# Patient Record
Sex: MALE | Race: WHITE | NOT HISPANIC OR LATINO | URBAN - METROPOLITAN AREA
[De-identification: names, ages, dates, MRNs, and addresses within clinical notes are randomized per-mention and may not be internally consistent; named-entity substitution may affect disease eponyms.]

---

## 2017-01-13 ENCOUNTER — AMBULATORY - HEALTHEAST (OUTPATIENT)
Dept: INFUSION THERAPY | Facility: HOSPITAL | Age: 82
End: 2017-01-13

## 2017-01-13 ENCOUNTER — OFFICE VISIT - HEALTHEAST (OUTPATIENT)
Dept: ONCOLOGY | Facility: HOSPITAL | Age: 82
End: 2017-01-13

## 2017-01-13 DIAGNOSIS — C91.10 CLL (CHRONIC LYMPHOCYTIC LEUKEMIA) (H): ICD-10-CM

## 2017-01-16 ENCOUNTER — COMMUNICATION - HEALTHEAST (OUTPATIENT)
Dept: ONCOLOGY | Facility: HOSPITAL | Age: 82
End: 2017-01-16

## 2017-01-23 ENCOUNTER — AMBULATORY - HEALTHEAST (OUTPATIENT)
Dept: ONCOLOGY | Facility: CLINIC | Age: 82
End: 2017-01-23

## 2017-01-23 DIAGNOSIS — C91.10 CLL (CHRONIC LYMPHOCYTIC LEUKEMIA) (H): ICD-10-CM

## 2017-01-24 ENCOUNTER — COMMUNICATION - HEALTHEAST (OUTPATIENT)
Dept: ONCOLOGY | Facility: HOSPITAL | Age: 82
End: 2017-01-24

## 2017-01-27 ENCOUNTER — AMBULATORY - HEALTHEAST (OUTPATIENT)
Dept: INFUSION THERAPY | Facility: HOSPITAL | Age: 82
End: 2017-01-27

## 2017-01-27 DIAGNOSIS — C91.10 CLL (CHRONIC LYMPHOCYTIC LEUKEMIA) (H): ICD-10-CM

## 2017-01-30 ENCOUNTER — COMMUNICATION - HEALTHEAST (OUTPATIENT)
Dept: ONCOLOGY | Facility: HOSPITAL | Age: 82
End: 2017-01-30

## 2017-01-30 LAB
LAB AP CHARGES (HE HISTORICAL CONVERSION): ABNORMAL
PATH REPORT.COMMENTS IMP SPEC: ABNORMAL
PATH REPORT.COMMENTS IMP SPEC: ABNORMAL
PATH REPORT.FINAL DX SPEC: ABNORMAL
PATH REPORT.MICROSCOPIC SPEC OTHER STN: ABNORMAL
RESULT FLAG (HE HISTORICAL CONVERSION): ABNORMAL

## 2017-02-03 ENCOUNTER — COMMUNICATION - HEALTHEAST (OUTPATIENT)
Dept: FAMILY MEDICINE | Facility: CLINIC | Age: 82
End: 2017-02-03

## 2017-02-03 DIAGNOSIS — E78.5 HYPERLIPIDEMIA: ICD-10-CM

## 2017-02-07 ENCOUNTER — COMMUNICATION - HEALTHEAST (OUTPATIENT)
Dept: FAMILY MEDICINE | Facility: CLINIC | Age: 82
End: 2017-02-07

## 2017-02-14 ENCOUNTER — AMBULATORY - HEALTHEAST (OUTPATIENT)
Dept: ONCOLOGY | Facility: HOSPITAL | Age: 82
End: 2017-02-14

## 2017-02-20 ENCOUNTER — COMMUNICATION - HEALTHEAST (OUTPATIENT)
Dept: FAMILY MEDICINE | Facility: CLINIC | Age: 82
End: 2017-02-20

## 2017-02-20 ENCOUNTER — OFFICE VISIT - HEALTHEAST (OUTPATIENT)
Dept: FAMILY MEDICINE | Facility: CLINIC | Age: 82
End: 2017-02-20

## 2017-02-20 DIAGNOSIS — C91.10 CLL (CHRONIC LYMPHOCYTIC LEUKEMIA) (H): ICD-10-CM

## 2017-02-20 DIAGNOSIS — R13.12 OROPHARYNGEAL DYSPHAGIA: ICD-10-CM

## 2017-02-20 DIAGNOSIS — J02.9 SORE THROAT: ICD-10-CM

## 2017-02-20 DIAGNOSIS — B34.9 VIRAL SYNDROME: ICD-10-CM

## 2017-02-21 ENCOUNTER — RECORDS - HEALTHEAST (OUTPATIENT)
Dept: ADMINISTRATIVE | Facility: OTHER | Age: 82
End: 2017-02-21

## 2017-02-22 ENCOUNTER — RECORDS - HEALTHEAST (OUTPATIENT)
Dept: ADMINISTRATIVE | Facility: OTHER | Age: 82
End: 2017-02-22

## 2017-02-24 ENCOUNTER — HOSPITAL ENCOUNTER (OUTPATIENT)
Dept: RADIOLOGY | Facility: HOSPITAL | Age: 82
Discharge: HOME | End: 2017-02-24
Attending: INTERNAL MEDICINE

## 2017-02-24 DIAGNOSIS — K44.9 HIATAL HERNIA: ICD-10-CM

## 2017-02-24 DIAGNOSIS — R13.10 DYSPHAGIA, UNSPECIFIED TYPE: ICD-10-CM

## 2017-02-25 ENCOUNTER — OFFICE VISIT - HEALTHEAST (OUTPATIENT)
Dept: FAMILY MEDICINE | Facility: CLINIC | Age: 82
End: 2017-02-25

## 2017-02-25 DIAGNOSIS — C91.10 CLL (CHRONIC LYMPHOCYTIC LEUKEMIA) (H): ICD-10-CM

## 2017-02-25 DIAGNOSIS — L03.012 PARONYCHIA OF FINGER OF LEFT HAND: ICD-10-CM

## 2017-02-27 ENCOUNTER — RECORDS - HEALTHEAST (OUTPATIENT)
Dept: ADMINISTRATIVE | Facility: OTHER | Age: 82
End: 2017-02-27

## 2017-02-28 ENCOUNTER — HOSPITAL ENCOUNTER (OUTPATIENT)
Dept: CT IMAGING | Facility: HOSPITAL | Age: 82
Discharge: HOME | End: 2017-02-28
Attending: INTERNAL MEDICINE

## 2017-02-28 DIAGNOSIS — K44.9 PARAESOPHAGEAL HIATAL HERNIA: ICD-10-CM

## 2017-03-01 ENCOUNTER — RECORDS - HEALTHEAST (OUTPATIENT)
Dept: ADMINISTRATIVE | Facility: OTHER | Age: 82
End: 2017-03-01

## 2017-03-02 ENCOUNTER — COMMUNICATION - HEALTHEAST (OUTPATIENT)
Dept: ONCOLOGY | Facility: HOSPITAL | Age: 82
End: 2017-03-02

## 2017-03-03 ENCOUNTER — RECORDS - HEALTHEAST (OUTPATIENT)
Dept: ADMINISTRATIVE | Facility: OTHER | Age: 82
End: 2017-03-03

## 2017-03-03 ENCOUNTER — HOSPITAL ENCOUNTER (OUTPATIENT)
Dept: CT IMAGING | Facility: HOSPITAL | Age: 82
Discharge: HOME | End: 2017-03-03
Attending: INTERNAL MEDICINE

## 2017-03-03 ENCOUNTER — OFFICE VISIT - HEALTHEAST (OUTPATIENT)
Dept: FAMILY MEDICINE | Facility: CLINIC | Age: 82
End: 2017-03-03

## 2017-03-03 DIAGNOSIS — F43.20 ADJUSTMENT DISORDER: ICD-10-CM

## 2017-03-03 DIAGNOSIS — C91.10 CLL (CHRONIC LYMPHOCYTIC LEUKEMIA) (H): ICD-10-CM

## 2017-03-03 DIAGNOSIS — R63.4 WEIGHT LOSS: ICD-10-CM

## 2017-03-03 DIAGNOSIS — R93.5 ABNORMAL ABDOMINAL CT SCAN: ICD-10-CM

## 2017-03-03 DIAGNOSIS — R13.10 SWALLOWING DYSFUNCTION: ICD-10-CM

## 2017-03-03 DIAGNOSIS — K44.9 PARAESOPHAGEAL HIATAL HERNIA: ICD-10-CM

## 2017-03-06 ENCOUNTER — RECORDS - HEALTHEAST (OUTPATIENT)
Dept: ADMINISTRATIVE | Facility: OTHER | Age: 82
End: 2017-03-06

## 2017-03-08 ENCOUNTER — COMMUNICATION - HEALTHEAST (OUTPATIENT)
Dept: FAMILY MEDICINE | Facility: CLINIC | Age: 82
End: 2017-03-08

## 2017-03-09 ENCOUNTER — RECORDS - HEALTHEAST (OUTPATIENT)
Dept: ADMINISTRATIVE | Facility: OTHER | Age: 82
End: 2017-03-09

## 2017-03-10 ENCOUNTER — RECORDS - HEALTHEAST (OUTPATIENT)
Dept: ADMINISTRATIVE | Facility: OTHER | Age: 82
End: 2017-03-10

## 2017-03-13 ENCOUNTER — HOSPITAL ENCOUNTER (OUTPATIENT)
Dept: RADIOLOGY | Facility: HOSPITAL | Age: 82
Discharge: HOME | End: 2017-03-13
Attending: FAMILY MEDICINE

## 2017-03-13 DIAGNOSIS — R13.10 SWALLOWING DYSFUNCTION: ICD-10-CM

## 2017-03-14 ENCOUNTER — COMMUNICATION - HEALTHEAST (OUTPATIENT)
Dept: FAMILY MEDICINE | Facility: CLINIC | Age: 82
End: 2017-03-14

## 2017-03-15 ENCOUNTER — COMMUNICATION - HEALTHEAST (OUTPATIENT)
Dept: ONCOLOGY | Facility: HOSPITAL | Age: 82
End: 2017-03-15

## 2017-03-15 ENCOUNTER — AMBULATORY - HEALTHEAST (OUTPATIENT)
Dept: FAMILY MEDICINE | Facility: CLINIC | Age: 82
End: 2017-03-15

## 2017-03-15 DIAGNOSIS — R13.10 SWALLOWING DYSFUNCTION: ICD-10-CM

## 2017-03-22 ENCOUNTER — OFFICE VISIT - HEALTHEAST (OUTPATIENT)
Dept: SPEECH THERAPY | Facility: REHABILITATION | Age: 82
End: 2017-03-22

## 2017-03-22 DIAGNOSIS — R13.10 SWALLOWING DYSFUNCTION: ICD-10-CM

## 2017-03-22 DIAGNOSIS — R13.13 DYSPHAGIA, PHARYNGEAL PHASE: ICD-10-CM

## 2017-03-27 ENCOUNTER — OFFICE VISIT - HEALTHEAST (OUTPATIENT)
Dept: SPEECH THERAPY | Facility: REHABILITATION | Age: 82
End: 2017-03-27

## 2017-03-27 DIAGNOSIS — R13.10 SWALLOWING DYSFUNCTION: ICD-10-CM

## 2017-03-27 DIAGNOSIS — R13.13 DYSPHAGIA, PHARYNGEAL PHASE: ICD-10-CM

## 2017-03-31 ENCOUNTER — COMMUNICATION - HEALTHEAST (OUTPATIENT)
Dept: FAMILY MEDICINE | Facility: CLINIC | Age: 82
End: 2017-03-31

## 2017-03-31 DIAGNOSIS — I10 HYPERTENSION: ICD-10-CM

## 2017-04-14 ENCOUNTER — AMBULATORY - HEALTHEAST (OUTPATIENT)
Dept: INFUSION THERAPY | Facility: HOSPITAL | Age: 82
End: 2017-04-14

## 2017-04-14 ENCOUNTER — OFFICE VISIT - HEALTHEAST (OUTPATIENT)
Dept: ONCOLOGY | Facility: HOSPITAL | Age: 82
End: 2017-04-14

## 2017-04-14 DIAGNOSIS — C91.10 CLL (CHRONIC LYMPHOCYTIC LEUKEMIA) (H): ICD-10-CM

## 2017-04-14 DIAGNOSIS — D72.829 LEUKOCYTOSIS: ICD-10-CM

## 2017-04-14 DIAGNOSIS — D63.8 ANEMIA, CHRONIC DISEASE: ICD-10-CM

## 2017-04-14 DIAGNOSIS — D69.6 THROMBOCYTOPENIA (H): ICD-10-CM

## 2017-05-15 ENCOUNTER — COMMUNICATION - HEALTHEAST (OUTPATIENT)
Dept: SPEECH THERAPY | Facility: REHABILITATION | Age: 82
End: 2017-05-15

## 2017-05-25 ENCOUNTER — COMMUNICATION - HEALTHEAST (OUTPATIENT)
Dept: FAMILY MEDICINE | Facility: CLINIC | Age: 82
End: 2017-05-25

## 2017-05-25 DIAGNOSIS — I25.10 CORONARY ARTERY DISEASE: ICD-10-CM

## 2017-06-05 ENCOUNTER — RECORDS - HEALTHEAST (OUTPATIENT)
Dept: ADMINISTRATIVE | Facility: OTHER | Age: 82
End: 2017-06-05

## 2017-07-14 ENCOUNTER — OFFICE VISIT - HEALTHEAST (OUTPATIENT)
Dept: ONCOLOGY | Facility: HOSPITAL | Age: 82
End: 2017-07-14

## 2017-07-14 ENCOUNTER — AMBULATORY - HEALTHEAST (OUTPATIENT)
Dept: INFUSION THERAPY | Facility: HOSPITAL | Age: 82
End: 2017-07-14

## 2017-07-14 DIAGNOSIS — C73 MALIGNANT NEOPLASM OF THYROID GLAND (H): ICD-10-CM

## 2017-07-14 DIAGNOSIS — C91.10 CLL (CHRONIC LYMPHOCYTIC LEUKEMIA) (H): ICD-10-CM

## 2017-07-18 ENCOUNTER — AMBULATORY - HEALTHEAST (OUTPATIENT)
Dept: INFUSION THERAPY | Facility: HOSPITAL | Age: 82
End: 2017-07-18

## 2017-07-18 ENCOUNTER — INFUSION - HEALTHEAST (OUTPATIENT)
Dept: INFUSION THERAPY | Facility: HOSPITAL | Age: 82
End: 2017-07-18

## 2017-07-18 DIAGNOSIS — C91.10 CLL (CHRONIC LYMPHOCYTIC LEUKEMIA) (H): ICD-10-CM

## 2017-07-18 LAB — HBV SURFACE AG SERPL QL IA: NEGATIVE

## 2017-07-18 ASSESSMENT — MIFFLIN-ST. JEOR: SCORE: 1551.2

## 2017-07-19 LAB — HBV CORE AB SERPL QL IA: NEGATIVE

## 2017-07-25 ENCOUNTER — AMBULATORY - HEALTHEAST (OUTPATIENT)
Dept: INFUSION THERAPY | Facility: HOSPITAL | Age: 82
End: 2017-07-25

## 2017-07-25 ENCOUNTER — INFUSION - HEALTHEAST (OUTPATIENT)
Dept: INFUSION THERAPY | Facility: HOSPITAL | Age: 82
End: 2017-07-25

## 2017-07-25 ENCOUNTER — OFFICE VISIT - HEALTHEAST (OUTPATIENT)
Dept: ONCOLOGY | Facility: HOSPITAL | Age: 82
End: 2017-07-25

## 2017-07-25 DIAGNOSIS — C91.10 CLL (CHRONIC LYMPHOCYTIC LEUKEMIA) (H): ICD-10-CM

## 2017-08-01 ENCOUNTER — INFUSION - HEALTHEAST (OUTPATIENT)
Dept: INFUSION THERAPY | Facility: HOSPITAL | Age: 82
End: 2017-08-01

## 2017-08-01 DIAGNOSIS — C91.10 CLL (CHRONIC LYMPHOCYTIC LEUKEMIA) (H): ICD-10-CM

## 2017-08-02 ENCOUNTER — COMMUNICATION - HEALTHEAST (OUTPATIENT)
Dept: ONCOLOGY | Facility: HOSPITAL | Age: 82
End: 2017-08-02

## 2017-08-02 DIAGNOSIS — C91.10 CLL (CHRONIC LYMPHOCYTIC LEUKEMIA) (H): ICD-10-CM

## 2017-08-02 DIAGNOSIS — R11.0 NAUSEA: ICD-10-CM

## 2017-08-03 ENCOUNTER — COMMUNICATION - HEALTHEAST (OUTPATIENT)
Dept: FAMILY MEDICINE | Facility: CLINIC | Age: 82
End: 2017-08-03

## 2017-08-03 DIAGNOSIS — E78.5 HYPERLIPIDEMIA: ICD-10-CM

## 2017-08-07 ENCOUNTER — INFUSION - HEALTHEAST (OUTPATIENT)
Dept: INFUSION THERAPY | Facility: CLINIC | Age: 82
End: 2017-08-07

## 2017-08-07 ENCOUNTER — AMBULATORY - HEALTHEAST (OUTPATIENT)
Dept: INFUSION THERAPY | Facility: CLINIC | Age: 82
End: 2017-08-07

## 2017-08-07 ENCOUNTER — OFFICE VISIT - HEALTHEAST (OUTPATIENT)
Dept: ONCOLOGY | Facility: CLINIC | Age: 82
End: 2017-08-07

## 2017-08-07 DIAGNOSIS — Z51.11 CHEMOTHERAPY MANAGEMENT, ENCOUNTER FOR: ICD-10-CM

## 2017-08-07 DIAGNOSIS — C91.10 CLL (CHRONIC LYMPHOCYTIC LEUKEMIA) (H): ICD-10-CM

## 2017-08-07 ASSESSMENT — MIFFLIN-ST. JEOR: SCORE: 1540.76

## 2017-08-10 ENCOUNTER — COMMUNICATION - HEALTHEAST (OUTPATIENT)
Dept: ONCOLOGY | Facility: CLINIC | Age: 82
End: 2017-08-10

## 2017-08-14 ENCOUNTER — OFFICE VISIT - HEALTHEAST (OUTPATIENT)
Dept: ONCOLOGY | Facility: HOSPITAL | Age: 82
End: 2017-08-14

## 2017-08-14 ENCOUNTER — AMBULATORY - HEALTHEAST (OUTPATIENT)
Dept: INFUSION THERAPY | Facility: HOSPITAL | Age: 82
End: 2017-08-14

## 2017-08-14 DIAGNOSIS — C91.10 CLL (CHRONIC LYMPHOCYTIC LEUKEMIA) (H): ICD-10-CM

## 2017-09-08 ENCOUNTER — RECORDS - HEALTHEAST (OUTPATIENT)
Dept: ADMINISTRATIVE | Facility: OTHER | Age: 82
End: 2017-09-08

## 2017-09-13 ENCOUNTER — COMMUNICATION - HEALTHEAST (OUTPATIENT)
Dept: FAMILY MEDICINE | Facility: CLINIC | Age: 82
End: 2017-09-13

## 2017-09-13 ENCOUNTER — AMBULATORY - HEALTHEAST (OUTPATIENT)
Dept: INFUSION THERAPY | Facility: HOSPITAL | Age: 82
End: 2017-09-13

## 2017-09-13 ENCOUNTER — OFFICE VISIT - HEALTHEAST (OUTPATIENT)
Dept: ONCOLOGY | Facility: HOSPITAL | Age: 82
End: 2017-09-13

## 2017-09-13 DIAGNOSIS — C91.10 CLL (CHRONIC LYMPHOCYTIC LEUKEMIA) (H): ICD-10-CM

## 2017-09-15 ENCOUNTER — COMMUNICATION - HEALTHEAST (OUTPATIENT)
Dept: FAMILY MEDICINE | Facility: CLINIC | Age: 82
End: 2017-09-15

## 2017-10-03 ENCOUNTER — COMMUNICATION - HEALTHEAST (OUTPATIENT)
Dept: FAMILY MEDICINE | Facility: CLINIC | Age: 82
End: 2017-10-03

## 2017-10-03 DIAGNOSIS — I10 HYPERTENSION: ICD-10-CM

## 2017-10-06 ENCOUNTER — AMBULATORY - HEALTHEAST (OUTPATIENT)
Dept: INFUSION THERAPY | Facility: HOSPITAL | Age: 82
End: 2017-10-06

## 2017-10-06 ENCOUNTER — OFFICE VISIT - HEALTHEAST (OUTPATIENT)
Dept: ONCOLOGY | Facility: HOSPITAL | Age: 82
End: 2017-10-06

## 2017-10-06 DIAGNOSIS — C91.10 CLL (CHRONIC LYMPHOCYTIC LEUKEMIA) (H): ICD-10-CM

## 2017-10-29 ENCOUNTER — COMMUNICATION - HEALTHEAST (OUTPATIENT)
Dept: FAMILY MEDICINE | Facility: CLINIC | Age: 82
End: 2017-10-29

## 2017-10-29 DIAGNOSIS — E03.9 HYPOTHYROIDISM: ICD-10-CM

## 2017-10-29 DIAGNOSIS — E78.5 HYPERLIPIDEMIA: ICD-10-CM

## 2017-11-02 ENCOUNTER — COMMUNICATION - HEALTHEAST (OUTPATIENT)
Dept: FAMILY MEDICINE | Facility: CLINIC | Age: 82
End: 2017-11-02

## 2017-11-02 DIAGNOSIS — E03.9 HYPOTHYROIDISM: ICD-10-CM

## 2017-11-02 DIAGNOSIS — E78.5 HYPERLIPIDEMIA: ICD-10-CM

## 2017-11-06 ENCOUNTER — AMBULATORY - HEALTHEAST (OUTPATIENT)
Dept: LAB | Facility: CLINIC | Age: 82
End: 2017-11-06

## 2017-11-06 DIAGNOSIS — E03.9 HYPOTHYROIDISM: ICD-10-CM

## 2017-11-17 ENCOUNTER — RECORDS - HEALTHEAST (OUTPATIENT)
Dept: ADMINISTRATIVE | Facility: OTHER | Age: 82
End: 2017-11-17

## 2017-11-27 ENCOUNTER — RECORDS - HEALTHEAST (OUTPATIENT)
Dept: ADMINISTRATIVE | Facility: OTHER | Age: 82
End: 2017-11-27

## 2017-11-28 ENCOUNTER — COMMUNICATION - HEALTHEAST (OUTPATIENT)
Dept: FAMILY MEDICINE | Facility: CLINIC | Age: 82
End: 2017-11-28

## 2017-11-28 DIAGNOSIS — I25.10 CORONARY ARTERY DISEASE: ICD-10-CM

## 2017-11-30 ENCOUNTER — COMMUNICATION - HEALTHEAST (OUTPATIENT)
Dept: SCHEDULING | Facility: CLINIC | Age: 82
End: 2017-11-30

## 2017-11-30 ENCOUNTER — AMBULATORY - HEALTHEAST (OUTPATIENT)
Dept: FAMILY MEDICINE | Facility: CLINIC | Age: 82
End: 2017-11-30

## 2017-12-01 ENCOUNTER — HOME CARE/HOSPICE - HEALTHEAST (OUTPATIENT)
Dept: HOME HEALTH SERVICES | Facility: HOME HEALTH | Age: 82
End: 2017-12-01

## 2017-12-02 ENCOUNTER — HOME CARE/HOSPICE - HEALTHEAST (OUTPATIENT)
Dept: HOME HEALTH SERVICES | Facility: HOME HEALTH | Age: 82
End: 2017-12-02

## 2017-12-03 ENCOUNTER — COMMUNICATION - HEALTHEAST (OUTPATIENT)
Dept: HOME HEALTH SERVICES | Facility: HOME HEALTH | Age: 82
End: 2017-12-03

## 2017-12-05 ENCOUNTER — COMMUNICATION - HEALTHEAST (OUTPATIENT)
Dept: FAMILY MEDICINE | Facility: CLINIC | Age: 82
End: 2017-12-05

## 2017-12-07 ENCOUNTER — OFFICE VISIT - HEALTHEAST (OUTPATIENT)
Dept: FAMILY MEDICINE | Facility: CLINIC | Age: 82
End: 2017-12-07

## 2017-12-07 DIAGNOSIS — Z09 HOSPITAL DISCHARGE FOLLOW-UP: ICD-10-CM

## 2017-12-07 DIAGNOSIS — I10 BENIGN ESSENTIAL HTN: ICD-10-CM

## 2017-12-07 DIAGNOSIS — L03.818 CELLULITIS OF OTHER SPECIFIED SITE: ICD-10-CM

## 2017-12-07 DIAGNOSIS — H60.391 INFECTION OF EAR LOBE, RIGHT: ICD-10-CM

## 2017-12-07 DIAGNOSIS — F43.21 ADJUSTMENT DISORDER WITH DEPRESSED MOOD: ICD-10-CM

## 2017-12-07 ASSESSMENT — MIFFLIN-ST. JEOR: SCORE: 1556.64

## 2017-12-13 ENCOUNTER — RECORDS - HEALTHEAST (OUTPATIENT)
Dept: ADMINISTRATIVE | Facility: OTHER | Age: 82
End: 2017-12-13

## 2018-01-17 ENCOUNTER — COMMUNICATION - HEALTHEAST (OUTPATIENT)
Dept: ONCOLOGY | Facility: HOSPITAL | Age: 83
End: 2018-01-17

## 2018-01-17 ENCOUNTER — COMMUNICATION - HEALTHEAST (OUTPATIENT)
Dept: SCHEDULING | Facility: CLINIC | Age: 83
End: 2018-01-17

## 2018-01-17 DIAGNOSIS — R12 HEARTBURN: ICD-10-CM

## 2018-01-17 DIAGNOSIS — K21.9 ACID REFLUX: ICD-10-CM

## 2018-01-18 ENCOUNTER — COMMUNICATION - HEALTHEAST (OUTPATIENT)
Dept: ONCOLOGY | Facility: HOSPITAL | Age: 83
End: 2018-01-18

## 2018-01-18 ENCOUNTER — AMBULATORY - HEALTHEAST (OUTPATIENT)
Dept: ONCOLOGY | Facility: HOSPITAL | Age: 83
End: 2018-01-18

## 2018-01-18 ENCOUNTER — INFUSION - HEALTHEAST (OUTPATIENT)
Dept: INFUSION THERAPY | Facility: HOSPITAL | Age: 83
End: 2018-01-18

## 2018-01-18 ENCOUNTER — AMBULATORY - HEALTHEAST (OUTPATIENT)
Dept: INFUSION THERAPY | Facility: HOSPITAL | Age: 83
End: 2018-01-18

## 2018-01-18 DIAGNOSIS — D64.9 ANEMIA: ICD-10-CM

## 2018-01-18 DIAGNOSIS — C91.10 CLL (CHRONIC LYMPHOCYTIC LEUKEMIA) (H): ICD-10-CM

## 2018-01-18 DIAGNOSIS — R53.83 FATIGUE: ICD-10-CM

## 2018-01-18 DIAGNOSIS — R53.1 WEAKNESS: ICD-10-CM

## 2018-01-18 LAB
ABO/RH(D): ABNORMAL
ANTIBODY SCREEN: POSITIVE
ANTIBODY UNIDENTIFIED: NORMAL
BASOPHILS # BLD AUTO: 0 THOU/UL (ref 0–0.2)
BASOPHILS NFR BLD AUTO: 0 % (ref 0–2)
EOSINOPHIL COUNT (ABSOLUTE): 0 THOU/UL (ref 0–0.4)
EOSINOPHIL NFR BLD AUTO: 0 % (ref 0–6)
ERYTHROCYTE [DISTWIDTH] IN BLOOD BY AUTOMATED COUNT: 15.2 % (ref 11–14.5)
HCT VFR BLD AUTO: 16.9 % (ref 40–54)
HGB BLD-MCNC: 5.1 G/DL (ref 14–18)
LYMPHOCYTES # BLD AUTO: 111.5 THOU/UL (ref 0.8–4.4)
LYMPHOCYTES NFR BLD AUTO: 99 % (ref 20–40)
MCH RBC QN AUTO: 32.1 PG (ref 27–34)
MCHC RBC AUTO-ENTMCNC: 30.2 G/DL (ref 32–36)
MCV RBC AUTO: 106 FL (ref 80–100)
MONOCYTES # BLD AUTO: 0 THOU/UL (ref 0–0.9)
MONOCYTES NFR BLD AUTO: 0 % (ref 2–10)
OVALOCYTES: ABNORMAL
PLAT MORPH BLD: ABNORMAL
PLATELET # BLD AUTO: 67 THOU/UL (ref 140–440)
PMV BLD AUTO: 13 FL (ref 8.5–12.5)
RBC # BLD AUTO: 1.59 MILL/UL (ref 4.4–6.2)
SMUDGE CELLS BLD QL SMEAR: PRESENT
TOTAL NEUTROPHILS-ABS(DIFF): 1.7 THOU/UL (ref 2–7.7)
TOTAL NEUTROPHILS-REL(DIFF): 1 % (ref 50–70)
WBC: 113.2 THOU/UL (ref 4–11)

## 2018-01-19 ENCOUNTER — OFFICE VISIT - HEALTHEAST (OUTPATIENT)
Dept: ONCOLOGY | Facility: HOSPITAL | Age: 83
End: 2018-01-19

## 2018-01-19 DIAGNOSIS — C91.10 CLL (CHRONIC LYMPHOCYTIC LEUKEMIA) (H): ICD-10-CM

## 2018-01-19 DIAGNOSIS — D72.829 LEUKOCYTOSIS: ICD-10-CM

## 2018-01-19 DIAGNOSIS — D64.9 ANEMIA: ICD-10-CM

## 2018-01-19 LAB
BLD PROD TYP BPU: NORMAL
BLOOD EXPIRATION DATE: NORMAL
BLOOD TYPE: 6200
CODING SYSTEM: NORMAL
COMPONENT (HISTORICAL CONVERSION): NORMAL
CROSSMATCH: NORMAL
LAB AP CHARGES (HE HISTORICAL CONVERSION): ABNORMAL
PATH REPORT.COMMENTS IMP SPEC: ABNORMAL
PATH REPORT.COMMENTS IMP SPEC: ABNORMAL
PATH REPORT.FINAL DX SPEC: ABNORMAL
PATH REPORT.MICROSCOPIC SPEC OTHER STN: ABNORMAL
RESULT FLAG (HE HISTORICAL CONVERSION): ABNORMAL
STATUS (HISTORICAL CONVERSION): NORMAL
UNIT ABO/RH (HISTORICAL CONVERSION): NORMAL
UNIT NUMBER: NORMAL

## 2018-01-21 LAB
BLD PROD TYP BPU: NORMAL
BLD PROD TYP BPU: NORMAL
BLOOD EXPIRATION DATE: NORMAL
BLOOD EXPIRATION DATE: NORMAL
BLOOD TYPE: 600
BLOOD TYPE: 600
CODING SYSTEM: NORMAL
CODING SYSTEM: NORMAL
COMPONENT (HISTORICAL CONVERSION): NORMAL
COMPONENT (HISTORICAL CONVERSION): NORMAL
CROSSMATCH: NORMAL
CROSSMATCH: NORMAL
ISSUE DATE AND TIME: NORMAL
ISSUE DATE AND TIME: NORMAL
STATUS (HISTORICAL CONVERSION): NORMAL
STATUS (HISTORICAL CONVERSION): NORMAL
UNIT ABO/RH (HISTORICAL CONVERSION): NORMAL
UNIT ABO/RH (HISTORICAL CONVERSION): NORMAL
UNIT NUMBER: NORMAL
UNIT NUMBER: NORMAL

## 2018-01-25 LAB
MISCELLANEOUS TEST DEPT. - HE HISTORICAL: NORMAL
PERFORMING LAB: NORMAL
SPECIMEN STATUS: NORMAL
TEST NAME: NORMAL

## 2018-01-26 ENCOUNTER — AMBULATORY - HEALTHEAST (OUTPATIENT)
Dept: INFUSION THERAPY | Facility: HOSPITAL | Age: 83
End: 2018-01-26

## 2018-01-26 ENCOUNTER — OFFICE VISIT - HEALTHEAST (OUTPATIENT)
Dept: ONCOLOGY | Facility: HOSPITAL | Age: 83
End: 2018-01-26

## 2018-01-26 DIAGNOSIS — C91.12 CLL (CHRONIC LYMPHOID LEUKEMIA) IN RELAPSE (H): ICD-10-CM

## 2018-01-26 DIAGNOSIS — D59.10 AUTOIMMUNE HEMOLYTIC ANEMIA (H): ICD-10-CM

## 2018-01-26 LAB
ALBUMIN SERPL-MCNC: 3.6 G/DL (ref 3.5–5)
ALP SERPL-CCNC: 52 U/L (ref 45–120)
ALT SERPL W P-5'-P-CCNC: 10 U/L (ref 0–45)
ANION GAP SERPL CALCULATED.3IONS-SCNC: 9 MMOL/L (ref 5–18)
AST SERPL W P-5'-P-CCNC: 10 U/L (ref 0–40)
BASOPHILS # BLD AUTO: 0 THOU/UL (ref 0–0.2)
BASOPHILS NFR BLD AUTO: 0 % (ref 0–2)
BILIRUB SERPL-MCNC: 0.6 MG/DL (ref 0–1)
BUN SERPL-MCNC: 27 MG/DL (ref 8–28)
CALCIUM SERPL-MCNC: 8.3 MG/DL (ref 8.5–10.5)
CHLORIDE BLD-SCNC: 108 MMOL/L (ref 98–107)
CO2 SERPL-SCNC: 26 MMOL/L (ref 22–31)
CREAT SERPL-MCNC: 0.96 MG/DL (ref 0.7–1.3)
EOSINOPHIL COUNT (ABSOLUTE): 0 THOU/UL (ref 0–0.4)
EOSINOPHIL NFR BLD AUTO: 0 % (ref 0–6)
ERYTHROCYTE [DISTWIDTH] IN BLOOD BY AUTOMATED COUNT: 15.8 % (ref 11–14.5)
GFR SERPL CREATININE-BSD FRML MDRD: >60 ML/MIN/1.73M2
GLUCOSE BLD-MCNC: 124 MG/DL (ref 70–125)
HAPTOGLOB SERPL-MCNC: <8 MG/DL (ref 33–171)
HCT VFR BLD AUTO: 29.4 % (ref 40–54)
HGB BLD-MCNC: 8.8 G/DL (ref 14–18)
LDH SERPL L TO P-CCNC: 240 U/L (ref 125–220)
LYMPHOCYTES # BLD AUTO: 109.5 THOU/UL (ref 0.8–4.4)
LYMPHOCYTES NFR BLD AUTO: 95 % (ref 20–40)
MCH RBC QN AUTO: 29.2 PG (ref 27–34)
MCHC RBC AUTO-ENTMCNC: 29.9 G/DL (ref 32–36)
MCV RBC AUTO: 98 FL (ref 80–100)
MONOCYTES # BLD AUTO: 0 THOU/UL (ref 0–0.9)
MONOCYTES NFR BLD AUTO: 0 % (ref 2–10)
OVALOCYTES: ABNORMAL
PLAT MORPH BLD: ABNORMAL
PLATELET # BLD AUTO: 60 THOU/UL (ref 140–440)
PMV BLD AUTO: 13.8 FL (ref 8.5–12.5)
POTASSIUM BLD-SCNC: 3.5 MMOL/L (ref 3.5–5)
PROT SERPL-MCNC: 5.3 G/DL (ref 6–8)
RBC # BLD AUTO: 3.01 MILL/UL (ref 4.4–6.2)
REACTIVE LYMPHS: ABNORMAL
RETICS # AUTO: 0.01 MILL/UL (ref 0.01–0.11)
SODIUM SERPL-SCNC: 143 MMOL/L (ref 136–145)
TOTAL NEUTROPHILS-ABS(DIFF): 5.8 THOU/UL (ref 2–7.7)
TOTAL NEUTROPHILS-REL(DIFF): 5 % (ref 50–70)
WBC: 115.3 THOU/UL (ref 4–11)

## 2018-01-29 ENCOUNTER — OFFICE VISIT - HEALTHEAST (OUTPATIENT)
Dept: FAMILY MEDICINE | Facility: CLINIC | Age: 83
End: 2018-01-29

## 2018-01-29 DIAGNOSIS — C91.12 CLL (CHRONIC LYMPHOID LEUKEMIA) IN RELAPSE (H): ICD-10-CM

## 2018-01-29 DIAGNOSIS — Z09 HOSPITAL DISCHARGE FOLLOW-UP: ICD-10-CM

## 2018-01-29 DIAGNOSIS — C44.92 SQUAMOUS CELL CARCINOMA OF SKIN: ICD-10-CM

## 2018-01-29 DIAGNOSIS — D59.19 OTHER AUTOIMMUNE HEMOLYTIC ANEMIAS: ICD-10-CM

## 2018-01-29 DIAGNOSIS — I10 BENIGN ESSENTIAL HTN: ICD-10-CM

## 2018-01-29 ASSESSMENT — MIFFLIN-ST. JEOR: SCORE: 1606.54

## 2018-02-01 ENCOUNTER — OFFICE VISIT - HEALTHEAST (OUTPATIENT)
Dept: ONCOLOGY | Facility: HOSPITAL | Age: 83
End: 2018-02-01

## 2018-02-01 ENCOUNTER — AMBULATORY - HEALTHEAST (OUTPATIENT)
Dept: INFUSION THERAPY | Facility: HOSPITAL | Age: 83
End: 2018-02-01

## 2018-02-01 DIAGNOSIS — D59.10 AUTOIMMUNE HEMOLYTIC ANEMIA (H): ICD-10-CM

## 2018-02-01 DIAGNOSIS — D59.19 OTHER AUTOIMMUNE HEMOLYTIC ANEMIAS: ICD-10-CM

## 2018-02-01 DIAGNOSIS — C91.12 CLL (CHRONIC LYMPHOID LEUKEMIA) IN RELAPSE (H): ICD-10-CM

## 2018-02-01 LAB
BASOPHILS # BLD AUTO: 0 THOU/UL (ref 0–0.2)
BASOPHILS NFR BLD AUTO: 0 % (ref 0–2)
EOSINOPHIL COUNT (ABSOLUTE): 0 THOU/UL (ref 0–0.4)
EOSINOPHIL NFR BLD AUTO: 0 % (ref 0–6)
ERYTHROCYTE [DISTWIDTH] IN BLOOD BY AUTOMATED COUNT: 17.3 % (ref 11–14.5)
HAPTOGLOB SERPL-MCNC: <8 MG/DL (ref 33–171)
HCT VFR BLD AUTO: 29.4 % (ref 40–54)
HGB BLD-MCNC: 8.4 G/DL (ref 14–18)
LDH SERPL L TO P-CCNC: 195 U/L (ref 125–220)
LYMPHOCYTES # BLD AUTO: 162.4 THOU/UL (ref 0.8–4.4)
LYMPHOCYTES NFR BLD AUTO: 97 % (ref 20–40)
MCH RBC QN AUTO: 29.1 PG (ref 27–34)
MCHC RBC AUTO-ENTMCNC: 28.6 G/DL (ref 32–36)
MCV RBC AUTO: 102 FL (ref 80–100)
MONOCYTES # BLD AUTO: 0 THOU/UL (ref 0–0.9)
MONOCYTES NFR BLD AUTO: 0 % (ref 2–10)
OVALOCYTES: ABNORMAL
PLAT MORPH BLD: ABNORMAL
PLATELET # BLD AUTO: 92 THOU/UL (ref 140–440)
PMV BLD AUTO: 12.9 FL (ref 8.5–12.5)
RBC # BLD AUTO: 2.89 MILL/UL (ref 4.4–6.2)
RETICS # AUTO: 0.03 MILL/UL (ref 0.01–0.11)
SMUDGE CELLS BLD QL SMEAR: PRESENT
TOTAL NEUTROPHILS-ABS(DIFF): 5 THOU/UL (ref 2–7.7)
TOTAL NEUTROPHILS-REL(DIFF): 3 % (ref 50–70)
WBC: 167.4 THOU/UL (ref 4–11)

## 2018-02-08 ENCOUNTER — OFFICE VISIT - HEALTHEAST (OUTPATIENT)
Dept: ONCOLOGY | Facility: HOSPITAL | Age: 83
End: 2018-02-08

## 2018-02-08 ENCOUNTER — AMBULATORY - HEALTHEAST (OUTPATIENT)
Dept: ONCOLOGY | Facility: HOSPITAL | Age: 83
End: 2018-02-08

## 2018-02-08 ENCOUNTER — AMBULATORY - HEALTHEAST (OUTPATIENT)
Dept: INFUSION THERAPY | Facility: HOSPITAL | Age: 83
End: 2018-02-08

## 2018-02-08 DIAGNOSIS — C91.10 CLL (CHRONIC LYMPHOCYTIC LEUKEMIA) (H): ICD-10-CM

## 2018-02-08 DIAGNOSIS — C44.92 SQUAMOUS CELL CARCINOMA OF SKIN: ICD-10-CM

## 2018-02-08 DIAGNOSIS — R06.02 SHORTNESS OF BREATH: ICD-10-CM

## 2018-02-08 DIAGNOSIS — C91.12 CLL (CHRONIC LYMPHOID LEUKEMIA) IN RELAPSE (H): ICD-10-CM

## 2018-02-08 DIAGNOSIS — R53.83 FATIGUE: ICD-10-CM

## 2018-02-08 DIAGNOSIS — D59.19 OTHER AUTOIMMUNE HEMOLYTIC ANEMIAS: ICD-10-CM

## 2018-02-08 DIAGNOSIS — R10.84 GENERALIZED ABDOMINAL PAIN: ICD-10-CM

## 2018-02-08 DIAGNOSIS — D59.10 ACQUIRED AUTOIMMUNE HEMOLYTIC ANEMIA (H): ICD-10-CM

## 2018-02-08 LAB
ABO/RH(D): ABNORMAL
ALBUMIN SERPL-MCNC: 3.9 G/DL (ref 3.5–5)
ALP SERPL-CCNC: 48 U/L (ref 45–120)
ALT SERPL W P-5'-P-CCNC: 12 U/L (ref 0–45)
ANION GAP SERPL CALCULATED.3IONS-SCNC: 9 MMOL/L (ref 5–18)
ANTIBODY SCREEN: POSITIVE
AST SERPL W P-5'-P-CCNC: 12 U/L (ref 0–40)
BASOPHILS # BLD AUTO: 0 THOU/UL (ref 0–0.2)
BASOPHILS NFR BLD AUTO: 0 % (ref 0–2)
BILIRUB SERPL-MCNC: 0.5 MG/DL (ref 0–1)
BUN SERPL-MCNC: 18 MG/DL (ref 8–28)
CALCIUM SERPL-MCNC: 8.5 MG/DL (ref 8.5–10.5)
CHLORIDE BLD-SCNC: 109 MMOL/L (ref 98–107)
CO2 SERPL-SCNC: 24 MMOL/L (ref 22–31)
CREAT SERPL-MCNC: 0.93 MG/DL (ref 0.7–1.3)
EOSINOPHIL COUNT (ABSOLUTE): 0 THOU/UL (ref 0–0.4)
EOSINOPHIL NFR BLD AUTO: 0 % (ref 0–6)
ERYTHROCYTE [DISTWIDTH] IN BLOOD BY AUTOMATED COUNT: 21.5 % (ref 11–14.5)
GFR SERPL CREATININE-BSD FRML MDRD: >60 ML/MIN/1.73M2
GLUCOSE BLD-MCNC: 231 MG/DL (ref 70–125)
HAPTOGLOB SERPL-MCNC: <8 MG/DL (ref 33–171)
HCT VFR BLD AUTO: 28.4 % (ref 40–54)
HGB BLD-MCNC: 8.1 G/DL (ref 14–18)
LDH SERPL L TO P-CCNC: 213 U/L (ref 125–220)
LYMPHOCYTES # BLD AUTO: 122.2 THOU/UL (ref 0.8–4.4)
LYMPHOCYTES NFR BLD AUTO: 99 % (ref 20–40)
MCH RBC QN AUTO: 30 PG (ref 27–34)
MCHC RBC AUTO-ENTMCNC: 28.5 G/DL (ref 32–36)
MCV RBC AUTO: 105 FL (ref 80–100)
MONOCYTES # BLD AUTO: 0 THOU/UL (ref 0–0.9)
MONOCYTES NFR BLD AUTO: 0 % (ref 2–10)
PLAT MORPH BLD: ABNORMAL
PLATELET # BLD AUTO: 86 THOU/UL (ref 140–440)
PMV BLD AUTO: 12.5 FL (ref 8.5–12.5)
POTASSIUM BLD-SCNC: 4.4 MMOL/L (ref 3.5–5)
PROT SERPL-MCNC: 5.9 G/DL (ref 6–8)
RBC # BLD AUTO: 2.7 MILL/UL (ref 4.4–6.2)
SODIUM SERPL-SCNC: 142 MMOL/L (ref 136–145)
TOTAL NEUTROPHILS-ABS(DIFF): 1.2 THOU/UL (ref 2–7.7)
TOTAL NEUTROPHILS-REL(DIFF): 1 % (ref 50–70)
WBC: 123.4 THOU/UL (ref 4–11)

## 2018-02-09 ENCOUNTER — INFUSION - HEALTHEAST (OUTPATIENT)
Dept: INFUSION THERAPY | Facility: HOSPITAL | Age: 83
End: 2018-02-09

## 2018-02-09 ENCOUNTER — COMMUNICATION - HEALTHEAST (OUTPATIENT)
Dept: ONCOLOGY | Facility: HOSPITAL | Age: 83
End: 2018-02-09

## 2018-02-09 DIAGNOSIS — R53.83 FATIGUE: ICD-10-CM

## 2018-02-09 DIAGNOSIS — C91.10 CLL (CHRONIC LYMPHOCYTIC LEUKEMIA) (H): ICD-10-CM

## 2018-02-09 DIAGNOSIS — R06.02 SHORTNESS OF BREATH: ICD-10-CM

## 2018-02-10 LAB
BLD PROD TYP BPU: NORMAL
BLOOD EXPIRATION DATE: NORMAL
BLOOD TYPE: 600
CODING SYSTEM: NORMAL
COMPONENT (HISTORICAL CONVERSION): NORMAL
CROSSMATCH: NORMAL
ISSUE DATE AND TIME: NORMAL
STATUS (HISTORICAL CONVERSION): NORMAL
UNIT ABO/RH (HISTORICAL CONVERSION): NORMAL
UNIT NUMBER: NORMAL

## 2018-02-11 LAB — REF LAB ANTIBODY IDENTIFICATION: NORMAL

## 2018-02-12 ENCOUNTER — COMMUNICATION - HEALTHEAST (OUTPATIENT)
Dept: ONCOLOGY | Facility: HOSPITAL | Age: 83
End: 2018-02-12

## 2018-02-13 ENCOUNTER — HOSPITAL ENCOUNTER (OUTPATIENT)
Dept: CT IMAGING | Facility: HOSPITAL | Age: 83
Setting detail: RADIATION/ONCOLOGY SERIES
Discharge: STILL ADMITTED OR ADMINISTRATIVE | End: 2018-02-13
Attending: INTERNAL MEDICINE

## 2018-02-13 DIAGNOSIS — C91.10 CLL (CHRONIC LYMPHOCYTIC LEUKEMIA) (H): ICD-10-CM

## 2018-02-14 ENCOUNTER — AMBULATORY - HEALTHEAST (OUTPATIENT)
Dept: INFUSION THERAPY | Facility: HOSPITAL | Age: 83
End: 2018-02-14

## 2018-02-14 ENCOUNTER — OFFICE VISIT - HEALTHEAST (OUTPATIENT)
Dept: ONCOLOGY | Facility: HOSPITAL | Age: 83
End: 2018-02-14

## 2018-02-14 DIAGNOSIS — C91.12 CLL (CHRONIC LYMPHOID LEUKEMIA) IN RELAPSE (H): ICD-10-CM

## 2018-02-14 DIAGNOSIS — R10.30 LOWER ABDOMINAL PAIN: ICD-10-CM

## 2018-02-14 DIAGNOSIS — D59.10 AUTOIMMUNE HEMOLYTIC ANEMIA (H): ICD-10-CM

## 2018-02-14 LAB
BASOPHILS # BLD AUTO: 0 THOU/UL (ref 0–0.2)
BASOPHILS NFR BLD AUTO: 0 % (ref 0–2)
EOSINOPHIL COUNT (ABSOLUTE): 0 THOU/UL (ref 0–0.4)
EOSINOPHIL NFR BLD AUTO: 0 % (ref 0–6)
ERYTHROCYTE [DISTWIDTH] IN BLOOD BY AUTOMATED COUNT: 23 % (ref 11–14.5)
HCT VFR BLD AUTO: 29.5 % (ref 40–54)
HGB BLD-MCNC: 8.8 G/DL (ref 14–18)
LDH SERPL L TO P-CCNC: 201 U/L (ref 125–220)
LYMPHOCYTES # BLD AUTO: 83.2 THOU/UL (ref 0.8–4.4)
LYMPHOCYTES NFR BLD AUTO: 93 % (ref 20–40)
MCH RBC QN AUTO: 31 PG (ref 27–34)
MCHC RBC AUTO-ENTMCNC: 29.8 G/DL (ref 32–36)
MCV RBC AUTO: 104 FL (ref 80–100)
MONOCYTES # BLD AUTO: 0.9 THOU/UL (ref 0–0.9)
MONOCYTES NFR BLD AUTO: 1 % (ref 2–10)
OVALOCYTES: ABNORMAL
PLAT MORPH BLD: ABNORMAL
PLATELET # BLD AUTO: 76 THOU/UL (ref 140–440)
PMV BLD AUTO: 11.7 FL (ref 8.5–12.5)
RBC # BLD AUTO: 2.84 MILL/UL (ref 4.4–6.2)
RETICS # AUTO: 0.14 MILL/UL (ref 0.01–0.11)
SMUDGE CELLS BLD QL SMEAR: PRESENT
TOTAL NEUTROPHILS-ABS(DIFF): 5.4 THOU/UL (ref 2–7.7)
TOTAL NEUTROPHILS-REL(DIFF): 6 % (ref 50–70)
WBC: 89.5 THOU/UL (ref 4–11)

## 2018-02-18 ENCOUNTER — COMMUNICATION - HEALTHEAST (OUTPATIENT)
Dept: FAMILY MEDICINE | Facility: CLINIC | Age: 83
End: 2018-02-18

## 2018-02-18 DIAGNOSIS — D59.19 OTHER AUTOIMMUNE HEMOLYTIC ANEMIAS: ICD-10-CM

## 2018-02-19 ENCOUNTER — AMBULATORY - HEALTHEAST (OUTPATIENT)
Dept: INFUSION THERAPY | Facility: HOSPITAL | Age: 83
End: 2018-02-19

## 2018-02-19 DIAGNOSIS — D59.10 AUTOIMMUNE HEMOLYTIC ANEMIA (H): ICD-10-CM

## 2018-02-19 DIAGNOSIS — C91.12 CLL (CHRONIC LYMPHOID LEUKEMIA) IN RELAPSE (H): ICD-10-CM

## 2018-02-19 LAB
ERYTHROCYTE [DISTWIDTH] IN BLOOD BY AUTOMATED COUNT: 24.8 % (ref 11–14.5)
HAPTOGLOB SERPL-MCNC: <8 MG/DL (ref 33–171)
HCT VFR BLD AUTO: 31.7 % (ref 40–54)
HGB BLD-MCNC: 9.3 G/DL (ref 14–18)
MCH RBC QN AUTO: 31.7 PG (ref 27–34)
MCHC RBC AUTO-ENTMCNC: 29.3 G/DL (ref 32–36)
MCV RBC AUTO: 108 FL (ref 80–100)
PLATELET # BLD AUTO: 79 THOU/UL (ref 140–440)
PMV BLD AUTO: 12.1 FL (ref 8.5–12.5)
RBC # BLD AUTO: 2.93 MILL/UL (ref 4.4–6.2)
WBC: 83.6 THOU/UL (ref 4–11)

## 2018-02-26 ENCOUNTER — COMMUNICATION - HEALTHEAST (OUTPATIENT)
Dept: ONCOLOGY | Facility: HOSPITAL | Age: 83
End: 2018-02-26

## 2018-03-03 ENCOUNTER — COMMUNICATION - HEALTHEAST (OUTPATIENT)
Dept: FAMILY MEDICINE | Facility: CLINIC | Age: 83
End: 2018-03-03

## 2018-03-07 ENCOUNTER — AMBULATORY - HEALTHEAST (OUTPATIENT)
Dept: INFUSION THERAPY | Facility: HOSPITAL | Age: 83
End: 2018-03-07

## 2018-03-07 ENCOUNTER — OFFICE VISIT - HEALTHEAST (OUTPATIENT)
Dept: ONCOLOGY | Facility: HOSPITAL | Age: 83
End: 2018-03-07

## 2018-03-07 ENCOUNTER — COMMUNICATION - HEALTHEAST (OUTPATIENT)
Dept: ONCOLOGY | Facility: HOSPITAL | Age: 83
End: 2018-03-07

## 2018-03-07 DIAGNOSIS — T14.8XXA BRUISING: ICD-10-CM

## 2018-03-07 DIAGNOSIS — C91.12 CLL (CHRONIC LYMPHOID LEUKEMIA) IN RELAPSE (H): ICD-10-CM

## 2018-03-07 DIAGNOSIS — D59.19 OTHER AUTOIMMUNE HEMOLYTIC ANEMIAS: ICD-10-CM

## 2018-03-07 DIAGNOSIS — C91.10 CLL (CHRONIC LYMPHOCYTIC LEUKEMIA) (H): ICD-10-CM

## 2018-03-07 LAB
ALBUMIN SERPL-MCNC: 3.7 G/DL (ref 3.5–5)
ALP SERPL-CCNC: 35 U/L (ref 45–120)
ALT SERPL W P-5'-P-CCNC: <9 U/L (ref 0–45)
ANION GAP SERPL CALCULATED.3IONS-SCNC: 7 MMOL/L (ref 5–18)
AST SERPL W P-5'-P-CCNC: 8 U/L (ref 0–40)
BASOPHILS # BLD AUTO: 0 THOU/UL (ref 0–0.2)
BASOPHILS NFR BLD AUTO: 0 % (ref 0–2)
BILIRUB SERPL-MCNC: 0.9 MG/DL (ref 0–1)
BUN SERPL-MCNC: 20 MG/DL (ref 8–28)
CALCIUM SERPL-MCNC: 8.5 MG/DL (ref 8.5–10.5)
CHLORIDE BLD-SCNC: 106 MMOL/L (ref 98–107)
CO2 SERPL-SCNC: 28 MMOL/L (ref 22–31)
CREAT SERPL-MCNC: 0.88 MG/DL (ref 0.7–1.3)
EOSINOPHIL COUNT (ABSOLUTE): 0 THOU/UL (ref 0–0.4)
EOSINOPHIL NFR BLD AUTO: 0 % (ref 0–6)
ERYTHROCYTE [DISTWIDTH] IN BLOOD BY AUTOMATED COUNT: 24.6 % (ref 11–14.5)
GFR SERPL CREATININE-BSD FRML MDRD: >60 ML/MIN/1.73M2
GLUCOSE BLD-MCNC: 158 MG/DL (ref 70–125)
HCT VFR BLD AUTO: 31.8 % (ref 40–54)
HGB BLD-MCNC: 9.7 G/DL (ref 14–18)
LYMPHOCYTES # BLD AUTO: 67.1 THOU/UL (ref 0.8–4.4)
LYMPHOCYTES NFR BLD AUTO: 96 % (ref 20–40)
MCH RBC QN AUTO: 33.7 PG (ref 27–34)
MCHC RBC AUTO-ENTMCNC: 30.5 G/DL (ref 32–36)
MCV RBC AUTO: 110 FL (ref 80–100)
MONOCYTES # BLD AUTO: 0 THOU/UL (ref 0–0.9)
MONOCYTES NFR BLD AUTO: 0 % (ref 2–10)
OVALOCYTES: ABNORMAL
PLAT MORPH BLD: ABNORMAL
PLATELET # BLD AUTO: 82 THOU/UL (ref 140–440)
PMV BLD AUTO: 12 FL (ref 8.5–12.5)
POLYCHROMASIA BLD QL SMEAR: ABNORMAL
POTASSIUM BLD-SCNC: 3.4 MMOL/L (ref 3.5–5)
PROT SERPL-MCNC: 5.5 G/DL (ref 6–8)
RBC # BLD AUTO: 2.88 MILL/UL (ref 4.4–6.2)
SODIUM SERPL-SCNC: 141 MMOL/L (ref 136–145)
TOTAL NEUTROPHILS-ABS(DIFF): 3.2 THOU/UL (ref 2–7.7)
TOTAL NEUTROPHILS-REL(DIFF): 5 % (ref 50–70)
WBC: 70.3 THOU/UL (ref 4–11)

## 2018-03-08 ENCOUNTER — COMMUNICATION - HEALTHEAST (OUTPATIENT)
Dept: ONCOLOGY | Facility: HOSPITAL | Age: 83
End: 2018-03-08

## 2018-03-09 ENCOUNTER — COMMUNICATION - HEALTHEAST (OUTPATIENT)
Dept: SCHEDULING | Facility: CLINIC | Age: 83
End: 2018-03-09

## 2018-03-09 ENCOUNTER — TRANSFERRED RECORDS (OUTPATIENT)
Dept: HEALTH INFORMATION MANAGEMENT | Facility: CLINIC | Age: 83
End: 2018-03-09

## 2018-03-09 ENCOUNTER — HOSPITAL ENCOUNTER (INPATIENT)
Dept: INTENSIVE CARE | Facility: CLINIC | Age: 83
Discharge: NURSING, REHAB OR LONG-TERM CARE | End: 2018-03-22
Attending: INTERNAL MEDICINE | Admitting: INTERNAL MEDICINE
Payer: COMMERCIAL

## 2018-03-09 DIAGNOSIS — A41.9 SEPSIS (H): ICD-10-CM

## 2018-03-09 DIAGNOSIS — C91.12 CLL (CHRONIC LYMPHOID LEUKEMIA) IN RELAPSE (H): ICD-10-CM

## 2018-03-09 DIAGNOSIS — E87.20 LACTIC ACIDOSIS: ICD-10-CM

## 2018-03-09 DIAGNOSIS — K81.9 CHOLECYSTITIS: ICD-10-CM

## 2018-03-09 DIAGNOSIS — R10.9 ABDOMINAL PAIN, UNSPECIFIED ABDOMINAL LOCATION: ICD-10-CM

## 2018-03-09 DIAGNOSIS — D59.19 OTHER AUTOIMMUNE HEMOLYTIC ANEMIAS: ICD-10-CM

## 2018-03-09 DIAGNOSIS — D69.6 THROMBOCYTOPENIA (H): ICD-10-CM

## 2018-03-09 DIAGNOSIS — D64.9 CHRONIC ANEMIA: ICD-10-CM

## 2018-03-09 DIAGNOSIS — E27.40 ADRENAL INSUFFICIENCY (H): ICD-10-CM

## 2018-03-09 DIAGNOSIS — N17.9 AKI (ACUTE KIDNEY INJURY) (H): ICD-10-CM

## 2018-03-09 DIAGNOSIS — I48.91 ATRIAL FIBRILLATION WITH RAPID VENTRICULAR RESPONSE (H): ICD-10-CM

## 2018-03-09 LAB
ABO/RH(D): ABNORMAL
ALBUMIN SERPL-MCNC: 3.2 G/DL (ref 3.5–5)
ALP SERPL-CCNC: 39 U/L (ref 45–120)
ALT SERPL W P-5'-P-CCNC: 11 U/L (ref 0–45)
ANION GAP SERPL CALCULATED.3IONS-SCNC: 12 MMOL/L (ref 5–18)
ANTIBODY SCREEN: POSITIVE
AORTIC ROOT: 3.5 CM
AORTIC VALVE MEAN VELOCITY: 206 CM/S
APTT PPP: 28 SECONDS (ref 24–37)
AR DECEL SLOPE: 2680 MM/S2
AR PEAK VELOCITY: 258 CM/S
ASCENDING AORTA: 4.4 CM
AST SERPL W P-5'-P-CCNC: 15 U/L (ref 0–40)
ATRIAL RATE - MUSE: 106 BPM
AV DIMENSIONLESS INDEX VTI: 0.4
AV MEAN GRADIENT: 19 MMHG
AV PEAK GRADIENT: 38.2 MMHG
AV REGURGITANT PEAK GRADIENT: 26.6 MMHG
AV REGURGITATION PRESSURE HALF TIME: 283 MS
AV VALVE AREA: 1.5 CM2
AV VELOCITY RATIO: 0.4
BASE EXCESS BLDV CALC-SCNC: -6.8 MMOL/L
BASOPHILS # BLD AUTO: 0 THOU/UL (ref 0–0.2)
BASOPHILS NFR BLD AUTO: 0 % (ref 0–2)
BILIRUB SERPL-MCNC: 1.6 MG/DL (ref 0–1)
BSA FOR ECHO PROCEDURE: 2.17 M2
BUN SERPL-MCNC: 28 MG/DL (ref 8–28)
CALCIUM SERPL-MCNC: 7.7 MG/DL (ref 8.5–10.5)
CHLORIDE BLD-SCNC: 110 MMOL/L (ref 98–107)
CO2 SERPL-SCNC: 20 MMOL/L (ref 22–31)
CREAT SERPL-MCNC: 1.4 MG/DL (ref 0.7–1.3)
CV BLOOD PRESSURE: NORMAL MMHG
CV ECHO HEIGHT: 75 IN
CV ECHO WEIGHT: 196 LBS
DIASTOLIC BLOOD PRESSURE - MUSE: NORMAL MMHG
DOP CALC AO PEAK VEL: 309 CM/S
DOP CALC AO VTI: 64.2 CM
DOP CALC LVOT AREA: 3.8 CM2
DOP CALC LVOT DIAMETER: 2.2 CM
DOP CALC LVOT PEAK VEL: 119 CM/S
DOP CALC LVOT STROKE VOLUME: 96.9 CM3
DOP CALCLVOT PEAK VEL VTI: 25.5 CM
ECHO EJECTION FRACTION ESTIMATED: 60 %
EJECTION FRACTION: 51 % (ref 55–75)
EOSINOPHIL COUNT (ABSOLUTE): 0 THOU/UL (ref 0–0.4)
EOSINOPHIL NFR BLD AUTO: 0 % (ref 0–6)
ERYTHROCYTE [DISTWIDTH] IN BLOOD BY AUTOMATED COUNT: 23.9 % (ref 11–14.5)
FLUAV AG SPEC QL IA: NORMAL
FLUBV AG SPEC QL IA: NORMAL
FOLATE SERPL-MCNC: 15.1 NG/ML
FRACTIONAL SHORTENING: 38.6 % (ref 28–44)
GFR SERPL CREATININE-BSD FRML MDRD: 48 ML/MIN/1.73M2
GLUCOSE BLD-MCNC: 179 MG/DL (ref 70–125)
GLUCOSE BLDC GLUCOMTR-MCNC: 134 MG/DL
GLUCOSE BLDC GLUCOMTR-MCNC: 140 MG/DL
GLUCOSE BLDC GLUCOMTR-MCNC: 142 MG/DL
GLUCOSE BLDC GLUCOMTR-MCNC: 144 MG/DL
GLUCOSE BLDC GLUCOMTR-MCNC: 152 MG/DL
GLUCOSE BLDC GLUCOMTR-MCNC: 165 MG/DL
HCO3, VENOUS, CALC - HISTORICAL: 19.3 MMOL/L (ref 24–30)
HCT VFR BLD AUTO: 29 % (ref 40–54)
HGB BLD-MCNC: 8.7 G/DL (ref 14–18)
INR PPP: 1.26 (ref 0.9–1.1)
INTERPRETATION ECG - MUSE: NORMAL
INTERVENTRICULAR SEPTUM IN END DIASTOLE: 1.55 CM (ref 0.6–1)
IVS/PW RATIO: 1.2
LA AREA 1: 27.7 CM2
LA AREA 2: 26 CM2
LACTATE SERPL-SCNC: 2.7 MMOL/L (ref 0.5–2.2)
LACTATE SERPL-SCNC: 2.7 MMOL/L (ref 0.5–2.2)
LACTATE SERPL-SCNC: 7 MMOL/L (ref 0.5–2.2)
LEFT ATRIUM LENGTH: 6.4 CM
LEFT ATRIUM SIZE: 4 CM
LEFT ATRIUM VOLUME INDEX: 44.1 ML/M2
LEFT ATRIUM VOLUME: 95.7 ML
LEFT VENTRICLE CARDIAC INDEX: 3.3 L/MIN/M2
LEFT VENTRICLE CARDIAC OUTPUT: 7.3 L/MIN
LEFT VENTRICLE DIASTOLIC VOLUME INDEX: 54.4 CM3/M2 (ref 34–74)
LEFT VENTRICLE DIASTOLIC VOLUME: 118 CM3 (ref 62–150)
LEFT VENTRICLE HEART RATE: 75 BPM
LEFT VENTRICLE MASS INDEX: 133.9 G/M2
LEFT VENTRICLE SYSTOLIC VOLUME INDEX: 26.9 CM3/M2 (ref 11–31)
LEFT VENTRICLE SYSTOLIC VOLUME: 58.4 CM3 (ref 21–61)
LEFT VENTRICULAR INTERNAL DIMENSION IN DIASTOLE: 4.84 CM (ref 4.2–5.8)
LEFT VENTRICULAR INTERNAL DIMENSION IN SYSTOLE: 2.97 CM (ref 2.5–4)
LEFT VENTRICULAR MASS: 290.6 G
LEFT VENTRICULAR OUTFLOW TRACT MEAN GRADIENT: 3 MMHG
LEFT VENTRICULAR OUTFLOW TRACT MEAN VELOCITY: 80.8 CM/S
LEFT VENTRICULAR OUTFLOW TRACT PEAK GRADIENT: 5 MMHG
LEFT VENTRICULAR POSTERIOR WALL IN END DIASTOLE: 1.34 CM (ref 0.6–1)
LV STROKE VOLUME INDEX: 44.6 ML/M2
LYMPHOCYTES # BLD AUTO: 32.1 THOU/UL (ref 0.8–4.4)
LYMPHOCYTES NFR BLD AUTO: 99 % (ref 20–40)
MAGNESIUM SERPL-MCNC: 1.4 MG/DL (ref 1.8–2.6)
MCH RBC QN AUTO: 34.4 PG (ref 27–34)
MCHC RBC AUTO-ENTMCNC: 30 G/DL (ref 32–36)
MCV RBC AUTO: 115 FL (ref 80–100)
MITRAL REGURGITANT VELOCITY TIME INTEGRAL: 109 CM
MITRAL VALVE E/A RATIO: 2.7
MONOCYTES # BLD AUTO: 0 THOU/UL (ref 0–0.9)
MONOCYTES NFR BLD AUTO: 0 % (ref 2–10)
MR FLOW: 26 CM3
MR MEAN GRADIENT: 42 MMHG
MR MEAN VELOCITY: 298 CM/S
MR PEAK GRADIENT: 65 MMHG
MR PISA EROA: 0.2 CM2
MR PISA RADIUS: 0.7 CM
MR PISA VN NYQUIST: 30.8 CM/S
MV AVERAGE E/E' RATIO: 16 CM/S
MV DECELERATION TIME: 222 MS
MV E'TISSUE VEL-LAT: 10.1 CM/S
MV E'TISSUE VEL-MED: 5.77 CM/S
MV LATERAL E/E' RATIO: 12.6
MV MEDIAL E/E' RATIO: 22
MV PEAK A VELOCITY: 46.7 CM/S
MV PEAK E VELOCITY: 127 CM/S
MV REGURGITANT VOLUME: 25.6 CC
NUC REST DIASTOLIC VOLUME INDEX: 3134.4 LBS
NUC REST SYSTOLIC VOLUME INDEX: 75 IN
OVALOCYTES: ABNORMAL
OXYHEMOGLOBIN (VBG) - HISTORICAL: 75.5 % (ref 70–75)
OXYHGB MFR BLDV: 80.1 % (ref 70–75)
P AXIS - MUSE: 80 DEGREES
PCO2 BLDV: 43 MM HG (ref 35–50)
PH BLDV: 7.27 [PH] (ref 7.35–7.45)
PHOSPHATE SERPL-MCNC: 2.9 MG/DL (ref 2.5–4.5)
PISA MR PEAK VEL: 403 CM/S
PLAT MORPH BLD: ABNORMAL
PLATELET # BLD AUTO: 48 THOU/UL (ref 140–440)
PMV BLD AUTO: 12.5 FL (ref 8.5–12.5)
PO2 BLDV: 45 MM HG (ref 25–47)
POLYCHROMASIA BLD QL SMEAR: ABNORMAL
POTASSIUM BLD-SCNC: 3.7 MMOL/L (ref 3.5–5)
POTASSIUM BLD-SCNC: 3.8 MMOL/L (ref 3.5–5)
PR INTERVAL - MUSE: 130 MS
PROCALCITONIN SERPL-MCNC: 30.52 NG/ML (ref 0–0.49)
PROT SERPL-MCNC: 5.2 G/DL (ref 6–8)
QRS DURATION - MUSE: 72 MS
QT - MUSE: 344 MS
QTC - MUSE: 456 MS
R AXIS - MUSE: 36 DEGREES
RBC # BLD AUTO: 2.53 MILL/UL (ref 4.4–6.2)
RETICS # AUTO: 0.12 MILL/UL (ref 0.01–0.11)
SMUDGE CELLS BLD QL SMEAR: PRESENT
SODIUM SERPL-SCNC: 142 MMOL/L (ref 136–145)
SYSTOLIC BLOOD PRESSURE - MUSE: NORMAL MMHG
T AXIS - MUSE: 132 DEGREES
TOTAL NEUTROPHILS-ABS(DIFF): 0.3 THOU/UL (ref 2–7.7)
TOTAL NEUTROPHILS-REL(DIFF): 1 % (ref 50–70)
TRICUSPID REGURGITATION PEAK PRESSURE GRADIENT: 30.3 MMHG
TRICUSPID VALVE ANULAR PLANE SYSTOLIC EXCURSION: 2 CM
TRICUSPID VALVE PEAK REGURGITANT VELOCITY: 275 CM/S
TROPONIN I SERPL-MCNC: 0.04 NG/ML (ref 0–0.29)
VENTRICULAR RATE- MUSE: 106 BPM
VIT B12 SERPL-MCNC: 265 PG/ML (ref 213–816)
WBC: 32.4 THOU/UL (ref 4–11)

## 2018-03-09 ASSESSMENT — MIFFLIN-ST. JEOR
SCORE: 1667.77
SCORE: 1624.23

## 2018-03-10 LAB
AEROBIC BLOOD CULTURE BOTTLE: ABNORMAL
AEROBIC BLOOD CULTURE BOTTLE: ABNORMAL
ALBUMIN SERPL-MCNC: 2.8 G/DL (ref 3.5–5)
ALBUMIN UR-MCNC: ABNORMAL MG/DL
AMORPH CRY #/AREA URNS HPF: ABNORMAL /[HPF]
ANAEROBIC BLOOD CULTURE BOTTLE: ABNORMAL
ANAEROBIC BLOOD CULTURE BOTTLE: ABNORMAL
ANION GAP SERPL CALCULATED.3IONS-SCNC: 10 MMOL/L (ref 5–18)
ANION GAP SERPL CALCULATED.3IONS-SCNC: 9 MMOL/L (ref 5–18)
APPEARANCE UR: ABNORMAL
BACTERIA #/AREA URNS HPF: ABNORMAL HPF
BACTERIA SPEC CULT: NO GROWTH
BASE EXCESS BLDA CALC-SCNC: -7.8 MMOL/L
BASE EXCESS BLDV CALC-SCNC: -7.8 MMOL/L
BASE EXCESS BLDV CALC-SCNC: -8.8 MMOL/L
BASE EXCESS BLDV CALC-SCNC: -9 MMOL/L
BASOPHILS # BLD AUTO: 0 THOU/UL (ref 0–0.2)
BASOPHILS # BLD AUTO: 0 THOU/UL (ref 0–0.2)
BASOPHILS NFR BLD AUTO: 0 % (ref 0–2)
BASOPHILS NFR BLD AUTO: 0 % (ref 0–2)
BILIRUB UR QL STRIP: NEGATIVE
BUN SERPL-MCNC: 45 MG/DL (ref 8–28)
BUN SERPL-MCNC: 48 MG/DL (ref 8–28)
CALCIUM SERPL-MCNC: 6.8 MG/DL (ref 8.5–10.5)
CALCIUM SERPL-MCNC: 6.9 MG/DL (ref 8.5–10.5)
CALCIUM, IONIZED MEASURED: 0.9 MMOL/L (ref 1.11–1.3)
CALCIUM, IONIZED MEASURED: 0.97 MMOL/L (ref 1.11–1.3)
CHLORIDE BLD-SCNC: 114 MMOL/L (ref 98–107)
CHLORIDE BLD-SCNC: 115 MMOL/L (ref 98–107)
CO2 SERPL-SCNC: 17 MMOL/L (ref 22–31)
CO2 SERPL-SCNC: 18 MMOL/L (ref 22–31)
COHGB MFR BLD: 100 % (ref 95–96)
COLOR UR AUTO: YELLOW
CREAT SERPL-MCNC: 1.55 MG/DL (ref 0.7–1.3)
CREAT SERPL-MCNC: 1.63 MG/DL (ref 0.7–1.3)
DOHLE BODIES: ABNORMAL
EOSINOPHIL COUNT (ABSOLUTE): 0 THOU/UL (ref 0–0.4)
EOSINOPHIL COUNT (ABSOLUTE): 0 THOU/UL (ref 0–0.4)
EOSINOPHIL NFR BLD AUTO: 0 % (ref 0–6)
EOSINOPHIL NFR BLD AUTO: 0 % (ref 0–6)
ERYTHROCYTE [DISTWIDTH] IN BLOOD BY AUTOMATED COUNT: 23.5 % (ref 11–14.5)
ERYTHROCYTE [DISTWIDTH] IN BLOOD BY AUTOMATED COUNT: 23.6 % (ref 11–14.5)
GFR SERPL CREATININE-BSD FRML MDRD: 40 ML/MIN/1.73M2
GFR SERPL CREATININE-BSD FRML MDRD: 43 ML/MIN/1.73M2
GLUCOSE BLD-MCNC: 131 MG/DL (ref 70–125)
GLUCOSE BLD-MCNC: 213 MG/DL (ref 70–125)
GLUCOSE BLDC GLUCOMTR-MCNC: 127 MG/DL
GLUCOSE BLDC GLUCOMTR-MCNC: 132 MG/DL
GLUCOSE BLDC GLUCOMTR-MCNC: 134 MG/DL
GLUCOSE BLDC GLUCOMTR-MCNC: 154 MG/DL
GLUCOSE BLDC GLUCOMTR-MCNC: 157 MG/DL
GLUCOSE BLDC GLUCOMTR-MCNC: 162 MG/DL
GLUCOSE BLDC GLUCOMTR-MCNC: 297 MG/DL
GLUCOSE UR STRIP-MCNC: NEGATIVE MG/DL
HCO3, ARTERIAL CALC - HISTORICAL: 18.8 MMOL/L (ref 23–29)
HCO3, VENOUS, CALC - HISTORICAL: 17.9 MMOL/L (ref 24–30)
HCO3, VENOUS, CALC - HISTORICAL: 18.1 MMOL/L (ref 24–30)
HCO3, VENOUS, CALC - HISTORICAL: 18.5 MMOL/L (ref 24–30)
HCT VFR BLD AUTO: 24.1 % (ref 40–54)
HCT VFR BLD AUTO: 28.2 % (ref 40–54)
HGB BLD-MCNC: 7.2 G/DL (ref 14–18)
HGB BLD-MCNC: 8.6 G/DL (ref 14–18)
HGB UR QL STRIP: ABNORMAL
INR PPP: 2.03 (ref 0.9–1.1)
ION CA PH 7.4: 0.83 MMOL/L (ref 1.11–1.3)
ION CA PH 7.4: 0.9 MMOL/L (ref 1.11–1.3)
KETONES UR STRIP-MCNC: NEGATIVE MG/DL
LACTATE SERPL-SCNC: 1.7 MMOL/L (ref 0.5–2.2)
LACTATE SERPL-SCNC: 2.9 MMOL/L (ref 0.5–2.2)
LACTATE SERPL-SCNC: 4 MMOL/L (ref 0.5–2.2)
LEUKOCYTE ESTERASE UR QL STRIP: ABNORMAL
LYMPHOCYTES # BLD AUTO: 25.6 THOU/UL (ref 0.8–4.4)
LYMPHOCYTES # BLD AUTO: 25.8 THOU/UL (ref 0.8–4.4)
LYMPHOCYTES NFR BLD AUTO: 64 % (ref 20–40)
LYMPHOCYTES NFR BLD AUTO: 70 % (ref 20–40)
MAGNESIUM SERPL-MCNC: 2.2 MG/DL (ref 1.8–2.6)
MAGNESIUM SERPL-MCNC: 2.5 MG/DL (ref 1.8–2.6)
MCH RBC QN AUTO: 33.8 PG (ref 27–34)
MCH RBC QN AUTO: 34.7 PG (ref 27–34)
MCHC RBC AUTO-ENTMCNC: 29.9 G/DL (ref 32–36)
MCHC RBC AUTO-ENTMCNC: 30.5 G/DL (ref 32–36)
MCV RBC AUTO: 113 FL (ref 80–100)
MCV RBC AUTO: 114 FL (ref 80–100)
METAMYELOCYTES (ABSOLUTE): 0.2 THOU/UL
METAMYELOCYTES (ABSOLUTE): 0.4 THOU/UL
METAMYELOCYTES NFR BLD MANUAL: 1 %
METAMYELOCYTES NFR BLD MANUAL: 1 %
MONOCYTES # BLD AUTO: 0.6 THOU/UL (ref 0–0.9)
MONOCYTES # BLD AUTO: 0.6 THOU/UL (ref 0–0.9)
MONOCYTES NFR BLD AUTO: 2 % (ref 2–10)
MONOCYTES NFR BLD AUTO: 2 % (ref 2–10)
MUCOUS THREADS #/AREA URNS LPF: ABNORMAL LPF
NITRATE UR QL: NEGATIVE
O2/TOTAL GAS SETTING VFR VENT: 40 %
OVALOCYTES: ABNORMAL
OVALOCYTES: ABNORMAL
OXYHEMOGLOBIN (VBG) - HISTORICAL: 76 % (ref 70–75)
OXYHEMOGLOBIN (VBG) - HISTORICAL: 95.5 % (ref 70–75)
OXYHEMOGLOBIN (VBG) - HISTORICAL: 96.7 % (ref 70–75)
OXYHEMOGLOBIN - HISTORICAL: 95.9 % (ref 95–96)
OXYHGB MFR BLDV: 100 % (ref 70–75)
OXYHGB MFR BLDV: 100 % (ref 70–75)
OXYHGB MFR BLDV: 79.7 % (ref 70–75)
PATH REPORT.MICROSCOPIC SPEC OTHER STN: ABNORMAL
PCO2 BLD: 42 MM HG (ref 35–45)
PCO2 BLDV: 43 MM HG (ref 35–50)
PCO2 BLDV: 43 MM HG (ref 35–50)
PCO2 BLDV: 49 MM HG (ref 35–50)
PEEP: 5 CM H2O
PH BLD: 7.26 [PH] (ref 7.37–7.44)
PH BLDV: 7.19 [PH] (ref 7.35–7.45)
PH BLDV: 7.23 [PH] (ref 7.35–7.45)
PH BLDV: 7.25 [PH] (ref 7.35–7.45)
PH UR STRIP: 5 [PH] (ref 4.5–8)
PH: 7.2 (ref 7.35–7.45)
PH: 7.21 (ref 7.35–7.45)
PHOSPHATE SERPL-MCNC: 5.8 MG/DL (ref 2.5–4.5)
PLAT MORPH BLD: ABNORMAL
PLAT MORPH BLD: ABNORMAL
PLATELET # BLD AUTO: 36 THOU/UL (ref 140–440)
PLATELET # BLD AUTO: 59 THOU/UL (ref 140–440)
PMV BLD AUTO: 11.9 FL (ref 8.5–12.5)
PMV BLD AUTO: 13.2 FL (ref 8.5–12.5)
PO2 BLD: 144 MM HG (ref 75–85)
PO2 BLDV: 113 MM HG (ref 25–47)
PO2 BLDV: 149 MM HG (ref 25–47)
PO2 BLDV: 41 MM HG (ref 25–47)
POTASSIUM BLD-SCNC: 4.7 MMOL/L (ref 3.5–5)
POTASSIUM BLD-SCNC: 5 MMOL/L (ref 3.5–5)
RATE: 14 RR/MIN
RBC # BLD AUTO: 2.13 MILL/UL (ref 4.4–6.2)
RBC # BLD AUTO: 2.48 MILL/UL (ref 4.4–6.2)
RBC #/AREA URNS AUTO: ABNORMAL HPF
SMUDGE CELLS BLD QL SMEAR: PRESENT
SODIUM SERPL-SCNC: 141 MMOL/L (ref 136–145)
SODIUM SERPL-SCNC: 142 MMOL/L (ref 136–145)
SP GR UR STRIP: 1.01 (ref 1–1.03)
SQUAMOUS #/AREA URNS AUTO: ABNORMAL LPF
TEMPERATURE: 37 DEGREES C
TOTAL NEUTROPHILS-ABS(DIFF): 10.3 THOU/UL (ref 2–7.7)
TOTAL NEUTROPHILS-ABS(DIFF): 14 THOU/UL (ref 2–7.7)
TOTAL NEUTROPHILS-REL(DIFF): 28 % (ref 50–70)
TOTAL NEUTROPHILS-REL(DIFF): 35 % (ref 50–70)
TOXIC GRANULATION: ABNORMAL
UROBILINOGEN UR STRIP-ACNC: ABNORMAL
VENTILATION MODE: ABNORMAL
WBC #/AREA URNS AUTO: ABNORMAL HPF
WBC: 36.9 THOU/UL (ref 4–11)
WBC: 40.6 THOU/UL (ref 4–11)

## 2018-03-10 ASSESSMENT — MIFFLIN-ST. JEOR
SCORE: 1646.45
SCORE: 1646.45

## 2018-03-11 LAB
ABO/RH(D): ABNORMAL
ALBUMIN SERPL-MCNC: 2.4 G/DL (ref 3.5–5)
ALP SERPL-CCNC: 39 U/L (ref 45–120)
ALT SERPL W P-5'-P-CCNC: 24 U/L (ref 0–45)
ANION GAP SERPL CALCULATED.3IONS-SCNC: 8 MMOL/L (ref 5–18)
ANTIBODY SCREEN: POSITIVE
AST SERPL W P-5'-P-CCNC: 15 U/L (ref 0–40)
BACTERIA SPEC CULT: NO GROWTH
BILIRUB DIRECT SERPL-MCNC: 0.6 MG/DL
BILIRUB SERPL-MCNC: 1.2 MG/DL (ref 0–1)
BUN SERPL-MCNC: 48 MG/DL (ref 8–28)
CALCIUM SERPL-MCNC: 7.5 MG/DL (ref 8.5–10.5)
CALCIUM, IONIZED MEASURED: 0.99 MMOL/L (ref 1.11–1.3)
CALCIUM, IONIZED MEASURED: 0.99 MMOL/L (ref 1.11–1.3)
CHLORIDE BLD-SCNC: 115 MMOL/L (ref 98–107)
CO2 SERPL-SCNC: 23 MMOL/L (ref 22–31)
CREAT SERPL-MCNC: 1.31 MG/DL (ref 0.7–1.3)
ERYTHROCYTE [DISTWIDTH] IN BLOOD BY AUTOMATED COUNT: 22.9 % (ref 11–14.5)
ERYTHROCYTE [DISTWIDTH] IN BLOOD BY AUTOMATED COUNT: 22.9 % (ref 11–14.5)
ERYTHROCYTE [DISTWIDTH] IN BLOOD BY AUTOMATED COUNT: 23 % (ref 11–14.5)
GFR SERPL CREATININE-BSD FRML MDRD: 52 ML/MIN/1.73M2
GLUCOSE BLD-MCNC: 122 MG/DL (ref 70–125)
GLUCOSE BLDC GLUCOMTR-MCNC: 102 MG/DL
GLUCOSE BLDC GLUCOMTR-MCNC: 117 MG/DL
GLUCOSE BLDC GLUCOMTR-MCNC: 134 MG/DL
GLUCOSE BLDC GLUCOMTR-MCNC: 137 MG/DL
GLUCOSE BLDC GLUCOMTR-MCNC: 152 MG/DL
GLUCOSE BLDC GLUCOMTR-MCNC: 184 MG/DL
GLUCOSE BLDC GLUCOMTR-MCNC: 200 MG/DL
GLUCOSE BLDC GLUCOMTR-MCNC: 235 MG/DL
GLUCOSE BLDC GLUCOMTR-MCNC: 256 MG/DL
GLUCOSE BLDC GLUCOMTR-MCNC: 56 MG/DL
GLUCOSE BLDC GLUCOMTR-MCNC: 75 MG/DL
GLUCOSE BLDC GLUCOMTR-MCNC: 80 MG/DL
GLUCOSE BLDC GLUCOMTR-MCNC: 80 MG/DL
GLUCOSE BLDC GLUCOMTR-MCNC: 84 MG/DL
HCT VFR BLD AUTO: 20.7 % (ref 40–54)
HCT VFR BLD AUTO: 22.1 % (ref 40–54)
HCT VFR BLD AUTO: 24.3 % (ref 40–54)
HGB BLD-MCNC: 6.8 G/DL (ref 14–18)
HGB BLD-MCNC: 7 G/DL (ref 14–18)
HGB BLD-MCNC: 7.4 G/DL (ref 14–18)
ION CA PH 7.4: 0.91 MMOL/L (ref 1.11–1.3)
ION CA PH 7.4: 0.94 MMOL/L (ref 1.11–1.3)
LACTATE SERPL-SCNC: 0.9 MMOL/L (ref 0.5–2.2)
LACTATE SERPL-SCNC: 2.4 MMOL/L (ref 0.5–2.2)
MAGNESIUM SERPL-MCNC: 2.4 MG/DL (ref 1.8–2.6)
MCH RBC QN AUTO: 33 PG (ref 27–34)
MCH RBC QN AUTO: 33.3 PG (ref 27–34)
MCH RBC QN AUTO: 33.8 PG (ref 27–34)
MCHC RBC AUTO-ENTMCNC: 30.5 G/DL (ref 32–36)
MCHC RBC AUTO-ENTMCNC: 31.7 G/DL (ref 32–36)
MCHC RBC AUTO-ENTMCNC: 32.9 G/DL (ref 32–36)
MCV RBC AUTO: 101 FL (ref 80–100)
MCV RBC AUTO: 107 FL (ref 80–100)
MCV RBC AUTO: 110 FL (ref 80–100)
PH: 7.2 (ref 7.35–7.45)
PH: 7.27 (ref 7.35–7.45)
PHOSPHATE SERPL-MCNC: 5 MG/DL (ref 2.5–4.5)
PLATELET # BLD AUTO: 43 THOU/UL (ref 140–440)
PLATELET # BLD AUTO: 51 THOU/UL (ref 140–440)
PLATELET # BLD AUTO: 88 THOU/UL (ref 140–440)
PMV BLD AUTO: 11.4 FL (ref 8.5–12.5)
PMV BLD AUTO: 11.8 FL (ref 8.5–12.5)
PMV BLD AUTO: 12.2 FL (ref 8.5–12.5)
POTASSIUM BLD-SCNC: 4 MMOL/L (ref 3.5–5)
PROT SERPL-MCNC: 4.7 G/DL (ref 6–8)
RBC # BLD AUTO: 2.06 MILL/UL (ref 4.4–6.2)
RBC # BLD AUTO: 2.07 MILL/UL (ref 4.4–6.2)
RBC # BLD AUTO: 2.22 MILL/UL (ref 4.4–6.2)
SODIUM SERPL-SCNC: 146 MMOL/L (ref 136–145)
WBC: 15.3 THOU/UL (ref 4–11)
WBC: 29.9 THOU/UL (ref 4–11)
WBC: 53.4 THOU/UL (ref 4–11)

## 2018-03-11 ASSESSMENT — MIFFLIN-ST. JEOR: SCORE: 1651.9

## 2018-03-12 LAB
ALBUMIN SERPL-MCNC: 2.5 G/DL (ref 3.5–5)
ALP SERPL-CCNC: 39 U/L (ref 45–120)
ALT SERPL W P-5'-P-CCNC: 18 U/L (ref 0–45)
ANION GAP SERPL CALCULATED.3IONS-SCNC: 7 MMOL/L (ref 5–18)
AST SERPL W P-5'-P-CCNC: 9 U/L (ref 0–40)
BASE EXCESS BLDV CALC-SCNC: 3.6 MMOL/L
BILIRUB DIRECT SERPL-MCNC: 0.6 MG/DL
BILIRUB SERPL-MCNC: 1.3 MG/DL (ref 0–1)
BLD PROD TYP BPU: NORMAL
BLOOD EXPIRATION DATE: NORMAL
BLOOD TYPE: 600
BLOOD TYPE: 6200
BUN SERPL-MCNC: 53 MG/DL (ref 8–28)
CALCIUM SERPL-MCNC: 7.7 MG/DL (ref 8.5–10.5)
CHLORIDE BLD-SCNC: 115 MMOL/L (ref 98–107)
CO2 SERPL-SCNC: 27 MMOL/L (ref 22–31)
CODING SYSTEM: NORMAL
COMPONENT (HISTORICAL CONVERSION): NORMAL
CREAT SERPL-MCNC: 1.25 MG/DL (ref 0.7–1.3)
CROSSMATCH: NORMAL
CROSSMATCH: NORMAL
ERYTHROCYTE [DISTWIDTH] IN BLOOD BY AUTOMATED COUNT: 23.5 % (ref 11–14.5)
GFR SERPL CREATININE-BSD FRML MDRD: 55 ML/MIN/1.73M2
GLUCOSE BLD-MCNC: 139 MG/DL (ref 70–125)
GLUCOSE BLDC GLUCOMTR-MCNC: 142 MG/DL
GLUCOSE BLDC GLUCOMTR-MCNC: 142 MG/DL
GLUCOSE BLDC GLUCOMTR-MCNC: 144 MG/DL
GLUCOSE BLDC GLUCOMTR-MCNC: 147 MG/DL
GLUCOSE BLDC GLUCOMTR-MCNC: 150 MG/DL
GLUCOSE BLDC GLUCOMTR-MCNC: 191 MG/DL
GLUCOSE BLDC GLUCOMTR-MCNC: 199 MG/DL
HCO3, VENOUS, CALC - HISTORICAL: 27.4 MMOL/L (ref 24–30)
HCT VFR BLD AUTO: 21.6 % (ref 40–54)
HGB BLD-MCNC: 6.7 G/DL (ref 14–18)
HGB BLD-MCNC: 7 G/DL (ref 14–18)
ISSUE DATE AND TIME: NORMAL
LAB AP CHARGES (HE HISTORICAL CONVERSION): ABNORMAL
MAGNESIUM SERPL-MCNC: 2.4 MG/DL (ref 1.8–2.6)
MCH RBC QN AUTO: 33 PG (ref 27–34)
MCHC RBC AUTO-ENTMCNC: 32.4 G/DL (ref 32–36)
MCV RBC AUTO: 102 FL (ref 80–100)
OXYHEMOGLOBIN (VBG) - HISTORICAL: 73.4 % (ref 70–75)
OXYHGB MFR BLDV: 76.7 % (ref 70–75)
PATH REPORT.COMMENTS IMP SPEC: ABNORMAL
PATH REPORT.COMMENTS IMP SPEC: ABNORMAL
PATH REPORT.FINAL DX SPEC: ABNORMAL
PATH REPORT.MICROSCOPIC SPEC OTHER STN: ABNORMAL
PATH REPORT.RELEVANT HX SPEC: ABNORMAL
PCO2 BLDV: 57 MM HG (ref 35–50)
PH BLDV: 7.33 [PH] (ref 7.35–7.45)
PHOSPHATE SERPL-MCNC: 3.8 MG/DL (ref 2.5–4.5)
PLATELET # BLD AUTO: 34 THOU/UL (ref 140–440)
PMV BLD AUTO: 12.7 FL (ref 8.5–12.5)
PO2 BLDV: 41 MM HG (ref 25–47)
POTASSIUM BLD-SCNC: 4 MMOL/L (ref 3.5–5)
PROT SERPL-MCNC: 4.6 G/DL (ref 6–8)
RBC # BLD AUTO: 2.12 MILL/UL (ref 4.4–6.2)
RESULT FLAG (HE HISTORICAL CONVERSION): ABNORMAL
SODIUM SERPL-SCNC: 149 MMOL/L (ref 136–145)
STATUS (HISTORICAL CONVERSION): NORMAL
UNIT ABO/RH (HISTORICAL CONVERSION): NORMAL
UNIT NUMBER: NORMAL
WBC: 21.6 THOU/UL (ref 4–11)

## 2018-03-12 ASSESSMENT — MIFFLIN-ST. JEOR: SCORE: 1659.61

## 2018-03-13 LAB
ANION GAP SERPL CALCULATED.3IONS-SCNC: 8 MMOL/L (ref 5–18)
BACTERIA SPEC CULT: ABNORMAL
BACTERIA SPEC CULT: ABNORMAL
BACTERIA SPEC CULT: NO GROWTH
BASOPHILS # BLD AUTO: 0 THOU/UL (ref 0–0.2)
BASOPHILS NFR BLD AUTO: 0 % (ref 0–2)
BUN SERPL-MCNC: 46 MG/DL (ref 8–28)
CALCIUM SERPL-MCNC: 7.5 MG/DL (ref 8.5–10.5)
CHLORIDE BLD-SCNC: 118 MMOL/L (ref 98–107)
CO2 SERPL-SCNC: 27 MMOL/L (ref 22–31)
CREAT SERPL-MCNC: 0.96 MG/DL (ref 0.7–1.3)
EOSINOPHIL # BLD AUTO: 0 THOU/UL (ref 0–0.4)
EOSINOPHIL NFR BLD AUTO: 0 % (ref 0–6)
ERYTHROCYTE [DISTWIDTH] IN BLOOD BY AUTOMATED COUNT: 22.9 % (ref 11–14.5)
GFR SERPL CREATININE-BSD FRML MDRD: >60 ML/MIN/1.73M2
GLUCOSE BLD-MCNC: 136 MG/DL (ref 70–125)
GLUCOSE BLDC GLUCOMTR-MCNC: 143 MG/DL
GLUCOSE BLDC GLUCOMTR-MCNC: 146 MG/DL
GLUCOSE BLDC GLUCOMTR-MCNC: 148 MG/DL
GLUCOSE BLDC GLUCOMTR-MCNC: 152 MG/DL
GLUCOSE BLDC GLUCOMTR-MCNC: 160 MG/DL
GLUCOSE BLDC GLUCOMTR-MCNC: 172 MG/DL
GLUCOSE BLDC GLUCOMTR-MCNC: 173 MG/DL
GRAM STAIN RESULT: ABNORMAL
GRAM STAIN RESULT: ABNORMAL
GRAM STAIN RESULT: NORMAL
GRAM STAIN RESULT: NORMAL
HCT VFR BLD AUTO: 23.4 % (ref 40–54)
HGB BLD-MCNC: 7.4 G/DL (ref 14–18)
LYMPHOCYTES # BLD AUTO: 6.4 THOU/UL (ref 0.8–4.4)
LYMPHOCYTES NFR BLD AUTO: 43 % (ref 20–40)
MAGNESIUM SERPL-MCNC: 2.2 MG/DL (ref 1.8–2.6)
MCH RBC QN AUTO: 31.6 PG (ref 27–34)
MCHC RBC AUTO-ENTMCNC: 31.6 G/DL (ref 32–36)
MCV RBC AUTO: 100 FL (ref 80–100)
MONOCYTES # BLD AUTO: 0.5 THOU/UL (ref 0–0.9)
MONOCYTES NFR BLD AUTO: 3 % (ref 2–10)
NEUTROPHILS # BLD AUTO: 7.8 THOU/UL (ref 2–7.7)
NEUTROPHILS NFR BLD AUTO: 54 % (ref 50–70)
PHOSPHATE SERPL-MCNC: 2.2 MG/DL (ref 2.5–4.5)
PLATELET # BLD AUTO: 23 THOU/UL (ref 140–440)
PMV BLD AUTO: 13.1 FL (ref 8.5–12.5)
POTASSIUM BLD-SCNC: 3 MMOL/L (ref 3.5–5)
RBC # BLD AUTO: 2.34 MILL/UL (ref 4.4–6.2)
SODIUM SERPL-SCNC: 153 MMOL/L (ref 136–145)
WBC: 14.8 THOU/UL (ref 4–11)

## 2018-03-13 ASSESSMENT — MIFFLIN-ST. JEOR: SCORE: 1667.49

## 2018-03-14 LAB
ALBUMIN SERPL-MCNC: 2.5 G/DL (ref 3.5–5)
ANION GAP SERPL CALCULATED.3IONS-SCNC: 8 MMOL/L (ref 5–18)
BASOPHILS # BLD AUTO: 0 THOU/UL (ref 0–0.2)
BASOPHILS NFR BLD AUTO: 0 % (ref 0–2)
BLD PROD TYP BPU: NORMAL
BLOOD EXPIRATION DATE: NORMAL
BLOOD TYPE: 600
BUN SERPL-MCNC: 38 MG/DL (ref 8–28)
CALCIUM SERPL-MCNC: 7.6 MG/DL (ref 8.5–10.5)
CHLORIDE BLD-SCNC: 120 MMOL/L (ref 98–107)
CO2 SERPL-SCNC: 27 MMOL/L (ref 22–31)
CODING SYSTEM: NORMAL
COMPONENT (HISTORICAL CONVERSION): NORMAL
CREAT SERPL-MCNC: 0.94 MG/DL (ref 0.7–1.3)
CROSSMATCH: NORMAL
EOSINOPHIL # BLD AUTO: 0 THOU/UL (ref 0–0.4)
EOSINOPHIL NFR BLD AUTO: 0 % (ref 0–6)
ERYTHROCYTE [DISTWIDTH] IN BLOOD BY AUTOMATED COUNT: 22.8 % (ref 11–14.5)
GFR SERPL CREATININE-BSD FRML MDRD: >60 ML/MIN/1.73M2
GLUCOSE BLD-MCNC: 166 MG/DL (ref 70–125)
GLUCOSE BLDC GLUCOMTR-MCNC: 166 MG/DL
GLUCOSE BLDC GLUCOMTR-MCNC: 170 MG/DL
GLUCOSE BLDC GLUCOMTR-MCNC: 182 MG/DL
GLUCOSE BLDC GLUCOMTR-MCNC: 201 MG/DL
HCT VFR BLD AUTO: 26.3 % (ref 40–54)
HGB BLD-MCNC: 8 G/DL (ref 14–18)
HGB BLD-MCNC: 8 G/DL (ref 14–18)
ISSUE DATE AND TIME: NORMAL
LYMPHOCYTES # BLD AUTO: 6.8 THOU/UL (ref 0.8–4.4)
LYMPHOCYTES NFR BLD AUTO: 48 % (ref 20–40)
MAGNESIUM SERPL-MCNC: 2.2 MG/DL (ref 1.8–2.6)
MCH RBC QN AUTO: 31.7 PG (ref 27–34)
MCHC RBC AUTO-ENTMCNC: 30.4 G/DL (ref 32–36)
MCV RBC AUTO: 104 FL (ref 80–100)
MONOCYTES # BLD AUTO: 0.5 THOU/UL (ref 0–0.9)
MONOCYTES NFR BLD AUTO: 4 % (ref 2–10)
NEUTROPHILS # BLD AUTO: 6.8 THOU/UL (ref 2–7.7)
NEUTROPHILS NFR BLD AUTO: 49 % (ref 50–70)
PHOSPHATE SERPL-MCNC: 3.3 MG/DL (ref 2.5–4.5)
PLATELET # BLD AUTO: 30 THOU/UL (ref 140–440)
PMV BLD AUTO: 13.2 FL (ref 8.5–12.5)
POTASSIUM BLD-SCNC: 3.1 MMOL/L (ref 3.5–5)
POTASSIUM BLD-SCNC: 3.8 MMOL/L (ref 3.5–5)
RBC # BLD AUTO: 2.52 MILL/UL (ref 4.4–6.2)
SODIUM SERPL-SCNC: 155 MMOL/L (ref 136–145)
STATUS (HISTORICAL CONVERSION): NORMAL
UNIT ABO/RH (HISTORICAL CONVERSION): NORMAL
UNIT NUMBER: NORMAL
WBC: 14.3 THOU/UL (ref 4–11)

## 2018-03-15 LAB
AEROBIC BLOOD CULTURE BOTTLE: ABNORMAL
ALBUMIN SERPL-MCNC: 2.5 G/DL (ref 3.5–5)
ALP SERPL-CCNC: 40 U/L (ref 45–120)
ALT SERPL W P-5'-P-CCNC: <9 U/L (ref 0–45)
ANAEROBIC BLOOD CULTURE BOTTLE: ABNORMAL
ANION GAP SERPL CALCULATED.3IONS-SCNC: 7 MMOL/L (ref 5–18)
AST SERPL W P-5'-P-CCNC: 7 U/L (ref 0–40)
BACTERIA SPEC CULT: ABNORMAL
BASOPHILS # BLD AUTO: 0 THOU/UL (ref 0–0.2)
BASOPHILS NFR BLD AUTO: 0 % (ref 0–2)
BILIRUB SERPL-MCNC: 1 MG/DL (ref 0–1)
BLD PROD TYP BPU: NORMAL
BLOOD EXPIRATION DATE: NORMAL
BLOOD TYPE: 600
BUN SERPL-MCNC: 33 MG/DL (ref 8–28)
CALCIUM SERPL-MCNC: 7.6 MG/DL (ref 8.5–10.5)
CHLORIDE BLD-SCNC: 116 MMOL/L (ref 98–107)
CO2 SERPL-SCNC: 30 MMOL/L (ref 22–31)
CODING SYSTEM: NORMAL
COMPONENT (HISTORICAL CONVERSION): NORMAL
COMPONENT (HISTORICAL CONVERSION): NORMAL
CREAT SERPL-MCNC: 0.85 MG/DL (ref 0.7–1.3)
CROSSMATCH: NORMAL
EOSINOPHIL # BLD AUTO: 0 THOU/UL (ref 0–0.4)
EOSINOPHIL NFR BLD AUTO: 0 % (ref 0–6)
ERYTHROCYTE [DISTWIDTH] IN BLOOD BY AUTOMATED COUNT: 22.5 % (ref 11–14.5)
GFR SERPL CREATININE-BSD FRML MDRD: >60 ML/MIN/1.73M2
GLUCOSE BLD-MCNC: 218 MG/DL (ref 70–125)
GLUCOSE BLDC GLUCOMTR-MCNC: 173 MG/DL
GLUCOSE BLDC GLUCOMTR-MCNC: 230 MG/DL
GLUCOSE BLDC GLUCOMTR-MCNC: 233 MG/DL
GLUCOSE BLDC GLUCOMTR-MCNC: 252 MG/DL
GRAM STAIN RESULT: ABNORMAL
HCT VFR BLD AUTO: 25.9 % (ref 40–54)
HGB BLD-MCNC: 7.9 G/DL (ref 14–18)
LYMPHOCYTES # BLD AUTO: 5.5 THOU/UL (ref 0.8–4.4)
LYMPHOCYTES NFR BLD AUTO: 48 % (ref 20–40)
MAGNESIUM SERPL-MCNC: 2.2 MG/DL (ref 1.8–2.6)
MCH RBC QN AUTO: 32.1 PG (ref 27–34)
MCHC RBC AUTO-ENTMCNC: 30.5 G/DL (ref 32–36)
MCV RBC AUTO: 105 FL (ref 80–100)
MONOCYTES # BLD AUTO: 0.4 THOU/UL (ref 0–0.9)
MONOCYTES NFR BLD AUTO: 4 % (ref 2–10)
NEUTROPHILS # BLD AUTO: 5.6 THOU/UL (ref 2–7.7)
NEUTROPHILS NFR BLD AUTO: 49 % (ref 50–70)
PHOSPHATE SERPL-MCNC: 2 MG/DL (ref 2.5–4.5)
PLATELET # BLD AUTO: 37 THOU/UL (ref 140–440)
PMV BLD AUTO: 13.7 FL (ref 8.5–12.5)
POTASSIUM BLD-SCNC: 3.1 MMOL/L (ref 3.5–5)
POTASSIUM BLD-SCNC: 3.5 MMOL/L (ref 3.5–5)
PROT SERPL-MCNC: 4.5 G/DL (ref 6–8)
RBC # BLD AUTO: 2.46 MILL/UL (ref 4.4–6.2)
SODIUM SERPL-SCNC: 153 MMOL/L (ref 136–145)
STATUS (HISTORICAL CONVERSION): NORMAL
STATUS (HISTORICAL CONVERSION): NORMAL
UNIT ABO/RH (HISTORICAL CONVERSION): NORMAL
UNIT NUMBER: NORMAL
WBC: 11.7 THOU/UL (ref 4–11)

## 2018-03-15 ASSESSMENT — MIFFLIN-ST. JEOR: SCORE: 1660.97

## 2018-03-16 LAB
ABO/RH(D): ABNORMAL
ANION GAP SERPL CALCULATED.3IONS-SCNC: 5 MMOL/L (ref 5–18)
ANTIBODY SCREEN: POSITIVE
BUN SERPL-MCNC: 27 MG/DL (ref 8–28)
CALCIUM SERPL-MCNC: 7.1 MG/DL (ref 8.5–10.5)
CHLORIDE BLD-SCNC: 111 MMOL/L (ref 98–107)
CO2 SERPL-SCNC: 29 MMOL/L (ref 22–31)
CREAT SERPL-MCNC: 0.82 MG/DL (ref 0.7–1.3)
ERYTHROCYTE [DISTWIDTH] IN BLOOD BY AUTOMATED COUNT: 22.3 % (ref 11–14.5)
GFR SERPL CREATININE-BSD FRML MDRD: >60 ML/MIN/1.73M2
GLUCOSE BLD-MCNC: 215 MG/DL (ref 70–125)
GLUCOSE BLDC GLUCOMTR-MCNC: 171 MG/DL
GLUCOSE BLDC GLUCOMTR-MCNC: 196 MG/DL
GLUCOSE BLDC GLUCOMTR-MCNC: 206 MG/DL
GLUCOSE BLDC GLUCOMTR-MCNC: 226 MG/DL
HCT VFR BLD AUTO: 25.1 % (ref 40–54)
HGB BLD-MCNC: 7.6 G/DL (ref 14–18)
INR PPP: 1.21 (ref 0.9–1.1)
INR PPP: 1.21 (ref 0.9–1.1)
MAGNESIUM SERPL-MCNC: 1.9 MG/DL (ref 1.8–2.6)
MCH RBC QN AUTO: 32.2 PG (ref 27–34)
MCHC RBC AUTO-ENTMCNC: 30.3 G/DL (ref 32–36)
MCV RBC AUTO: 106 FL (ref 80–100)
PHOSPHATE SERPL-MCNC: 2 MG/DL (ref 2.5–4.5)
PLATELET # BLD AUTO: 41 THOU/UL (ref 140–440)
PMV BLD AUTO: 14.4 FL (ref 8.5–12.5)
POTASSIUM BLD-SCNC: 3.9 MMOL/L (ref 3.5–5)
POTASSIUM BLD-SCNC: 4.1 MMOL/L (ref 3.5–5)
RBC # BLD AUTO: 2.36 MILL/UL (ref 4.4–6.2)
SODIUM SERPL-SCNC: 145 MMOL/L (ref 136–145)
WBC: 13.2 THOU/UL (ref 4–11)

## 2018-03-16 ASSESSMENT — MIFFLIN-ST. JEOR: SCORE: 1616.97

## 2018-03-17 LAB
ANION GAP SERPL CALCULATED.3IONS-SCNC: 8 MMOL/L (ref 5–18)
BUN SERPL-MCNC: 23 MG/DL (ref 8–28)
CALCIUM SERPL-MCNC: 7.1 MG/DL (ref 8.5–10.5)
CHLORIDE BLD-SCNC: 107 MMOL/L (ref 98–107)
CO2 SERPL-SCNC: 28 MMOL/L (ref 22–31)
CREAT SERPL-MCNC: 0.81 MG/DL (ref 0.7–1.3)
ERYTHROCYTE [DISTWIDTH] IN BLOOD BY AUTOMATED COUNT: 22.3 % (ref 11–14.5)
GFR SERPL CREATININE-BSD FRML MDRD: >60 ML/MIN/1.73M2
GLUCOSE BLD-MCNC: 189 MG/DL (ref 70–125)
GLUCOSE BLDC GLUCOMTR-MCNC: 177 MG/DL
GLUCOSE BLDC GLUCOMTR-MCNC: 182 MG/DL
GLUCOSE BLDC GLUCOMTR-MCNC: 200 MG/DL
GLUCOSE BLDC GLUCOMTR-MCNC: 247 MG/DL
HCT VFR BLD AUTO: 25.6 % (ref 40–54)
HGB BLD-MCNC: 7.8 G/DL (ref 14–18)
HGB BLD-MCNC: 8.6 G/DL (ref 14–18)
MAGNESIUM SERPL-MCNC: 1.7 MG/DL (ref 1.8–2.6)
MCH RBC QN AUTO: 32.2 PG (ref 27–34)
MCHC RBC AUTO-ENTMCNC: 30.5 G/DL (ref 32–36)
MCV RBC AUTO: 106 FL (ref 80–100)
PHOSPHATE SERPL-MCNC: 2.6 MG/DL (ref 2.5–4.5)
PLATELET # BLD AUTO: 59 THOU/UL (ref 140–440)
PMV BLD AUTO: 13.4 FL (ref 8.5–12.5)
POTASSIUM BLD-SCNC: 3.6 MMOL/L (ref 3.5–5)
POTASSIUM BLD-SCNC: 3.6 MMOL/L (ref 3.5–5)
RBC # BLD AUTO: 2.42 MILL/UL (ref 4.4–6.2)
SODIUM SERPL-SCNC: 143 MMOL/L (ref 136–145)
T4 FREE SERPL-MCNC: 0.5 NG/DL (ref 0.7–1.8)
WBC: 19.2 THOU/UL (ref 4–11)

## 2018-03-18 LAB
ALBUMIN SERPL-MCNC: 2.5 G/DL (ref 3.5–5)
ALP SERPL-CCNC: 37 U/L (ref 45–120)
ALT SERPL W P-5'-P-CCNC: <9 U/L (ref 0–45)
ALT SERPL-CCNC: <9 U/L (ref 0–45)
ANION GAP SERPL CALCULATED.3IONS-SCNC: 5 MMOL/L (ref 5–18)
AST SERPL W P-5'-P-CCNC: 9 U/L (ref 0–40)
AST SERPL-CCNC: 9 U/L (ref 0–40)
BILIRUB SERPL-MCNC: 1.2 MG/DL (ref 0–1)
BLD PROD TYP BPU: NORMAL
BLOOD EXPIRATION DATE: NORMAL
BLOOD TYPE: 600
BUN SERPL-MCNC: 20 MG/DL (ref 8–28)
CALCIUM SERPL-MCNC: 7 MG/DL (ref 8.5–10.5)
CHLORIDE BLD-SCNC: 107 MMOL/L (ref 98–107)
CO2 SERPL-SCNC: 30 MMOL/L (ref 22–31)
CODING SYSTEM: NORMAL
COMPONENT (HISTORICAL CONVERSION): NORMAL
CREAT SERPL-MCNC: 0.77 MG/DL (ref 0.7–1.3)
CROSSMATCH: NORMAL
ERYTHROCYTE [DISTWIDTH] IN BLOOD BY AUTOMATED COUNT: 22.7 % (ref 11–14.5)
GFR SERPL CREATININE-BSD FRML MDRD: >60 ML/MIN/1.73M2
GLUCOSE BLD-MCNC: 172 MG/DL (ref 70–125)
GLUCOSE BLDC GLUCOMTR-MCNC: 162 MG/DL
GLUCOSE BLDC GLUCOMTR-MCNC: 192 MG/DL
GLUCOSE BLDC GLUCOMTR-MCNC: 201 MG/DL
GLUCOSE BLDC GLUCOMTR-MCNC: 216 MG/DL
GLUCOSE BLDC GLUCOMTR-MCNC: 254 MG/DL
HCT VFR BLD AUTO: 27.9 % (ref 40–54)
HGB BLD-MCNC: 8.7 G/DL (ref 14–18)
ISSUE DATE AND TIME: NORMAL
MCH RBC QN AUTO: 32.1 PG (ref 27–34)
MCHC RBC AUTO-ENTMCNC: 31.2 G/DL (ref 32–36)
MCV RBC AUTO: 103 FL (ref 80–100)
PHOSPHATE SERPL-MCNC: 2.6 MG/DL (ref 2.5–4.5)
PLATELET # BLD AUTO: 52 THOU/UL (ref 140–440)
PMV BLD AUTO: 13.9 FL (ref 8.5–12.5)
POTASSIUM BLD-SCNC: 3.8 MMOL/L (ref 3.5–5)
PROT SERPL-MCNC: 4.4 G/DL (ref 6–8)
RBC # BLD AUTO: 2.71 MILL/UL (ref 4.4–6.2)
SODIUM SERPL-SCNC: 142 MMOL/L (ref 136–145)
STATUS (HISTORICAL CONVERSION): NORMAL
TSH SERPL DL<=0.005 MIU/L-ACNC: 4.21 UIU/ML (ref 0.3–5)
TSH SERPL-ACNC: 4.21 UIU/ML (ref 0.3–5)
UNIT ABO/RH (HISTORICAL CONVERSION): NORMAL
UNIT NUMBER: NORMAL
WBC: 12.5 THOU/UL (ref 4–11)

## 2018-03-18 ASSESSMENT — MIFFLIN-ST. JEOR: SCORE: 1701.79

## 2018-03-19 LAB
ANION GAP SERPL CALCULATED.3IONS-SCNC: 6 MMOL/L (ref 5–18)
BUN SERPL-MCNC: 23 MG/DL (ref 8–28)
CALCIUM SERPL-MCNC: 7.1 MG/DL (ref 8.5–10.5)
CHLORIDE BLD-SCNC: 105 MMOL/L (ref 98–107)
CO2 SERPL-SCNC: 31 MMOL/L (ref 22–31)
CREAT SERPL-MCNC: 0.76 MG/DL (ref 0.7–1.3)
ERYTHROCYTE [DISTWIDTH] IN BLOOD BY AUTOMATED COUNT: 22.4 % (ref 11–14.5)
GFR SERPL CREATININE-BSD FRML MDRD: >60 ML/MIN/1.73M2
GLUCOSE BLD-MCNC: 139 MG/DL (ref 70–125)
GLUCOSE BLDC GLUCOMTR-MCNC: 131 MG/DL
GLUCOSE BLDC GLUCOMTR-MCNC: 139 MG/DL
GLUCOSE BLDC GLUCOMTR-MCNC: 142 MG/DL
GLUCOSE BLDC GLUCOMTR-MCNC: 177 MG/DL
HCT VFR BLD AUTO: 28.8 % (ref 40–54)
HGB BLD-MCNC: 8.8 G/DL (ref 14–18)
MAGNESIUM SERPL-MCNC: 1.8 MG/DL (ref 1.8–2.6)
MCH RBC QN AUTO: 31.5 PG (ref 27–34)
MCHC RBC AUTO-ENTMCNC: 30.6 G/DL (ref 32–36)
MCV RBC AUTO: 103 FL (ref 80–100)
PHOSPHATE SERPL-MCNC: 2.4 MG/DL (ref 2.5–4.5)
PLATELET # BLD AUTO: 65 THOU/UL (ref 140–440)
PMV BLD AUTO: 13.4 FL (ref 8.5–12.5)
POTASSIUM BLD-SCNC: 3.6 MMOL/L (ref 3.5–5)
RBC # BLD AUTO: 2.79 MILL/UL (ref 4.4–6.2)
SODIUM SERPL-SCNC: 142 MMOL/L (ref 136–145)
WBC: 11.4 THOU/UL (ref 4–11)

## 2018-03-19 ASSESSMENT — MIFFLIN-ST. JEOR: SCORE: 1692.27

## 2018-03-20 ENCOUNTER — COMMUNICATION - HEALTHEAST (OUTPATIENT)
Dept: ONCOLOGY | Facility: HOSPITAL | Age: 83
End: 2018-03-20

## 2018-03-20 LAB
AEROBIC BLOOD CULTURE BOTTLE: NO GROWTH
AEROBIC BLOOD CULTURE BOTTLE: NO GROWTH
ANAEROBIC BLOOD CULTURE BOTTLE: NO GROWTH
ANAEROBIC BLOOD CULTURE BOTTLE: NO GROWTH
ANION GAP SERPL CALCULATED.3IONS-SCNC: 7 MMOL/L (ref 5–18)
BLD PROD TYP BPU: NORMAL
BLOOD EXPIRATION DATE: NORMAL
BLOOD TYPE: 600
BUN SERPL-MCNC: 21 MG/DL (ref 8–28)
CALCIUM SERPL-MCNC: 7.4 MG/DL (ref 8.5–10.5)
CHLORIDE BLD-SCNC: 106 MMOL/L (ref 98–107)
CO2 SERPL-SCNC: 30 MMOL/L (ref 22–31)
CODING SYSTEM: NORMAL
COMPONENT (HISTORICAL CONVERSION): NORMAL
CREAT SERPL-MCNC: 0.77 MG/DL (ref 0.7–1.3)
CROSSMATCH: NORMAL
ERYTHROCYTE [DISTWIDTH] IN BLOOD BY AUTOMATED COUNT: 22.5 % (ref 11–14.5)
GFR SERPL CREATININE-BSD FRML MDRD: >60 ML/MIN/1.73M2
GLUCOSE BLD-MCNC: 125 MG/DL (ref 70–125)
GLUCOSE BLDC GLUCOMTR-MCNC: 145 MG/DL
GLUCOSE BLDC GLUCOMTR-MCNC: 145 MG/DL
GLUCOSE BLDC GLUCOMTR-MCNC: 166 MG/DL
GLUCOSE BLDC GLUCOMTR-MCNC: 251 MG/DL
HCT VFR BLD AUTO: 32 % (ref 40–54)
HGB BLD-MCNC: 9.8 G/DL (ref 14–18)
MCH RBC QN AUTO: 31.9 PG (ref 27–34)
MCHC RBC AUTO-ENTMCNC: 30.6 G/DL (ref 32–36)
MCV RBC AUTO: 104 FL (ref 80–100)
PHOSPHATE SERPL-MCNC: 2.4 MG/DL (ref 2.5–4.5)
PLATELET # BLD AUTO: 80 THOU/UL (ref 140–440)
PMV BLD AUTO: 12.9 FL (ref 8.5–12.5)
POTASSIUM BLD-SCNC: 3.8 MMOL/L (ref 3.5–5)
RBC # BLD AUTO: 3.07 MILL/UL (ref 4.4–6.2)
SODIUM SERPL-SCNC: 143 MMOL/L (ref 136–145)
STATUS (HISTORICAL CONVERSION): NORMAL
UNIT ABO/RH (HISTORICAL CONVERSION): NORMAL
UNIT NUMBER: NORMAL
WBC: 11.5 THOU/UL (ref 4–11)

## 2018-03-20 ASSESSMENT — MIFFLIN-ST. JEOR: SCORE: 1765.3

## 2018-03-21 ENCOUNTER — COMMUNICATION - HEALTHEAST (OUTPATIENT)
Dept: ONCOLOGY | Facility: HOSPITAL | Age: 83
End: 2018-03-21

## 2018-03-21 LAB
ANION GAP SERPL CALCULATED.3IONS-SCNC: 7 MMOL/L (ref 5–18)
ATRIAL RATE - MUSE: 55 BPM
BUN SERPL-MCNC: 19 MG/DL (ref 8–28)
CALCIUM SERPL-MCNC: 7.5 MG/DL (ref 8.5–10.5)
CHLORIDE BLD-SCNC: 104 MMOL/L (ref 98–107)
CO2 SERPL-SCNC: 29 MMOL/L (ref 22–31)
CREAT SERPL-MCNC: 0.71 MG/DL (ref 0.7–1.3)
DIASTOLIC BLOOD PRESSURE - MUSE: NORMAL MMHG
ERYTHROCYTE [DISTWIDTH] IN BLOOD BY AUTOMATED COUNT: 21.8 % (ref 11–14.5)
GFR SERPL CREATININE-BSD FRML MDRD: >60 ML/MIN/1.73M2
GLUCOSE BLD-MCNC: 100 MG/DL (ref 70–125)
GLUCOSE BLDC GLUCOMTR-MCNC: 206 MG/DL
GLUCOSE BLDC GLUCOMTR-MCNC: 206 MG/DL
GLUCOSE BLDC GLUCOMTR-MCNC: 216 MG/DL
GLUCOSE BLDC GLUCOMTR-MCNC: 220 MG/DL
GLUCOSE BLDC GLUCOMTR-MCNC: 96 MG/DL
HCT VFR BLD AUTO: 29.6 % (ref 40–54)
HGB BLD-MCNC: 9.2 G/DL (ref 14–18)
INTERPRETATION ECG - MUSE: NORMAL
MCH RBC QN AUTO: 32.4 PG (ref 27–34)
MCHC RBC AUTO-ENTMCNC: 31.1 G/DL (ref 32–36)
MCV RBC AUTO: 104 FL (ref 80–100)
P AXIS - MUSE: NORMAL DEGREES
PHOSPHATE SERPL-MCNC: 2 MG/DL (ref 2.5–4.5)
PLATELET # BLD AUTO: 88 THOU/UL (ref 140–440)
PMV BLD AUTO: 12.9 FL (ref 8.5–12.5)
POTASSIUM BLD-SCNC: 3.6 MMOL/L (ref 3.5–5)
PR INTERVAL - MUSE: NORMAL MS
QRS DURATION - MUSE: 84 MS
QT - MUSE: 504 MS
QTC - MUSE: 504 MS
R AXIS - MUSE: 31 DEGREES
RBC # BLD AUTO: 2.84 MILL/UL (ref 4.4–6.2)
SODIUM SERPL-SCNC: 140 MMOL/L (ref 136–145)
SYSTOLIC BLOOD PRESSURE - MUSE: NORMAL MMHG
T AXIS - MUSE: 110 DEGREES
VENTRICULAR RATE- MUSE: 60 BPM
WBC: 9.1 THOU/UL (ref 4–11)

## 2018-03-21 ASSESSMENT — MIFFLIN-ST. JEOR: SCORE: 1774.82

## 2018-03-22 ENCOUNTER — RECORDS - HEALTHEAST (OUTPATIENT)
Dept: ADMINISTRATIVE | Facility: OTHER | Age: 83
End: 2018-03-22

## 2018-03-22 ENCOUNTER — HOSPITAL ENCOUNTER (INPATIENT)
Facility: CLINIC | Age: 83
LOS: 6 days | Discharge: HOME HEALTH | DRG: 949 | End: 2018-03-28
Attending: PHYSICAL MEDICINE & REHABILITATION | Admitting: PHYSICAL MEDICINE & REHABILITATION
Payer: COMMERCIAL

## 2018-03-22 DIAGNOSIS — B37.2 CANDIDAL INTERTRIGO: ICD-10-CM

## 2018-03-22 DIAGNOSIS — I48.0 PAROXYSMAL ATRIAL FIBRILLATION (H): Primary | ICD-10-CM

## 2018-03-22 DIAGNOSIS — I82.621 ACUTE DEEP VEIN THROMBOSIS (DVT) OF OTHER VEIN OF RIGHT UPPER EXTREMITY (H): ICD-10-CM

## 2018-03-22 DIAGNOSIS — G25.81 RESTLESS LEGS SYNDROME (RLS): ICD-10-CM

## 2018-03-22 DIAGNOSIS — I82.621 ACUTE DEEP VEIN THROMBOSIS (DVT) OF BRACHIAL VEIN OF RIGHT UPPER EXTREMITY (H): ICD-10-CM

## 2018-03-22 DIAGNOSIS — R53.81 DEBILITY: ICD-10-CM

## 2018-03-22 DIAGNOSIS — K81.0 ACUTE CHOLECYSTITIS: ICD-10-CM

## 2018-03-22 DIAGNOSIS — F43.21 ADJUSTMENT DISORDER WITH DEPRESSED MOOD: ICD-10-CM

## 2018-03-22 PROBLEM — A41.9 SEPSIS (H): Status: ACTIVE | Noted: 2018-03-22

## 2018-03-22 LAB
ANION GAP SERPL CALCULATED.3IONS-SCNC: 4 MMOL/L (ref 5–18)
BUN SERPL-MCNC: 20 MG/DL (ref 8–28)
CALCIUM SERPL-MCNC: 8.1 MG/DL (ref 8.5–10.5)
CHLORIDE BLD-SCNC: 106 MMOL/L (ref 98–107)
CO2 SERPL-SCNC: 31 MMOL/L (ref 22–31)
CREAT SERPL-MCNC: 0.75 MG/DL (ref 0.7–1.3)
CREAT SERPL-MCNC: 0.75 MG/DL (ref 0.7–1.3)
ERYTHROCYTE [DISTWIDTH] IN BLOOD BY AUTOMATED COUNT: 22.2 % (ref 11–14.5)
GFR SERPL CREATININE-BSD FRML MDRD: >60 ML/MIN/1.73M2
GFR SERPL CREATININE-BSD FRML MDRD: >60 ML/MIN/1.73M2
GLUCOSE BLD-MCNC: 113 MG/DL (ref 70–125)
GLUCOSE BLDC GLUCOMTR-MCNC: 119 MG/DL
GLUCOSE BLDC GLUCOMTR-MCNC: 130 MG/DL (ref 70–99)
GLUCOSE BLDC GLUCOMTR-MCNC: 147 MG/DL (ref 70–99)
GLUCOSE BLDC GLUCOMTR-MCNC: 154 MG/DL
GLUCOSE SERPL-MCNC: 113 MG/DL (ref 70–125)
HCT VFR BLD AUTO: 32.9 % (ref 40–54)
HGB BLD-MCNC: 9.9 G/DL (ref 14–18)
MAGNESIUM SERPL-MCNC: 2 MG/DL (ref 1.8–2.6)
MCH RBC QN AUTO: 31.6 PG (ref 27–34)
MCHC RBC AUTO-ENTMCNC: 30.1 G/DL (ref 32–36)
MCV RBC AUTO: 105 FL (ref 80–100)
PHOSPHATE SERPL-MCNC: 1.9 MG/DL (ref 2.5–4.5)
PLATELET # BLD AUTO: 113 THOU/UL (ref 140–440)
PMV BLD AUTO: 12.2 FL (ref 8.5–12.5)
POTASSIUM BLD-SCNC: 4.8 MMOL/L (ref 3.5–5)
POTASSIUM SERPL-SCNC: 4.8 MMOL/L (ref 3.5–5)
RBC # BLD AUTO: 3.13 MILL/UL (ref 4.4–6.2)
SODIUM SERPL-SCNC: 141 MMOL/L (ref 136–145)
WBC: 9.3 THOU/UL (ref 4–11)

## 2018-03-22 PROCEDURE — 12800006 ZZH R&B REHAB

## 2018-03-22 PROCEDURE — 25000132 ZZH RX MED GY IP 250 OP 250 PS 637: Performed by: PHYSICIAN ASSISTANT

## 2018-03-22 PROCEDURE — 00000146 ZZHCL STATISTIC GLUCOSE BY METER IP

## 2018-03-22 RX ORDER — ALLOPURINOL 100 MG/1
300 TABLET ORAL DAILY
Status: DISCONTINUED | OUTPATIENT
Start: 2018-03-23 | End: 2018-03-28 | Stop reason: HOSPADM

## 2018-03-22 RX ORDER — POLYETHYLENE GLYCOL 3350 17 G/17G
17 POWDER, FOR SOLUTION ORAL DAILY PRN
Status: DISCONTINUED | OUTPATIENT
Start: 2018-03-22 | End: 2018-03-28 | Stop reason: HOSPADM

## 2018-03-22 RX ORDER — TIZANIDINE 2 MG/1
2 TABLET ORAL EVERY 8 HOURS PRN
Status: DISCONTINUED | OUTPATIENT
Start: 2018-03-22 | End: 2018-03-28 | Stop reason: HOSPADM

## 2018-03-22 RX ORDER — FOLIC ACID 1 MG/1
1 TABLET ORAL AT BEDTIME
Status: DISCONTINUED | OUTPATIENT
Start: 2018-03-22 | End: 2018-03-28 | Stop reason: HOSPADM

## 2018-03-22 RX ORDER — ROPINIROLE 0.25 MG/1
0.25 TABLET, FILM COATED ORAL
Status: DISCONTINUED | OUTPATIENT
Start: 2018-03-22 | End: 2018-03-27

## 2018-03-22 RX ORDER — CEFDINIR 300 MG/1
300 CAPSULE ORAL EVERY 12 HOURS SCHEDULED
Status: DISCONTINUED | OUTPATIENT
Start: 2018-03-22 | End: 2018-03-28 | Stop reason: HOSPADM

## 2018-03-22 RX ORDER — METRONIDAZOLE 500 MG/1
500 TABLET ORAL EVERY 8 HOURS SCHEDULED
Status: DISCONTINUED | OUTPATIENT
Start: 2018-03-22 | End: 2018-03-28 | Stop reason: HOSPADM

## 2018-03-22 RX ORDER — OXYCODONE HYDROCHLORIDE 5 MG/1
5 TABLET ORAL EVERY 4 HOURS PRN
Status: DISCONTINUED | OUTPATIENT
Start: 2018-03-22 | End: 2018-03-28 | Stop reason: HOSPADM

## 2018-03-22 RX ORDER — SIMVASTATIN 20 MG
40 TABLET ORAL AT BEDTIME
Status: DISCONTINUED | OUTPATIENT
Start: 2018-03-22 | End: 2018-03-28 | Stop reason: HOSPADM

## 2018-03-22 RX ORDER — ACETAMINOPHEN 325 MG/1
325-975 TABLET ORAL EVERY 6 HOURS PRN
Status: DISCONTINUED | OUTPATIENT
Start: 2018-03-22 | End: 2018-03-27

## 2018-03-22 RX ORDER — CLOPIDOGREL BISULFATE 75 MG/1
75 TABLET ORAL DAILY
Status: DISCONTINUED | OUTPATIENT
Start: 2018-03-23 | End: 2018-03-28 | Stop reason: HOSPADM

## 2018-03-22 RX ORDER — NALOXONE HYDROCHLORIDE 0.4 MG/ML
.1-.4 INJECTION, SOLUTION INTRAMUSCULAR; INTRAVENOUS; SUBCUTANEOUS
Status: DISCONTINUED | OUTPATIENT
Start: 2018-03-22 | End: 2018-03-28 | Stop reason: HOSPADM

## 2018-03-22 RX ORDER — AMIODARONE HYDROCHLORIDE 200 MG/1
200 TABLET ORAL 2 TIMES DAILY
Status: DISCONTINUED | OUTPATIENT
Start: 2018-03-22 | End: 2018-03-28 | Stop reason: HOSPADM

## 2018-03-22 RX ORDER — AMIODARONE HYDROCHLORIDE 200 MG/1
200 TABLET ORAL DAILY
Status: DISCONTINUED | OUTPATIENT
Start: 2018-03-23 | End: 2018-03-22

## 2018-03-22 RX ADMIN — OMEPRAZOLE 40 MG: 20 CAPSULE, DELAYED RELEASE ORAL at 18:04

## 2018-03-22 RX ADMIN — CEFDINIR 300 MG: 300 CAPSULE ORAL at 20:20

## 2018-03-22 RX ADMIN — AMIODARONE HYDROCHLORIDE 200 MG: 200 TABLET ORAL at 20:17

## 2018-03-22 RX ADMIN — METRONIDAZOLE 500 MG: 500 TABLET ORAL at 18:04

## 2018-03-22 RX ADMIN — METRONIDAZOLE 500 MG: 500 TABLET ORAL at 20:18

## 2018-03-22 RX ADMIN — OXYCODONE HYDROCHLORIDE 5 MG: 5 TABLET ORAL at 20:16

## 2018-03-22 RX ADMIN — SIMVASTATIN 40 MG: 20 TABLET, FILM COATED ORAL at 20:18

## 2018-03-22 RX ADMIN — FOLIC ACID 1 MG: 1 TABLET ORAL at 20:20

## 2018-03-22 ASSESSMENT — MIFFLIN-ST. JEOR: SCORE: 1673.67

## 2018-03-22 NOTE — H&P
"     [facility]                HISTORY AND PHYSICAL NOTE         Patient Name: Nixon Velasquez Jr.   YOB: 1929  MRN: [id number]     Age / Sex: 88 year old male    Admit Date: 03/22/18    Reason for Admission: intense rehabilitation for debility in the setting of septicemia, and acute acalculous cholecystitis s/p percutaneous cholecystostomy.     Physical Examination  /51 (BP Location: Left arm)  Pulse 52  Temp 95.4  F (35.2  C) (Oral)  Resp 20  Ht 1.93 m (6' 4\")  Wt 93 kg (205 lb)  SpO2 95%  BMI 24.95 kg/m2  General Awake, alert, not in distress  HEENT EOMs intact  Cardiac Regular  Respiratory Clear breath sounds  Abdomen Soft, has a drain in the right lower quadrant with a bag with dark green/brown drainage  LE edema 2+  echymosis throughout the extremities   Dentures present   Strength is 4/5 and symmetrical   Legs are much weaker and edematous     Post Admission Physician Evaluation:     I have compared Nixon Velasquez Jr.'s condition on admission to acute rehabilitation to that outlined in the preadmission screen. History and physical exam performed by me.    No significant differences are identified and the patient remains appropriate for an inpatient rehabilitation facility level of care to manage medical issues and address functional impairments due to Debility     Comorbid medical conditions being managed:   Presented with septic and acute acalculous cholecystitis, on antibiotics for another 10 days. Plan for elective cholecystectomy in 4-6 weeks. Needs to have his drainage monitored closely. 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      Present function:   Ambulated slow 10 feet with with min to mod assist of 2.      Anticipated " rehabilitation course: Anticipate he will require 2-3 weeks. Anticipate he will be discharged home with his family for support. He has 10 children. His wife is in hospice and family has moved into the house to provide the with support. There are 4 BIRGIT and main level on one level. There are grab bars in the house.      Will benefit from intensive rehabilitation includin minutes each of PT, OT and SLP - seven days a week. Anticipate he will be able to tolerate the intensity of the therapies.        Rehabilitation nursing; has rehab nursing needs in the reenforcement of the strategies, taught by the therapists, and bowel and bladder management   Close management by physiatry.     Counseled patient and family regarding expectation in the ARU, urged to use call light, counseled on initial evaluation, and care conference closer to discharge. They report a large family will be attending the care conference      Prognosis:  fair    Estimated length of stay: 2-3 weeks         Yuliya Mayorga MD, MHA   Department of Physical Medicine and Rehabilitation  [facility]  Total time spent: 70 minutes with more than half the time was spent counseling and / or coordination of care   Includes counseling / education / discussion     Yuliya Mayorga MD, MHA

## 2018-03-22 NOTE — PLAN OF CARE
Problem: Patient Care Overview  Goal: Plan of Care/Patient Progress Review  FOCUS/GOAL  Mobility and Reinforcement of self-care/ADL    ASSESSMENT, INTERVENTIONS AND CONTINUING PLAN FOR GOAL:  Patient arrived to unit at approx 1440, was able to transfer from w/c to bed with Ao2 and gait belt pivot assist.  Patient denies any pain at this time, is somewhat [place] but was able to understand and follow commands.  Patient was sleepy when arrived here, room orientation done with patient and son (Mason) and per son patient has 9 other children who are also involved and live close.  Patient will still have all admission questions and skin assessment to be done, continue with admission process.

## 2018-03-22 NOTE — IP AVS SNAPSHOT
MRN:[id number]                      After Visit Summary   3/22/2018    Nixon Velasquez Jr.    MRN: [id number]           Thank you!     Thank you for choosing [facility] for your care. Our goal is always to provide you with excellent care. Hearing back from our patients is one way we can continue to improve our services. Please take a few minutes to complete the written survey that you may receive in the mail after you visit with us. Thank you!        Patient Information     Date Of Birth          5/2/1929        About your hospital stay     You were admitted on:  March 22, 2018 You last received care in the:   [facility]    You were discharged on:  March 28, 2018        Reason for your hospital stay       Admitted for rehabilitation following hospitalization for acute cholecystitis (gallbladder inflammation/infection)                  Who to Call     For medical emergencies, please call 911.  For non-urgent questions about your medical care, please call your primary care provider or clinic, [phone]          Attending Provider     Provider Specialty    Radha Christine MD Physical Medicine and Rehab    Standal, Bri Robbins MD Physical Medicine and Rehab       Primary Care Provider Office Phone # Fax #    Riley Cuevas [phone]      After Care Instructions     Activity       Your activity upon discharge: up with walker with assist from family as advised.            Diet       Follow this diet upon discharge: Orders Placed This Encounter      Room Service      Combination Diet Regular Diet Adult; Mechanical/Dental Soft Diet            Tubes and drains       You are going home with the following tubes or drains: cholecystostomy tube Flushing Your Drainage Tube: Twice daily.   1. Use 10cc of sterile normal saline  2. Turn the 3 way stopcock off to the drainage bag/bulb. (Point the arrow/lever at the bag/bulb).  3. Clean the flushing port with alcohol and attach the  "flush syringe by twisting it into place.  4. Gently inject/flush the drain. (Saline should flow toward your body not toward the bag/bulb.)  5. Turn the stopcock back into its original position (to open the drain again). [The lever should be \"off\" to the flushing port].  6. Untwist the syringe and remove it.    Keep tube site clean and dry.  Drain tube three times daily or as needed.                  Follow-up Appointments     Adult [facility] Follow-up and recommended labs and tests       Follow up with Oncology as scheduled- discuss recent hospitalization and acute right ue dvt  Follow up with primary care- within one week follow up hospitalization, a-fib, acute dvt, acute cholecystitis.  Repeat CBC, BMP within-  Follow up with surgery within 2-4 weeks    Appointments on [place] and/or [facility] (with [facility] or [facility] provider or service). Call [phone] if you haven't heard regarding these appointments within 7 days of discharge.                  Additional Services     Home Care OT Referral for Hospital Discharge       OT to eval and treat    Your provider has ordered home care - occupational therapy. If you have not been contacted within 2 days of your discharge please call the department phone number listed on the top of this document.            Home Care PT Referral for Hospital Discharge       PT to eval and treat    Your provider has ordered home care - physical therapy. If you have not been contacted within 2 days of your discharge please call the department phone number listed on the top of this document.            Home care nursing referral       RN skilled nursing visit. RN to assess drain site for signs/symptoms of infection, hydration, nutrition and bowel status and home safety.  Home health aide to assist with bathing    Your provider has ordered home care nursing services. If you have not been contacted within 2 days of your discharge please call the inpatient department phone number at " [phone] .                  Further instructions from your care team       Follow up appointments:    -- Follow up with Oncology as scheduled.    -- Follow up with primary care within one week follow up hospitalization, acute cholecystitis, a-fib, RUE DVT  You are scheduled to see Dr. Riley Cuevas on Friday, March 30th at 8:20 am. Please arrive 15 minutes early to check in    Address  [facility]                          [address] [place] [address]  Phone   [phone]    -- Follow up with General Surgery- 2-4 weeks follow up acute cholecystis- follow up cholecystostomy tube check is recommended in 2-4 weeks. Please call [facility] to schedule your exchange [phone]    -- Call [facility] at [phone] for the following:  - Drainage tube falls out, is pulled back or felt to be out of position.  - Unable to flush drainage tube.  - Leakage around drainage tube site.  - Extreme pain at drainage tube site.  - Outputs suddenly stop or significantly reduce.  - Warmth, redness, swelling or tenderness around the drainage tube.  - Nausea and/or vomiting  - Jaundice (yellow or orange/yellow color of your skin, especially in your eyes)    -- Follow up with A-fib clinic with cardiology-     HOME CARE  [facility] [phone] will provide RN, PT, OT, and Home Health Aide. They will call you after you discharge to schedule the first visit.       Pending Results     Date and Time Order Name Status Description    3/28/2018 0836 EKG 12-lead, complete Preliminary             Statement of Approval     Ordered          03/28/18 1553  I have reviewed and agree with all the recommendations and orders detailed in this document.  EFFECTIVE NOW     Approved and electronically signed by:  Bri Heard MD             Admission Information     Date & Time Provider Department Dept. Phone    3/22/2018 Bri Heard MD  [facility] [phone]     "  Your Vitals Were     Blood Pressure Pulse Temperature Respirations Height Weight    123/59 (BP Location: Left arm) 52 96.8  F (36  C) (Oral) 18 1.93 m (6' 4\") 93 kg (205 lb)    Pulse Oximetry BMI (Body Mass Index)                90% 24.95 kg/m2          [employer] Information     [employer] lets you send messages to your doctor, view your test results, renew your prescriptions, schedule appointments and more. To sign up, go to [email or web address] . Click on \"Log in\" on the left side of the screen, which will take you to the Welcome page. Then click on \"Sign up Now\" on the right side of the page.     You will be asked to enter the access code listed below, as well as some personal information. Please follow the directions to create your username and password.     Your access code is: [email or web address]  Expires: 2018  1:12 PM     Your access code will  in 90 days. If you need help or a new code, please call your [facility] clinic or [phone].        Care EveryWhere ID     This is your Care EveryWhere ID. This could be used by other organizations to access your [facility] medical records  [id number]        Equal Access to Services     MORIAH LORD AH: Lois chicaso Sobarry, waaxda luqadaha, qaybta kaalmada adeegyada, anthony correa. So [facility] [phone].    ATENCIÓN: Si habla español, tiene a del real disposición servicios gratuitos de asistencia lingüística. Llame al [phone].    We comply with applicable federal civil rights laws and [place] laws. We do not discriminate on the basis of race, color, national origin, age, disability, sex, sexual orientation, or gender identity.               Review of your medicines      START taking        Dose / Directions    allopurinol 300 MG tablet   Commonly known as:  ZYLOPRIM        Dose:  300 mg   Start taking on:  3/29/2018   Take 1 tablet (300 mg) by mouth daily   Quantity:  30 tablet   Refills:  0       amiodarone 200 MG tablet "   Commonly known as:  PACERONE/CODARONE   Indication:  Atrial Fibrillation   Used for:  Paroxysmal atrial fibrillation (H)        200 mg twice daily through 3/31.  200 mg daily starting 4/1   Quantity:  38 tablet   Refills:  0       cefdinir 300 MG capsule   Commonly known as:  OMNICEF   Indication:  cholecystitis   Used for:  Acute cholecystitis        Dose:  300 mg   Take 1 capsule (300 mg) by mouth every 12 hours   Quantity:  8 capsule   Refills:  0       clopidogrel 75 MG tablet   Commonly known as:  PLAVIX        Dose:  75 mg   Start taking on:  3/29/2018   Take 1 tablet (75 mg) by mouth daily   Quantity:  30 tablet   Refills:  0       enoxaparin 100 MG/ML injection   Commonly known as:  LOVENOX   Used for:  Acute deep vein thrombosis (DVT) of other vein of right upper extremity (H)        Dose:  1 mg/kg   Inject 0.93 mLs (93 mg) Subcutaneous every 12 hours   Quantity:  60 Syringe   Refills:  0       escitalopram 5 MG tablet   Commonly known as:  LEXAPRO        Dose:  5 mg   Take 1 tablet (5 mg) by mouth daily   Refills:  0       folic acid 1 MG tablet   Commonly known as:  FOLVITE        Dose:  1 mg   Take 1 tablet (1 mg) by mouth At Bedtime   Quantity:  30 tablet   Refills:  0       ibrutinib 140 MG tablet   Commonly known as:  IMBRUVICA   Indication:  Chronic Lymphocytic Leukemia        Dose:  420 mg   Start taking on:  3/29/2018   Take 3 tablets (420 mg) by mouth daily   Refills:  0       levothyroxine 150 MCG tablet   Commonly known as:  SYNTHROID/LEVOTHROID        Dose:  150 mcg   Start taking on:  3/29/2018   Take 1 tablet (150 mcg) by mouth every morning (before breakfast)   Quantity:  30 tablet   Refills:  0       metroNIDAZOLE 500 MG tablet   Commonly known as:  FLAGYL   Indication:  cholecystitis   Used for:  Acute cholecystitis        Dose:  500 mg   Take 1 tablet (500 mg) by mouth every 8 hours   Quantity:  13 tablet   Refills:  0       miconazole 2 % cream   Commonly known as:  MICATIN   Used for:   Candidal intertrigo        Apply topically 2 times daily   Quantity:  198 g   Refills:  0       mirtazapine 15 MG tablet   Commonly known as:  REMERON   Used for:  Adjustment disorder with depressed mood        Dose:  15 mg   Take 1 tablet (15 mg) by mouth every evening   Quantity:  30 tablet   Refills:  0       omeprazole 40 MG capsule   Commonly known as:  priLOSEC        Dose:  40 mg   Take 1 capsule (40 mg) by mouth 2 times daily (before meals)   Quantity:  30 capsule   Refills:  0       oxyCODONE 5 MG capsule   Commonly known as:  OXY-IR   Used for:  Acute cholecystitis        Dose:  5 mg   Take 1 capsule (5 mg) by mouth every 8 hours as needed for moderate to severe pain   Quantity:  15 capsule   Refills:  0       rOPINIRole 0.25 MG tablet   Commonly known as:  REQUIP   Used for:  Restless legs syndrome (RLS)        Dose:  0.25 mg   Take 1 tablet (0.25 mg) by mouth every evening   Quantity:  30 tablet   Refills:  0       Sharps Container Misc   Used for:  Acute deep vein thrombosis (DVT) of other vein of right upper extremity (H)        Dose:  1 Box   1 Box continuous   Quantity:  1 each   Refills:  0       simvastatin 40 MG tablet   Commonly known as:  ZOCOR        Dose:  40 mg   Take 1 tablet (40 mg) by mouth At Bedtime   Quantity:  30 tablet   Refills:  0       tiZANidine 2 MG tablet   Commonly known as:  ZANAFLEX        Dose:  2 mg   Take 1 tablet (2 mg) by mouth every 8 hours as needed for muscle spasms   Quantity:  90 tablet   Refills:  0         CONTINUE these medicines which have NOT CHANGED        Dose / Directions    TYLENOL PO        Refills:  0            Where to get your medicines      These medications were sent to [facility] [place], [place] - [address]  [address] [address]     Phone:  [phone]     amiodarone 200 MG tablet    cefdinir 300 MG capsule    enoxaparin 100 MG/ML injection    metroNIDAZOLE 500 MG tablet    miconazole 2 % cream     mirtazapine 15 MG tablet    rOPINIRole 0.25 MG tablet    Sharps Container Misc         Some of these will need a paper prescription and others can be bought over the counter. Ask your nurse if you have questions.     Bring a paper prescription for each of these medications     oxyCODONE 5 MG capsule                Protect others around you: Learn how to safely use, store and throw away your medicines at www.disposemymeds.org.        ANTIBIOTIC INSTRUCTION     You've Been Prescribed an Antibiotic - Now What?  Your healthcare team thinks that you or your loved one might have an infection. Some infections can be treated with antibiotics, which are powerful, life-saving drugs. Like all medications, antibiotics have side effects and should only be used when necessary. There are some important things you should know about your antibiotic treatment.      Your healthcare team may run tests before you start taking an antibiotic.    Your team may take samples (e.g., from your blood, urine or other areas) to run tests to look for bacteria. These test can be important to determine if you need an antibiotic at all and, if you do, which antibiotic will work best.      Within a few days, your healthcare team might change or even stop your antibiotic.    Your team may start you on an antibiotic while they are working to find out what is making you sick.    Your team might change your antibiotic because test results show that a different antibiotic would be better to treat your infection.    In some cases, once your team has more information, they learn that you do not need an antibiotic at all. They may find out that you don't have an infection, or that the antibiotic you're taking won't work against your infection. For example, an infection caused by a virus can't be treated with antibiotics. Staying on an antibiotic when you don't need it is more likely to be harmful than helpful.      You may experience side effects from your  antibiotic.    Like all medications, antibiotics have side effects. Some of these can be serious.    Let you healthcare team know if you have any known allergies when you are admitted to the hospital.    One significant side effect of nearly all antibiotics is the risk of severe and sometimes deadly diarrhea caused by Clostridium difficile (C. Difficile). This occurs when a person takes antibiotics because some good germs are destroyed. Antibiotic use allows C. diificile to take over, putting patients at high risk for this serious infection.    As a patient or caregiver, it is important to understand your or your loved one's antibiotic treatment. It is especially important for caregivers to speak up when patients can't speak for themselves. Here are some important questions to ask your healthcare team.    What infection is this antibiotic treating and how do you know I have that infection?    What side effects might occur from this antibiotic?    How long will I need to take this antibiotic?    Is it safe to take this antibiotic with other medications or supplements (e.g., vitamins) that I am taking?     Are there any special directions I need to know about taking this antibiotic? For example, should I take it with food?    How will I be monitored to know whether my infection is responding to the antibiotic?    What tests may help to make sure the right antibiotic is prescribed for me?      Information provided by:  www.cdc.gov/getsmart  U.S. Department of Health and Human Services  Centers for disease Control and Prevention  National Center for Emerging and Zoonotic Infectious Diseases  Division of Healthcare Quality Promotion        Information about OPIOIDS     PRESCRIPTION OPIOIDS: WHAT YOU NEED TO KNOW    Prescription opioids can be used to help relieve moderate to severe pain and are often prescribed following a surgery or injury, or for certain health conditions. These medications can be an important part of  treatment but also come with serious risks. It is important to work with your health care provider to make sure you are getting the safest, most effective care.    WHAT ARE THE RISKS AND SIDE EFFECTS OF OPIOID USE?  Prescription opioids carry serious risks of addiction and overdose, especially with prolonged use. An opioid overdose, often marked by slowed breathing can cause sudden death. The use of prescription opioids can have a number of side effects as well, even when taken as directed:      Tolerance - meaning you might need to take more of a medication for the same pain relief    Physical dependence - meaning you have symptoms of withdrawal when a medication is stopped    Increased sensitivity to pain    Constipation    Nausea, vomiting, and dry mouth    Sleepiness and dizziness    Confusion    Depression    Low levels of testosterone that can result in lower sex drive, energy, and strength    Itching and sweating    RISKS ARE GREATER WITH:    History of drug misuse, substance use disorder, or overdose    Mental health conditions (such as depression or anxiety)    Sleep apnea    Older age (65 years or older)    Pregnancy    Avoid alcohol while taking prescription opioids.   Also, unless specifically advised by your health care provider, medications to avoid include:    Benzodiazepines (such as Xanax or Valium)    Muscle relaxants (such as Soma or Flexeril)    Hypnotics (such as Ambien or Lunesta)    Other prescription opioids    KNOW YOUR OPTIONS:  Talk to your health care provider about ways to manage your pain that do not involve prescription opioids. Some of these options may actually work better and have fewer risks and side effects:    Pain relievers such as acetaminophen, ibuprofen, and naproxen    Some medications that are also used for depression or seizures    Physical therapy and exercise    Cognitive behavioral therapy, a psychological, goal-directed approach, in which patients learn how to modify  physical, behavioral, and emotional triggers of pain and stress    IF YOU ARE PRESCRIBED OPIOIDS FOR PAIN:    Never take opioids in greater amounts or more often than prescribed    Follow up with your primary health care provider and work together to create a plan on how to manage your pain.    Talk about ways to help manage your pain that do not involve prescription opioids    Talk about all concerns and side effects    Help prevent misuse and abuse    Never sell or share prescription opioids    Never use another person's prescription opioids    Store prescription opioids in a secure place and out of reach of others (this may include visitors, children, friends, and family)    Visit www.cdc.gov/drugoverdose to learn about risks of opioid abuse and overdose    If you believe you may be struggling with addiction, tell your health care provider and ask for guidance or call [facility]'s National Helpline at 1-482-176-HELP    LEARN MORE / www.cdc.gov/drugoverdose/prescribing/guideline.html    Safely dispose of unused prescription opioids: Find your local drug take-back programs and more information about the importance of safe disposal at www.doseofreality.[place].gov             Medication List: This is a list of all your medications and when to take them. Check marks below indicate your daily home schedule. Keep this list as a reference.      Medications           Morning Afternoon Evening Bedtime As Needed    allopurinol 300 MG tablet   Commonly known as:  ZYLOPRIM   Take 1 tablet (300 mg) by mouth daily   Start taking on:  3/29/2018   Last time this was given:  300 mg on 3/28/2018  9:11 AM                                amiodarone 200 MG tablet   Commonly known as:  PACERONE/CODARONE   200 mg twice daily through 3/31.  200 mg daily starting 4/1   Last time this was given:  200 mg on 3/28/2018  9:11 AM                                cefdinir 300 MG capsule   Commonly known as:  OMNICEF   Take 1 capsule (300 mg) by mouth every  12 hours   Last time this was given:  300 mg on 3/28/2018  9:11 AM                                clopidogrel 75 MG tablet   Commonly known as:  PLAVIX   Take 1 tablet (75 mg) by mouth daily   Start taking on:  3/29/2018   Last time this was given:  75 mg on 3/28/2018  9:13 AM                                enoxaparin 100 MG/ML injection   Commonly known as:  LOVENOX   Inject 0.93 mLs (93 mg) Subcutaneous every 12 hours   Last time this was given:  90 mg on 3/28/2018  9:11 AM                                escitalopram 5 MG tablet   Commonly known as:  LEXAPRO   Take 1 tablet (5 mg) by mouth daily   Last time this was given:  5 mg on 3/28/2018  1:32 PM                                folic acid 1 MG tablet   Commonly known as:  FOLVITE   Take 1 tablet (1 mg) by mouth At Bedtime   Last time this was given:  1 mg on 3/27/2018  7:35 PM                                ibrutinib 140 MG tablet   Commonly known as:  IMBRUVICA   Take 3 tablets (420 mg) by mouth daily   Start taking on:  3/29/2018   Last time this was given:  420 mg on 3/28/2018  9:12 AM                                levothyroxine 150 MCG tablet   Commonly known as:  SYNTHROID/LEVOTHROID   Take 1 tablet (150 mcg) by mouth every morning (before breakfast)   Start taking on:  3/29/2018   Last time this was given:  150 mcg on 3/28/2018  6:16 AM                                metroNIDAZOLE 500 MG tablet   Commonly known as:  FLAGYL   Take 1 tablet (500 mg) by mouth every 8 hours   Last time this was given:  500 mg on 3/28/2018  1:32 PM                                miconazole 2 % cream   Commonly known as:  MICATIN   Apply topically 2 times daily   Last time this was given:  3/28/2018 10:10 AM                                mirtazapine 15 MG tablet   Commonly known as:  REMERON   Take 1 tablet (15 mg) by mouth every evening                                omeprazole 40 MG capsule   Commonly known as:  priLOSEC   Take 1 capsule (40 mg) by mouth 2 times daily  (before meals)   Last time this was given:  40 mg on 3/28/2018  6:16 AM                                oxyCODONE 5 MG capsule   Commonly known as:  OXY-IR   Take 1 capsule (5 mg) by mouth every 8 hours as needed for moderate to severe pain                                rOPINIRole 0.25 MG tablet   Commonly known as:  REQUIP   Take 1 tablet (0.25 mg) by mouth every evening   Last time this was given:  0.25 mg on 3/27/2018  7:34 PM                                Sharps Container Misc   1 Box continuous                                simvastatin 40 MG tablet   Commonly known as:  ZOCOR   Take 1 tablet (40 mg) by mouth At Bedtime   Last time this was given:  40 mg on 3/27/2018  7:34 PM                                tiZANidine 2 MG tablet   Commonly known as:  ZANAFLEX   Take 1 tablet (2 mg) by mouth every 8 hours as needed for muscle spasms   Last time this was given:  2 mg on 3/28/2018  6:16 AM                                TYLENOL PO   Last time this was given:  1,000 mg on 3/28/2018  3:15 AM

## 2018-03-22 NOTE — IP AVS SNAPSHOT
[facility]    [address] [place] [id number]    Phone:  [phone]                                       After Visit Summary   3/22/2018    Nixon Velasquez Jr.    MRN: [id number]           After Visit Summary Signature Page     I have received my discharge instructions, and my questions have been answered. I have discussed any challenges I see with this plan with the nurse or doctor.    ..........................................................................................................................................  Patient/Patient Representative Signature      ..........................................................................................................................................  Patient Representative Print Name and Relationship to Patient    ..................................................               ................................................  Date                                            Time    ..........................................................................................................................................  Reviewed by Signature/Title    ...................................................              ..............................................  Date                                                            Time

## 2018-03-22 NOTE — H&P
"    [facility]   Acute Rehabilitation Unit  Admission History and Physical    chief complaint   \"I'm weak\"    History of Present illness  Nixon Velasquez Jr. is a 88 year old man with a past medical history significant for CLL, hemolytic anemia, CAD s/p CABG, CVA, Papillary thyroid carcinoma, prostate cancer, who initially presente t to [facility] 3/9/2018 with septic shock found to be secondary to acute cholecystitis with E. Coli Bacteremia.  Mr. Velasquez underwent percutaneous cholecystostomy placement and was treated with IV zosyn x 11 days with transition to po for 10 additional days.  He was seen and evaluated by surgery with plan for cholecystectomy in 4-6 weeks.  Hospital course was complicated by A-Fib with RVR,  Pancytopenia, and qt prolongation.     On admission to ARU patient seen with 2 sons at bedside.  Reports he is fatigued and is noted to be somewhat dysarthric, son does answer some of questions.  Nixon denies abdominal pain, n/v/, having \"liquid farts\" when asked about stool, denies fevers/chills, and sob.  Reports he has chronic urinary incontinence dating back to \"prostate cancer\" and wears depends daily.  He reports he is hard of hearing, had an infection in his right ear and due to shape of left ear is unable to wear hearing aides.  Denies vision changes.  Son clarifies that Nixon has dysarthria and dysphagia and some facial numbness dating back to a stroke and these worsen with fatigue.  Also report Nixon has a \"Milk allergy\", reportedly if he eats any products containing milk he has restless legs for 10 days.        Functionally, the patient was independent with adls prior to admission, even doing stairs on a regular basis.  Had some assistance from sons with finances though was still driving.  Currently CGA/SBA with seated grooming, min assist with dressing, min-mod assist fo 2 for transfers, min assist with fww ambulating 10 feet with FWW.  " "      PAST Medical History   Reviewed and updated in Epic.  Past Medical History:   Diagnosis Date     CAD (coronary artery disease)      CLL (chronic lymphocytic leukemia) (H)      CVA (cerebral vascular accident) (H)      Prostate cancer (H)      Thyroid cancer (H)        Surgical History  Reviewed and updated in Epic.  Past Surgical History:   Procedure Laterality Date     CORONARY ARTERY BYPASS       PROSTATECTOMY PERINEAL       THYROIDECTOMY         SOCIAL HISTORY  Reviewed and updated in Epic.  Marital Status:   Living situation: lives with son, wife (on hospice) with daughters providing 24/7 care  Family support: supportive 5 sons 3 daughters  Vocational History: retired  Tobacco use: denies  Alcohol use: denies  Illicit drug use: denies  Social History     Social History     Marital status:      Spouse name: N/A     Number of children: N/A     Years of education: N/A     Occupational History     Not on file.     Social History Main Topics     Smoking status: Not on file     Smokeless tobacco: Not on file     Alcohol use Not on file     Drug use: Not on file     Sexual activity: Not on file     Other Topics Concern     Not on file     Social History Narrative       FAMILY HISTORY  Reviewed and updated in Epic.  No family history on file.      Prior Functional history   Pt was independent with all ADLs, transfers, mobility and gait.      Medications  Scheduled meds  Prescriptions Prior to Admission   Medication Sig Dispense Refill Last Dose     Acetaminophen (TYLENOL PO)    3/22 at 0220       ALLERGIES     Allergies   Allergen Reactions     Milk [Lac Bovis] Muscle Pain (Myalgia)         Review of Systems  A 10 point ROS was performed and negative unless otherwise noted in HPI.       Physical Exam  VITAL SIGNS:  /51 (BP Location: Left arm)  Pulse 52  Temp 95.4  F (35.2  C) (Oral)  Resp 20  Ht 1.93 m (6' 4\")  Wt 93 kg (205 lb)  SpO2 95%  BMI 24.95 kg/m2  BMI:  Estimated body mass index " "is 24.95 kg/(m^2) as calculated from the following:    Height as of this encounter: 1.93 m (6' 4\").    Weight as of this encounter: 93 kg (205 lb).     General: alert awake nad  HEENT: mmm, mild right facial droop  Pulmonary: non labored clear on room air  Cardiovascular: irreg, +murmur  Abdominal: soft distended non tender drain brown clear fluid in RUQ  Extremities: warm dry +ble edema  MSK/neuro:   Mental Status:  alert and oriented   Cranial Nerves: [place], right facial numbness/weakness   Sensory: Normal to light touch in bilateral upper and lower extremities    Strength:   SF  EF  EE  WE  G  HF  KE  DF   PF   R  4           4          4            4         4         2+         3          3          3  L  4           4          4            4         4         2            3          3          3     Abnormal movements: None    Speech: dysarthric overall intelligible    Cognition: oriented to conversation   Gait: not tested  Skin: examined skin without rashes, redness, swelling.       Labs  Obtain am labs.      IMPRESSION/PLAN:  Mr. Nixon Velasquez is a 88 year old man with a history of: CLL, hemolytic anemia, CAD s/p CABG, CVA, Papillary thyroid carcinoma, prostate cancer, who initially presente t to [facility] 3/9/2018 with septic shock found to be secondary to acute cholecystitis with E. Coli Bacteremia.      Admission to acute inpatient rehab sepsis 2/2 e.coli bacteremia  Impairment group code: 16      1. PT, OT 90 minutes of each on a daily basis, in addition to rehab nursing and close management of physiatrist.      2. Impairment of ADL's: Noted to have impaired activity tolerance, impaired trunk strength currently sba with set up for seated grooming and hygiene min-mod assist of 2 for transfers to commode.  Patient does wear depends for baseline incontinence.  Goal for home with mod I for basic adls.     3. Impairment of mobility:  Noted to have impaired strength, balance, and activity tolerance.  " Currently mod assist for bed mobility, min-mod assist of 2 for sit to stand with elevated surface and min assist with 10 feet of ambulation.  Goals for mod I with transfers and short distance ambulation.     4. Medical Conditions  Cholecystitis- with septic shock.  Underwent cholecystostomy and received 11 day course of iv Zosyn.  -follow up with general surgery with plan for cholecystectomy 4-6 weeks- may need drain removed prior  -drain cares  -empty drain q shift  -continue oral antibiotics, flagyl, cefdinir for 10 days  -oxycodone/tylenol prn pain      CLL- reported recent restart of oral chemotherapy.  Received 3 units PRBC, 3 units PLTS, and 1 unit FFP durinag hospitalization in setting of pancytopenia, felt to be in part related to ongoing chemotherapy.   -continue ibrutinib 420 mg daily (patient to take own supply)  -to be held 3 days prior to cholecystectomy (when scheduled)  -monitor counts  -follow up with oncology    A-Fib with RVR- reportedly post op seen and evaluated by cardiology, started on iv amiodarone   -continue amiodarone 200 mg bid through 3/31 then transition to 200 mg daily  -follow up with cardiology- consider anticoagulation as outpatient    CAD- history of CABG  -continue plavix  -continue zocor    CVA- with residual dysarthria, dysphagia, and right facial sensory changes.   -monitor swallow  -continue mech soft diet     History of prostate cancer- s/p prostatectomy- with chronic urinary incontinence with use of depends    Thyroid cancer s/p thyroidectomy- continue synthroid    5. Adjustment to disability:  Monitor mood  6. FEN: mech soft  7. Bowel: incontinent- monitor   8. Bladder: incontinent   9. DVT Prophylaxis: mechanical  10. GI Prophylaxis: ppi  11. Code: DNR/DNI  12. Disposition: goal for home with family support  13. ELOS:  2 weeks.  14. Rehab prognosis:  Expected ongoing improvement in strength and activity tolerance though guarded given many medical comorbidities  15. Follow  up Appointments on Discharge: General Surgery, Cardiology, Oncology, PCP        Seen and discussed with Dr. Mayorga , PM&R staff physician     Lisa Trejo PA-C  Rehab Service  Pager [phone]

## 2018-03-23 LAB
ALBUMIN SERPL-MCNC: 2.9 G/DL (ref 3.4–5)
ALP SERPL-CCNC: 46 U/L (ref 40–150)
ALT SERPL W P-5'-P-CCNC: 15 U/L (ref 0–70)
ANION GAP SERPL CALCULATED.3IONS-SCNC: 5 MMOL/L (ref 3–14)
ANISOCYTOSIS BLD QL SMEAR: SLIGHT
AST SERPL W P-5'-P-CCNC: 15 U/L (ref 0–45)
BASOPHILS # BLD AUTO: 0 10E9/L (ref 0–0.2)
BASOPHILS NFR BLD AUTO: 0.1 %
BILIRUB SERPL-MCNC: 0.8 MG/DL (ref 0.2–1.3)
BUN SERPL-MCNC: 16 MG/DL (ref 7–30)
CALCIUM SERPL-MCNC: 7.8 MG/DL (ref 8.5–10.1)
CHLORIDE SERPL-SCNC: 109 MMOL/L (ref 94–109)
CO2 SERPL-SCNC: 30 MMOL/L (ref 20–32)
CREAT SERPL-MCNC: 0.8 MG/DL (ref 0.66–1.25)
DIFFERENTIAL METHOD BLD: ABNORMAL
EOSINOPHIL # BLD AUTO: 0 10E9/L (ref 0–0.7)
EOSINOPHIL NFR BLD AUTO: 0.4 %
ERYTHROCYTE [DISTWIDTH] IN BLOOD BY AUTOMATED COUNT: 21.4 % (ref 10–15)
GFR SERPL CREATININE-BSD FRML MDRD: >90 ML/MIN/1.7M2
GLUCOSE SERPL-MCNC: 109 MG/DL (ref 70–99)
HCT VFR BLD AUTO: 33.2 % (ref 40–53)
HGB BLD-MCNC: 10.1 G/DL (ref 13.3–17.7)
IMM GRANULOCYTES # BLD: 0 10E9/L (ref 0–0.4)
IMM GRANULOCYTES NFR BLD: 0.2 %
LYMPHOCYTES # BLD AUTO: 5.5 10E9/L (ref 0.8–5.3)
LYMPHOCYTES NFR BLD AUTO: 55.2 %
MCH RBC QN AUTO: 31.2 PG (ref 26.5–33)
MCHC RBC AUTO-ENTMCNC: 30.4 G/DL (ref 31.5–36.5)
MCV RBC AUTO: 103 FL (ref 78–100)
MICROCYTES BLD QL SMEAR: PRESENT
MONOCYTES # BLD AUTO: 0.5 10E9/L (ref 0–1.3)
MONOCYTES NFR BLD AUTO: 5 %
NEUTROPHILS # BLD AUTO: 3.9 10E9/L (ref 1.6–8.3)
NEUTROPHILS NFR BLD AUTO: 39.1 %
NRBC # BLD AUTO: 0 10*3/UL
NRBC BLD AUTO-RTO: 0 /100
OVALOCYTES BLD QL SMEAR: SLIGHT
PLATELET # BLD AUTO: 130 10E9/L (ref 150–450)
PLATELET # BLD EST: ABNORMAL 10*3/UL
POIKILOCYTOSIS BLD QL SMEAR: SLIGHT
POTASSIUM SERPL-SCNC: 4.2 MMOL/L (ref 3.4–5.3)
PROT SERPL-MCNC: 5.4 G/DL (ref 6.8–8.8)
RBC # BLD AUTO: 3.24 10E12/L (ref 4.4–5.9)
SODIUM SERPL-SCNC: 144 MMOL/L (ref 133–144)
WBC # BLD AUTO: 9.9 10E9/L (ref 4–11)

## 2018-03-23 PROCEDURE — 97167 OT EVAL HIGH COMPLEX 60 MIN: CPT | Mod: GO | Performed by: OCCUPATIONAL THERAPIST

## 2018-03-23 PROCEDURE — 85025 COMPLETE CBC W/AUTO DIFF WBC: CPT | Performed by: PHYSICIAN ASSISTANT

## 2018-03-23 PROCEDURE — 40000193 ZZH STATISTIC PT WARD VISIT: Performed by: STUDENT IN AN ORGANIZED HEALTH CARE EDUCATION/TRAINING PROGRAM

## 2018-03-23 PROCEDURE — 40000133 ZZH STATISTIC OT WARD VISIT: Performed by: OCCUPATIONAL THERAPIST

## 2018-03-23 PROCEDURE — 97530 THERAPEUTIC ACTIVITIES: CPT | Mod: GO | Performed by: OCCUPATIONAL THERAPIST

## 2018-03-23 PROCEDURE — 97110 THERAPEUTIC EXERCISES: CPT | Mod: GP | Performed by: STUDENT IN AN ORGANIZED HEALTH CARE EDUCATION/TRAINING PROGRAM

## 2018-03-23 PROCEDURE — 97535 SELF CARE MNGMENT TRAINING: CPT | Mod: GO | Performed by: OCCUPATIONAL THERAPIST

## 2018-03-23 PROCEDURE — 25000132 ZZH RX MED GY IP 250 OP 250 PS 637: Performed by: PHYSICIAN ASSISTANT

## 2018-03-23 PROCEDURE — 80053 COMPREHEN METABOLIC PANEL: CPT | Performed by: PHYSICIAN ASSISTANT

## 2018-03-23 PROCEDURE — 97163 PT EVAL HIGH COMPLEX 45 MIN: CPT | Mod: GP | Performed by: STUDENT IN AN ORGANIZED HEALTH CARE EDUCATION/TRAINING PROGRAM

## 2018-03-23 PROCEDURE — 12800006 ZZH R&B REHAB

## 2018-03-23 PROCEDURE — 97530 THERAPEUTIC ACTIVITIES: CPT | Mod: GP | Performed by: STUDENT IN AN ORGANIZED HEALTH CARE EDUCATION/TRAINING PROGRAM

## 2018-03-23 PROCEDURE — 36415 COLL VENOUS BLD VENIPUNCTURE: CPT | Performed by: PHYSICIAN ASSISTANT

## 2018-03-23 RX ORDER — MICONAZOLE NITRATE 20 MG/G
CREAM TOPICAL 2 TIMES DAILY
Status: DISCONTINUED | OUTPATIENT
Start: 2018-03-23 | End: 2018-03-28 | Stop reason: HOSPADM

## 2018-03-23 RX ADMIN — MICONAZOLE NITRATE: 20 CREAM TOPICAL at 21:45

## 2018-03-23 RX ADMIN — FOLIC ACID 1 MG: 1 TABLET ORAL at 21:45

## 2018-03-23 RX ADMIN — OMEPRAZOLE 40 MG: 20 CAPSULE, DELAYED RELEASE ORAL at 06:54

## 2018-03-23 RX ADMIN — CEFDINIR 300 MG: 300 CAPSULE ORAL at 19:30

## 2018-03-23 RX ADMIN — AMIODARONE HYDROCHLORIDE 200 MG: 200 TABLET ORAL at 21:45

## 2018-03-23 RX ADMIN — METRONIDAZOLE 500 MG: 500 TABLET ORAL at 06:54

## 2018-03-23 RX ADMIN — SIMVASTATIN 40 MG: 20 TABLET, FILM COATED ORAL at 21:45

## 2018-03-23 RX ADMIN — OMEPRAZOLE 40 MG: 20 CAPSULE, DELAYED RELEASE ORAL at 16:30

## 2018-03-23 RX ADMIN — METRONIDAZOLE 500 MG: 500 TABLET ORAL at 16:30

## 2018-03-23 RX ADMIN — AMIODARONE HYDROCHLORIDE 200 MG: 200 TABLET ORAL at 08:20

## 2018-03-23 RX ADMIN — ALLOPURINOL 300 MG: 100 TABLET ORAL at 08:20

## 2018-03-23 RX ADMIN — LEVOTHYROXINE SODIUM 150 MCG: 100 TABLET ORAL at 06:54

## 2018-03-23 RX ADMIN — METRONIDAZOLE 500 MG: 500 TABLET ORAL at 21:45

## 2018-03-23 RX ADMIN — CLOPIDOGREL BISULFATE 75 MG: 75 TABLET, FILM COATED ORAL at 08:20

## 2018-03-23 RX ADMIN — CEFDINIR 300 MG: 300 CAPSULE ORAL at 08:20

## 2018-03-23 RX ADMIN — OXYCODONE HYDROCHLORIDE 5 MG: 5 TABLET ORAL at 23:23

## 2018-03-23 ASSESSMENT — ACTIVITIES OF DAILY LIVING (ADL): PREVIOUS_RESPONSIBILITIES: DRIVING

## 2018-03-23 NOTE — PROGRESS NOTES
03/23/18 1500   Values Beliefs and Spiritual Care   C: Community: In support of your spiritual health, is there someone we may contact for you? (identify all that apply) ([facility]/ARU)   Visit Information   Visit Made By Staff    Type of Visit Initial;Staff consultation/triage   Visited Patient;Patient/family not available   Interventions   Plan of Care Review   With patient/family/proxy   Basic Spiritual Interventions    introduction/orientation to Spiritual Health Services   Advanced Assessments/Interventions   Presenting Concerns/Issues Spiritual/Uatsdin/emotional support   SPIRITUAL HEALTH SERVICES   Spiritual Assessment Progress Note   [facility] ([facility] 5R   REFERRAL SOURCE: Hospital  request.   On this visit, Nixon was just beginning his OT apt.   PLAN: We agreed to meet on Monday on ARU.    Rev. Siobhan Barakat  Staff   Spiritual Health Services  Pager: [phone]  Office: [phone]  * [facility] remains available 24/7 for emergent requests/referrals, either by having the switchboard page the on-call  or by entering an ASAP/STAT consult in Epic (this will also page the on-call ).*

## 2018-03-23 NOTE — PLAN OF CARE
Problem: Patient Care Overview  Goal: Plan of Care/Patient Progress Review  OT eval completed and tx initiated. Pt previously was IND with ADLs and driving. Pt was managing basement stairs to work in his workshop. Pt's family provides assistance with finances, cooking, cleaning. Pt has a service for medication set-up. Pt currently requires set-up and supervision with G/H tasks seated at EOB. Pt requires max A with toilet hygiene and set-up and SBA with use of urinal. Pt requires mod A with supine to EOB with HOB increased and min A with EOB to supine.   Anticipate LOS: 1.5-2 weeks with pt D/C'ing Home with HC OT and home health aide.  Family looking into purchasing stair lift for basement stairs. Pt may need W/C, hospital bed, AE for LBD and bathing, and bedside commode.

## 2018-03-23 NOTE — PLAN OF CARE
Problem: Patient Care Overview  Goal: Plan of Care/Patient Progress Review  Orders received, chart reviewed, eval completed and treatment initiated.  Currently requires MOD A for sup>sit with use of hospital bed features, demo MIN A stand pivot transfer with FWW, though is variable- recommend Ax2 stand pivot transfer with nsg, wc based for mobility at this time. Pt is anxious to get home to his wife who has advanced dementia and is on home hospice. Anticipate pt with extensive equipment needs including ramp, wc, hospital bed, need for family training with goal to get him home with 24/7 assist by 4/5/18.    Attempted to see pt for scheduled pm PT appt, however pt exhausted per RN, sleeping and she felt it was more important for him to rest 2/2 fatigue. Missed 30 minutes

## 2018-03-23 NOTE — PROGRESS NOTES
03/23/18 0815   Quick Adds   Type of Visit Initial Occupational Therapy Evaluation   Living Environment   Lives With child(maile), adult;spouse   Living Arrangements house   Home Accessibility bed and bath on same level;stairs to enter home;stairs within home   Number of Stairs to Enter Home 5   Number of Stairs Within Home 12  (2 flights in house, only uses stairs for the basement)   Stair Railings at Home inside, present at both sides;outside, present on right side   Transportation Available car;family or friend will provide   Living Environment Comment OT: Pt lives with son and wife in a 2 story house. Pt's bedroom and bathroom are on the main floor. Pt has shower bench and uses walk in shower, but does have a bathtub as well. Pt has a queen sized bed.    Self-Care   Dominant Hand right   Usual Activity Tolerance fair   Current Activity Tolerance poor   Regular Exercise no   Equipment Currently Used at Home commode;grab bar;other (see comments)  (shower bench)   Activity/Exercise/Self-Care Comment OT: Pt reports limited activity since November d/t being sick/weak. Prior pt took care of wife, and enjoyed woodworking,   Functional Level Prior   Ambulation 0-->independent   Transferring 0-->independent   Toileting 0-->independent   Bathing 0-->independent   Dressing 0-->independent   Eating 0-->independent   Communication 0-->understands/communicates without difficulty   Swallowing 0-->swallows foods/liquids without difficulty   Cognition 0 - no cognition issues reported   Fall history within last six months yes   Number of times patient has fallen within last six months 1  (tripped in the basement workshop)   Which of the above functional risks had a recent onset or change? ambulation;transferring;toileting;bathing;dressing;eating;fall history   Prior Functional Level Comment OT: Pt was previously IND with ADLs. Pt's kids assist with meal prep, home management, and money management. Pt reports having a service that  "assists with setting up medications.    General Information   Onset of Illness/Injury or Date of Surgery - Date 03/09/18   Referring Physician Dr. Mayorga    Patient/Family Goals Statement OT: \"To get out of here as soon as possible to be with my sweatheart.\"   Additional Occupational Profile Info/Pertinent History of Current Problem OT: Per chart review, pt is a 88 year old man with a past medical history significant for CLL, hemolytic anemia, CAD s/p CABG, CVA, Papillary thyroid carcinoma, prostate cancer, who initially presente t to [facility] 3/9/2018 with septic shock found to be secondary to acute cholecystitis with E. Coli Bacteremia.  Mr. Velasquez underwent percutaneous cholecystostomy placement and was treated with IV zosyn x 11 days with transition to po for 10 additional days.  He was seen and evaluated by surgery with plan for cholecystectomy in 4-6 weeks.  Hospital course was complicated by A-Fib with RVR, Pancytopenia, and qt prolongation.   Precautions/Limitations fall precautions;other (see comments)  ([place]), hx of Right side weakness.   Heart Disease Risk Factors Lack of physical activity;Family history;Medical history;Gender;Age   Cognitive Status Examination   Orientation orientation to person, place and time   Level of Consciousness alert   Visual Perception   Visual Perception Comments OT: Wears glasses   Sensory Examination   Sensory Comments OT: numbness and tingling in BLE's   Integumentary/Edema   Integumentary/Edema Comments OT: 1+ edema BLE's.   Range of Motion (ROM)   ROM Comment OT: BUE's WFL   Strength   Strength Comments OT: RUE: 4- all planes, LUE: 3+ all planes   Hand Strength   Left hand  (pounds) 25 pounds   Right hand  (pounds) 29 pounds   Coordination   Left hand, nine hole peg test (seconds) 35   Right hand, nine hole peg test (seconds) 46   Functional Limitations Decreased speed;Object transport impaired   Coordination Comments OT: Right hand slower d/t hx " of stroke affecting Right side.    ARC Assessment Only   Acute Rehab FIM See FIM scores for Mobility/ADL Assessment   Instrumental Activities of Daily Living (IADL)   Previous Responsibilities driving   Activities of Daily Living Analysis   Impairments Contributing to Impaired Activities of Daily Living balance impaired;coordination impaired;motor control impaired;postural control impaired;ROM decreased;strength decreased   General Therapy Interventions   Planned Therapy Interventions ADL retraining;IADL retraining;balance training;bed mobility training;fine motor coordination training;motor coordination training;ROM;strengthening;transfer training;home program guidelines;progressive activity/exercise;risk factor education   Clinical Impression   Criteria for Skilled Therapeutic Interventions Met yes, treatment indicated   OT Diagnosis OT: Decreased safety and IND with ADLs and IADLs.    Influenced by the following impairments OT: Impaired strength, impaired cognition, impaired balance, weakness, impaired standing tolerance, impaired [facility]   Assessment of Occupational Performance 5 or more Performance Deficits   Identified Performance Deficits OT: Performance deficits include: dressing, toileting, G/H tasks, bathing, functional transfers and mobility, and driving.    Clinical Decision Making (Complexity) High complexity   Therapy Frequency daily   Predicted Duration of Therapy Intervention (days/wks) 1.5 -2 weeks   Anticipated Equipment Needs at Discharge bathing equipment;bedside commode;dressing equipment;hospital bed;toileting equipment;wheelchair;wheelchair cushion   Anticipated Discharge Disposition Home;Home with Assist;Home with Home Therapy   Risks and Benefits of Treatment have been explained. Yes   Patient, Family & other staff in agreement with plan of care Yes   Clinical Impression Comments OT: Pt pleasant and cooperative throughout evaluation. Educated pt on role of OT and goals for POC. Spoke to pt's dtr  on the phone with the pt present to discuss D/C planning in regards to how much support can be given and options for home modifications. Pt would benefit from skilled OT services due to recent decline in safety and IND with ADLs and IADLs.    Total Evaluation Time   Total Evaluation Time (Minutes) 30

## 2018-03-23 NOTE — PROGRESS NOTES
"  [facility]  Daily progress note    interval history  Nixon Velasquez Jr. was seen and examined at bedside. Daughters present.  Reports had issues with sleep and feeling tired looking forward to use of bed extender.  Appetite is low but did eat this am, no n/v/d, no sob, no fevers, no urinary concerns is incontinent at baseline.  With some redness and rash in groin area, Nixon feels this would be better if he got a shower.  Reportedly felt right groin \"lump\" was able to \"push it in\" and on exam \"can't find it\" suspect hernia, encouraged to monitor and report if painful or unable to reduce.     Functionally, undergoing therapy evals today        medications    miconazole   Topical BID     cefdinir  300 mg Oral Q12H KRISTIN     metroNIDAZOLE  500 mg Oral Q8H KRISTIN     ibrutinib  420 mg Oral Daily     allopurinol  300 mg Oral Daily     clopidogrel  75 mg Oral Daily     folic acid  1 mg Oral At Bedtime     levothyroxine  150 mcg Oral QAM AC     omeprazole  40 mg Oral BID AC     simvastatin  40 mg Oral At Bedtime     amiodarone  200 mg Oral BID        tiZANidine, acetaminophen, polyethylene glycol, oxyCODONE IR, naloxone, rOPINIRole     physical exam  /67  Pulse 60  Temp 97.1  F (36.2  C) (Oral)  Resp 16  Ht 1.93 m (6' 4\")  Wt 93 kg (205 lb)  SpO2 94%  BMI 24.95 kg/m2  Gen: alert awake nad  Cardio: rrr  Pulm: non labored clear on room air  Abd: soft non tender.  RUQ drain with brown clear drainage.  Right groin suspected hernia (reducible)  Ext: significant edema- +pedal pulses   Neuro/MSK: alert oriented, mild dysarthria though speech overall inteligible.  Strength upper 4/5 throughout lower 2+/5 at hip flexors 3/5 ke/kf.      Labs  Cbc  cmp reviewed.    Rehabilitation - continue comprehensive acute inpatient rehabilitation program with multidisciplinary approach including therapies, rehab nursing, and physiatry following. See interval history for " updates.      assessment and plan    IMPRESSION/PLAN:  Mr. Nixon Velasquez is a 88 year old man with a history of: CLL, hemolytic anemia, CAD s/p CABG, CVA, Papillary thyroid carcinoma, prostate cancer, who initially presente t to [facility] 3/9/2018 with septic shock found to be secondary to acute cholecystitis with E. Coli Bacteremia.        Cholecystitis- with septic shock.  Underwent cholecystostomy and received 11 day course of iv Zosyn.  -follow up with general surgery with plan for cholecystectomy 4-6 weeks- may need drain removed prior  -drain cares  -empty drain q shift  -continue oral antibiotics, flagyl, cefdinir for 10 days  -oxycodone/tylenol prn pain        CLL- reported recent restart of oral chemotherapy.  Received 3 units PRBC, 3 units PLTS, and 1 unit FFP durinag hospitalization in setting of pancytopenia, felt to be in part related to ongoing chemotherapy.   -continue ibrutinib 420 mg daily (patient to take own supply)  -to be held 3 days prior to cholecystectomy (when scheduled)  -monitor counts  -follow up with oncology     A-Fib with RVR- reportedly post op seen and evaluated by cardiology, started on iv amiodarone   -continue amiodarone 200 mg bid through 3/31 then transition to 200 mg daily  -follow up with cardiology- consider anticoagulation as outpatient     CAD- history of CABG  -continue plavix  -continue zocor     CVA- with residual dysarthria, dysphagia, and right facial sensory changes.   -monitor swallow  -continue mech soft diet      History of prostate cancer- s/p prostatectomy- with chronic urinary incontinence with use of depends  -family to bring in home brief     Candida intertrigo-   Apply micantin  -keep skin folds clean and dry as able     Thyroid cancer s/p thyroidectomy- continue synthroid    Discussed with Dr. Heard.     Lisa Trejo PATenaC  Rehab Service  Pager: [phone]

## 2018-03-23 NOTE — PLAN OF CARE
Problem: Goal/Outcome  Goal: Goal Outcome Summary  Pt a&ox4. Used call light appropriately. Dysarthria/garbled speech at times, difficult to understand at times. Pt is [place] and doesn't wear hearing aids due to deformity of ear. Pt wears partial dentures that he removed at hs. Complained of generalized pain -Oxycodone 5mg given x1 with relief. Pt was ao2 to change incontinent brief. Per pt incontinent at baseline since his prostate cancer years back. Pt has red moist fungal rash in groin, applied corn starch and sticky note left for PMR team. Pt states he is allergic to all dairy products, assisted pt with ordering dinner. Took medications whole with water, per report pt has dysphagia but is declining to follow diet-no s/s of aspiration or coughing with regular diet/thin liquids. Drain site in right side of abdomen draining to gravity, site has minimal bleeding, dressing changed. Bi-lat LE edema, rested legs on a pillow, pt declined TEDs this shift. Skin tear on R hand. Pt has thin brittle skin, uses pillows to prevent hitting extremities on side rails. Bottom has pink blanchable area, encouraged pt to reposition off of bottom.     Ordered long bed for pt due to being 6ft 4in, bed arrived pt states he will switch beds when he gets out of bed. Alarms on for safety.  Son staying in room with pt, brought recliner chair and linen for him to use.

## 2018-03-23 NOTE — PLAN OF CARE
"Problem: Patient Care Overview  Goal: Plan of Care/Patient Progress Review  FOCUS/GOAL  Bladder management, Medical management and Mobility    ASSESSMENT, INTERVENTIONS AND CONTINUING PLAN FOR GOAL:  Pt denied pain during shift. Denied SOB, denied difficulty breathing. No neuro changes noted. VSS. Check-ins provided by writer Q 2 hours for incontinence management and repositioning provided Q 2 hours. Incontinent of bladder x1, requiring bed change approx 0200. Sticky note left for MD to assess: \"3/23 0450 Pt noticed increase in swelling R lower abdomen, \"bulge\" per pt. Pt stating noticed increase in size yesterday 3/22. No pain.\" Pt Son stayed overnight, at bedside. Alarms on for safety purposes. Call light within reach. Continue with POC.         "

## 2018-03-23 NOTE — PROGRESS NOTES
03/23/18 1128   Quick Adds   Type of Visit Initial PT Evaluation   Living Environment   Lives With child(maile), adult;spouse   Living Arrangements house   Home Accessibility bed and bath on same level;stairs to enter home;stairs within home   Number of Stairs to Enter Home 4   Number of Stairs Within Home 12  (2 flights in house, only uses stairs for the basement)   Stair Railings at Home inside, present at both sides;outside, present on right side   Transportation Available car;family or friend will provide   Living Environment Comment Pt lives with son and wife in a 2 story house. Pt's bedroom and bathroom are on the main floor. Pt has shower bench and uses walk in shower, but does have a bathtub as well. Pt has a double bed. Dtr states they are looking doreen added stair lift to basement woodshop   Self-Care   Dominant Hand right   Usual Activity Tolerance fair   Current Activity Tolerance poor   Regular Exercise no   Equipment Currently Used at Home commode;grab bar;other (see comments)  (shower bench, lift chair)   Activity/Exercise/Self-Care Comment Pt reports limited activity since November d/t being sick/weak. Prior pt took care of wife, and enjoyed woodworking,   Functional Level Prior   Ambulation 0-->independent   Transferring 0-->independent   Toileting 0-->independent   Bathing 0-->independent   Dressing 0-->independent   Eating 0-->independent   Communication 0-->understands/communicates without difficulty   Swallowing 0-->swallows foods/liquids without difficulty   Cognition 0 - no cognition issues reported   Fall history within last six months yes   Number of times patient has fallen within last six months 1  (tripped in the basement workshop)   Which of the above functional risks had a recent onset or change? ambulation;transferring;toileting;bathing;dressing;eating;fall history   Prior Functional Level Comment Pt was previously IND with ADLs. Pt's kids assist with meal prep, home management, and  money management. Pt reports having a service that assists with setting up medications.    General Information   Onset of Illness/Injury or Date of Surgery - Date 03/09/18   Referring Physician Dr. Vargas   Patient/Family Goals Statement to get home my Monday   Pertinent History of Current Problem (include personal factors and/or comorbidities that impact the POC) PMH and [facility] CLL, hemolytic anemia, CAD s/p CABG, CVA, Papillary thyroid carcinoma, prostate cancer, septic shock found to be secondary to acute cholecystitis with E. Coli Bacteremia.  s/p percutaneous cholecystostomy placement;  plan for cholecystectomy in 4-6 weeks.  Hospital course was complicated by A-Fib with RVR,  Pancytopenia, and qt prolongation. h/o CVA with residual deficits   Precautions/Limitations fall precautions;other (see comments)  (chemo precautions)   Heart Disease Risk Factors Age;Gender;Medical history;Stress;Lack of physical activity   General Info Comments per chart review, pt was still driving   Cognitive Status Examination   Level of Consciousness alert   Follows Commands and Answers Questions 75% of the time   Personal Safety and Judgment impaired   Pain Assessment   Patient Currently in Pain No   Integumentary/Edema   Integumentary/Edema Comments BLE +2   Posture    Posture Forward head position;Protracted shoulders;Kyphosis   Posture Comments demo trunk wekaness in sitting EOB   Range of Motion (ROM)   ROM Comment tight HS, limiting knee EXT ~10*   Strength   Strength Comments B grt toe 3-/5, foot intrinsics 4/5. functionally pt with generalized weakness   MMT: Hip, Rehab Eval   Hip Flexion - Left Side (4-/5) good minus, left   Hip Flexion - Right Side (4-/5) good minus, right   Hip Extension - Right Side (2/5) poor, right   Hip Extension - Left Side (2/5) poor, left   Hip ABduction - Right Side (3-/5) fair minus, right   Hip ABduction - Left Side (3-/5) fair minus, left   MMT: Knee, Rehab Eval   Knee Flexion - Left Side (5/5)  normal,left   Knee Extension - Left Side (4/5) good, left   Knee Flexion - Right Side (4-/5) good minus, right   Knee Extension - Right Side (4/5) good, right   MMT: Ankle, Rehab Eval   Ankle Dorsiflexion - Left Side (3/5) fair, left   Ankle Plantarflexion - Left Side (4/5) good, left   Ankle Dorsiflexion - Right Side (3/5) fair, left   Ankle Plantarflexion - Right Side (4/5) good, left   ARC Assessment Only   Acute Rehab FIM See FIM scores for Mobility/ADL Assessment   Balance   Balance Comments needs heavy UE support in standing   General Therapy Interventions   Planned Therapy Interventions ADL retraining;balance training;bed mobility training;gait training;strengthening;groups;transfer training;stretching;wheelchair management/propulsion training;risk factor education;home program guidelines   Clinical Impression   Criteria for Skilled Therapeutic Intervention yes, treatment indicated   PT Diagnosis impaired functional mobility   Influenced by the following impairments strength, balance, activity tolerance, complex medical,   Functional limitations due to impairments indepdence with functional mobility, transfers, bed mobility, gait, stairs, household and community mobility, ability to drive   Clinical Presentation Unstable/Unpredictable   Clinical Presentation Rationale pt with > 4 personal factors, complex medical   Clinical Decision Making (Complexity) High complexity   Therapy Frequency` other (see comments)  ( minutes daily)   Predicted Duration of Therapy Intervention (days/wks) 14 days   Anticipated Equipment Needs at Discharge hospital bed;front wheeled walker;wheelchair;gait belt   Anticipated Discharge Disposition Home with Assist;Home with Home Therapy   Risk & Benefits of therapy have been explained Yes   Patient, Family & other staff in agreement with plan of care Yes   Clinical Impression Comments please see care plan note   Total Evaluation Time   Total Evaluation Time (Minutes) 43

## 2018-03-23 NOTE — PLAN OF CARE
Problem: Goal/Outcome  Goal: Goal Outcome Summary  Outcome: No Change  FOCUS/GOAL  Medication management    ASSESSMENT, INTERVENTIONS AND CONTINUING PLAN FOR GOAL:  Pt A&O. VSS. Fluids promoted. Denies SOB, numbness or tingling in all extremities and denies pain. Pt very tired today after morning therapy, needed A2 to transfer to bed because pt stated he felt too weak and tired. Incontinent today. Pt uses call light appropriately and is able to make needs known. Will continue POC.

## 2018-03-24 PROCEDURE — 25000132 ZZH RX MED GY IP 250 OP 250 PS 637: Performed by: PHYSICAL MEDICINE & REHABILITATION

## 2018-03-24 PROCEDURE — 97116 GAIT TRAINING THERAPY: CPT | Mod: GP | Performed by: PHYSICAL THERAPIST

## 2018-03-24 PROCEDURE — 25000132 ZZH RX MED GY IP 250 OP 250 PS 637: Performed by: PHYSICIAN ASSISTANT

## 2018-03-24 PROCEDURE — 97530 THERAPEUTIC ACTIVITIES: CPT | Mod: GP | Performed by: PHYSICAL THERAPIST

## 2018-03-24 PROCEDURE — 97110 THERAPEUTIC EXERCISES: CPT | Mod: GP | Performed by: PHYSICAL THERAPIST

## 2018-03-24 PROCEDURE — 40000193 ZZH STATISTIC PT WARD VISIT: Performed by: PHYSICAL THERAPIST

## 2018-03-24 PROCEDURE — 97535 SELF CARE MNGMENT TRAINING: CPT | Mod: GO

## 2018-03-24 PROCEDURE — 12800006 ZZH R&B REHAB

## 2018-03-24 PROCEDURE — 40000133 ZZH STATISTIC OT WARD VISIT

## 2018-03-24 RX ORDER — PROCHLORPERAZINE MALEATE 5 MG
5 TABLET ORAL EVERY 6 HOURS PRN
Status: DISCONTINUED | OUTPATIENT
Start: 2018-03-24 | End: 2018-03-28 | Stop reason: HOSPADM

## 2018-03-24 RX ADMIN — OXYCODONE HYDROCHLORIDE 5 MG: 5 TABLET ORAL at 21:53

## 2018-03-24 RX ADMIN — MICONAZOLE NITRATE: 20 CREAM TOPICAL at 21:04

## 2018-03-24 RX ADMIN — ACETAMINOPHEN 325 MG: 325 TABLET ORAL at 17:07

## 2018-03-24 RX ADMIN — ACETAMINOPHEN 650 MG: 325 TABLET ORAL at 19:19

## 2018-03-24 RX ADMIN — METRONIDAZOLE 500 MG: 500 TABLET ORAL at 06:04

## 2018-03-24 RX ADMIN — OMEPRAZOLE 40 MG: 20 CAPSULE, DELAYED RELEASE ORAL at 06:04

## 2018-03-24 RX ADMIN — CEFDINIR 300 MG: 300 CAPSULE ORAL at 07:57

## 2018-03-24 RX ADMIN — LEVOTHYROXINE SODIUM 150 MCG: 100 TABLET ORAL at 06:04

## 2018-03-24 RX ADMIN — AMIODARONE HYDROCHLORIDE 200 MG: 200 TABLET ORAL at 21:04

## 2018-03-24 RX ADMIN — FOLIC ACID 1 MG: 1 TABLET ORAL at 21:04

## 2018-03-24 RX ADMIN — SIMVASTATIN 40 MG: 20 TABLET, FILM COATED ORAL at 21:04

## 2018-03-24 RX ADMIN — AMIODARONE HYDROCHLORIDE 200 MG: 200 TABLET ORAL at 08:02

## 2018-03-24 RX ADMIN — MICONAZOLE NITRATE: 20 CREAM TOPICAL at 07:59

## 2018-03-24 RX ADMIN — METRONIDAZOLE 500 MG: 500 TABLET ORAL at 13:25

## 2018-03-24 RX ADMIN — OMEPRAZOLE 40 MG: 20 CAPSULE, DELAYED RELEASE ORAL at 17:06

## 2018-03-24 RX ADMIN — ALLOPURINOL 300 MG: 100 TABLET ORAL at 07:58

## 2018-03-24 RX ADMIN — CEFDINIR 300 MG: 300 CAPSULE ORAL at 19:19

## 2018-03-24 RX ADMIN — CLOPIDOGREL BISULFATE 75 MG: 75 TABLET, FILM COATED ORAL at 07:58

## 2018-03-24 RX ADMIN — METRONIDAZOLE 500 MG: 500 TABLET ORAL at 21:04

## 2018-03-24 NOTE — PLAN OF CARE
FOCUS/GOAL  Bowel management, Bladder management, Pain management, Mobility, Skin integrity and Cognition/Memory/Judgment/Problem solving    ASSESSMENT, INTERVENTIONS AND CONTINUING PLAN FOR GOAL:  Pt is alert and oriented but forgetful at times, pt couldn't find call light and reported that it had been hours since the last time that he had seen staff, but after being reminded that the nurse had brought him a hot blanket and adjusted the temperature in the room shortly prior he no longer was upset, incontinent at baseline and was changed x 1 during the night, loose BM on 3/23/17, heavy assist of 2 to bedside commode, currently on chemo precautions for oral chemo, gravity drain in RUQ with CDI dressing, 50 ml output during the night, no further care concerns at this time continue with POC.

## 2018-03-24 NOTE — PROGRESS NOTES
18 1100   Living Arrangements   Lives With child(maile), adult;spouse   Living Arrangements house   Able to Return to Prior Living Arrangements yes   Home Safety   Patient Feels Safe Living in Home? yes   Discharge Needs Assessment   Equipment Currently Used at Home commode;grab bar;shower chair   Transportation Available car;family or friend will provide   Values Beliefs and Spiritual Care   F: Jolene: Do you have a connection with a jolene community, Alevism, or Advent group? yes   The name of the jolene community, Alevism, or Advent group is: (Faith)     Social Work: Initial Assessment with Discharge Plan    Patient Name: Nixon Velasquez Jr.  : 1929  Age: 88 year old  MRN: [id number]  Completed assessment with: patient  Admitted to ARU: 3/22/18    Presenting Information   Date of SW assessment: 3/24/18  Health Care Directive: not in EPIC  Primary Health Care Agent: next-of-kin would be surrogate decision-maker if necessary  Secondary Health Care Agent: n/a  Living Situation: lives with wife (has dementia, on hospice, being cared for by their daughter) in house with 5 step entry and 12 steps inside  Previous Functional Status: independent mostly, but needed more assistance from family recently  DME available: see above  Patient and family understanding of hospitalization: weakness  Cultural/Language/Spiritual Considerations: Faith    Physical Health  Reason for admission: debility 2/2 E coli bacteremia; sepsis    Provider Information   Primary Care Physician: Dr. Riley Cuevas [phone]    Mental Health/Chemical Dependency:   Diagnosis: depression (lexapro)-not currently taking  Alcohol/Tobacco/Narcotics: none  Support/Services in Place: none  Services Needed/Recommended: health psychologist is available  Sexuality/Intimacy: heterosexual    Support System  Marital Status: ; wife-Balbina (dementia, hospice care, daughter is caregiver)  Family support: 10 children; main  contacts: Srinivasa, Jean-Claude, Cha, Nixon Ott  Other children: Kurt, Pat, Miguel, Rodney, Ronnell  Other support available: extended family    Community Resources  Current in home services: none  Previous services: none    Financial/Employment/Education  Employment Status: retired  Income Source: Social Security  Education: high school  Financial Concerns: none  Insurance: [facility]    Discharge Plan   Patient and family discharge goal: home with wife/family and recommended home care services  Provided education on discharge plan: home care/therapy services  Patient agreeable to discharge plan: to be determined  Provided education and attained signature for Medicare IM and IRF Patient Rights and Privacy Information provided to patient : yes  Provided patient with [facility] Resources: n/a  Barriers to discharge: none identified    Discharge Recommendations   Disposition: return home with wife/family and home care services  Transportation Needs: family can provide  Name of Transportation Company and Phone: n/a    Additional comments   D:  See H&P for full medical background.  I:  Met with patient to complete assessment and social work consult.  A:  Patient is doing okay.  He expressed feeling bad about interaction with 2 staff last night, thinks he was hallucinating and might have offended them.  He was very apologetic and became teary when talking about it and asked that writer let the staff members know that he is sorry.  Writer updated staff nurse and charge nurse to pass along his apology.  Supportive family who is very involved.  Short stay so that he can return home soon.  P:  SW will follow and assist as needed.    Please invite to Care Conference:  Srinivasa (son) [phone]      VAISHNAVI Pierce   Phone: [phone]  Pager: [phone]

## 2018-03-24 NOTE — PLAN OF CARE
Problem: Goal/Outcome  Goal: Goal Outcome Summary  Outcome: Therapy, progress toward functional goals as expected  OT: pt was min A for functional transfers with FWW to wheelchair, no Liko lift needed. Pt shower completed. Pt declined PM OT session due to fatigue, though pt was pleasant and energetic during AM session.

## 2018-03-24 NOTE — PLAN OF CARE
Problem: Goal/Outcome  Goal: Goal Outcome Summary  RN: Denies pain. Right upper quad drain intact. Lisseth area reddened, no open area noted, cream applied per order.

## 2018-03-24 NOTE — PLAN OF CARE
"Problem: Goal/Outcome  Goal: Goal Outcome Summary  FOCUS/GOAL  Bowel management, Bladder management and Wound care management    ASSESSMENT, INTERVENTIONS AND CONTINUING PLAN FOR GOAL:  Pt alert and oriented, repositioned q2hr while in bed. Pt incont of urine this shift. Pt continent of BM, calling to get assistance up to BSC. Moderate assist x2 stand pivot using walker to BSC. Pt had small formed BM. 25 mL of green drainage from drain. Dressing changes with small amount drainage. Pt moved to different room this shift. Pt ate 100% dinner. Pt calling at end of night for pain med. Pt told nurse he had pain all over keeping hi from sleeping. Asked for \"whichever one is stronger, I need it\" when asked what medication he wanted for pain. Pt given 5mg oxycodone.         "

## 2018-03-24 NOTE — PLAN OF CARE
Problem: Goal/Outcome  Goal: Goal Outcome Summary  Pt had great participation during am session, but fatigued at end requesting to be done.  Pt able to complete SPT with fww and min-mod A demonstrating good ability to turn.  Pt cancelled pm session due to fatigue.  Pt was agitated and called family reporting fatigue and wanting to go home.  PT checked in with family and reported letting pt rest and will reset tomorrow.  Family was gracious and agreeable.  PT to continue with POC toward d/c goals tomorrow.

## 2018-03-24 NOTE — PROGRESS NOTES
[facility], [place]    Physical Medicine and Rehabilitation Daily Note     Assessment and Plan:    Nixon Velasquez Jr. is a 88 year old man with a past medical history significant for CLL, hemolytic anemia, CAD s/p CABG, CVA, Papillary thyroid carcinoma, prostate cancer, who initially presente t to [facility] 3/9/2018 with septic shock found to be secondary to acute cholecystitis with E. Coli Bacteremia.  Mr. Velasquez underwent percutaneous cholecystostomy placement and was treated with IV zosyn x 11 days with transition to po for 10 additional days.  He was seen and evaluated by surgery with plan for cholecystectomy in 4-6 weeks.  Hospital course was complicated by A-Fib with RVR,  Pancytopenia, and qt prolongation.     Interval History:    Patient seen on rounds, making gains with therapies. Some difficulties with comprehension and expression. Needs some asst with dressing.  Assist of 2 for pivot transfers. Continue therapies and discharge planning.           Review of Systems:    8 systems reviewed, see interval history for details.     Medications:    See chart     Physical Exam:      All vitals stable  Constitutional: Alert, NAD    Lungs:  Clear    Cardiovascular:  RRR   Abdomen: Soft    Genitounirinary:  Cont urine    Musculoskeletal: No joint swelling or redness    Neurologic:  No new focal changes      Total time spent >25 min with chart review, patient exam, rehab plan and dictation. >50% time spent face to face and with patient care coordination.     LUÍS MYLES

## 2018-03-25 PROCEDURE — 25000132 ZZH RX MED GY IP 250 OP 250 PS 637: Performed by: PHYSICIAN ASSISTANT

## 2018-03-25 PROCEDURE — 97530 THERAPEUTIC ACTIVITIES: CPT | Mod: GO

## 2018-03-25 PROCEDURE — 97116 GAIT TRAINING THERAPY: CPT | Mod: GP | Performed by: PHYSICAL THERAPIST

## 2018-03-25 PROCEDURE — 97110 THERAPEUTIC EXERCISES: CPT | Mod: GP | Performed by: PHYSICAL THERAPIST

## 2018-03-25 PROCEDURE — 97110 THERAPEUTIC EXERCISES: CPT | Mod: GO

## 2018-03-25 PROCEDURE — 40000133 ZZH STATISTIC OT WARD VISIT

## 2018-03-25 PROCEDURE — 40000193 ZZH STATISTIC PT WARD VISIT: Performed by: PHYSICAL THERAPIST

## 2018-03-25 PROCEDURE — 97530 THERAPEUTIC ACTIVITIES: CPT | Mod: GO | Performed by: STUDENT IN AN ORGANIZED HEALTH CARE EDUCATION/TRAINING PROGRAM

## 2018-03-25 PROCEDURE — 40000133 ZZH STATISTIC OT WARD VISIT: Performed by: STUDENT IN AN ORGANIZED HEALTH CARE EDUCATION/TRAINING PROGRAM

## 2018-03-25 PROCEDURE — 97530 THERAPEUTIC ACTIVITIES: CPT | Mod: GP | Performed by: PHYSICAL THERAPIST

## 2018-03-25 PROCEDURE — 97112 NEUROMUSCULAR REEDUCATION: CPT | Mod: GP | Performed by: PHYSICAL THERAPIST

## 2018-03-25 PROCEDURE — 12800006 ZZH R&B REHAB

## 2018-03-25 PROCEDURE — 25000132 ZZH RX MED GY IP 250 OP 250 PS 637: Performed by: PHYSICAL MEDICINE & REHABILITATION

## 2018-03-25 RX ADMIN — METRONIDAZOLE 500 MG: 500 TABLET ORAL at 20:37

## 2018-03-25 RX ADMIN — OMEPRAZOLE 40 MG: 20 CAPSULE, DELAYED RELEASE ORAL at 16:26

## 2018-03-25 RX ADMIN — AMIODARONE HYDROCHLORIDE 200 MG: 200 TABLET ORAL at 20:37

## 2018-03-25 RX ADMIN — CEFDINIR 300 MG: 300 CAPSULE ORAL at 08:13

## 2018-03-25 RX ADMIN — OMEPRAZOLE 40 MG: 20 CAPSULE, DELAYED RELEASE ORAL at 06:02

## 2018-03-25 RX ADMIN — FOLIC ACID 1 MG: 1 TABLET ORAL at 20:37

## 2018-03-25 RX ADMIN — CEFDINIR 300 MG: 300 CAPSULE ORAL at 20:37

## 2018-03-25 RX ADMIN — ROPINIROLE HYDROCHLORIDE 0.25 MG: 0.25 TABLET, FILM COATED ORAL at 21:53

## 2018-03-25 RX ADMIN — LEVOTHYROXINE SODIUM 150 MCG: 100 TABLET ORAL at 06:02

## 2018-03-25 RX ADMIN — OXYCODONE HYDROCHLORIDE 5 MG: 5 TABLET ORAL at 20:20

## 2018-03-25 RX ADMIN — METRONIDAZOLE 500 MG: 500 TABLET ORAL at 13:40

## 2018-03-25 RX ADMIN — ALLOPURINOL 300 MG: 100 TABLET ORAL at 08:12

## 2018-03-25 RX ADMIN — AMIODARONE HYDROCHLORIDE 200 MG: 200 TABLET ORAL at 08:13

## 2018-03-25 RX ADMIN — OXYCODONE HYDROCHLORIDE 5 MG: 5 TABLET ORAL at 13:40

## 2018-03-25 RX ADMIN — SIMVASTATIN 40 MG: 20 TABLET, FILM COATED ORAL at 20:37

## 2018-03-25 RX ADMIN — MICONAZOLE NITRATE: 20 CREAM TOPICAL at 20:38

## 2018-03-25 RX ADMIN — METRONIDAZOLE 500 MG: 500 TABLET ORAL at 06:02

## 2018-03-25 RX ADMIN — CLOPIDOGREL BISULFATE 75 MG: 75 TABLET, FILM COATED ORAL at 08:13

## 2018-03-25 NOTE — PLAN OF CARE
Problem: Patient Care Overview  Goal: Plan of Care/Patient Progress Review  OT: Session focused on functional reach while standing at FWW level and seated shoulder isometric strengthening. Pt quick to fatigue with strengthening exercises this date.

## 2018-03-25 NOTE — PLAN OF CARE
Problem: Goal/Outcome  Goal: Goal Outcome Summary  Outcome: Improving  FOCUS/GOAL  Bladder management, Nutrition/Feeding/Swallowing precautions and Pain management    ASSESSMENT, INTERVENTIONS AND CONTINUING PLAN FOR GOAL:  Pt was inct of bladder and voided as well, dependent with perinea cares and changing the brief but managed to pull down his pants with Min A but Mod A with pulling his pants up, Mod A with transfers from sitting to standing position, and CGA x1 with ambulation to the bathroom/room. Pt was encouraged with fluid intakes and toileting shc, c/o generalized pain,denied spasms, and pain med given x1 with some relief. Bed alarm on for safety. Pt's family in room, and helpful.   Nursing will cont POC

## 2018-03-25 NOTE — PLAN OF CARE
Problem: Goal/Outcome  Goal: Goal Outcome Summary  Pt demonstrated very depressed emotion during am session, but was much more upbeat during am session with son and daughter present.  Pt continues to demonstrate progress with transfers and gait toward d/c.

## 2018-03-25 NOTE — PROGRESS NOTES
[facility], [place]    Physical Medicine and Rehabilitation Daily Note     Assessment and Plan:    Nixon Velasquez Jr. is a 88 year old man with a past medical history significant for CLL, hemolytic anemia, CAD s/p CABG, CVA, Papillary thyroid carcinoma, prostate cancer, who initially presente t to [facility] 3/9/2018 with septic shock found to be secondary to acute cholecystitis with E. Coli Bacteremia.  Mr. Velasquez underwent percutaneous cholecystostomy placement and was treated with IV zosyn x 11 days with transition to po for 10 additional days.  He was seen and evaluated by surgery with plan for cholecystectomy in 4-6 weeks.  Hospital course was complicated by A-Fib with RVR,  Pancytopenia, and qt prolongation.    Interval History:    Patient seen on rounds, making gains with therapies. Needs some asst with dressing. Some difficulties with memory and comprehension. Mod asst with transfers and walker. Continue therapies and discharge planning.          Review of Systems:    8 systems reviewed, see interval history for details.     Medications:    See chart     Physical Exam:      All vitals stable  Constitutional: Alert, NAD    Lungs:  Clear    Cardiovascular:  RRR   Abdomen: Soft    Genitounirinary:  Cont urine    Musculoskeletal: No joint swelling or redness    Neurologic:  No new focal changes    Total time spent >25 min with chart review, patient exam, rehab plan and dictation. >50% time spent face to face and with patient care coordination.       LUÍS MYLES

## 2018-03-25 NOTE — PLAN OF CARE
Problem: Goal/Outcome  Goal: Goal Outcome Summary  FOCUS/GOAL  Bowel management, Bladder management, Medication management, Pain management and Wound care management    ASSESSMENT, INTERVENTIONS AND CONTINUING PLAN FOR GOAL:  Pt alert and oriented. At start of shift pt or family had called because pt needed to void and the pt had begun to walk quickly all the way to the bathroom with son at side before staff could assist him. Pt educated on importance of waiting for staff assistance. Pt had med size soft BM. Pt had to use wheelchair to get back to bed because of fatigue. Pt had a low grade temp of 99.3 and was shivering. Nurse obtained order of PRN compazine for nausea. Pt stated he was no longer nauseous so he did not receive. Pt given PRN tylenol 325 mg and some apple juice. After pt drank fluids and ate some dinner he said he felt much better. Recheck of temp was normal. Pt requested additional 650 of 975 mg dose of tylenol for generalized pain with good effect. Pt up to [facility] to urinate x1 this shift. 10mL green drainage from RUQ drain. Pt later called stating he had more generalized pain and was very uncomfortable. Pt unable to specify a more precise location of pain. Pt given PRN oxycodone and more apple juice, orange juice and water encouraging fluid intake. Pt also given ice pack to try and positioned on side with pillows. Pt asking if there is a way to get his surgery sooner to have his gallbladder removed because it is making him very uncomfortable. Sticky note left for doctor to assess with pt.

## 2018-03-25 NOTE — PLAN OF CARE
Problem: Goal/Outcome  Goal: Goal Outcome Summary  Outcome: Improving  FOCUS/GOAL  Medical management    ASSESSMENT, INTERVENTIONS AND CONTINUING PLAN FOR GOAL:  Patient slept well with his son at bedside. He called for assistance twice, able to verbalize his needs to use bathroom. The first time he transferred and ambulated to the bathroom sat on the toilet with moderate assistance and a walker. It was difficult for him to get off the toilet, he needed maximal assistance to stand up from the toilet, once he was up, he ambulated back to bed. The second time he transferred out of bed with more difficulties, maximal assistance, but it was easier for his to get off the toilet this time, needing assistance of 1. The toilet is low, put on a raises toilet seat. He had no c/o pain. He has a Cholecystomy tube. Draining green thick fluids. He is on chemotherapy precaution.

## 2018-03-26 ENCOUNTER — AMBULATORY - HEALTHEAST (OUTPATIENT)
Dept: ONCOLOGY | Facility: HOSPITAL | Age: 83
End: 2018-03-26

## 2018-03-26 ENCOUNTER — APPOINTMENT (OUTPATIENT)
Dept: ULTRASOUND IMAGING | Facility: CLINIC | Age: 83
DRG: 949 | End: 2018-03-26
Attending: PHYSICAL MEDICINE & REHABILITATION
Payer: COMMERCIAL

## 2018-03-26 DIAGNOSIS — C91.12 CLL (CHRONIC LYMPHOID LEUKEMIA) IN RELAPSE (H): ICD-10-CM

## 2018-03-26 LAB
ANION GAP SERPL CALCULATED.3IONS-SCNC: 5 MMOL/L (ref 3–14)
ANISOCYTOSIS BLD QL SMEAR: ABNORMAL
BASOPHILS # BLD AUTO: 0 10E9/L (ref 0–0.2)
BASOPHILS NFR BLD AUTO: 0.1 %
BUN SERPL-MCNC: 8 MG/DL (ref 7–30)
BURR CELLS BLD QL SMEAR: SLIGHT
CALCIUM SERPL-MCNC: 7.4 MG/DL (ref 8.5–10.1)
CHLORIDE SERPL-SCNC: 109 MMOL/L (ref 94–109)
CO2 SERPL-SCNC: 27 MMOL/L (ref 20–32)
CREAT SERPL-MCNC: 0.79 MG/DL (ref 0.66–1.25)
DIFFERENTIAL METHOD BLD: ABNORMAL
EOSINOPHIL # BLD AUTO: 0.1 10E9/L (ref 0–0.7)
EOSINOPHIL NFR BLD AUTO: 0.7 %
ERYTHROCYTE [DISTWIDTH] IN BLOOD BY AUTOMATED COUNT: 21.3 % (ref 10–15)
ERYTHROCYTE [DISTWIDTH] IN BLOOD BY AUTOMATED COUNT: 21.4 % (ref 10–15)
GFR SERPL CREATININE-BSD FRML MDRD: >90 ML/MIN/1.7M2
GLUCOSE SERPL-MCNC: 131 MG/DL (ref 70–99)
HCT VFR BLD AUTO: 31.4 % (ref 40–53)
HCT VFR BLD AUTO: 33.1 % (ref 40–53)
HGB BLD-MCNC: 10.1 G/DL (ref 13.3–17.7)
HGB BLD-MCNC: 9.6 G/DL (ref 13.3–17.7)
IMM GRANULOCYTES # BLD: 0 10E9/L (ref 0–0.4)
IMM GRANULOCYTES NFR BLD: 0.1 %
LYMPHOCYTES # BLD AUTO: 5.1 10E9/L (ref 0.8–5.3)
LYMPHOCYTES NFR BLD AUTO: 70.9 %
MACROCYTES BLD QL SMEAR: PRESENT
MCH RBC QN AUTO: 31.2 PG (ref 26.5–33)
MCH RBC QN AUTO: 31.8 PG (ref 26.5–33)
MCHC RBC AUTO-ENTMCNC: 30.5 G/DL (ref 31.5–36.5)
MCHC RBC AUTO-ENTMCNC: 30.6 G/DL (ref 31.5–36.5)
MCV RBC AUTO: 102 FL (ref 78–100)
MCV RBC AUTO: 104 FL (ref 78–100)
MICROCYTES BLD QL SMEAR: PRESENT
MONOCYTES # BLD AUTO: 0.4 10E9/L (ref 0–1.3)
MONOCYTES NFR BLD AUTO: 5 %
NEUTROPHILS # BLD AUTO: 1.7 10E9/L (ref 1.6–8.3)
NEUTROPHILS NFR BLD AUTO: 23.2 %
NRBC # BLD AUTO: 0 10*3/UL
NRBC BLD AUTO-RTO: 0 /100
PLATELET # BLD AUTO: 109 10E9/L (ref 150–450)
PLATELET # BLD AUTO: 92 10E9/L (ref 150–450)
PLATELET # BLD EST: ABNORMAL 10*3/UL
POIKILOCYTOSIS BLD QL SMEAR: SLIGHT
POTASSIUM SERPL-SCNC: 4 MMOL/L (ref 3.4–5.3)
RADIOLOGIST FLAGS: ABNORMAL
RBC # BLD AUTO: 3.08 10E12/L (ref 4.4–5.9)
RBC # BLD AUTO: 3.18 10E12/L (ref 4.4–5.9)
RBC INCLUSIONS BLD: SLIGHT
SODIUM SERPL-SCNC: 141 MMOL/L (ref 133–144)
WBC # BLD AUTO: 7.1 10E9/L (ref 4–11)
WBC # BLD AUTO: 9.4 10E9/L (ref 4–11)

## 2018-03-26 PROCEDURE — 99222 1ST HOSP IP/OBS MODERATE 55: CPT | Performed by: PHYSICIAN ASSISTANT

## 2018-03-26 PROCEDURE — 97110 THERAPEUTIC EXERCISES: CPT | Mod: GP | Performed by: STUDENT IN AN ORGANIZED HEALTH CARE EDUCATION/TRAINING PROGRAM

## 2018-03-26 PROCEDURE — 93971 EXTREMITY STUDY: CPT | Mod: RT

## 2018-03-26 PROCEDURE — 97116 GAIT TRAINING THERAPY: CPT | Mod: GP | Performed by: STUDENT IN AN ORGANIZED HEALTH CARE EDUCATION/TRAINING PROGRAM

## 2018-03-26 PROCEDURE — 25000132 ZZH RX MED GY IP 250 OP 250 PS 637: Performed by: PHYSICIAN ASSISTANT

## 2018-03-26 PROCEDURE — 40000133 ZZH STATISTIC OT WARD VISIT

## 2018-03-26 PROCEDURE — 97535 SELF CARE MNGMENT TRAINING: CPT | Mod: GO

## 2018-03-26 PROCEDURE — 25000128 H RX IP 250 OP 636: Performed by: INTERNAL MEDICINE

## 2018-03-26 PROCEDURE — 25000132 ZZH RX MED GY IP 250 OP 250 PS 637: Performed by: PHYSICAL MEDICINE & REHABILITATION

## 2018-03-26 PROCEDURE — 36415 COLL VENOUS BLD VENIPUNCTURE: CPT | Performed by: PHYSICIAN ASSISTANT

## 2018-03-26 PROCEDURE — 40000133 ZZH STATISTIC OT WARD VISIT: Performed by: OCCUPATIONAL THERAPIST

## 2018-03-26 PROCEDURE — 97530 THERAPEUTIC ACTIVITIES: CPT | Mod: GP | Performed by: STUDENT IN AN ORGANIZED HEALTH CARE EDUCATION/TRAINING PROGRAM

## 2018-03-26 PROCEDURE — 97535 SELF CARE MNGMENT TRAINING: CPT | Mod: GO | Performed by: OCCUPATIONAL THERAPIST

## 2018-03-26 PROCEDURE — 12800006 ZZH R&B REHAB

## 2018-03-26 PROCEDURE — 40000193 ZZH STATISTIC PT WARD VISIT: Performed by: STUDENT IN AN ORGANIZED HEALTH CARE EDUCATION/TRAINING PROGRAM

## 2018-03-26 PROCEDURE — 85027 COMPLETE CBC AUTOMATED: CPT | Performed by: INTERNAL MEDICINE

## 2018-03-26 PROCEDURE — 85025 COMPLETE CBC W/AUTO DIFF WBC: CPT | Performed by: PHYSICIAN ASSISTANT

## 2018-03-26 PROCEDURE — 99207 ZZC CONSULT E&M CHANGED TO INITIAL LEVEL: CPT | Performed by: PHYSICIAN ASSISTANT

## 2018-03-26 PROCEDURE — 80048 BASIC METABOLIC PNL TOTAL CA: CPT | Performed by: PHYSICIAN ASSISTANT

## 2018-03-26 RX ORDER — HEPARIN SODIUM 10000 [USP'U]/100ML
0-3500 INJECTION, SOLUTION INTRAVENOUS CONTINUOUS
Status: DISCONTINUED | OUTPATIENT
Start: 2018-03-26 | End: 2018-03-27

## 2018-03-26 RX ADMIN — CLOPIDOGREL BISULFATE 75 MG: 75 TABLET, FILM COATED ORAL at 08:00

## 2018-03-26 RX ADMIN — METRONIDAZOLE 500 MG: 500 TABLET ORAL at 13:47

## 2018-03-26 RX ADMIN — CEFDINIR 300 MG: 300 CAPSULE ORAL at 21:13

## 2018-03-26 RX ADMIN — OMEPRAZOLE 40 MG: 20 CAPSULE, DELAYED RELEASE ORAL at 06:51

## 2018-03-26 RX ADMIN — AMIODARONE HYDROCHLORIDE 200 MG: 200 TABLET ORAL at 21:13

## 2018-03-26 RX ADMIN — OMEPRAZOLE 40 MG: 20 CAPSULE, DELAYED RELEASE ORAL at 16:00

## 2018-03-26 RX ADMIN — AMIODARONE HYDROCHLORIDE 200 MG: 200 TABLET ORAL at 08:00

## 2018-03-26 RX ADMIN — MICONAZOLE NITRATE: 20 CREAM TOPICAL at 21:15

## 2018-03-26 RX ADMIN — ALLOPURINOL 300 MG: 100 TABLET ORAL at 08:00

## 2018-03-26 RX ADMIN — ACETAMINOPHEN 650 MG: 325 TABLET ORAL at 16:00

## 2018-03-26 RX ADMIN — MICONAZOLE NITRATE: 20 CREAM TOPICAL at 08:04

## 2018-03-26 RX ADMIN — CEFDINIR 300 MG: 300 CAPSULE ORAL at 08:00

## 2018-03-26 RX ADMIN — LEVOTHYROXINE SODIUM 150 MCG: 100 TABLET ORAL at 06:51

## 2018-03-26 RX ADMIN — HEPARIN SODIUM 1650 UNITS/HR: 10000 INJECTION, SOLUTION INTRAVENOUS at 18:03

## 2018-03-26 RX ADMIN — OXYCODONE HYDROCHLORIDE 5 MG: 5 TABLET ORAL at 21:36

## 2018-03-26 RX ADMIN — OXYCODONE HYDROCHLORIDE 5 MG: 5 TABLET ORAL at 05:13

## 2018-03-26 RX ADMIN — METRONIDAZOLE 500 MG: 500 TABLET ORAL at 21:13

## 2018-03-26 RX ADMIN — METRONIDAZOLE 500 MG: 500 TABLET ORAL at 05:13

## 2018-03-26 RX ADMIN — SIMVASTATIN 40 MG: 20 TABLET, FILM COATED ORAL at 21:13

## 2018-03-26 RX ADMIN — FOLIC ACID 1 MG: 1 TABLET ORAL at 21:13

## 2018-03-26 NOTE — PLAN OF CARE
"Problem: Goal/Outcome  Goal: Goal Outcome Summary  FOCUS/GOAL  Bladder management, Nutrition/Feeding/Swallowing precautions, Medication management and Pain management    ASSESSMENT, INTERVENTIONS AND CONTINUING PLAN FOR GOAL:  Pt alert and oriented, VSS. Pt sleeping much of shift. Pt complaining of generalized pain with some additional pain in left shoulder/rib area with movement. Pt stating he thinks he \"cracked a rib, there was a pop during therapy.\" Pt given oxycodone. Pt then explaining he has had a pill pocketed on the left side of his mouth that he couldn't swallow, stated he sometimes has trouble swallowing from prev stroke . Pt sat upright in bed and alternated between sips of water and applesauce to clear throat/mouth. Rest of scheduled meds given with applesauce, sticky note placed to clarify diet and suggest swallow eval. Son later called nurse stating pt was unable to sleep because of his restless legs. Son stated the pt hasn't been able to sleep at night for many days because of his restless legs. Pt noted to have visible legs spasms occurring. Nurse explained to pt that he needs to tell us if he is uncomfortable and what is going on with him otherwise we cannot help. Pt given PRN requip for restless legs. Drain on right side draining green output, dressing changed. Pt needs encouragement to drink, does not like the taste of water here.         "

## 2018-03-26 NOTE — PLAN OF CARE
Problem: Goal/Outcome  Goal: Goal Outcome Summary  FOCUS/GOAL  Medical management    ASSESSMENT, INTERVENTIONS AND CONTINUING PLAN FOR GOAL:  Emptied 25 ml of dark colored drainage from gall bladder site drain without issue. Rt arm is edematous today, skin is very fragile in general with multiple bruises and skin tears noted. Currently Mr. Velasquez  Is getting a U/S of his right arm. Pt does require heavy assist of 1 to get both legs back into bed. Will continue to monitor and anticipate his needs.

## 2018-03-26 NOTE — CONSULTS
[facility], [place] - ARU  Internal Medicine Consult       Date of Admission:  3/22/2018  Consult Requested by: PM&R  Reason for Consult: R upper extremity DVT.    Assessment & Plan   Nixon Velasquez Jr. is a 88 year old male who has a history of HTN, HLD, CAD s/p CABG x2, CVA, DM II, RLS, squamous cell skin cancer, adenocarcinoma of prostate s/p prostatectomy, papillary carcinoma of thyroid s/p thyroidectomy, acquired hypothyroidism, CLL, and autoimmune hemolytic anemia (follows with Dr. Pennington, [facility]) who was admitted to [facility] from 3/9 to 3/22 with septic shock d/t E.coli bacteremia and acute cholecystitis s/p cholecystostomy tube drainage. Hospital stay c/b A-fib with RVR (started on amiodarone by cardiology) and FRANC (resolved). He was discharged to ARU for aggressive therapy d/t physical deconditioning.     # Acute catheter associated DVT of RUE:  Acute worsening of RUE swelling with evidence of occlusive thrombus of R innominate vein, subclavian vein, and axillary vein on US 3/26. Noted to have R PICC on CXR 3/18 per [facility] chart, which was discontinued prior to admission to ARU. Likely PICC associated DVT. No concerning signs or symptoms to suggest PE.   - Cont IV heparin (started by primary team)  - Pharmacy consulted to start coumadin 3/27  - Will try to contact primary hematologist, Dr. Pennington, to discuss optimal AC regimen (given CLL, thrombocytopenia, and bleeding risk w surgery/chemotherapy).   - Repeat CBC in AM.    # E.coli bacteremia d/t acute cholecystitis s/p cholecystostomy:  Patient admitted w septic shock 3/9 d/t E.coli bacteremia. Noted to have acute cholecystitis, which is probable source. Initially treated w pressors, IV abx and cholecystostomy w clinical improvement. General surgery considering cholecystectomy in 3-4 weeks following completion of abx (10 day course recommended on discharge).   - Cont cefdinir 300mg BID +  metronidazole 500mg TID  - Follow up with Hematology and Cardiology for pre-op recommendations re: anticoagulation, chemotherapy, antiplatelet therapy.  - Follow up with General Surgery in 3-4 weeks for possible choleycstectomy    # CLL; Hx Autoimmune hemolytic anemia; Thrombocytopenia:  Follows with Dr. Pennington of Hem/Onc. Recently started on Ibrutinib 3/1/18, which has been continued here. Thrombocytopenia questionably d/t consumption for DVT vs marrow suppression from infection vs medication side effect. Counts stable.   - Will attempt to update Dr. Pennington, however clinic currently closed  - Cont Ibrutinib 420mg QD  - Monitor for bleeding  - Repeat CBC in AM    # DM II:  Diet controlled. Sugars well controlled.     # PAF:  Noted during acute admit. Likely precipitated by sepsis. Evaluated by cardiology and treated w IV amiodarone. Anticoagulation recommended but deferred initiation to outpatient d/t possible surgery and bleeding risk.   - Cardiology follow up scheduled 4/10  - Cont amiodarone 200mg BID (plan to taper to 200mg QD at follow up appointment)  - Cont IV heparin as above    # Hx HTN:  Hypotensive on admission to [facility] d/t sepsis. Not on any antihypertensive meds PTA. Currently hypertensive.   - Monitor BP  - Consider beta blocker if consistently hypertensive given cormorbidities (a-fib and CAD).    # CAD:  Hx CABG x2 in 1996. No acute concerns. Cont plavix and statin.     # Hx CVA:  Residual R hemiparesthesia. No new complaints. Cont plavix and statin.     # Hypothyroidism:  Hx papillary carcinoma s/p thyroidectomy. Cont PTA levothyroxine 150mcg QD.     # RLS:  Stable. Cont PTA requip.      DVT Prophylaxis: Heparin    The patient's care was discussed with the Bedside Nurse, Patient and PM&R Team.    Bryce Arteaga PA-C  Internal Medicine Staff Hospitalist Service  [facility]  Pager: [phone]  ______________________________________________________________________    Chief Complaint    RUE swelling d/t DVT    History is obtained from the patient    History of Present Illness   Nixon Velasquez Jr. is a 88 year old male who has a history of HTN, HLD, CAD s/p CABG x2, CVA, DM II, RLS, squamous cell skin cancer, adenocarcinoma of prostate s/p prostatectomy, papillary carcinoma of thyroid s/p thyroidectomy, acquired hypothyroidism, CLL, and autoimmune hemolytic anemia (follows with Dr. Pennington, [facility]) who was admitted to [facility] in [place] from 3/9 to 3/22 with septic shock d/t E.coli bacteremia and acute cholangitis/cholecystitis. He was treated with pressors, IV antibiotics and cholecystostomy tube with plans for possible cholecystectomy 3-4 weeks post-discharge. His hospital stay was complicated by A-fib with RVR (started on amiodarone by cardiology) and FRANC. He was discharged to ARU for aggressive therapy d/t physical deconditioning.     The patient notes RUE swelling and discomfort for the past 1-2 weeks, however significantly worse since admission to ARU. He denies any actual pain, just discomfort from swelling. Venous doppler US of the RUE was positive for occlusive thrombus of the right innominate vein, subclavian vein, and axillary vein. The patient denies any fevers, chills, chest pain or dyspnea. No hypoxia noted on vitals. He does not recall any central venous catheters at OSH, however review of [facility] chart reveals that he had a R PICC line. He does not recall any history of DVT/PE in the past, however review of chart notes PE on past medical history.     He currently denies any complaints other than generalized weakness and fatigue.     Review of Systems   The 10 point Review of Systems is negative other than noted in the HPI or here.     Past Medical History    I have reviewed this patient's medical history and updated it with pertinent information if needed.   Past Medical History:   Diagnosis Date     Adenocarcinoma of prostate (H)      Autoimmune hemolytic anemia  (H)      CAD (coronary artery disease)      CLL (chronic lymphocytic leukemia) (H)      CVA (cerebral vascular accident) (H)      Erosive esophagitis     w/ GI bleed     Hiatal hernia      HLD (hyperlipidemia)      HTN (hypertension)      Hypothyroidism      Papillary carcinoma of thyroid (H)      RLS (restless legs syndrome)      Squamous cell carcinoma      Type 2 diabetes mellitus (H)        Past Surgical History   I have reviewed this patient's surgical history and updated it with pertinent information if needed.  Past Surgical History:   Procedure Laterality Date     CORONARY ARTERY BYPASS       PROSTATECTOMY PERINEAL       THYROIDECTOMY         Social History   Social History   Substance Use Topics     Smoking status: Not on file     Smokeless tobacco: Not on file     Alcohol use Not on file       Family History   Family history reviewed with patient and is noncontributory.    Medications   I have reviewed this patient's current medications  Prescriptions Prior to Admission   Medication Sig Dispense Refill Last Dose     Acetaminophen (TYLENOL PO)    3/22 at 0220       Allergies   Allergies   Allergen Reactions     Milk Protein Extract Other (See Comments), Muscle Pain (Myalgia) and Cramps     Restless legs     Milk [Lac Bovis] Muscle Pain (Myalgia)       Physical Exam   Vital Signs: Temp: 97.7  F (36.5  C) Temp src: Oral BP: 156/67 Pulse: 63   Resp: 18 SpO2: 93 % O2 Device: None (Room air)    Weight: 205 lbs 0 oz    GENERAL:  Awake. Alert. Oriented x 3. NAD. On room air.  HEENT: No scleral icterus. Mucous membranes moist.   CV: RRR. S1, S2. No murmurs appreciated.   RESPIRATORY: Lungs CTAB with no wheezing, rales, rhonchi.   GI: Abdomen soft and non distended. Active bowel sounds. No tenderness. Cholecystostomy RUQ w bilious drainage.     NEUROLOGICAL: No focal deficits. Moves all extremities.    EXTREMITIES: No peripheral edema. Intact bilateral pedal pulses.   SKIN: Scattered purpura/ecchymotic areas. No  jaundice. No rashes.    Data   Data reviewed today: I reviewed all medications, new labs and imaging results over the last 24 hours.    ROUTINE IP LABS (Last four results)  CMP   Recent Labs  Lab 03/26/18  0620 03/23/18  0650    144   POTASSIUM 4.0 4.2   CHLORIDE 109 109   CO2 27 30   ANIONGAP 5 5   * 109*   BUN 8 16   CR 0.79 0.80   ELIU 7.4* 7.8*   PROTTOTAL  --  5.4*   ALBUMIN  --  2.9*   BILITOTAL  --  0.8   ALKPHOS  --  46   AST  --  15   ALT  --  15     CBC   Recent Labs  Lab 03/26/18  0620 03/23/18  0650   WBC 7.1 9.9   RBC 3.18* 3.24*   HGB 10.1* 10.1*   HCT 33.1* 33.2*   * 103*   MCH 31.8 31.2   MCHC 30.5* 30.4*   RDW 21.4* 21.4*   PLT 92* 130*     INR No lab results found in last 7 days.      All labs personally reviewed in [facility].  See A&P for additional results.     Unresulted Labs Ordered in the Past 30 Days of this Admission     Date and Time Order Name Status Description    3/26/2018 1546 CBC with platelets In process

## 2018-03-26 NOTE — PROGRESS NOTES
"  [facility]   Acute Rehabilitation Unit  Daily progress note    interval history  Nixon Velasquez Jr. was seen and examined at bedside. He reports new swelling in his right upper extremity. Denies any pain or erythema in affected extremity. Also complains of pain in his left posterior ribs. No shortness of breath, chest pain, nausea, vomiting, constipation or diarrhea. Patient sent to ultrasound for RUE doppler which revealed extensive DVT. This was communicated to patient. Medicine consulted. He will be started on anticoagulation - holding off on therapies today until clot stabilized.       medications    heparin Loading Dose  80 Units/kg Intravenous Once     miconazole   Topical BID     cefdinir  300 mg Oral Q12H KRISTIN     metroNIDAZOLE  500 mg Oral Q8H KRISTIN     ibrutinib  420 mg Oral Daily     allopurinol  300 mg Oral Daily     clopidogrel  75 mg Oral Daily     folic acid  1 mg Oral At Bedtime     levothyroxine  150 mcg Oral QAM AC     omeprazole  40 mg Oral BID AC     simvastatin  40 mg Oral At Bedtime     amiodarone  200 mg Oral BID        heparin, Warfarin Therapy Reminder, prochlorperazine, tiZANidine, acetaminophen, polyethylene glycol, oxyCODONE IR, naloxone, rOPINIRole     physical exam  /67 (BP Location: Left arm)  Pulse 63  Temp 97.7  F (36.5  C) (Oral)  Resp 18  Ht 1.93 m (6' 4\")  Wt 93 kg (205 lb)  SpO2 93%  BMI 24.95 kg/m2  Gen: alert awake nad  Cardio: Regular rate and rhythm  Pulm: non labored clear on room air  Abd: soft non tender.  RUQ drain with brown clear drainage.  Ext: RUE edematous with no erythema. Multiple ecchymoses on upper extremities. Compression stockings BLE.    Neuro/MSK: alert oriented, mild dysarthria though speech overall inteligible.        Labs  CBC, INR, Heparin 10A, bmp     Rehabilitation - continue comprehensive acute inpatient rehabilitation program with multidisciplinary approach including therapies, rehab nursing, and " physiatry following. See interval history for updates.      assessment and plan    IMPRESSION/PLAN:  Mr. Nixon Velasquez is an 88 year old man with a history of: CLL, hemolytic anemia, CAD s/p CABG, CVA, Papillary thyroid carcinoma, prostate cancer, who initially presente t to [facility] 3/9/2018 with septic shock found to be secondary to acute cholecystitis with E. Coli Bacteremia.     Acute DVT - Discovered on ultrasound 3/26/18, likely central-line associated given ICU stay and need for pressors with central line prior to rehab stay. Medicine has been consulted. Patient being placed on heparin. Will need to discuss plan for upcoming cholecystectomy now that he is going to be anticoagulated - question whether they should pursue surgery prior to becoming therapeutic or wait.     Cholecystitis- with septic shock.  Underwent cholecystostomy and received 11 day course of iv Zosyn.  -follow up with general surgery with plan for cholecystectomy 4-6 weeks- may need drain removed prior  -drain cares  -empty drain q shift  -continue oral antibiotics, flagyl, cefdinir for 10 days  -oxycodone/tylenol prn pain     CLL- reported recent restart of oral chemotherapy.  Received 3 units PRBC, 3 units PLTS, and 1 unit FFP durinag hospitalization in setting of pancytopenia, felt to be in part related to ongoing chemotherapy.   -continue ibrutinib 420 mg daily (patient to take own supply)  -to be held 3 days prior to cholecystectomy (when scheduled)  -monitor counts  -follow up with oncology     A-Fib with RVR- reportedly post op seen and evaluated by cardiology, started on iv amiodarone   -continue amiodarone 200 mg bid through 3/31 then transition to 200 mg daily  -follow up with cardiology- there has been consideration for anticoagulation, although this was not pursued given upcoming surgery.      CAD- history of CABG  -continue plavix  -continue zocor     CVA- with residual dysarthria, dysphagia, and right facial sensory  changes.   -monitor swallow  -continue [facility] soft diet      History of prostate cancer- s/p prostatectomy- with chronic urinary incontinence with use of depends  -family to bring in home brief     Candida intertrigo-   Apply micantin  -keep skin folds clean and dry as able     Thyroid cancer s/p thyroidectomy- continue synthroid    Bri Heard MD  [phone]    I, Bri Heard, spent a total of 60 minutes bedside and on the inpatient unit today managing the care of Nixon Velasquez Jr..  Over 50% of my time on the unit was spent counseling the patient and /or coordinating care. See note for details.

## 2018-03-26 NOTE — PLAN OF CARE
Problem: Goal/Outcome  Goal: Goal Outcome Summary  Outcome: Improving  FOCUS/GOAL  Medical management    ASSESSMENT, INTERVENTIONS AND CONTINUING PLAN FOR GOAL:  Patient did not sleep well tonight, his son is at bedside. Around 0100, he called for assistance to the bathroom. He c/o pain in left lateral mid rib cage, he said he broke a bone, there is no skin discoloration or bruising at that area. Offered him pain medication, he declined.  He was able to transfer out of bed, ambulate to the bathroom, transfer  to toilet and back to bed  with a walker and minimal assistance. He had been incontinent of urine and voided more in the toilet.   Around 0500, he called asked for assistance to the bathroom with same amount of assistance. He was incontinent of urine, voided in the toilet and had a small soft BM. He needed maximal assistance with perineal cares and clothing management. He c/o again of the same pain, left rib cage, offered him Oxycodone 5 mg, he verbalize he had pain relief. It was difficult for him to describe his pain, his son had to speak for him at times.   No respiratory distress. He wants to talk to the doctor about this pain.

## 2018-03-27 ENCOUNTER — ANESTHESIA EVENT (OUTPATIENT)
Dept: REHABILITATION | Facility: CLINIC | Age: 83
DRG: 949 | End: 2018-03-27
Payer: COMMERCIAL

## 2018-03-27 ENCOUNTER — COMMUNICATION - HEALTHEAST (OUTPATIENT)
Dept: ONCOLOGY | Facility: HOSPITAL | Age: 83
End: 2018-03-27

## 2018-03-27 ENCOUNTER — ANESTHESIA (OUTPATIENT)
Dept: REHABILITATION | Facility: CLINIC | Age: 83
DRG: 949 | End: 2018-03-27
Payer: COMMERCIAL

## 2018-03-27 LAB
ERYTHROCYTE [DISTWIDTH] IN BLOOD BY AUTOMATED COUNT: 21.3 % (ref 10–15)
HCT VFR BLD AUTO: 31.6 % (ref 40–53)
HGB BLD-MCNC: 9.8 G/DL (ref 13.3–17.7)
INR PPP: 1.28 (ref 0.86–1.14)
LMWH PPP CHRO-ACNC: 0.47 IU/ML
LMWH PPP CHRO-ACNC: 0.82 IU/ML
MCH RBC QN AUTO: 31.9 PG (ref 26.5–33)
MCHC RBC AUTO-ENTMCNC: 31 G/DL (ref 31.5–36.5)
MCV RBC AUTO: 103 FL (ref 78–100)
PLATELET # BLD AUTO: 89 10E9/L (ref 150–450)
RBC # BLD AUTO: 3.07 10E12/L (ref 4.4–5.9)
WBC # BLD AUTO: 7.8 10E9/L (ref 4–11)

## 2018-03-27 PROCEDURE — 85520 HEPARIN ASSAY: CPT | Performed by: INTERNAL MEDICINE

## 2018-03-27 PROCEDURE — 40000671 ZZH STATISTIC ANESTHESIA CASE

## 2018-03-27 PROCEDURE — 36415 COLL VENOUS BLD VENIPUNCTURE: CPT | Performed by: INTERNAL MEDICINE

## 2018-03-27 PROCEDURE — 25000128 H RX IP 250 OP 636: Performed by: INTERNAL MEDICINE

## 2018-03-27 PROCEDURE — 85520 HEPARIN ASSAY: CPT | Performed by: PHYSICAL MEDICINE & REHABILITATION

## 2018-03-27 PROCEDURE — 25000132 ZZH RX MED GY IP 250 OP 250 PS 637: Performed by: PHYSICAL MEDICINE & REHABILITATION

## 2018-03-27 PROCEDURE — 12800006 ZZH R&B REHAB

## 2018-03-27 PROCEDURE — 25000128 H RX IP 250 OP 636: Performed by: PHYSICIAN ASSISTANT

## 2018-03-27 PROCEDURE — 36415 COLL VENOUS BLD VENIPUNCTURE: CPT | Performed by: PHYSICAL MEDICINE & REHABILITATION

## 2018-03-27 PROCEDURE — 85027 COMPLETE CBC AUTOMATED: CPT | Performed by: INTERNAL MEDICINE

## 2018-03-27 PROCEDURE — 25000132 ZZH RX MED GY IP 250 OP 250 PS 637: Performed by: PHYSICIAN ASSISTANT

## 2018-03-27 PROCEDURE — 85610 PROTHROMBIN TIME: CPT | Performed by: INTERNAL MEDICINE

## 2018-03-27 RX ORDER — FONDAPARINUX SODIUM 7.5 MG/.6ML
7.5 INJECTION SUBCUTANEOUS EVERY 24 HOURS
Status: DISCONTINUED | OUTPATIENT
Start: 2018-03-27 | End: 2018-03-27

## 2018-03-27 RX ORDER — ROPINIROLE 0.25 MG/1
0.25 TABLET, FILM COATED ORAL EVERY EVENING
Status: DISCONTINUED | OUTPATIENT
Start: 2018-03-27 | End: 2018-03-28 | Stop reason: HOSPADM

## 2018-03-27 RX ORDER — ACETAMINOPHEN 500 MG
1000 TABLET ORAL EVERY 6 HOURS PRN
Status: DISCONTINUED | OUTPATIENT
Start: 2018-03-27 | End: 2018-03-28 | Stop reason: HOSPADM

## 2018-03-27 RX ADMIN — ROPINIROLE HYDROCHLORIDE 0.25 MG: 0.25 TABLET, FILM COATED ORAL at 19:34

## 2018-03-27 RX ADMIN — AMIODARONE HYDROCHLORIDE 200 MG: 200 TABLET ORAL at 09:02

## 2018-03-27 RX ADMIN — CLOPIDOGREL BISULFATE 75 MG: 75 TABLET, FILM COATED ORAL at 09:02

## 2018-03-27 RX ADMIN — HEPARIN SODIUM 1550 UNITS/HR: 10000 INJECTION, SOLUTION INTRAVENOUS at 03:40

## 2018-03-27 RX ADMIN — OMEPRAZOLE 40 MG: 20 CAPSULE, DELAYED RELEASE ORAL at 06:48

## 2018-03-27 RX ADMIN — OMEPRAZOLE 40 MG: 20 CAPSULE, DELAYED RELEASE ORAL at 17:27

## 2018-03-27 RX ADMIN — METRONIDAZOLE 500 MG: 500 TABLET ORAL at 13:54

## 2018-03-27 RX ADMIN — FOLIC ACID 1 MG: 1 TABLET ORAL at 19:35

## 2018-03-27 RX ADMIN — HEPARIN SODIUM 1550 UNITS/HR: 10000 INJECTION, SOLUTION INTRAVENOUS at 08:52

## 2018-03-27 RX ADMIN — LEVOTHYROXINE SODIUM 150 MCG: 100 TABLET ORAL at 06:48

## 2018-03-27 RX ADMIN — ACETAMINOPHEN 1000 MG: 500 TABLET ORAL at 20:24

## 2018-03-27 RX ADMIN — SIMVASTATIN 40 MG: 20 TABLET, FILM COATED ORAL at 19:34

## 2018-03-27 RX ADMIN — METRONIDAZOLE 500 MG: 500 TABLET ORAL at 06:48

## 2018-03-27 RX ADMIN — CEFDINIR 300 MG: 300 CAPSULE ORAL at 19:34

## 2018-03-27 RX ADMIN — ALLOPURINOL 300 MG: 100 TABLET ORAL at 09:02

## 2018-03-27 RX ADMIN — AMIODARONE HYDROCHLORIDE 200 MG: 200 TABLET ORAL at 19:35

## 2018-03-27 RX ADMIN — FONDAPARINUX SODIUM 7.5 MG: 7.5 INJECTION, SOLUTION SUBCUTANEOUS at 12:03

## 2018-03-27 RX ADMIN — CEFDINIR 300 MG: 300 CAPSULE ORAL at 09:02

## 2018-03-27 RX ADMIN — OXYCODONE HYDROCHLORIDE 5 MG: 5 TABLET ORAL at 20:24

## 2018-03-27 RX ADMIN — TIZANIDINE 2 MG: 2 TABLET ORAL at 22:09

## 2018-03-27 RX ADMIN — METRONIDAZOLE 500 MG: 500 TABLET ORAL at 19:34

## 2018-03-27 NOTE — PLAN OF CARE
Problem: Patient Care Overview  Goal: Plan of Care/Patient Progress Review  PT: Cancel of 30 minute AM session, per nursing and pt that pt had been up til 3 or 4 in the morning for blood draws, also has R UE DVT; pt left supine in bed to rest.

## 2018-03-27 NOTE — PLAN OF CARE
Problem: Goal/Outcome  Goal: Goal Outcome Summary  FOCUS/GOAL  Medical management    ASSESSMENT, INTERVENTIONS AND CONTINUING PLAN FOR GOAL:  ANA drain flushed without discomfort to patient, drainage in bag continues to be greenish brown 15 ml emptied this morning.   Heparin drip d/c'd per verbal order from PA, Atrixtra injection given. Pt very tired today due to being awake until 0400 with medical needs. Will continue to monitor and anticipate needs.

## 2018-03-27 NOTE — PLAN OF CARE
Problem: Goal/Outcome  Goal: Goal Outcome Summary  FOCUS/GOAL  Medication management, Pain management and Wound care management    ASSESSMENT, INTERVENTIONS AND CONTINUING PLAN FOR GOAL:  Pt alert and oriented but forgetful. Offered pills crushed or whole with applesauce and patient wasn't sure, so pills crushed. No difficulty swallowing noted. RUE US positive for clot. PIV started to left hand and heparin started at 1800. 10a lab scheduled for midnight, 6 hours after. Received loading dose of 7450, then 1650 units/hour. PIV site bleeding with no pain, area cleansed and new dressing applied with gauze and coban as skin fragile. Daughter present and states he is allergic to warfarin. Updated allergy list and left note for doctor. Pt verbalizes he is sad if this affects his discharge plan as he was hoping to be home around [facility]. Nephew plans to sleep in room. Received PRN tylenol and oxycodone received with relief for pain in RUE. Up with assist of 1 and walker. Drain output 50cc this shift and dressing changed. No rash noted to groin. PVR 61, was incontinent of urine. Last BM 3/26.

## 2018-03-27 NOTE — PROGRESS NOTES
"   03/27/18 1700   Visit Information   Visit Made By Staff    Type of Visit Follow-up;Distress   Distress Emotional   Visited Patient   Interventions   Plan of Care Review   With patient/family/proxy;With interdisciplinary team   Basic Spiritual Interventions   Assessment of spiritual needs/resources;Reflective conversation   Provision of Spiritual Resources  Other  (Made sure Nixon is listed as Pentecostal.)   Advanced Assessments/Interventions   Presenting Concerns/Issues Spiritual/Scientologist/emotional support;Goals of care discernment;Grief and adjustment issues;Challenged coping;Stress/self-care   SPIRITUAL HEALTH SERVICES  SPIRITUAL  ASSESSMENT Progress Note  [facility] 5R    PRIMARY FOCUS    Goals of Care    Emotional distress    Support for coping    ILLNESS CIRCUMSTANCES:   Reviewed documentation. Responded to referral based on f/u visit.  Reflective conversation shared with Nixon which integrated elements of illness and family narratives.    Context of Serious Illness/Sympton(s)- Nixon flatly stated that he has had it. \"I have no more fight left in me. I want to go home.\" Nixon has ten children, and his wife of 68 years is in home hospice with dementia.  Nixon has twenty-four grandchildren and thirteen great-grandchildren.  They all live in the area. \"We never had a fight in 68 years. 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    Resources for Support - Family.    DISTRESS:     Emotional Distress - Nixon said, \"I've been ill for so long. I just want to go home. I can't take this anymore.\"    Sabianist Distress - n/a    Social/Cultural/Economic- n/d    SPIRITUAL/Yazidism COPING):     Adventism/Jolene - Pentecostal. (Made sure Nixon is listed as Pentecostal.)    Spiritual Practice(s) - Sacraments.    Emotional, Relational,Existential Connections- Nixon named that he wants to be with his wife and stop fighting medically. We called his son Srinivasa and shared that Nixon'd " "like the care conference to happen earlier than Friday this week.     GOALS OF CARE:    Goals of Care - Nixon made it clear that he'd like to go home with hospice. I spoke with Dr. Heard and Dr. Hughes about Nixon's wishes and also about making the care conference happen earlier than Friday of this week.    Meaning/Sense-Making- Nixon said repeatedly, \"It's been a good life, a great life.\"    PLAN: Will see Nixon on Thursday on ARU, and follow up with Dr. Hughes.    Rev. Arron-O Habermas-Ricci  Staff   [phone]  * [facility] remains available 24/7 for emergent requests/referrals, either by having the switchboard page the on-call  or by entering an ASAP/STAT consult in Epic (this will also page the on-call ).*        "

## 2018-03-27 NOTE — PLAN OF CARE
Problem: Individualization  Goal: Patient Individualization  Outcome: Improving  FOCUS/GOAL  Bladder management and Medical management    ASSESSMENT, INTERVENTIONS AND CONTINUING PLAN FOR GOAL:  Alert & oriented, uses call light to make needs known. Needs assist of two with repositioning and boosting up in bed. Continent of bladder with use of toilet. CGA with gait belt and walker for transfers and toileting. Right hand PIV removed 2/2 bleeding. Left forearm PIV in place and patent, Heparin 10A was 0.82 @ 0112, heparin dose adjusted and is currently running at 1550 units/hr. Right arm continues swollen, declines pain to site. Drain from gallbladder site draining greenish bile, output 35 cc this shift. Denies pain or discomfort. Elevated B/Ps taken on left and right leg reported to MD with orders to recheck at later time, B/P 132/61 on left arm. Laith spent night at bedside. Pleasant & cooperative.

## 2018-03-27 NOTE — PLAN OF CARE
"Problem: Patient Care Overview  Goal: Plan of Care/Patient Progress Review  PT: -60 minutes for 2nd AM PT session, pt declined to attend planned \"Falls Prevention\" class to rest, 2/2 being up to ~4:00 AM. Cont with POC tomorrow.       "

## 2018-03-27 NOTE — PLAN OF CARE
Problem: Patient Care Overview  Goal: Plan of Care/Patient Progress Review  OT: Cancel AM session, patient with known DVT in RUE, reports that he was up until 3:30 with blood draw attempts, is fatigued and would like to rest.  Given aforementioned concerns, writer feels cancelling session is appropriate.  -60 minutes.      Fauzia HA

## 2018-03-27 NOTE — PROGRESS NOTES
"  General acute hospital   Acute Rehabilitation Unit  Daily progress note    interval history  Nixon Velasquez Jr. was seen and examined at bedside. He reports he was up the majority of the night because of blood draws and is therefore quite fatigued today. Tolerating the fondaparinux infusion. Denies shortness of breath, chest pain, nausea, vomiting, constipation or diarrhea. Becomes tearful when talking about his wife, who reportedly has Alzheimers disease. Patient is feeling very eager to get home to her.       medications    fondaparinux  7.5 mg Subcutaneous Q24H     rOPINIRole  0.25 mg Oral QPM     miconazole   Topical BID     cefdinir  300 mg Oral Q12H KRISTIN     metroNIDAZOLE  500 mg Oral Q8H KRISTIN     ibrutinib  420 mg Oral Daily     allopurinol  300 mg Oral Daily     clopidogrel  75 mg Oral Daily     folic acid  1 mg Oral At Bedtime     levothyroxine  150 mcg Oral QAM AC     omeprazole  40 mg Oral BID AC     simvastatin  40 mg Oral At Bedtime     amiodarone  200 mg Oral BID        acetaminophen (TYLENOL) tablet 1,000 mg, prochlorperazine, tiZANidine, polyethylene glycol, oxyCODONE IR, naloxone     physical exam  /61 (BP Location: Left arm)  Pulse 60  Temp 98.5  F (36.9  C) (Oral)  Resp 17  Ht 1.93 m (6' 4\")  Wt 93 kg (205 lb)  SpO2 95%  BMI 24.95 kg/m2  Gen: alert awake nad  Cardio: Regular rate and rhythm  Pulm: non labored clear on room air  Abd: soft non tender.  RUQ drain with brown/green drainage.  Ext: RUE edematous with no erythema. Multiple ecchymoses on upper extremities. Compression stockings BLE.    Neuro/MSK: alert oriented, mild dysarthria though speech overall inteligible.        Labs  CBC, INR, Heparin 10A reviewed    Rehabilitation - continue comprehensive acute inpatient rehabilitation program with multidisciplinary approach including therapies, rehab nursing, and physiatry following. See interval history for updates.      assessment and " plan    IMPRESSION/PLAN:  Mr. Nixon Velasquez is an 88 year old man with a history of: CLL, hemolytic anemia, CAD s/p CABG, CVA, Papillary thyroid carcinoma, prostate cancer, who initially presente t to [facility] 3/9/2018 with septic shock found to be secondary to acute cholecystitis with E. Coli Bacteremia.     Acute DVT - Discovered on ultrasound 3/26/18, likely central-line associated given ICU stay and need for pressors with central line prior to rehab stay. Appreciate medicine assistance with this issue. Patient on fondaparinux.     Cholecystitis- with septic shock.  Underwent cholecystostomy and received 11 day course of iv Zosyn.  -follow up with general surgery with plan for cholecystectomy 4-6 weeks- may need drain removed prior  -drain cares  -empty drain q shift  -continue oral antibiotics, flagyl, cefdinir for 10 days  -oxycodone/tylenol prn pain     CLL- reported recent restart of oral chemotherapy.  Received 3 units PRBC, 3 units PLTS, and 1 unit FFP durinag hospitalization in setting of pancytopenia, felt to be in part related to ongoing chemotherapy.   -continue ibrutinib 420 mg daily (patient to take own supply)  -to be held 3 days prior to cholecystectomy (when scheduled)  -monitor counts  -follow up with oncology     A-Fib with RVR- reportedly post op seen and evaluated by cardiology, started on iv amiodarone   -continue amiodarone 200 mg bid through 3/31 then transition to 200 mg daily  -follow up with cardiology     CAD- history of CABG  -continue plavix  -continue zocor     CVA- with residual dysarthria, dysphagia, and right facial sensory changes.   -monitor swallow  -continue mech soft diet      History of prostate cancer- s/p prostatectomy- with chronic urinary incontinence with use of depends  -family to bring in home brief     Candida intertrigo-   Apply micantin  -keep skin folds clean and dry as able     Thyroid cancer s/p thyroidectomy- continue synthroid    Restless leg  syndrome- Scheduled Requip    Bri Heard MD  [phone]    I, Bri Heard, spent a total of 35 minutes bedside and on the inpatient unit today managing the care of Nixon Velasquez Jr.  Over 50% of my time on the unit was spent counseling the patient and /or coordinating care. See note for details.

## 2018-03-27 NOTE — PLAN OF CARE
Problem: Patient Care Overview  Goal: Plan of Care/Patient Progress Review  OT: Pt declined PM OT session, due to fatigue. Pt reports he will do whatever we ask, tomorrow. -45 min

## 2018-03-27 NOTE — PROGRESS NOTES
[facility] - ARU  Internal Medicine Progress Note      Assessment & Plan   Nixon Velasquez Jr. is a 88 year old male who has a history of HTN, HLD, CAD s/p CABG x2, CVA, DM II, RLS, squamous cell skin cancer, adenocarcinoma of prostate s/p prostatectomy, papillary carcinoma of thyroid s/p thyroidectomy, acquired hypothyroidism, CLL, and autoimmune hemolytic anemia who was admitted to [facility] in [place] from 3/9 to 3/22 with septic shock d/t E.coli bacteremia and acute cholecystitis s/p cholecystostomy tube drainage. Hospital stay c/b A-fib with RVR (started on amiodarone by cardiology) and FRANC (resolved). He was discharged to ARU for aggressive therapy d/t physical deconditioning.      # Acute catheter associated DVT of RUE:  Acute worsening of RUE swelling since admission to ARU. Occlusive thrombus of R innominate vein, subclavian vein, and axillary vein noted on US 3/26. Recent PICC in RUE which was discontinued prior to admission to ARU (noted on CXR 3/18 at [facility]). No tachycardia, chest pain or hypoxia to suggest PE. Attempted contact w primary hematologist, Dr. Mina, to discuss anticoagulation in setting of CLL, thrombocytopenia, and potential bleeding risk w chemotherapy and upcoming surgery. Dr. Mina not available; updated his RN. Case discussed w Hematology at [facility]. Given reported allergies to coumadin and heparin, they recommend transition to arixtra or lovenox. Arixtra was preferred in case allergy to heparin is actually HIT, although this seems unlikely (no documented history of HIT per Hematology). Lovenox would also be OK. Hem reports 20-30% of patients on Ibrutinib have platelet dysfunction and increased risk of bleeding - will need to monitor closely while on anticoagulation.   - IV heparin discontinued  - Started on arixtra 7.5mg QD 3/27 but not covered by insurance and will be very expensive (recieved 1 dose)  - Will transition to Lovenox  1mg/kg BID in AM  - Monitor for bleeding; Repeat CBC in AM     # E.coli bacteremia d/t acute cholecystitis s/p cholecystostomy:  Patient admitted w septic shock 3/9 d/t E.coli bacteremia. Noted to have acute cholecystitis, which is probable source. Initially treated w pressors, IV abx and cholecystostomyt. General surgery considering cholecystectomy in 3-4 weeks following completion of abx (10 day course recommended on discharge).   - Cont cefdinir 300mg BID + metronidazole 500mg TID  - Follow up with Hematology and Cardiology for pre-op recommendations re: anticoagulation, chemotherapy, antiplatelet therapy.  - Follow up with General Surgery in 3-4 weeks for possible choleycstectomy     # CLL; Hx Autoimmune hemolytic anemia; Thrombocytopenia:  Follows with Dr. Pennington of Hem/Onc. Recently started on Ibrutinib 3/1/18, which has been continued here. Thrombocytopenia questionably d/t consumption for DVT vs marrow suppression from infection vs medication side effect. Counts stable. High risk of bleeding on chemotherapy, plavix and anticoagulation. Per hematology, OK to continue plavix for the time being since no evidence of bleeding on current regimen. Dr. Pennington RN updated.   - Follow up appt scheduled w Dr. Pennington' NP on 4/9 at 1:15pm  - Cont Ibrutinib 420mg QD  - Monitor for bleeding  - Repeat CBC in AM     # DM II:  Diet controlled. Sugars well controlled.      # PAF:  Noted during acute admit. Likely precipitated by sepsis. Evaluated by cardiology and loaded w IV amiodarone. Anticoagulation recommended but initiation deferred to outpatient d/t possible surgery and bleeding risk. ? Allergy to coumadin.   - Cardiology follow up scheduled 4/10  - Cont amiodarone 200mg BID (plan to taper to 200mg QD at follow up appointment)  - Lovenox starting in AM; long term anticoagulation to be discussed on follow up appt     # Hx HTN:  Hypotensive on admission to [facility] d/t sepsis. Not on any antihypertensive meds PTA. BP  controlled here.     # CAD:  Hx CABG x2 in 1996. No acute concerns.   - Cont plavix and statin.   - Will need to follow up with cardiology to discuss ongoing plavix use while on anticoagulation and chemotherapy     # Hx CVA:  Residual R hemiparesthesia. No new complaints. Cont plavix and statin.      # Hypothyroidism:  Hx papillary carcinoma s/p thyroidectomy. Cont PTA levothyroxine 150mcg QD.      # RLS:  Stable. Cont PTA requip.    # Pain Assessment:  Current Pain Score 3/27/2018   Patient currently in pain? denies   Pain location -   Pain descriptors -   Nixon dickson pain level was assessed and he currently denies pain.      Diet: Combination Diet Regular Diet Adult; Mechanical/Dental Soft Diet  Room Service  Fluids: None  DVT Prophylaxis: Enoxaparin (Lovenox) SQ  Code Status: DNR/DNI        The patient's care was discussed with the PM&R Team.    Bryce Arteaga PA-C    Internal Medicine Staff Hospitalist Service  [facility]  Pager: [phone]  Please see sticky note for cross cover information  __________________________________________________________________    Interval History   Patient reports improvement in RUE swelling today. Denies any dyspnea or chest pain. Still quite fatigued. No abdominal pain. Denies any new complaints. Spoke with patient's daughter, Namrata, to discuss hx allergy to coumadin. She does not know what type of reaction her had, or whether he had bleeding while on coumadin. She also reports an allergy to heparin as well, reaction unknown. Spoke with Dr. Mina's nurse, who also notes reported allergy to coumadin but reaction unknown. No reported history of heparin allergy or HIT.     ROS:  Pulm, CV, GI and  performed and negative unless otherwise noted in subjective.     Data reviewed today: I reviewed all medications, new labs and imaging results over the last 24 hours.    Physical Exam   Vital Signs: Temp: 98.5  F (36.9  C) Temp src: Oral BP: 132/61 Pulse: 60   Resp: 17 SpO2:  95 % O2 Device: None (Room air)    Weight: 205 lbs 0 oz    GENERAL:  Awake. Alert. Oriented x 3. NAD.   HEENT: No scleral icterus. Mucous membranes moist.   CV: RRR. S1, S2. No murmurs appreciated.   RESPIRATORY: Lungs CTAB with no wheezing, rales, rhonchi.   GI: Abdomen soft and non distended. Active bowel sounds. No tenderness. Cholecystostomy in RUQ w bilious drainage.    NEUROLOGICAL: No focal deficits. Moves all extremities.    EXTREMITIES: No peripheral edema. Intact bilateral pedal pulses.   SKIN: No jaundice. No rashes.         Data     ROUTINE IP LABS (Last four results)  CMP   Recent Labs  Lab 03/26/18  0620 03/23/18  0650    144   POTASSIUM 4.0 4.2   CHLORIDE 109 109   CO2 27 30   ANIONGAP 5 5   * 109*   BUN 8 16   CR 0.79 0.80   ELIU 7.4* 7.8*   PROTTOTAL  --  5.4*   ALBUMIN  --  2.9*   BILITOTAL  --  0.8   ALKPHOS  --  46   AST  --  15   ALT  --  15     CBC   Recent Labs  Lab 03/27/18  0733 03/26/18  1617 03/26/18  0620 03/23/18  0650   WBC 7.8 9.4 7.1 9.9   RBC 3.07* 3.08* 3.18* 3.24*   HGB 9.8* 9.6* 10.1* 10.1*   HCT 31.6* 31.4* 33.1* 33.2*   * 102* 104* 103*   MCH 31.9 31.2 31.8 31.2   MCHC 31.0* 30.6* 30.5* 30.4*   RDW 21.3* 21.3* 21.4* 21.4*   PLT 89* 109* 92* 130*     INR   Recent Labs  Lab 03/27/18  0733   INR 1.28*       All labs personally reviewed in [facility].  See A&P for additional results.     Unresulted Labs Ordered in the Past 30 Days of this Admission     No orders found from 1/21/2018 to 3/23/2018.

## 2018-03-28 ENCOUNTER — RECORDS - HEALTHEAST (OUTPATIENT)
Dept: ADMINISTRATIVE | Facility: OTHER | Age: 83
End: 2018-03-28

## 2018-03-28 ENCOUNTER — APPOINTMENT (OUTPATIENT)
Dept: PHYSICAL THERAPY | Facility: CLINIC | Age: 83
End: 2018-03-28
Payer: COMMERCIAL

## 2018-03-28 VITALS
WEIGHT: 205 LBS | BODY MASS INDEX: 24.96 KG/M2 | OXYGEN SATURATION: 90 % | HEIGHT: 76 IN | TEMPERATURE: 96.8 F | SYSTOLIC BLOOD PRESSURE: 123 MMHG | HEART RATE: 52 BPM | DIASTOLIC BLOOD PRESSURE: 59 MMHG | RESPIRATION RATE: 18 BRPM

## 2018-03-28 LAB
ERYTHROCYTE [DISTWIDTH] IN BLOOD BY AUTOMATED COUNT: 21.3 % (ref 10–15)
HCT VFR BLD AUTO: 30.5 % (ref 40–53)
HGB BLD-MCNC: 9.2 G/DL (ref 13.3–17.7)
MCH RBC QN AUTO: 31.4 PG (ref 26.5–33)
MCHC RBC AUTO-ENTMCNC: 30.2 G/DL (ref 31.5–36.5)
MCV RBC AUTO: 104 FL (ref 78–100)
PLATELET # BLD AUTO: 83 10E9/L (ref 150–450)
RBC # BLD AUTO: 2.93 10E12/L (ref 4.4–5.9)
WBC # BLD AUTO: 6.6 10E9/L (ref 4–11)

## 2018-03-28 PROCEDURE — 97535 SELF CARE MNGMENT TRAINING: CPT | Mod: GO | Performed by: OCCUPATIONAL THERAPIST

## 2018-03-28 PROCEDURE — 97530 THERAPEUTIC ACTIVITIES: CPT | Mod: GP | Performed by: STUDENT IN AN ORGANIZED HEALTH CARE EDUCATION/TRAINING PROGRAM

## 2018-03-28 PROCEDURE — 25000132 ZZH RX MED GY IP 250 OP 250 PS 637: Performed by: PHYSICAL MEDICINE & REHABILITATION

## 2018-03-28 PROCEDURE — 40000193 ZZH STATISTIC PT WARD VISIT: Performed by: STUDENT IN AN ORGANIZED HEALTH CARE EDUCATION/TRAINING PROGRAM

## 2018-03-28 PROCEDURE — 25000128 H RX IP 250 OP 636: Performed by: PHYSICIAN ASSISTANT

## 2018-03-28 PROCEDURE — 40000133 ZZH STATISTIC OT WARD VISIT: Performed by: OCCUPATIONAL THERAPIST

## 2018-03-28 PROCEDURE — 93005 ELECTROCARDIOGRAM TRACING: CPT | Performed by: STUDENT IN AN ORGANIZED HEALTH CARE EDUCATION/TRAINING PROGRAM

## 2018-03-28 PROCEDURE — 25000132 ZZH RX MED GY IP 250 OP 250 PS 637: Performed by: PHYSICIAN ASSISTANT

## 2018-03-28 PROCEDURE — 36415 COLL VENOUS BLD VENIPUNCTURE: CPT | Performed by: PHYSICIAN ASSISTANT

## 2018-03-28 PROCEDURE — 85027 COMPLETE CBC AUTOMATED: CPT | Performed by: PHYSICIAN ASSISTANT

## 2018-03-28 RX ORDER — LEVOTHYROXINE SODIUM 150 UG/1
150 TABLET ORAL
Qty: 30 TABLET | COMMUNITY
Start: 2018-03-29

## 2018-03-28 RX ORDER — ROPINIROLE 0.25 MG/1
0.25 TABLET, FILM COATED ORAL EVERY EVENING
Qty: 30 TABLET | Refills: 0 | Status: SHIPPED | OUTPATIENT
Start: 2018-03-28

## 2018-03-28 RX ORDER — LANOLIN ALCOHOL/MO/W.PET/CERES
3 CREAM (GRAM) TOPICAL EVERY EVENING
Status: DISCONTINUED | OUTPATIENT
Start: 2018-03-28 | End: 2018-03-28 | Stop reason: HOSPADM

## 2018-03-28 RX ORDER — ALLOPURINOL 300 MG/1
300 TABLET ORAL DAILY
Qty: 30 TABLET | COMMUNITY
Start: 2018-03-29

## 2018-03-28 RX ORDER — OMEPRAZOLE 40 MG/1
40 CAPSULE, DELAYED RELEASE ORAL
Qty: 30 CAPSULE | COMMUNITY
Start: 2018-03-28

## 2018-03-28 RX ORDER — CONTAINER,EMPTY
1 EACH MISCELLANEOUS CONTINUOUS
Qty: 1 EACH | Refills: 0 | Status: SHIPPED | OUTPATIENT
Start: 2018-03-28

## 2018-03-28 RX ORDER — ESCITALOPRAM OXALATE 5 MG/1
5 TABLET ORAL DAILY
COMMUNITY
Start: 2018-03-28

## 2018-03-28 RX ORDER — OXYCODONE HYDROCHLORIDE 5 MG/1
5 CAPSULE ORAL EVERY 8 HOURS PRN
Qty: 15 CAPSULE | Refills: 0 | Status: SHIPPED | OUTPATIENT
Start: 2018-03-28

## 2018-03-28 RX ORDER — MIRTAZAPINE 15 MG/1
15 TABLET, FILM COATED ORAL EVERY EVENING
Qty: 30 TABLET | Refills: 0 | Status: SHIPPED | OUTPATIENT
Start: 2018-03-28

## 2018-03-28 RX ORDER — ESCITALOPRAM OXALATE 5 MG/1
5 TABLET ORAL DAILY
Status: DISCONTINUED | OUTPATIENT
Start: 2018-03-28 | End: 2018-03-28 | Stop reason: HOSPADM

## 2018-03-28 RX ORDER — TIZANIDINE 2 MG/1
2 TABLET ORAL EVERY 8 HOURS PRN
Qty: 90 TABLET | COMMUNITY
Start: 2018-03-28

## 2018-03-28 RX ORDER — AMIODARONE HYDROCHLORIDE 200 MG/1
TABLET ORAL
Qty: 38 TABLET | Refills: 0 | Status: SHIPPED | OUTPATIENT
Start: 2018-03-28

## 2018-03-28 RX ORDER — MIRTAZAPINE 15 MG/1
15 TABLET, FILM COATED ORAL EVERY EVENING
Status: DISCONTINUED | OUTPATIENT
Start: 2018-03-28 | End: 2018-03-28 | Stop reason: HOSPADM

## 2018-03-28 RX ORDER — SIMVASTATIN 40 MG
40 TABLET ORAL AT BEDTIME
Qty: 30 TABLET | COMMUNITY
Start: 2018-03-28

## 2018-03-28 RX ORDER — FOLIC ACID 1 MG/1
1 TABLET ORAL AT BEDTIME
Qty: 30 TABLET | COMMUNITY
Start: 2018-03-28

## 2018-03-28 RX ORDER — MICONAZOLE NITRATE 20 MG/G
CREAM TOPICAL 2 TIMES DAILY
Qty: 198 G | Refills: 0 | Status: SHIPPED | OUTPATIENT
Start: 2018-03-28

## 2018-03-28 RX ORDER — METRONIDAZOLE 500 MG/1
500 TABLET ORAL EVERY 8 HOURS
Qty: 13 TABLET | Refills: 0 | Status: SHIPPED | OUTPATIENT
Start: 2018-03-28

## 2018-03-28 RX ORDER — CEFDINIR 300 MG/1
300 CAPSULE ORAL EVERY 12 HOURS
Qty: 8 CAPSULE | Refills: 0 | Status: SHIPPED | OUTPATIENT
Start: 2018-03-28

## 2018-03-28 RX ORDER — CLOPIDOGREL BISULFATE 75 MG/1
75 TABLET ORAL DAILY
Qty: 30 TABLET | COMMUNITY
Start: 2018-03-29

## 2018-03-28 RX ADMIN — TIZANIDINE 2 MG: 2 TABLET ORAL at 06:16

## 2018-03-28 RX ADMIN — ACETAMINOPHEN 1000 MG: 500 TABLET ORAL at 03:15

## 2018-03-28 RX ADMIN — METRONIDAZOLE 500 MG: 500 TABLET ORAL at 13:32

## 2018-03-28 RX ADMIN — OMEPRAZOLE 40 MG: 20 CAPSULE, DELAYED RELEASE ORAL at 06:16

## 2018-03-28 RX ADMIN — ENOXAPARIN SODIUM 90 MG: 100 INJECTION SUBCUTANEOUS at 09:11

## 2018-03-28 RX ADMIN — CLOPIDOGREL BISULFATE 75 MG: 75 TABLET, FILM COATED ORAL at 09:13

## 2018-03-28 RX ADMIN — OXYCODONE HYDROCHLORIDE 5 MG: 5 TABLET ORAL at 03:15

## 2018-03-28 RX ADMIN — METRONIDAZOLE 500 MG: 500 TABLET ORAL at 06:16

## 2018-03-28 RX ADMIN — CEFDINIR 300 MG: 300 CAPSULE ORAL at 09:11

## 2018-03-28 RX ADMIN — ESCITALOPRAM 5 MG: 5 TABLET, FILM COATED ORAL at 13:32

## 2018-03-28 RX ADMIN — AMIODARONE HYDROCHLORIDE 200 MG: 200 TABLET ORAL at 09:11

## 2018-03-28 RX ADMIN — MICONAZOLE NITRATE: 20 CREAM TOPICAL at 10:10

## 2018-03-28 RX ADMIN — ALLOPURINOL 300 MG: 100 TABLET ORAL at 09:11

## 2018-03-28 RX ADMIN — LEVOTHYROXINE SODIUM 150 MCG: 100 TABLET ORAL at 06:16

## 2018-03-28 NOTE — PLAN OF CARE
Problem: Goal/Outcome  Goal: Goal Outcome Summary  Pt planning to d/c home today, 3/28 with continued family support and ongoing home care therapy. Placed referral to [facility] for RN, PT, OT, and HHA. Sw will continue to assist as needed.

## 2018-03-28 NOTE — PLAN OF CARE
Problem: Goal/Outcome  Goal: Goal Outcome Summary  Outcome: Improving  FOCUS/GOAL  Medical management    ASSESSMENT, INTERVENTIONS AND CONTINUING PLAN FOR GOAL:  Patient had little sleep tonight. He was awake every 2 hours when he was rounded on.His older son was at bedside. He was given Tylenol, Zanaflex, Oxycodone and Requip at HS, but at 2345, he was awake, and has been awake since then. His son reported that patient was able to sleep for a few minutes at a time but would wake up right away. Patient verbalize that he was worried about his wife, he wants to be home with his wife, he wants to change the course of his treatment, wants to go home as soon as possible to be with his wife. His son reported patient was having restless legs and spasms, but when patient was asked he said no. His son was asking for more Requip and more sleeping medication, Requip was ordered only once. Another son came around 0300, and said the patient was anxious, he wanted MD called to give him anxiety pill.Informed his family that patient can get get Zanaflex due at 0600 and that it may help until his regular doctors are here, they and patient agreed. Patient appeared calm.   Around 0600, he was given Zanaflex with other scheduled medication, he took them with apple sauce. He needed to be repositioned, he sat at the edge of the bed with moderate assistance, scooted himself up toward the top of the bed, maximal  assistance to lift his legs into bed. He is on his left side. He had no c/o pain until 0300, when family asked for pain medication for generalize pain. He was given Tylenol and Oxycodone, with no relief 1 hour later, but during transfer out of bed and back, he did not appear to be in pain,   No urine output, tonight, offered patient assistance to toilet twice he declined.

## 2018-03-28 NOTE — PROGRESS NOTES
FOCUS/GOAL  Discharge planning    ASSESSMENT, INTERVENTIONS AND CONTINUING PLAN FOR GOAL:  Pt was discharged to home at 1630  , family will assist pt with cares and he will have home care   Discharged instructions were discussed with his daughter who is an RN and his son has some questions above medications   And Rn inquired from PA that he does not have to take the Xanflex if he does not have the maedications at home , pt is supposed to have the Amio dorone but was not filled by the [facility] this Rn spoke to the Pharmacist at d/c pharmacy she said it is too soon to fill because it was filled this March at [facility] and the Pharmacist spoke to the daughter and she verbalized understanding of it , pt was accompanied by staff to the car left ARU at 1630 with all his belongings , accompanied by his family .

## 2018-03-28 NOTE — DISCHARGE SUMMARY
[facility]   Acute Rehabilitation Unit  Discharge summary     Date of Admission: 3/22/2018  Date of Discharge: 3/28/2018  Disposition: home  Primary Care Physician: Riley Cuevas  Attending physician: Dr. Bri Heard    discharge diagnosis  Acute cholecystitis  cll  A-fib with rvr  Cad  Situational depression    brief summary  Mr. Nixon Velasquez is an 88 year old man with a history of: CLL, hemolytic anemia, CAD s/p CABG, CVA, Papillary thyroid carcinoma, prostate cancer, who initially presente t to [facility] 3/9/2018 with septic shock found to be secondary to acute cholecystitis with E. Coli Bacteremia. Due to poor sleep  and medical decline of wife (home on hospice) requested to discharge today 3/28.  Family underwent training per PT, OT, and RN with verbalization of recommendations and plan for discharge home with home care.     rehabilitation course  OT: Continued therapy is recommended.  Rationale/Recommendations:  Functional strength/endurance, ADL/IADL, DME/AE, family training, EC/WS.      ADLs/IADLs: Pt requiring physical assistance for functional transfers and mobility while using a fww. Pt requiring physical assistance with TB dressing, bathing, and toileting. Pt requiring supervision with seated g/h. Pt performance significantly impacted by fatigue, at times requiring A x 2 for transfers. Fatigue also impacting mobility, recommending BSC when unable to ambulate to the bathroom. Pt has been educated on EC/WS strategies to maximize IND and safety. Recommend assist for all IADLs as this time.  DME/AE: Pt owns commode, fww, shower bench, bedrail, bathroom grab bar, stair lift chair, and stair railings. Recommend main level living, short distance ambulation with assistance.  Family Support: Family training completed 3/28 for functional transfers, ADLs, EC/WS, and DME/AE.     PT: Continued therapy is recommended.  Rationale/Recommendations:  home PT for  "safety with home mobility, development of HEP, stair training once strength and activity tolerance improve.      Pt has FWW, family will provide WC. Gait belt issued     Pt had quick dc home d/t pt not sleeping here, anxious about betting home to his wife who is on hospice. Family training completed, will need Ax1-2 pending fatigue levels, surface heights.     mEDICAL COURSE  Acute Catheter associated DVT - Underwent US 3/26/18 due to extensive right arm swelling. Deep venous thrombosis was found in the right innominate, subclavian, and axillary veins. Superficial venous thrombosis in the right basilic vein. See PN by Joel Arteaga PA-C 3/27 and 3/28 for details \"Attempted contact w primary hematologist, Dr. Mina, to discuss anticoagulation in setting of CLL, thrombocytopenia, and potential bleeding risk w chemotherapy and upcoming surgery. Dr. Mina not available;. Case discussed w Hematology at [facility]. Given reported allergies to coumadin and heparin, they recommend transition to arixtra or lovenox. Arixtra was preferred in case allergy to heparin is actually HIT, although this seems unlikely (no documented history of HIT per Hematology). Lovenox would also be OK. Hem reports 20-30% of patients on Ibrutinib have platelet dysfunction and increased risk of bleeding.\" Ultimately, decision was made to transition to lovenox due to ease of dosing and cost issues. Family learned how to administer injections prior to discharge. Follow up with oncology as scheduled 4/9/2018     Cholecystitis- with septic shock E. Coli Bacteremia.   Underwent cholecystostomy and received 11 day course of iv Zosyn.  -follow up with general surgery with plan for cholecystectomy 4-6 weeks- may need drain removed prior  -drain cares and flushes- family training completed prior to dc.   -continue oral antibiotics, flagyl, cefdinir for 10 days  -oxycodone/tylenol prn pain      CLL- reported recent restart of oral chemotherapy. Also with history " of hemolytic anemia. Thrombocytopenia.  Received 3 units PRBC, 3 units PLTS, and 1 unit FFP durinag hospitalization in setting of pancytopenia,  3/28 WBC 6.6, Hgb 9.2, PLT 83.  -continue ibrutinib 420 mg daily (to be held 3 days prior to cholecystectomy (when scheduled)  -follow up with oncology as scheduled 4/9/2018  -repeat CBC with pcp within one week      A-Fib with RVR- noted during acute hospitalization likely precipitated by sepsis,  seen and evaluated by cardiology, started on iv amiodarone with transition to po.     -continue amiodarone 200 mg bid through 3/31 then transition to 200 mg daily  -follow up with cardiology as scheduled 4/10.   -lovenox for acute DVT as above      CAD- history of CABG  -continue plavix  -continue zocor      CVA- with residual dysarthria, dysphagia, and right facial sensory changes.       History of prostate cancer- s/p prostatectomy- with chronic urinary incontinence with use of depends     Candida intertrigo-   Apply micantin  -keep skin folds clean and dry as able      Thyroid cancer s/p thyroidectomy- continue synthroid    QTc prolongation- Lexapro and Remeron discontinued during acute hospitalization with ongoing sleep issues and depressed mood. Repeat EKG 3/28 with QTc WNL    Depression/Anxiety and impaired sleep- restart lexapro 5 mg daily 3/28, restart Remeron 15 mg qhs- up titrate per pcp  -follow up with pcp within one week.      Restless leg syndrome- Scheduled Requip qhs    dISCHARGE MEDICATIONS  Discharge Medication List as of 3/28/2018  3:55 PM      START taking these medications    Details   allopurinol (ZYLOPRIM) 300 MG tablet Take 1 tablet (300 mg) by mouth daily, Disp-30 tablet, Historical      amiodarone (PACERONE/CODARONE) 200 MG tablet 200 mg twice daily through 3/31.  200 mg daily starting 4/1, Disp-38 tablet, R-0, E-Prescribe      cefdinir (OMNICEF) 300 MG capsule Take 1 capsule (300 mg) by mouth every 12 hours, Disp-8 capsule, R-0, E-Prescribe       clopidogrel (PLAVIX) 75 MG tablet Take 1 tablet (75 mg) by mouth daily, Disp-30 tablet, Historical      enoxaparin (LOVENOX) 100 MG/ML injection Inject 0.93 mLs (93 mg) Subcutaneous every 12 hours, Disp-60 Syringe, R-0, E-Prescribe      escitalopram (LEXAPRO) 5 MG tablet Take 1 tablet (5 mg) by mouth daily, Historical      folic acid (FOLVITE) 1 MG tablet Take 1 tablet (1 mg) by mouth At Bedtime, Disp-30 tablet, Historical      ibrutinib (IMBRUVICA) 140 MG tablet Take 3 tablets (420 mg) by mouth daily, Historical      levothyroxine (SYNTHROID/LEVOTHROID) 150 MCG tablet Take 1 tablet (150 mcg) by mouth every morning (before breakfast), Disp-30 tablet, Historical      metroNIDAZOLE (FLAGYL) 500 MG tablet Take 1 tablet (500 mg) by mouth every 8 hours, Disp-13 tablet, R-0, E-Prescribe      miconazole (MICATIN) 2 % cream Apply topically 2 times dailyDisp-198 g, [id number]      mirtazapine (REMERON) 15 MG tablet Take 1 tablet (15 mg) by mouth every evening, Disp-30 tablet, R-0, E-Prescribe      omeprazole (PRILOSEC) 40 MG capsule Take 1 capsule (40 mg) by mouth 2 times daily (before meals), Disp-30 capsule, Historical      rOPINIRole (REQUIP) 0.25 MG tablet Take 1 tablet (0.25 mg) by mouth every evening, Disp-30 tablet, R-0, E-Prescribe      simvastatin (ZOCOR) 40 MG tablet Take 1 tablet (40 mg) by mouth At Bedtime, Disp-30 tablet, Historical      tiZANidine (ZANAFLEX) 2 MG tablet Take 1 tablet (2 mg) by mouth every 8 hours as needed for muscle spasms, Disp-90 tablet, Historical      oxyCODONE (OXY-IR) 5 MG capsule Take 1 capsule (5 mg) by mouth every 8 hours as needed for moderate to severe pain, Disp-15 capsule, R-0, Local Print      Sharps Container MISC 1 Box continuous, Disp-1 each, R-0, E-Prescribe         CONTINUE these medications which have NOT CHANGED    Details   Acetaminophen (TYLENOL PO) Historical             DISCHARGE INSTRUCTIONS AND FOLLOW UP    Discharge Procedure Orders  Home care nursing referral  "  Referral Type: Home Health Therapies & Aides     Home Care PT Referral for Hospital Discharge   Referral Type: Home Health Therapies & Aides     Home Care OT Referral for Hospital Discharge     Reason for your hospital stay   Order Comments: Admitted for rehabilitation following hospitalization for acute cholecystitis (gallbladder inflammation/infection)     Activity   Order Comments: Your activity upon discharge: up with walker with assist from family as advised.   Order Specific Question Answer Comments   Is discharge order? Yes      Tubes and drains   Order Comments: You are going home with the following tubes or drains: cholecystostomy tube Flushing Your Drainage Tube: Twice daily.   1. Use 10cc of sterile normal saline  2. Turn the 3 way stopcock off to the drainage bag/bulb. (Point the arrow/lever at the bag/bulb).  3. Clean the flushing port with alcohol and attach the flush syringe by twisting it into place.  4. Gently inject/flush the drain. (Saline should flow toward your body not toward the bag/bulb.)  5. Turn the stopcock back into its original position (to open the drain again). [The lever should be \"off\" to the flushing port].  6. Untwist the syringe and remove it.    Keep tube site clean and dry.  Drain tube three times daily or as needed.     MD face to face encounter   Order Comments: Documentation of Face to Face and Certification for Home Health Services    I certify that patient: Nixon Velasquez Jr. is under my care and that I, or a nurse practitioner or physician's assistant working with me, had a face-to-face encounter that meets the physician face-to-face encounter requirements with this patient on: 3/28/2018.    This encounter with the patient was in whole, or in part, for the following medical condition, which is the primary reason for home health care: debility, impaired physical mobility and activity tolerance.    I certify that, based on my findings, the following services are medically " necessary home health services: Nursing, Occupational Therapy and Physical Therapy.    My clinical findings support the need for the above services because: Nurse is needed: To assess abdominal pain, drain site,  after changes in  medical regimen. and To provide caregiver training to assist with: lovenox injections, cholecystostomy cares.., Occupational Therapy Services are needed to assess and treat cognitive ability and address ADL safety due to impairment in functional mobility and Physical Therapy Services are needed to assess and treat the following functional impairments: physical mobility and activity tolerance.    Further, I certify that my clinical findings support that this patient is homebound (i.e. absences from home require considerable and taxing effort and are for medical reasons or [employer] services or infrequently or of short duration when for other reasons) because:Requires assistance of another person or specialized equipment to access medical services because patient: Is unable to exit home safely on own due to: impaired physical mobility, impaired activity tolerance.     Based on the above findings. I certify that this patient is confined to the home and needs intermittent skilled nursing care, physical therapy and/or speech therapy.  The patient is under my care, and I have initiated the establishment of the plan of care.  This patient will be followed by a physician who will periodically review the plan of care.  Physician/Provider to provide follow up care: Riley Cuevas    Attending [facility] physician (the Medicare certified [place] provider): Bri Heard,*  Physician Signature: See electronic signature associated with these discharge orders.  Date: 3/28/2018     Adult [facility] Follow-up and recommended labs and tests   Order Comments: Follow up with Oncology as scheduled- discuss recent hospitalization and acute right ue dvt  Follow up with primary care- within one week follow  up hospitalization, a-fib, acute dvt, acute cholecystitis.  Repeat CBC, BMP within-  Follow up with surgery within 2-4 weeks    Appointments on [place] and/or [facility] (with [facility] or [facility] provider or service). Call [phone] if you haven't heard regarding these appointments within 7 days of discharge.     DNR/DNI     Diet   Order Comments: Follow this diet upon discharge: Orders Placed This Encounter     Room Service     Combination Diet Regular Diet Adult; Mechanical/Dental Soft Diet   Order Specific Question Answer Comments   Is discharge order? Yes             physical examination    Most recent Vital Signs:   Vitals:    03/27/18 0631 03/27/18 0709 03/27/18 1749 03/28/18 0931   BP: 176/81 132/61 123/62 123/59   BP Location: Right leg Left arm Right arm Left arm   Pulse: 60  59 52   Resp: 17  16 18   Temp: 98.5  F (36.9  C)  96.5  F (35.8  C) 96.8  F (36  C)   TempSrc: Oral  Oral Oral   SpO2: 95%  96% 90%   Weight:       Height:       General: awake alert nad  Resp: non labored clear on room air  CV: reg +murmur  Ab: soft non tender right drain with clear thin light brown fluid  Extremities: warm dry with mild edema, mild RUE edema (improved)     60 minutes spent in discharge, including >50% in counseling and coordination of care, medication review and plan of care recommended on follow up.     Patient was evaluated on day of discharge by Dr. Heard who agrees with plan of care.    Discharge summary was forwarded to Riley Cuevas (PCP) at the time of discharge, so as to bridge from hospital to outpatient care.     It was our pleasure to care for Nixon Velasquez Jr. during this hospitalization. Please do not hesitate to contact me should there be questions regarding the hospital course or discharge plan.        Lisa Trejo PA-C  Rehab Medicine Service  [phone]    IBri MD, saw and evaluated Nixon Mckeon as part of a shared visit.  I have reviewed and discussed with the  advanced practice provider their history, physical and plan.     I, Bri Heard, personally saw the patient today and spent greater than 30 minutes discharging this patient.      Bri Heard MD  Physical Medicine & Rehabilitation   [phone]

## 2018-03-28 NOTE — DISCHARGE INSTRUCTIONS
Follow up appointments:    -- Follow up with Oncology as scheduled.    -- Follow up with primary care within one week follow up hospitalization, acute cholecystitis, a-fib, RUE DVT  You are scheduled to see Dr. Riley Cuevas on Friday, March 30th at 8:20 am. Please arrive 15 minutes early to check in    Address  [facility]                          [address] [place] [address]  Phone   [phone]    -- Follow up with General Surgery- 2-4 weeks follow up acute cholecystis- follow up cholecystostomy tube check is recommended in 2-4 weeks. Please call [facility] to schedule your exchange [phone]    -- Call [facility] at [phone] for the following:  - Drainage tube falls out, is pulled back or felt to be out of position.  - Unable to flush drainage tube.  - Leakage around drainage tube site.  - Extreme pain at drainage tube site.  - Outputs suddenly stop or significantly reduce.  - Warmth, redness, swelling or tenderness around the drainage tube.  - Nausea and/or vomiting  - Jaundice (yellow or orange/yellow color of your skin, especially in your eyes)    -- Follow up with A-fib clinic with cardiology-     HOME CARE  [facility] [phone] will provide RN, PT, OT, and Home Health Aide. They will call you after you discharge to schedule the first visit.

## 2018-03-28 NOTE — PLAN OF CARE
Problem: Goal/Outcome  Goal: Goal Outcome Summary  Occupational Therapy Discharge Summary    Reason for therapy discharge:    Discharged to home with home therapy.    Progress towards therapy goal(s). See goals on Care Plan in [facility] electronic health record for goal details.  Goals not met.  Barriers to achieving goals:   limited tolerance for therapy.    Therapy recommendation(s):    Continued therapy is recommended.  Rationale/Recommendations:  Functional strength/endurance, ADL/IADL, DME/AE, family training, EC/WS.     Summary: Mr. Nixon Velasquez is a 88 year old man with a history of CLL, hemolytic anemia, CAD s/p CABG, CVA, Papillary thyroid carcinoma, prostate cancer admitted [facility] 3/9/2018 with septic shock found to be secondary to acute cholecystitis with E. Coli Bacteremia.   ADLs/IADLs: Pt requiring physical assistance for functional transfers and mobility while using a fww. Pt requiring physical assistance with TB dressing, bathing, and toileting. Pt requiring supervision with seated g/h. Pt performance significantly impacted by fatigue, at times requiring A x 2 for transfers. Fatigue also impacting mobility, recommending BSC when unable to ambulate to the bathroom. Pt has been educated on EC/WS strategies to maximize IND and safety. Recommend assist for all IADLs as this time.  DME/AE: Pt owns commode, fww, shower bench, bedrail, bathroom grab bar, stair lift chair, and stair railings. Recommend main level living, short distance ambulation with assistance.  Family Support: Family training completed 3/28 for functional transfers, ADLs, EC/WS, and DME/AE.

## 2018-03-28 NOTE — PLAN OF CARE
Problem: Goal/Outcome  Goal: Goal Outcome Summary  Pt and family are agreeable to TCU placement for continued support and therapy. Placed referrals to:    [facility]  Martha NIEVES      Currently awaiting available bed. Sw will continue to assist as needed.

## 2018-03-28 NOTE — PLAN OF CARE
Problem: Patient Care Overview  Goal: Plan of Care/Patient Progress Review  Physical Therapy Discharge Summary    Reason for therapy discharge:    Discharged to home w/ 24/7 assist, home PT  Patient/family request discontinuation of services.    Progress towards therapy goal(s). See goals on Care Plan in [facility] electronic health record for goal details.  Goals not met.  Barriers to achieving goals:   limited tolerance for therapy and discharge from facility.    Therapy recommendation(s):    Continued therapy is recommended.  Rationale/Recommendations:  home PT for safety with home mobility, development of HEP, stair training once strength and activity tolerance improve.     Pt has FWW, family will provide WC. Gait belt issued    Pt had quick dc home d/t pt not sleeping here, anxious about betting home to his wife who is on hospice. Family training completed, will need Ax1-2 pending fatigue levels, surface heights.

## 2018-03-28 NOTE — PLAN OF CARE
"Problem: Goal/Outcome  Goal: Goal Outcome Summary  FOCUS/GOAL  Pain management and Medical management    ASSESSMENT, INTERVENTIONS AND CONTINUING PLAN FOR GOAL:  Pt alert, at start of shift sitting up in chair with family and wife visiting. After family left,  Arron-o visiting with patient. Nurse entered after and patient states he is grateful for our cares and that all of us are \"going to [facility], but wants to go home,  tired of being at the hospital, hope to have CC tomorrow and discharge tomorrow\" Sat and listened and offered support to patient. Son visited, and now brother here and staying the night. Brother advocating for patient and said he needs his requip for RLS. Received around 7:30pm with no relief. Requested tylenol and oxycodone at 8:30PM with no relief. He reports pain all over, not able to sleep.  Received PRN zanaflex around 10:10PM. Updated on-call if there is anything else we can offer him. Plan to offer PRN when they are available. Declined tpump offered 3x, gave him heated blanket. 50cc output to cholecystostomy and dressing changed with scant serosanguinous drainage. Up with walker and assist of 1. Last BM 3/26. Incontinent of urine.        "

## 2018-03-28 NOTE — PLAN OF CARE
Problem: Goal/Outcome  Goal: Goal Outcome Summary  Pt is discharging home with home care services and family support. Teaching was done how to care for the ANA drain, dressing change around that and skin care. Daughter is a nurse and verbalized understanding of Lovenox shot, sub q injection. Pt/ family is provided with 2 days worth of dressing supplies and normal saline to irrigate the drain. Pt/family are waiting for discharge medication to leave. We will continue to facilitate discharge while assisting as needed.

## 2018-03-29 ENCOUNTER — COMMUNICATION - HEALTHEAST (OUTPATIENT)
Dept: ONCOLOGY | Facility: HOSPITAL | Age: 83
End: 2018-03-29

## 2018-03-29 ENCOUNTER — COMMUNICATION - HEALTHEAST (OUTPATIENT)
Dept: FAMILY MEDICINE | Facility: CLINIC | Age: 83
End: 2018-03-29

## 2018-03-29 LAB — INTERPRETATION ECG - MUSE: NORMAL

## 2018-03-30 ENCOUNTER — COMMUNICATION - HEALTHEAST (OUTPATIENT)
Dept: FAMILY MEDICINE | Facility: CLINIC | Age: 83
End: 2018-03-30

## 2018-03-30 ENCOUNTER — OFFICE VISIT - HEALTHEAST (OUTPATIENT)
Dept: FAMILY MEDICINE | Facility: CLINIC | Age: 83
End: 2018-03-30

## 2018-03-30 DIAGNOSIS — C91.12 CLL (CHRONIC LYMPHOID LEUKEMIA) IN RELAPSE (H): ICD-10-CM

## 2018-03-30 DIAGNOSIS — I82.409 DVT (DEEP VENOUS THROMBOSIS) (H): ICD-10-CM

## 2018-03-30 DIAGNOSIS — K81.9 CHOLECYSTITIS: ICD-10-CM

## 2018-03-30 DIAGNOSIS — Z09 HOSPITAL DISCHARGE FOLLOW-UP: ICD-10-CM

## 2018-03-30 DIAGNOSIS — F43.20 ADJUSTMENT DISORDER: ICD-10-CM

## 2018-03-30 DIAGNOSIS — A41.9 SEPSIS (H): ICD-10-CM

## 2018-03-30 DIAGNOSIS — R94.31 PROLONGED Q-T INTERVAL ON ECG: ICD-10-CM

## 2018-03-30 DIAGNOSIS — I10 HYPERTENSION: ICD-10-CM

## 2018-03-30 DIAGNOSIS — I48.91 ATRIAL FIBRILLATION WITH RAPID VENTRICULAR RESPONSE (H): ICD-10-CM

## 2018-03-30 LAB
ERYTHROCYTE [DISTWIDTH] IN BLOOD BY AUTOMATED COUNT: 21.2 % (ref 11–14.5)
HCT VFR BLD AUTO: 35.7 % (ref 40–54)
HGB BLD-MCNC: 11 G/DL (ref 14–18)
MCH RBC QN AUTO: 32.5 PG (ref 27–34)
MCHC RBC AUTO-ENTMCNC: 30.8 G/DL (ref 32–36)
MCV RBC AUTO: 106 FL (ref 80–100)
PLATELET # BLD AUTO: 104 THOU/UL (ref 140–440)
PMV BLD AUTO: 13.1 FL (ref 8.5–12.5)
RBC # BLD AUTO: 3.38 MILL/UL (ref 4.4–6.2)
WBC: 10.7 THOU/UL (ref 4–11)

## 2018-03-30 NOTE — PROGRESS NOTES
IRF-MORE CLARIFICATION NOTE FOR DISCHARGE  Lowest score for each FIM item supported by available charting 3/26/2018    Eating: FIM 5; patient needing set up assistance  Grooming: FIM 5; patient needing set up assistance  Bathing: FIM 1; activity did not occur  Upper Body Dressing: FIM 4; patient needing minimal assistance  Lower Body Dressing: FIM 4; overall patient needing minimal assistance  Toileting: FIM 4; per nursing notes, patient needing minimal assistance  Bladder:  FIM 1; patient needing total assistance at times with episodes of incontinence  Bladder Number of Accidents: 0  Bowel: FIM 7; no deficits noted in charting  Bowel Number of Accidents: 0  Transfer: FIM 3; overall patient needing moderate assistance due to assist needed with bed mobility  Toilet transfer: FIM 4; per nursing notes, needing minimal assistance  Tub/shower transfer: FIM 1--activity did not occur  Locomotion distance: Less than 50 feet  Locomotion: FIM 1; limited distance ambulation in room  Stairs: FIM 1; activity did not occur  Comprehension: FIM 6; need for extra time noted  Expression: FIM 6; need for extra time noted  Social Interaction: FIM 6 due to medications  Problem solving: FIM 5; assist with higher level tasks  Memory: FIM 5; assist with higher level tasks    Clarification of quality measures:  Walk 50 feet with turns, walk 150 feet, walk 10 feet on uneven surfaces:  88--patient had multiple medical issues and fatigue limiting performance  Bathin--patient had multiple medical issues and fatigue limiting performance

## 2018-03-31 ENCOUNTER — AMBULATORY - HEALTHEAST (OUTPATIENT)
Dept: FAMILY MEDICINE | Facility: CLINIC | Age: 83
End: 2018-03-31

## 2018-03-31 ENCOUNTER — COMMUNICATION - HEALTHEAST (OUTPATIENT)
Dept: SCHEDULING | Facility: CLINIC | Age: 83
End: 2018-03-31

## 2018-03-31 DIAGNOSIS — A41.9 SEPSIS (H): ICD-10-CM

## 2018-04-02 ENCOUNTER — AMBULATORY - HEALTHEAST (OUTPATIENT)
Dept: ONCOLOGY | Facility: HOSPITAL | Age: 83
End: 2018-04-02

## 2018-04-03 ENCOUNTER — COMMUNICATION - HEALTHEAST (OUTPATIENT)
Dept: SCHEDULING | Facility: CLINIC | Age: 83
End: 2018-04-03

## 2018-04-03 ENCOUNTER — COMMUNICATION - HEALTHEAST (OUTPATIENT)
Dept: FAMILY MEDICINE | Facility: CLINIC | Age: 83
End: 2018-04-03

## 2018-04-03 ENCOUNTER — PATIENT OUTREACH (OUTPATIENT)
Dept: CARE COORDINATION | Facility: CLINIC | Age: 83
End: 2018-04-03

## 2018-04-03 NOTE — PROGRESS NOTES
Clinic Care Coordination Contact    OUTREACH    Referral Information:  Referral Source: IP Report     RN CC outreach:  Patient discharged to home with home care services from [facility] on 4/2/18.  Patient was treated for dehydration and fatigue.  It is important to note, patients wife passed away on 3/30/18    Chief Complaint   Patient presents with     Clinic Care Coordination - Post Hospital     DC'd from [facility] on 4/2/18 with home care PCP appt 4/11/18        Universal Utilization:   Utilization    Last refreshed: 4/3/2018 11:40 AM:  No Show Count (past year) 0       Last refreshed: 4/3/2018 11:40 AM:  ED visits 0       Last refreshed: 4/3/2018 11:40 AM:  Hospital admissions 1          Current as of: 4/3/2018 11:40 AM             Clinical Concerns:  Current Medical Concerns:  Dehydration and fatigue.  Patient encouraged to drink fluids.  Daughter Namrata states she tries to get pt to drink, but he is still very tired.  Current Behavioral Concerns: Pt is grieving loss of wife which may be attributing to symptoms.    Education Provided to patient: reviewed upcoming appointment scheduled      Psychosocial:  Current living arrangement:: I live in a private home with family (Daughters Namrata and Cha take turns staying with pt)     Resources and Interventions:  Current Resources: Skilled Nursing, Physicial Therapy, Occupational Therapy;       Patient/Caregiver understanding:Patient verbalized understanding, engaged in AIDET communication behavior during encounter.        Plan:  Patient will work with home care for plan of care and attend follow up visit as scheduled.   RN CC will outreach upon discharge from home care, will be available sooner if needed, contact information given.

## 2018-04-04 ENCOUNTER — COMMUNICATION - HEALTHEAST (OUTPATIENT)
Dept: FAMILY MEDICINE | Facility: CLINIC | Age: 83
End: 2018-04-04

## 2018-04-05 ENCOUNTER — COMMUNICATION - HEALTHEAST (OUTPATIENT)
Dept: FAMILY MEDICINE | Facility: CLINIC | Age: 83
End: 2018-04-05

## 2018-04-06 ENCOUNTER — RECORDS - HEALTHEAST (OUTPATIENT)
Dept: ADMINISTRATIVE | Facility: OTHER | Age: 83
End: 2018-04-06

## 2018-04-07 ENCOUNTER — COMMUNICATION - HEALTHEAST (OUTPATIENT)
Dept: SCHEDULING | Facility: CLINIC | Age: 83
End: 2018-04-07

## 2018-04-09 ENCOUNTER — OFFICE VISIT - HEALTHEAST (OUTPATIENT)
Dept: ONCOLOGY | Facility: HOSPITAL | Age: 83
End: 2018-04-09

## 2018-04-09 ENCOUNTER — AMBULATORY - HEALTHEAST (OUTPATIENT)
Dept: INFUSION THERAPY | Facility: HOSPITAL | Age: 83
End: 2018-04-09

## 2018-04-09 ENCOUNTER — RECORDS - HEALTHEAST (OUTPATIENT)
Dept: GENERAL RADIOLOGY | Facility: CLINIC | Age: 83
End: 2018-04-09

## 2018-04-09 ENCOUNTER — OFFICE VISIT - HEALTHEAST (OUTPATIENT)
Dept: FAMILY MEDICINE | Facility: CLINIC | Age: 83
End: 2018-04-09

## 2018-04-09 DIAGNOSIS — C91.12 CLL (CHRONIC LYMPHOID LEUKEMIA) IN RELAPSE (H): ICD-10-CM

## 2018-04-09 DIAGNOSIS — R09.02 HYPOXEMIA: ICD-10-CM

## 2018-04-09 DIAGNOSIS — R53.1 WEAKNESS: ICD-10-CM

## 2018-04-09 DIAGNOSIS — R53.1 GENERALIZED WEAKNESS: ICD-10-CM

## 2018-04-09 DIAGNOSIS — R09.02 HYPOXIA: ICD-10-CM

## 2018-04-09 DIAGNOSIS — J90 PLEURAL EFFUSION: ICD-10-CM

## 2018-04-09 DIAGNOSIS — F43.20 ADJUSTMENT DISORDER: ICD-10-CM

## 2018-04-09 LAB
ALBUMIN SERPL-MCNC: 2.8 G/DL (ref 3.5–5)
ALP SERPL-CCNC: 61 U/L (ref 45–120)
ALT SERPL W P-5'-P-CCNC: <9 U/L (ref 0–45)
ANION GAP SERPL CALCULATED.3IONS-SCNC: 10 MMOL/L (ref 5–18)
AST SERPL W P-5'-P-CCNC: 9 U/L (ref 0–40)
BASOPHILS # BLD AUTO: 0 THOU/UL (ref 0–0.2)
BASOPHILS NFR BLD AUTO: 0 % (ref 0–2)
BILIRUB SERPL-MCNC: 0.5 MG/DL (ref 0–1)
BNP SERPL-MCNC: 68 PG/ML (ref 0–93)
BUN SERPL-MCNC: 12 MG/DL (ref 8–28)
CALCIUM SERPL-MCNC: 7.7 MG/DL (ref 8.5–10.5)
CHLORIDE BLD-SCNC: 108 MMOL/L (ref 98–107)
CO2 SERPL-SCNC: 27 MMOL/L (ref 22–31)
CREAT SERPL-MCNC: 0.71 MG/DL (ref 0.7–1.3)
EOSINOPHIL COUNT (ABSOLUTE): 0 THOU/UL (ref 0–0.4)
EOSINOPHIL NFR BLD AUTO: 0 % (ref 0–6)
ERYTHROCYTE [DISTWIDTH] IN BLOOD BY AUTOMATED COUNT: 22.2 % (ref 11–14.5)
GFR SERPL CREATININE-BSD FRML MDRD: >60 ML/MIN/1.73M2
GLUCOSE BLD-MCNC: 107 MG/DL (ref 70–125)
HCT VFR BLD AUTO: 36.8 % (ref 40–54)
HGB BLD-MCNC: 11.5 G/DL (ref 14–18)
LYMPHOCYTES # BLD AUTO: 6.5 THOU/UL (ref 0.8–4.4)
LYMPHOCYTES NFR BLD AUTO: 76 % (ref 20–40)
MCH RBC QN AUTO: 32.8 PG (ref 27–34)
MCHC RBC AUTO-ENTMCNC: 31.3 G/DL (ref 32–36)
MCV RBC AUTO: 105 FL (ref 80–100)
MONOCYTES # BLD AUTO: 0.6 THOU/UL (ref 0–0.9)
MONOCYTES NFR BLD AUTO: 7 % (ref 2–10)
OVALOCYTES: ABNORMAL
PLAT MORPH BLD: ABNORMAL
PLATELET # BLD AUTO: 123 THOU/UL (ref 140–440)
PMV BLD AUTO: 11.7 FL (ref 8.5–12.5)
POTASSIUM BLD-SCNC: 4 MMOL/L (ref 3.5–5)
PROT SERPL-MCNC: 5.1 G/DL (ref 6–8)
RBC # BLD AUTO: 3.51 MILL/UL (ref 4.4–6.2)
SODIUM SERPL-SCNC: 145 MMOL/L (ref 136–145)
TOTAL NEUTROPHILS-ABS(DIFF): 1.5 THOU/UL (ref 2–7.7)
TOTAL NEUTROPHILS-REL(DIFF): 17 % (ref 50–70)
WBC: 8.6 THOU/UL (ref 4–11)

## 2018-04-10 ENCOUNTER — COMMUNICATION - HEALTHEAST (OUTPATIENT)
Dept: FAMILY MEDICINE | Facility: CLINIC | Age: 83
End: 2018-04-10

## 2018-04-10 ENCOUNTER — OFFICE VISIT - HEALTHEAST (OUTPATIENT)
Dept: CARDIOLOGY | Facility: CLINIC | Age: 83
End: 2018-04-10

## 2018-04-10 ENCOUNTER — AMBULATORY - HEALTHEAST (OUTPATIENT)
Dept: CARDIOLOGY | Facility: CLINIC | Age: 83
End: 2018-04-10

## 2018-04-10 ENCOUNTER — RECORDS - HEALTHEAST (OUTPATIENT)
Dept: ADMINISTRATIVE | Facility: OTHER | Age: 83
End: 2018-04-10

## 2018-04-10 DIAGNOSIS — E78.5 HYPERLIPIDEMIA: ICD-10-CM

## 2018-04-10 DIAGNOSIS — I48.0 PAROXYSMAL ATRIAL FIBRILLATION (H): ICD-10-CM

## 2018-04-10 DIAGNOSIS — I10 BENIGN ESSENTIAL HTN: ICD-10-CM

## 2018-04-10 DIAGNOSIS — I48.91 ATRIAL FIBRILLATION WITH RAPID VENTRICULAR RESPONSE (H): ICD-10-CM

## 2018-04-10 ASSESSMENT — MIFFLIN-ST. JEOR: SCORE: 1597.46

## 2018-04-11 ENCOUNTER — COMMUNICATION - HEALTHEAST (OUTPATIENT)
Dept: FAMILY MEDICINE | Facility: CLINIC | Age: 83
End: 2018-04-11

## 2018-04-13 ENCOUNTER — COMMUNICATION - HEALTHEAST (OUTPATIENT)
Dept: FAMILY MEDICINE | Facility: CLINIC | Age: 83
End: 2018-04-13

## 2018-04-16 ENCOUNTER — RECORDS - HEALTHEAST (OUTPATIENT)
Dept: ADMINISTRATIVE | Facility: OTHER | Age: 83
End: 2018-04-16

## 2018-04-16 ENCOUNTER — OFFICE VISIT - HEALTHEAST (OUTPATIENT)
Dept: FAMILY MEDICINE | Facility: CLINIC | Age: 83
End: 2018-04-16

## 2018-04-16 ENCOUNTER — COMMUNICATION - HEALTHEAST (OUTPATIENT)
Dept: FAMILY MEDICINE | Facility: CLINIC | Age: 83
End: 2018-04-16

## 2018-04-16 DIAGNOSIS — R53.1 GENERALIZED WEAKNESS: ICD-10-CM

## 2018-04-16 DIAGNOSIS — R09.02 HYPOXIA: ICD-10-CM

## 2018-04-16 DIAGNOSIS — I10 BENIGN ESSENTIAL HTN: ICD-10-CM

## 2018-04-18 ENCOUNTER — RECORDS - HEALTHEAST (OUTPATIENT)
Dept: ADMINISTRATIVE | Facility: OTHER | Age: 83
End: 2018-04-18

## 2018-04-23 ENCOUNTER — OFFICE VISIT - HEALTHEAST (OUTPATIENT)
Dept: ONCOLOGY | Facility: HOSPITAL | Age: 83
End: 2018-04-23

## 2018-04-23 ENCOUNTER — AMBULATORY - HEALTHEAST (OUTPATIENT)
Dept: INFUSION THERAPY | Facility: HOSPITAL | Age: 83
End: 2018-04-23

## 2018-04-23 DIAGNOSIS — C91.12 CLL (CHRONIC LYMPHOID LEUKEMIA) IN RELAPSE (H): ICD-10-CM

## 2018-04-23 DIAGNOSIS — C91.10 CLL (CHRONIC LYMPHOCYTIC LEUKEMIA) (H): ICD-10-CM

## 2018-04-23 LAB
ALBUMIN SERPL-MCNC: 2.7 G/DL (ref 3.5–5)
ALP SERPL-CCNC: 62 U/L (ref 45–120)
ALT SERPL W P-5'-P-CCNC: <9 U/L (ref 0–45)
ANION GAP SERPL CALCULATED.3IONS-SCNC: 7 MMOL/L (ref 5–18)
AST SERPL W P-5'-P-CCNC: 15 U/L (ref 0–40)
BASOPHILS # BLD AUTO: 0 THOU/UL (ref 0–0.2)
BASOPHILS NFR BLD AUTO: 1 % (ref 0–2)
BILIRUB SERPL-MCNC: 0.4 MG/DL (ref 0–1)
BUN SERPL-MCNC: 17 MG/DL (ref 8–28)
CALCIUM SERPL-MCNC: 8.2 MG/DL (ref 8.5–10.5)
CHLORIDE BLD-SCNC: 106 MMOL/L (ref 98–107)
CO2 SERPL-SCNC: 28 MMOL/L (ref 22–31)
CREAT SERPL-MCNC: 0.74 MG/DL (ref 0.7–1.3)
EOSINOPHIL # BLD AUTO: 0.1 THOU/UL (ref 0–0.4)
EOSINOPHIL NFR BLD AUTO: 1 % (ref 0–6)
ERYTHROCYTE [DISTWIDTH] IN BLOOD BY AUTOMATED COUNT: 19.7 % (ref 11–14.5)
GFR SERPL CREATININE-BSD FRML MDRD: >60 ML/MIN/1.73M2
GLUCOSE BLD-MCNC: 128 MG/DL (ref 70–125)
HCT VFR BLD AUTO: 33.5 % (ref 40–54)
HGB BLD-MCNC: 10.6 G/DL (ref 14–18)
LYMPHOCYTES # BLD AUTO: 3.5 THOU/UL (ref 0.8–4.4)
LYMPHOCYTES NFR BLD AUTO: 60 % (ref 20–40)
MCH RBC QN AUTO: 33 PG (ref 27–34)
MCHC RBC AUTO-ENTMCNC: 31.6 G/DL (ref 32–36)
MCV RBC AUTO: 104 FL (ref 80–100)
MONOCYTES # BLD AUTO: 0.3 THOU/UL (ref 0–0.9)
MONOCYTES NFR BLD AUTO: 5 % (ref 2–10)
NEUTROPHILS # BLD AUTO: 1.9 THOU/UL (ref 2–7.7)
NEUTROPHILS NFR BLD AUTO: 33 % (ref 50–70)
PLATELET # BLD AUTO: 152 THOU/UL (ref 140–440)
PMV BLD AUTO: 11.3 FL (ref 8.5–12.5)
POTASSIUM BLD-SCNC: 4.8 MMOL/L (ref 3.5–5)
PROT SERPL-MCNC: 5.6 G/DL (ref 6–8)
RBC # BLD AUTO: 3.21 MILL/UL (ref 4.4–6.2)
SODIUM SERPL-SCNC: 141 MMOL/L (ref 136–145)
WBC: 5.8 THOU/UL (ref 4–11)

## 2018-04-25 ENCOUNTER — RECORDS - HEALTHEAST (OUTPATIENT)
Dept: ADMINISTRATIVE | Facility: OTHER | Age: 83
End: 2018-04-25

## 2018-04-25 ENCOUNTER — HOSPITAL ENCOUNTER (OUTPATIENT)
Dept: INTERVENTIONAL RADIOLOGY/VASCULAR | Facility: CLINIC | Age: 83
Discharge: HOME | End: 2018-04-25
Attending: SURGERY | Admitting: RADIOLOGY

## 2018-04-25 DIAGNOSIS — K81.9 CHOLECYSTITIS: ICD-10-CM

## 2018-04-27 ENCOUNTER — HOSPITAL ENCOUNTER (OUTPATIENT)
Dept: INTERVENTIONAL RADIOLOGY/VASCULAR | Facility: CLINIC | Age: 83
Discharge: HOME | End: 2018-04-27
Attending: RADIOLOGY | Admitting: RADIOLOGY

## 2018-04-27 ENCOUNTER — COMMUNICATION - HEALTHEAST (OUTPATIENT)
Dept: FAMILY MEDICINE | Facility: CLINIC | Age: 83
End: 2018-04-27

## 2018-04-27 ENCOUNTER — RECORDS - HEALTHEAST (OUTPATIENT)
Dept: ADMINISTRATIVE | Facility: OTHER | Age: 83
End: 2018-04-27

## 2018-04-27 DIAGNOSIS — K81.9 CHOLECYSTITIS: ICD-10-CM

## 2018-04-27 ASSESSMENT — MIFFLIN-ST. JEOR: SCORE: 1533.96

## 2018-04-28 ENCOUNTER — COMMUNICATION - HEALTHEAST (OUTPATIENT)
Dept: FAMILY MEDICINE | Facility: CLINIC | Age: 83
End: 2018-04-28

## 2018-04-28 DIAGNOSIS — E78.5 HYPERLIPIDEMIA: ICD-10-CM

## 2018-05-02 ENCOUNTER — OFFICE VISIT - HEALTHEAST (OUTPATIENT)
Dept: FAMILY MEDICINE | Facility: CLINIC | Age: 83
End: 2018-05-02

## 2018-05-02 DIAGNOSIS — Z01.818 PREOP EXAMINATION: ICD-10-CM

## 2018-05-02 LAB
ALBUMIN SERPL-MCNC: 3.4 G/DL (ref 3.5–5)
ANION GAP SERPL CALCULATED.3IONS-SCNC: 13 MMOL/L (ref 5–18)
ATRIAL RATE - MUSE: 67 BPM
BASOPHILS # BLD AUTO: 0 THOU/UL (ref 0–0.2)
BASOPHILS NFR BLD AUTO: 0 % (ref 0–2)
BUN SERPL-MCNC: 18 MG/DL (ref 8–28)
CALCIUM SERPL-MCNC: 8.7 MG/DL (ref 8.5–10.5)
CHLORIDE BLD-SCNC: 105 MMOL/L (ref 98–107)
CO2 SERPL-SCNC: 24 MMOL/L (ref 22–31)
CREAT SERPL-MCNC: 0.8 MG/DL (ref 0.7–1.3)
DIASTOLIC BLOOD PRESSURE - MUSE: NORMAL MMHG
EOSINOPHIL COUNT (ABSOLUTE): 0.2 THOU/UL (ref 0–0.4)
EOSINOPHIL NFR BLD AUTO: 3 % (ref 0–6)
ERYTHROCYTE [DISTWIDTH] IN BLOOD BY AUTOMATED COUNT: 18.3 % (ref 11–14.5)
GFR SERPL CREATININE-BSD FRML MDRD: >60 ML/MIN/1.73M2
GLUCOSE BLD-MCNC: 114 MG/DL (ref 70–125)
HCT VFR BLD AUTO: 39 % (ref 40–54)
HGB BLD-MCNC: 12.1 G/DL (ref 14–18)
INTERPRETATION ECG - MUSE: NORMAL
LYMPHOCYTES # BLD AUTO: 3.8 THOU/UL (ref 0.8–4.4)
LYMPHOCYTES NFR BLD AUTO: 62 % (ref 20–40)
MCH RBC QN AUTO: 33.2 PG (ref 27–34)
MCHC RBC AUTO-ENTMCNC: 31 G/DL (ref 32–36)
MCV RBC AUTO: 107 FL (ref 80–100)
MONOCYTES # BLD AUTO: 0.6 THOU/UL (ref 0–0.9)
MONOCYTES NFR BLD AUTO: 9 % (ref 2–10)
OVALOCYTES: ABNORMAL
P AXIS - MUSE: 54 DEGREES
PLAT MORPH BLD: NORMAL
PLATELET # BLD AUTO: 187 THOU/UL (ref 140–440)
PMV BLD AUTO: 11.7 FL (ref 8.5–12.5)
POTASSIUM BLD-SCNC: 4.7 MMOL/L (ref 3.5–5)
PR INTERVAL - MUSE: 138 MS
QRS DURATION - MUSE: 72 MS
QT - MUSE: 412 MS
QTC - MUSE: 435 MS
R AXIS - MUSE: 25 DEGREES
RBC # BLD AUTO: 3.64 MILL/UL (ref 4.4–6.2)
SMUDGE CELLS BLD QL SMEAR: PRESENT
SODIUM SERPL-SCNC: 142 MMOL/L (ref 136–145)
SYSTOLIC BLOOD PRESSURE - MUSE: NORMAL MMHG
T AXIS - MUSE: 18 DEGREES
TOTAL NEUTROPHILS-ABS(DIFF): 1.6 THOU/UL (ref 2–7.7)
TOTAL NEUTROPHILS-REL(DIFF): 26 % (ref 50–70)
VENTRICULAR RATE- MUSE: 67 BPM
WBC: 6.2 THOU/UL (ref 4–11)

## 2018-05-02 ASSESSMENT — MIFFLIN-ST. JEOR: SCORE: 1548.7

## 2018-05-04 ENCOUNTER — COMMUNICATION - HEALTHEAST (OUTPATIENT)
Dept: FAMILY MEDICINE | Facility: CLINIC | Age: 83
End: 2018-05-04

## 2018-05-04 DIAGNOSIS — F43.20 ADJUSTMENT DISORDER: ICD-10-CM

## 2018-05-08 ENCOUNTER — RECORDS - HEALTHEAST (OUTPATIENT)
Dept: ADMINISTRATIVE | Facility: OTHER | Age: 83
End: 2018-05-08

## 2018-05-10 ENCOUNTER — ANESTHESIA - HEALTHEAST (OUTPATIENT)
Dept: SURGERY | Facility: CLINIC | Age: 83
End: 2018-05-10

## 2018-05-10 ENCOUNTER — SURGERY - HEALTHEAST (OUTPATIENT)
Dept: SURGERY | Facility: CLINIC | Age: 83
End: 2018-05-10

## 2018-05-10 ASSESSMENT — MIFFLIN-ST. JEOR: SCORE: 1514.68

## 2018-05-16 ENCOUNTER — RECORDS - HEALTHEAST (OUTPATIENT)
Dept: ADMINISTRATIVE | Facility: OTHER | Age: 83
End: 2018-05-16

## 2018-05-22 ENCOUNTER — OFFICE VISIT - HEALTHEAST (OUTPATIENT)
Dept: ONCOLOGY | Facility: HOSPITAL | Age: 83
End: 2018-05-22

## 2018-05-22 ENCOUNTER — AMBULATORY - HEALTHEAST (OUTPATIENT)
Dept: INFUSION THERAPY | Facility: HOSPITAL | Age: 83
End: 2018-05-22

## 2018-05-22 DIAGNOSIS — C91.12 CLL (CHRONIC LYMPHOID LEUKEMIA) IN RELAPSE (H): ICD-10-CM

## 2018-05-22 DIAGNOSIS — C91.10 CLL (CHRONIC LYMPHOCYTIC LEUKEMIA) (H): ICD-10-CM

## 2018-05-22 LAB
ALBUMIN SERPL-MCNC: 3.3 G/DL (ref 3.5–5)
ALP SERPL-CCNC: 73 U/L (ref 45–120)
ALT SERPL W P-5'-P-CCNC: <9 U/L (ref 0–45)
ANION GAP SERPL CALCULATED.3IONS-SCNC: 10 MMOL/L (ref 5–18)
AST SERPL W P-5'-P-CCNC: 15 U/L (ref 0–40)
BASOPHILS # BLD AUTO: 0 THOU/UL (ref 0–0.2)
BASOPHILS NFR BLD AUTO: 1 % (ref 0–2)
BILIRUB SERPL-MCNC: 0.5 MG/DL (ref 0–1)
BUN SERPL-MCNC: 14 MG/DL (ref 8–28)
CALCIUM SERPL-MCNC: 8.7 MG/DL (ref 8.5–10.5)
CHLORIDE BLD-SCNC: 106 MMOL/L (ref 98–107)
CO2 SERPL-SCNC: 27 MMOL/L (ref 22–31)
CREAT SERPL-MCNC: 0.77 MG/DL (ref 0.7–1.3)
EOSINOPHIL # BLD AUTO: 0.1 THOU/UL (ref 0–0.4)
EOSINOPHIL NFR BLD AUTO: 1 % (ref 0–6)
ERYTHROCYTE [DISTWIDTH] IN BLOOD BY AUTOMATED COUNT: 17 % (ref 11–14.5)
GFR SERPL CREATININE-BSD FRML MDRD: >60 ML/MIN/1.73M2
GLUCOSE BLD-MCNC: 139 MG/DL (ref 70–125)
HCT VFR BLD AUTO: 36.9 % (ref 40–54)
HGB BLD-MCNC: 11.5 G/DL (ref 14–18)
LYMPHOCYTES # BLD AUTO: 2.5 THOU/UL (ref 0.8–4.4)
LYMPHOCYTES NFR BLD AUTO: 51 % (ref 20–40)
MCH RBC QN AUTO: 32 PG (ref 27–34)
MCHC RBC AUTO-ENTMCNC: 31.2 G/DL (ref 32–36)
MCV RBC AUTO: 103 FL (ref 80–100)
MONOCYTES # BLD AUTO: 0.6 THOU/UL (ref 0–0.9)
MONOCYTES NFR BLD AUTO: 12 % (ref 2–10)
NEUTROPHILS # BLD AUTO: 1.7 THOU/UL (ref 2–7.7)
NEUTROPHILS NFR BLD AUTO: 35 % (ref 50–70)
PLATELET # BLD AUTO: 129 THOU/UL (ref 140–440)
PMV BLD AUTO: 11.4 FL (ref 8.5–12.5)
POTASSIUM BLD-SCNC: 4.1 MMOL/L (ref 3.5–5)
PROT SERPL-MCNC: 5.7 G/DL (ref 6–8)
RBC # BLD AUTO: 3.59 MILL/UL (ref 4.4–6.2)
SODIUM SERPL-SCNC: 143 MMOL/L (ref 136–145)
WBC: 4.9 THOU/UL (ref 4–11)

## 2018-05-22 ASSESSMENT — MIFFLIN-ST. JEOR: SCORE: 1539.85

## 2018-05-23 ENCOUNTER — RECORDS - HEALTHEAST (OUTPATIENT)
Dept: ADMINISTRATIVE | Facility: OTHER | Age: 83
End: 2018-05-23

## 2018-05-24 ENCOUNTER — OFFICE VISIT - HEALTHEAST (OUTPATIENT)
Dept: CARDIOLOGY | Facility: CLINIC | Age: 83
End: 2018-05-24

## 2018-05-24 DIAGNOSIS — I48.91 ATRIAL FIBRILLATION (H): ICD-10-CM

## 2018-05-24 DIAGNOSIS — I25.10 ATHEROSCLEROSIS OF NATIVE CORONARY ARTERY OF NATIVE HEART WITHOUT ANGINA PECTORIS: ICD-10-CM

## 2018-05-24 ASSESSMENT — MIFFLIN-ST. JEOR: SCORE: 1545.3

## 2018-05-25 ENCOUNTER — COMMUNICATION - HEALTHEAST (OUTPATIENT)
Dept: ONCOLOGY | Facility: HOSPITAL | Age: 83
End: 2018-05-25

## 2018-05-29 ENCOUNTER — HOSPITAL ENCOUNTER (OUTPATIENT)
Dept: CARDIOLOGY | Facility: HOSPITAL | Age: 83
Discharge: HOME | End: 2018-05-29
Attending: INTERNAL MEDICINE

## 2018-05-29 DIAGNOSIS — I48.91 ATRIAL FIBRILLATION (H): ICD-10-CM

## 2018-06-01 ENCOUNTER — COMMUNICATION - HEALTHEAST (OUTPATIENT)
Dept: FAMILY MEDICINE | Facility: CLINIC | Age: 83
End: 2018-06-01

## 2018-06-01 ENCOUNTER — RECORDS - HEALTHEAST (OUTPATIENT)
Dept: ADMINISTRATIVE | Facility: OTHER | Age: 83
End: 2018-06-01

## 2018-06-01 DIAGNOSIS — I25.10 CORONARY ARTERY DISEASE: ICD-10-CM

## 2018-06-04 ENCOUNTER — RECORDS - HEALTHEAST (OUTPATIENT)
Dept: ADMINISTRATIVE | Facility: OTHER | Age: 83
End: 2018-06-04

## 2018-06-07 ENCOUNTER — AMBULATORY - HEALTHEAST (OUTPATIENT)
Dept: CARDIOLOGY | Facility: CLINIC | Age: 83
End: 2018-06-07

## 2018-06-07 DIAGNOSIS — Z79.899 LONG TERM USE OF DRUG: ICD-10-CM

## 2018-06-08 ENCOUNTER — RECORDS - HEALTHEAST (OUTPATIENT)
Dept: ADMINISTRATIVE | Facility: OTHER | Age: 83
End: 2018-06-08

## 2018-06-12 ENCOUNTER — RECORDS - HEALTHEAST (OUTPATIENT)
Dept: ADMINISTRATIVE | Facility: OTHER | Age: 83
End: 2018-06-12

## 2018-06-19 ENCOUNTER — AMBULATORY - HEALTHEAST (OUTPATIENT)
Dept: INFUSION THERAPY | Facility: HOSPITAL | Age: 83
End: 2018-06-19

## 2018-06-19 ENCOUNTER — OFFICE VISIT - HEALTHEAST (OUTPATIENT)
Dept: ONCOLOGY | Facility: HOSPITAL | Age: 83
End: 2018-06-19

## 2018-06-19 DIAGNOSIS — I82.621 DEEP VEIN THROMBOSIS (DVT) OF RIGHT UPPER EXTREMITY, UNSPECIFIED CHRONICITY, UNSPECIFIED VEIN (H): ICD-10-CM

## 2018-06-19 DIAGNOSIS — C91.12 CLL (CHRONIC LYMPHOID LEUKEMIA) IN RELAPSE (H): ICD-10-CM

## 2018-06-19 DIAGNOSIS — D59.19 OTHER AUTOIMMUNE HEMOLYTIC ANEMIAS: ICD-10-CM

## 2018-06-19 DIAGNOSIS — D69.6 THROMBOCYTOPENIA (H): ICD-10-CM

## 2018-06-19 LAB
BASOPHILS # BLD AUTO: 0 THOU/UL (ref 0–0.2)
BASOPHILS NFR BLD AUTO: 0 % (ref 0–2)
EOSINOPHIL # BLD AUTO: 0.1 THOU/UL (ref 0–0.4)
EOSINOPHIL NFR BLD AUTO: 1 % (ref 0–6)
ERYTHROCYTE [DISTWIDTH] IN BLOOD BY AUTOMATED COUNT: 16.2 % (ref 11–14.5)
HCT VFR BLD AUTO: 34.6 % (ref 40–54)
HGB BLD-MCNC: 11 G/DL (ref 14–18)
LYMPHOCYTES # BLD AUTO: 2.5 THOU/UL (ref 0.8–4.4)
LYMPHOCYTES NFR BLD AUTO: 55 % (ref 20–40)
MCH RBC QN AUTO: 32.4 PG (ref 27–34)
MCHC RBC AUTO-ENTMCNC: 31.8 G/DL (ref 32–36)
MCV RBC AUTO: 102 FL (ref 80–100)
MONOCYTES # BLD AUTO: 0.3 THOU/UL (ref 0–0.9)
MONOCYTES NFR BLD AUTO: 7 % (ref 2–10)
NEUTROPHILS # BLD AUTO: 1.6 THOU/UL (ref 2–7.7)
NEUTROPHILS NFR BLD AUTO: 37 % (ref 50–70)
PLATELET # BLD AUTO: 98 THOU/UL (ref 140–440)
PMV BLD AUTO: 11.3 FL (ref 8.5–12.5)
RBC # BLD AUTO: 3.39 MILL/UL (ref 4.4–6.2)
WBC: 4.6 THOU/UL (ref 4–11)

## 2018-06-20 ENCOUNTER — COMMUNICATION - HEALTHEAST (OUTPATIENT)
Dept: CARDIOLOGY | Facility: CLINIC | Age: 83
End: 2018-06-20

## 2018-06-25 ENCOUNTER — OFFICE VISIT - HEALTHEAST (OUTPATIENT)
Dept: CARDIOLOGY | Facility: CLINIC | Age: 83
End: 2018-06-25

## 2018-06-25 DIAGNOSIS — I48.0 PAROXYSMAL ATRIAL FIBRILLATION (H): ICD-10-CM

## 2018-06-25 DIAGNOSIS — Z79.899 LONG TERM USE OF DRUG: ICD-10-CM

## 2018-06-25 DIAGNOSIS — Z53.09 CONTRAINDICATION TO ANTICOAGULATION THERAPY: ICD-10-CM

## 2018-06-25 DIAGNOSIS — I10 BENIGN ESSENTIAL HTN: ICD-10-CM

## 2018-06-25 DIAGNOSIS — I35.0 MILD AORTIC STENOSIS: ICD-10-CM

## 2018-06-25 LAB — TSH SERPL DL<=0.005 MIU/L-ACNC: 3.28 UIU/ML (ref 0.3–5)

## 2018-06-25 ASSESSMENT — MIFFLIN-ST. JEOR: SCORE: 1577.05

## 2018-06-28 LAB
AMIODARONE SERPL-MCNC: 0.4 UG/ML (ref 0.5–2)
DESETHYLAMIODARONE SERPL-MCNC: 0.4 UG/ML

## 2018-06-29 ENCOUNTER — COMMUNICATION - HEALTHEAST (OUTPATIENT)
Dept: FAMILY MEDICINE | Facility: CLINIC | Age: 83
End: 2018-06-29

## 2018-07-10 ENCOUNTER — RECORDS - HEALTHEAST (OUTPATIENT)
Dept: ADMINISTRATIVE | Facility: OTHER | Age: 83
End: 2018-07-10

## 2018-07-11 ENCOUNTER — RECORDS - HEALTHEAST (OUTPATIENT)
Dept: ADMINISTRATIVE | Facility: OTHER | Age: 83
End: 2018-07-11

## 2018-07-18 ENCOUNTER — OFFICE VISIT - HEALTHEAST (OUTPATIENT)
Dept: ONCOLOGY | Facility: HOSPITAL | Age: 83
End: 2018-07-18

## 2018-07-18 ENCOUNTER — AMBULATORY - HEALTHEAST (OUTPATIENT)
Dept: INFUSION THERAPY | Facility: HOSPITAL | Age: 83
End: 2018-07-18

## 2018-07-18 DIAGNOSIS — D59.19 OTHER AUTOIMMUNE HEMOLYTIC ANEMIAS: ICD-10-CM

## 2018-07-18 DIAGNOSIS — C91.12 CLL (CHRONIC LYMPHOID LEUKEMIA) IN RELAPSE (H): ICD-10-CM

## 2018-07-18 DIAGNOSIS — D69.6 THROMBOCYTOPENIA (H): ICD-10-CM

## 2018-07-18 LAB
BASOPHILS # BLD AUTO: 0 THOU/UL (ref 0–0.2)
BASOPHILS NFR BLD AUTO: 0 % (ref 0–2)
EOSINOPHIL # BLD AUTO: 0.1 THOU/UL (ref 0–0.4)
EOSINOPHIL NFR BLD AUTO: 1 % (ref 0–6)
ERYTHROCYTE [DISTWIDTH] IN BLOOD BY AUTOMATED COUNT: 16.8 % (ref 11–14.5)
HCT VFR BLD AUTO: 36.4 % (ref 40–54)
HGB BLD-MCNC: 11.7 G/DL (ref 14–18)
LYMPHOCYTES # BLD AUTO: 2.7 THOU/UL (ref 0.8–4.4)
LYMPHOCYTES NFR BLD AUTO: 41 % (ref 20–40)
MCH RBC QN AUTO: 32.7 PG (ref 27–34)
MCHC RBC AUTO-ENTMCNC: 32.1 G/DL (ref 32–36)
MCV RBC AUTO: 102 FL (ref 80–100)
MONOCYTES # BLD AUTO: 0.4 THOU/UL (ref 0–0.9)
MONOCYTES NFR BLD AUTO: 6 % (ref 2–10)
NEUTROPHILS # BLD AUTO: 3.4 THOU/UL (ref 2–7.7)
NEUTROPHILS NFR BLD AUTO: 52 % (ref 50–70)
PLATELET # BLD AUTO: 99 THOU/UL (ref 140–440)
PMV BLD AUTO: 11.5 FL (ref 8.5–12.5)
RBC # BLD AUTO: 3.58 MILL/UL (ref 4.4–6.2)
WBC: 6.6 THOU/UL (ref 4–11)

## 2018-07-23 ENCOUNTER — RECORDS - HEALTHEAST (OUTPATIENT)
Dept: ADMINISTRATIVE | Facility: OTHER | Age: 83
End: 2018-07-23

## 2018-07-27 ENCOUNTER — COMMUNICATION - HEALTHEAST (OUTPATIENT)
Dept: CARDIOLOGY | Facility: CLINIC | Age: 83
End: 2018-07-27

## 2018-07-27 ENCOUNTER — COMMUNICATION - HEALTHEAST (OUTPATIENT)
Dept: ONCOLOGY | Facility: HOSPITAL | Age: 83
End: 2018-07-27

## 2018-07-27 DIAGNOSIS — R12 HEARTBURN: ICD-10-CM

## 2018-07-27 DIAGNOSIS — K21.9 ACID REFLUX: ICD-10-CM

## 2018-07-27 DIAGNOSIS — E78.5 HYPERLIPIDEMIA: ICD-10-CM

## 2018-08-09 ENCOUNTER — RECORDS - HEALTHEAST (OUTPATIENT)
Dept: ADMINISTRATIVE | Facility: OTHER | Age: 83
End: 2018-08-09

## 2018-08-16 ENCOUNTER — RECORDS - HEALTHEAST (OUTPATIENT)
Dept: ADMINISTRATIVE | Facility: OTHER | Age: 83
End: 2018-08-16

## 2018-08-23 ENCOUNTER — COMMUNICATION - HEALTHEAST (OUTPATIENT)
Dept: CARDIOLOGY | Facility: CLINIC | Age: 83
End: 2018-08-23

## 2018-08-23 ENCOUNTER — COMMUNICATION - HEALTHEAST (OUTPATIENT)
Dept: FAMILY MEDICINE | Facility: CLINIC | Age: 83
End: 2018-08-23

## 2018-08-23 DIAGNOSIS — D59.19 OTHER AUTOIMMUNE HEMOLYTIC ANEMIAS: ICD-10-CM

## 2018-08-23 DIAGNOSIS — I48.91 ATRIAL FIBRILLATION WITH RAPID VENTRICULAR RESPONSE (H): ICD-10-CM

## 2018-08-30 ENCOUNTER — AMBULATORY - HEALTHEAST (OUTPATIENT)
Dept: CARDIOLOGY | Facility: CLINIC | Age: 83
End: 2018-08-30

## 2018-09-12 ENCOUNTER — RECORDS - HEALTHEAST (OUTPATIENT)
Dept: ADMINISTRATIVE | Facility: OTHER | Age: 83
End: 2018-09-12

## 2018-09-17 ENCOUNTER — OFFICE VISIT - HEALTHEAST (OUTPATIENT)
Dept: ONCOLOGY | Facility: HOSPITAL | Age: 83
End: 2018-09-17

## 2018-09-17 ENCOUNTER — AMBULATORY - HEALTHEAST (OUTPATIENT)
Dept: INFUSION THERAPY | Facility: HOSPITAL | Age: 83
End: 2018-09-17

## 2018-09-17 DIAGNOSIS — C91.12 CLL (CHRONIC LYMPHOID LEUKEMIA) IN RELAPSE (H): ICD-10-CM

## 2018-09-17 DIAGNOSIS — C91.10 CLL (CHRONIC LYMPHOCYTIC LEUKEMIA) (H): ICD-10-CM

## 2018-09-17 LAB
BASOPHILS # BLD AUTO: 0 THOU/UL (ref 0–0.2)
BASOPHILS NFR BLD AUTO: 0 % (ref 0–2)
EOSINOPHIL # BLD AUTO: 0 THOU/UL (ref 0–0.4)
EOSINOPHIL NFR BLD AUTO: 1 % (ref 0–6)
ERYTHROCYTE [DISTWIDTH] IN BLOOD BY AUTOMATED COUNT: 16.3 % (ref 11–14.5)
HCT VFR BLD AUTO: 35.7 % (ref 40–54)
HGB BLD-MCNC: 11.8 G/DL (ref 14–18)
LYMPHOCYTES # BLD AUTO: 2.6 THOU/UL (ref 0.8–4.4)
LYMPHOCYTES NFR BLD AUTO: 48 % (ref 20–40)
MCH RBC QN AUTO: 34.2 PG (ref 27–34)
MCHC RBC AUTO-ENTMCNC: 33.1 G/DL (ref 32–36)
MCV RBC AUTO: 104 FL (ref 80–100)
MONOCYTES # BLD AUTO: 0.6 THOU/UL (ref 0–0.9)
MONOCYTES NFR BLD AUTO: 10 % (ref 2–10)
NEUTROPHILS # BLD AUTO: 2.2 THOU/UL (ref 2–7.7)
NEUTROPHILS NFR BLD AUTO: 41 % (ref 50–70)
PLATELET # BLD AUTO: ABNORMAL THOU/UL (ref 140–440)
PMV BLD AUTO: 12.2 FL (ref 8.5–12.5)
RBC # BLD AUTO: 3.45 MILL/UL (ref 4.4–6.2)
WBC: 5.5 THOU/UL (ref 4–11)

## 2018-09-21 ENCOUNTER — OFFICE VISIT - HEALTHEAST (OUTPATIENT)
Dept: FAMILY MEDICINE | Facility: CLINIC | Age: 83
End: 2018-09-21

## 2018-09-21 DIAGNOSIS — F43.21 ADJUSTMENT DISORDER WITH DEPRESSED MOOD: ICD-10-CM

## 2018-09-21 DIAGNOSIS — C44.92 SQUAMOUS CELL CARCINOMA OF SKIN: ICD-10-CM

## 2018-09-21 DIAGNOSIS — I67.2 CEREBRAL ATHEROSCLEROSIS: ICD-10-CM

## 2018-09-21 DIAGNOSIS — Z00.00 HEALTHCARE MAINTENANCE: ICD-10-CM

## 2018-09-21 DIAGNOSIS — I25.10 ATHEROSCLEROSIS OF NATIVE CORONARY ARTERY OF NATIVE HEART WITHOUT ANGINA PECTORIS: ICD-10-CM

## 2018-09-21 DIAGNOSIS — I48.0 PAROXYSMAL ATRIAL FIBRILLATION (H): ICD-10-CM

## 2018-09-24 ENCOUNTER — RECORDS - HEALTHEAST (OUTPATIENT)
Dept: ADMINISTRATIVE | Facility: OTHER | Age: 83
End: 2018-09-24

## 2018-10-11 ENCOUNTER — RECORDS - HEALTHEAST (OUTPATIENT)
Dept: ADMINISTRATIVE | Facility: OTHER | Age: 83
End: 2018-10-11

## 2018-10-22 ENCOUNTER — RECORDS - HEALTHEAST (OUTPATIENT)
Dept: ADMINISTRATIVE | Facility: OTHER | Age: 83
End: 2018-10-22

## 2018-12-06 ENCOUNTER — AMBULATORY - HEALTHEAST (OUTPATIENT)
Dept: ONCOLOGY | Facility: HOSPITAL | Age: 83
End: 2018-12-06

## 2018-12-06 ENCOUNTER — COMMUNICATION - HEALTHEAST (OUTPATIENT)
Dept: ONCOLOGY | Facility: HOSPITAL | Age: 83
End: 2018-12-06

## 2018-12-06 ENCOUNTER — AMBULATORY - HEALTHEAST (OUTPATIENT)
Dept: INFUSION THERAPY | Facility: HOSPITAL | Age: 83
End: 2018-12-06

## 2018-12-06 DIAGNOSIS — C91.10 CLL (CHRONIC LYMPHOCYTIC LEUKEMIA) (H): ICD-10-CM

## 2018-12-06 LAB
BASOPHILS # BLD AUTO: 0 THOU/UL (ref 0–0.2)
BASOPHILS NFR BLD AUTO: 0 % (ref 0–2)
EOSINOPHIL # BLD AUTO: 0 THOU/UL (ref 0–0.4)
EOSINOPHIL NFR BLD AUTO: 1 % (ref 0–6)
ERYTHROCYTE [DISTWIDTH] IN BLOOD BY AUTOMATED COUNT: 15.4 % (ref 11–14.5)
HCT VFR BLD AUTO: 40.4 % (ref 40–54)
HGB BLD-MCNC: 13.2 G/DL (ref 14–18)
LYMPHOCYTES # BLD AUTO: 3.9 THOU/UL (ref 0.8–4.4)
LYMPHOCYTES NFR BLD AUTO: 52 % (ref 20–40)
MCH RBC QN AUTO: 34.7 PG (ref 27–34)
MCHC RBC AUTO-ENTMCNC: 32.7 G/DL (ref 32–36)
MCV RBC AUTO: 106 FL (ref 80–100)
MONOCYTES # BLD AUTO: 0.5 THOU/UL (ref 0–0.9)
MONOCYTES NFR BLD AUTO: 7 % (ref 2–10)
NEUTROPHILS # BLD AUTO: 3 THOU/UL (ref 2–7.7)
NEUTROPHILS NFR BLD AUTO: 41 % (ref 50–70)
PLATELET # BLD AUTO: 104 THOU/UL (ref 140–440)
PMV BLD AUTO: 11.9 FL (ref 8.5–12.5)
RBC # BLD AUTO: 3.8 MILL/UL (ref 4.4–6.2)
WBC: 7.5 THOU/UL (ref 4–11)

## 2018-12-11 ENCOUNTER — COMMUNICATION - HEALTHEAST (OUTPATIENT)
Dept: FAMILY MEDICINE | Facility: CLINIC | Age: 83
End: 2018-12-11

## 2018-12-11 ENCOUNTER — AMBULATORY - HEALTHEAST (OUTPATIENT)
Dept: INFUSION THERAPY | Facility: HOSPITAL | Age: 83
End: 2018-12-11

## 2018-12-11 ENCOUNTER — OFFICE VISIT - HEALTHEAST (OUTPATIENT)
Dept: ONCOLOGY | Facility: HOSPITAL | Age: 83
End: 2018-12-11

## 2018-12-11 DIAGNOSIS — E89.0 POSTOPERATIVE HYPOTHYROIDISM: ICD-10-CM

## 2018-12-11 DIAGNOSIS — R73.03 PREDIABETES: ICD-10-CM

## 2018-12-11 DIAGNOSIS — I25.10 CORONARY ARTERY DISEASE: ICD-10-CM

## 2018-12-11 DIAGNOSIS — C91.12 CLL (CHRONIC LYMPHOID LEUKEMIA) IN RELAPSE (H): ICD-10-CM

## 2018-12-11 DIAGNOSIS — D59.0 DRUG-INDUCED AUTOANTIBODY TYPE HEMOLYTIC ANEMIA (H): ICD-10-CM

## 2018-12-11 LAB
ALBUMIN SERPL-MCNC: 4.1 G/DL (ref 3.5–5)
ALP SERPL-CCNC: 97 U/L (ref 45–120)
ALT SERPL W P-5'-P-CCNC: 10 U/L (ref 0–45)
ANION GAP SERPL CALCULATED.3IONS-SCNC: 8 MMOL/L (ref 5–18)
AST SERPL W P-5'-P-CCNC: 13 U/L (ref 0–40)
BILIRUB SERPL-MCNC: 0.6 MG/DL (ref 0–1)
BUN SERPL-MCNC: 22 MG/DL (ref 8–28)
CALCIUM SERPL-MCNC: 9 MG/DL (ref 8.5–10.5)
CHLORIDE BLD-SCNC: 105 MMOL/L (ref 98–107)
CO2 SERPL-SCNC: 28 MMOL/L (ref 22–31)
CREAT SERPL-MCNC: 0.95 MG/DL (ref 0.7–1.3)
GFR SERPL CREATININE-BSD FRML MDRD: >60 ML/MIN/1.73M2
GLUCOSE BLD-MCNC: 96 MG/DL (ref 70–125)
POTASSIUM BLD-SCNC: 4.2 MMOL/L (ref 3.5–5)
PROT SERPL-MCNC: 6.3 G/DL (ref 6–8)
SODIUM SERPL-SCNC: 141 MMOL/L (ref 136–145)
T4 FREE SERPL-MCNC: 1.5 NG/DL (ref 0.7–1.8)
TSH SERPL DL<=0.005 MIU/L-ACNC: 9.78 UIU/ML (ref 0.3–5)

## 2019-01-01 ENCOUNTER — COMMUNICATION - HEALTHEAST (OUTPATIENT)
Dept: ONCOLOGY | Facility: HOSPITAL | Age: 84
End: 2019-01-01

## 2019-01-01 ENCOUNTER — COMMUNICATION - HEALTHEAST (OUTPATIENT)
Dept: FAMILY MEDICINE | Facility: CLINIC | Age: 84
End: 2019-01-01

## 2019-01-01 ENCOUNTER — OFFICE VISIT - HEALTHEAST (OUTPATIENT)
Dept: ONCOLOGY | Facility: HOSPITAL | Age: 84
End: 2019-01-01

## 2019-01-01 ENCOUNTER — AMBULATORY - HEALTHEAST (OUTPATIENT)
Dept: ONCOLOGY | Facility: HOSPITAL | Age: 84
End: 2019-01-01

## 2019-01-01 ENCOUNTER — AMBULATORY - HEALTHEAST (OUTPATIENT)
Dept: INFUSION THERAPY | Facility: HOSPITAL | Age: 84
End: 2019-01-01

## 2019-01-01 ENCOUNTER — RECORDS - HEALTHEAST (OUTPATIENT)
Dept: ADMINISTRATIVE | Facility: OTHER | Age: 84
End: 2019-01-01

## 2019-01-01 DIAGNOSIS — I25.10 CORONARY ARTERY DISEASE: ICD-10-CM

## 2019-01-01 DIAGNOSIS — C91.12 CLL (CHRONIC LYMPHOID LEUKEMIA) IN RELAPSE (H): ICD-10-CM

## 2019-01-01 DIAGNOSIS — K21.9 ACID REFLUX: ICD-10-CM

## 2019-01-01 DIAGNOSIS — D59.19 OTHER AUTOIMMUNE HEMOLYTIC ANEMIAS: ICD-10-CM

## 2019-01-01 DIAGNOSIS — D64.9 ANEMIA, UNSPECIFIED TYPE: ICD-10-CM

## 2019-01-01 DIAGNOSIS — C61 MALIGNANT NEOPLASM OF PROSTATE (H): ICD-10-CM

## 2019-01-01 DIAGNOSIS — F43.21 ADJUSTMENT DISORDER WITH DEPRESSED MOOD: ICD-10-CM

## 2019-01-01 DIAGNOSIS — R12 HEARTBURN: ICD-10-CM

## 2019-01-01 LAB
ALBUMIN SERPL-MCNC: 4 G/DL (ref 3.5–5)
ALP SERPL-CCNC: 89 U/L (ref 45–120)
ALT SERPL W P-5'-P-CCNC: <9 U/L (ref 0–45)
ANION GAP SERPL CALCULATED.3IONS-SCNC: 8 MMOL/L (ref 5–18)
AST SERPL W P-5'-P-CCNC: 13 U/L (ref 0–40)
BASOPHILS # BLD AUTO: 0 THOU/UL (ref 0–0.2)
BASOPHILS # BLD AUTO: 0 THOU/UL (ref 0–0.2)
BASOPHILS NFR BLD AUTO: 0 % (ref 0–2)
BASOPHILS NFR BLD AUTO: 1 % (ref 0–2)
BILIRUB SERPL-MCNC: 0.7 MG/DL (ref 0–1)
BUN SERPL-MCNC: 20 MG/DL (ref 8–28)
CALCIUM SERPL-MCNC: 9.1 MG/DL (ref 8.5–10.5)
CHLORIDE BLD-SCNC: 105 MMOL/L (ref 98–107)
CO2 SERPL-SCNC: 27 MMOL/L (ref 22–31)
CREAT SERPL-MCNC: 1.01 MG/DL (ref 0.7–1.3)
EOSINOPHIL # BLD AUTO: 0.1 THOU/UL (ref 0–0.4)
EOSINOPHIL # BLD AUTO: 0.1 THOU/UL (ref 0–0.4)
EOSINOPHIL NFR BLD AUTO: 1 % (ref 0–6)
EOSINOPHIL NFR BLD AUTO: 1 % (ref 0–6)
ERYTHROCYTE [DISTWIDTH] IN BLOOD BY AUTOMATED COUNT: 14.4 % (ref 11–14.5)
ERYTHROCYTE [DISTWIDTH] IN BLOOD BY AUTOMATED COUNT: 14.4 % (ref 11–14.5)
GFR SERPL CREATININE-BSD FRML MDRD: >60 ML/MIN/1.73M2
GLUCOSE BLD-MCNC: 164 MG/DL (ref 70–125)
HCT VFR BLD AUTO: 39.6 % (ref 40–54)
HCT VFR BLD AUTO: 40.6 % (ref 40–54)
HGB BLD-MCNC: 13.1 G/DL (ref 14–18)
HGB BLD-MCNC: 13.3 G/DL (ref 14–18)
LYMPHOCYTES # BLD AUTO: 4.6 THOU/UL (ref 0.8–4.4)
LYMPHOCYTES # BLD AUTO: 4.8 THOU/UL (ref 0.8–4.4)
LYMPHOCYTES NFR BLD AUTO: 52 % (ref 20–40)
LYMPHOCYTES NFR BLD AUTO: 56 % (ref 20–40)
MCH RBC QN AUTO: 34.5 PG (ref 27–34)
MCH RBC QN AUTO: 35.2 PG (ref 27–34)
MCHC RBC AUTO-ENTMCNC: 32.8 G/DL (ref 32–36)
MCHC RBC AUTO-ENTMCNC: 33.1 G/DL (ref 32–36)
MCV RBC AUTO: 106 FL (ref 80–100)
MCV RBC AUTO: 107 FL (ref 80–100)
MONOCYTES # BLD AUTO: 0.7 THOU/UL (ref 0–0.9)
MONOCYTES # BLD AUTO: 0.8 THOU/UL (ref 0–0.9)
MONOCYTES NFR BLD AUTO: 8 % (ref 2–10)
MONOCYTES NFR BLD AUTO: 9 % (ref 2–10)
NEUTROPHILS # BLD AUTO: 3 THOU/UL (ref 2–7.7)
NEUTROPHILS # BLD AUTO: 3.4 THOU/UL (ref 2–7.7)
NEUTROPHILS NFR BLD AUTO: 35 % (ref 50–70)
NEUTROPHILS NFR BLD AUTO: 39 % (ref 50–70)
PLAT MORPH BLD: ABNORMAL
PLATELET # BLD AUTO: 101 THOU/UL (ref 140–440)
PLATELET # BLD AUTO: 99 THOU/UL (ref 140–440)
PMV BLD AUTO: 11.7 FL (ref 8.5–12.5)
PMV BLD AUTO: 11.7 FL (ref 8.5–12.5)
POTASSIUM BLD-SCNC: 4.1 MMOL/L (ref 3.5–5)
PROT SERPL-MCNC: 6.5 G/DL (ref 6–8)
RBC # BLD AUTO: 3.72 MILL/UL (ref 4.4–6.2)
RBC # BLD AUTO: 3.85 MILL/UL (ref 4.4–6.2)
REACTIVE LYMPHS: ABNORMAL
SODIUM SERPL-SCNC: 140 MMOL/L (ref 136–145)
WBC: 8.6 THOU/UL (ref 4–11)
WBC: 8.8 THOU/UL (ref 4–11)

## 2019-01-02 ENCOUNTER — RECORDS - HEALTHEAST (OUTPATIENT)
Dept: ADMINISTRATIVE | Facility: OTHER | Age: 84
End: 2019-01-02

## 2019-01-17 ENCOUNTER — COMMUNICATION - HEALTHEAST (OUTPATIENT)
Dept: FAMILY MEDICINE | Facility: CLINIC | Age: 84
End: 2019-01-17

## 2019-01-17 DIAGNOSIS — E03.9 HYPOTHYROIDISM: ICD-10-CM

## 2019-02-01 ENCOUNTER — COMMUNICATION - HEALTHEAST (OUTPATIENT)
Dept: FAMILY MEDICINE | Facility: CLINIC | Age: 84
End: 2019-02-01

## 2019-02-02 ENCOUNTER — COMMUNICATION - HEALTHEAST (OUTPATIENT)
Dept: ONCOLOGY | Facility: HOSPITAL | Age: 84
End: 2019-02-02

## 2019-02-02 DIAGNOSIS — K21.9 ACID REFLUX: ICD-10-CM

## 2019-02-02 DIAGNOSIS — R12 HEARTBURN: ICD-10-CM

## 2019-02-18 ENCOUNTER — COMMUNICATION - HEALTHEAST (OUTPATIENT)
Dept: FAMILY MEDICINE | Facility: CLINIC | Age: 84
End: 2019-02-18

## 2019-03-02 ENCOUNTER — COMMUNICATION - HEALTHEAST (OUTPATIENT)
Dept: FAMILY MEDICINE | Facility: CLINIC | Age: 84
End: 2019-03-02

## 2019-03-02 DIAGNOSIS — D59.19 OTHER AUTOIMMUNE HEMOLYTIC ANEMIAS: ICD-10-CM

## 2019-03-15 ENCOUNTER — COMMUNICATION - HEALTHEAST (OUTPATIENT)
Dept: FAMILY MEDICINE | Facility: CLINIC | Age: 84
End: 2019-03-15

## 2019-03-15 DIAGNOSIS — D59.19 OTHER AUTOIMMUNE HEMOLYTIC ANEMIAS: ICD-10-CM

## 2019-04-12 ENCOUNTER — OFFICE VISIT - HEALTHEAST (OUTPATIENT)
Dept: ONCOLOGY | Facility: HOSPITAL | Age: 84
End: 2019-04-12

## 2019-04-12 ENCOUNTER — AMBULATORY - HEALTHEAST (OUTPATIENT)
Dept: INFUSION THERAPY | Facility: HOSPITAL | Age: 84
End: 2019-04-12

## 2019-04-12 DIAGNOSIS — C91.12 CLL (CHRONIC LYMPHOID LEUKEMIA) IN RELAPSE (H): ICD-10-CM

## 2019-04-12 DIAGNOSIS — D69.6 THROMBOCYTOPENIA (H): ICD-10-CM

## 2019-04-12 LAB
ALBUMIN SERPL-MCNC: 4.1 G/DL (ref 3.5–5)
ALP SERPL-CCNC: 93 U/L (ref 45–120)
ALT SERPL W P-5'-P-CCNC: <9 U/L (ref 0–45)
ANION GAP SERPL CALCULATED.3IONS-SCNC: 9 MMOL/L (ref 5–18)
AST SERPL W P-5'-P-CCNC: 14 U/L (ref 0–40)
BASOPHILS # BLD AUTO: 0 THOU/UL (ref 0–0.2)
BASOPHILS NFR BLD AUTO: 0 % (ref 0–2)
BILIRUB SERPL-MCNC: 0.7 MG/DL (ref 0–1)
BUN SERPL-MCNC: 19 MG/DL (ref 8–28)
CALCIUM SERPL-MCNC: 9.5 MG/DL (ref 8.5–10.5)
CHLORIDE BLD-SCNC: 107 MMOL/L (ref 98–107)
CO2 SERPL-SCNC: 26 MMOL/L (ref 22–31)
CREAT SERPL-MCNC: 0.98 MG/DL (ref 0.7–1.3)
EOSINOPHIL # BLD AUTO: 0 THOU/UL (ref 0–0.4)
EOSINOPHIL NFR BLD AUTO: 1 % (ref 0–6)
ERYTHROCYTE [DISTWIDTH] IN BLOOD BY AUTOMATED COUNT: 14.1 % (ref 11–14.5)
GFR SERPL CREATININE-BSD FRML MDRD: >60 ML/MIN/1.73M2
GLUCOSE BLD-MCNC: 135 MG/DL (ref 70–125)
HCT VFR BLD AUTO: 39.9 % (ref 40–54)
HGB BLD-MCNC: 13.2 G/DL (ref 14–18)
LYMPHOCYTES # BLD AUTO: 2.8 THOU/UL (ref 0.8–4.4)
LYMPHOCYTES NFR BLD AUTO: 45 % (ref 20–40)
MCH RBC QN AUTO: 35.8 PG (ref 27–34)
MCHC RBC AUTO-ENTMCNC: 33.1 G/DL (ref 32–36)
MCV RBC AUTO: 108 FL (ref 80–100)
MONOCYTES # BLD AUTO: 0.4 THOU/UL (ref 0–0.9)
MONOCYTES NFR BLD AUTO: 6 % (ref 2–10)
NEUTROPHILS # BLD AUTO: 3 THOU/UL (ref 2–7.7)
NEUTROPHILS NFR BLD AUTO: 48 % (ref 50–70)
PLATELET # BLD AUTO: 89 THOU/UL (ref 140–440)
PMV BLD AUTO: 11.7 FL (ref 8.5–12.5)
POTASSIUM BLD-SCNC: 4.4 MMOL/L (ref 3.5–5)
PROT SERPL-MCNC: 6.2 G/DL (ref 6–8)
RBC # BLD AUTO: 3.69 MILL/UL (ref 4.4–6.2)
SODIUM SERPL-SCNC: 142 MMOL/L (ref 136–145)
T4 FREE SERPL-MCNC: 1.3 NG/DL (ref 0.7–1.8)
TSH SERPL DL<=0.005 MIU/L-ACNC: 1.21 UIU/ML (ref 0.3–5)
WBC: 6.3 THOU/UL (ref 4–11)

## 2019-04-26 ENCOUNTER — COMMUNICATION - HEALTHEAST (OUTPATIENT)
Dept: CARDIOLOGY | Facility: CLINIC | Age: 84
End: 2019-04-26

## 2019-04-26 DIAGNOSIS — E78.5 HYPERLIPIDEMIA: ICD-10-CM

## 2019-05-11 ENCOUNTER — COMMUNICATION - HEALTHEAST (OUTPATIENT)
Dept: FAMILY MEDICINE | Facility: CLINIC | Age: 84
End: 2019-05-11

## 2019-05-11 ENCOUNTER — COMMUNICATION - HEALTHEAST (OUTPATIENT)
Dept: CARDIOLOGY | Facility: CLINIC | Age: 84
End: 2019-05-11

## 2019-05-11 DIAGNOSIS — E78.5 HYPERLIPIDEMIA: ICD-10-CM

## 2019-05-11 DIAGNOSIS — I25.10 CORONARY ARTERY DISEASE: ICD-10-CM

## 2019-06-26 ENCOUNTER — OFFICE VISIT - HEALTHEAST (OUTPATIENT)
Dept: FAMILY MEDICINE | Facility: CLINIC | Age: 84
End: 2019-06-26

## 2019-06-26 DIAGNOSIS — R05.9 COUGH: ICD-10-CM

## 2020-01-01 ENCOUNTER — RECORDS - HEALTHEAST (OUTPATIENT)
Dept: GENERAL RADIOLOGY | Facility: CLINIC | Age: 85
End: 2020-01-01

## 2020-01-01 ENCOUNTER — RECORDS - HEALTHEAST (OUTPATIENT)
Dept: ADMINISTRATIVE | Facility: OTHER | Age: 85
End: 2020-01-01

## 2020-01-01 ENCOUNTER — HOME CARE/HOSPICE - HEALTHEAST (OUTPATIENT)
Dept: HOSPICE | Facility: HOSPICE | Age: 85
End: 2020-01-01

## 2020-01-01 ENCOUNTER — COMMUNICATION - HEALTHEAST (OUTPATIENT)
Dept: FAMILY MEDICINE | Facility: CLINIC | Age: 85
End: 2020-01-01

## 2020-01-01 ENCOUNTER — HOME CARE/HOSPICE - HEALTHEAST (OUTPATIENT)
Dept: HOME HEALTH SERVICES | Facility: HOME HEALTH | Age: 85
End: 2020-01-01

## 2020-01-01 ENCOUNTER — COMMUNICATION - HEALTHEAST (OUTPATIENT)
Dept: NURSING | Facility: CLINIC | Age: 85
End: 2020-01-01

## 2020-01-01 ENCOUNTER — AMBULATORY - HEALTHEAST (OUTPATIENT)
Dept: HOSPICE | Facility: HOSPICE | Age: 85
End: 2020-01-01

## 2020-01-01 ENCOUNTER — COMMUNICATION - HEALTHEAST (OUTPATIENT)
Dept: HOME HEALTH SERVICES | Facility: HOME HEALTH | Age: 85
End: 2020-01-01

## 2020-01-01 ENCOUNTER — COMMUNICATION - HEALTHEAST (OUTPATIENT)
Dept: CARDIOLOGY | Facility: CLINIC | Age: 85
End: 2020-01-01

## 2020-01-01 ENCOUNTER — OFFICE VISIT - HEALTHEAST (OUTPATIENT)
Dept: ONCOLOGY | Facility: HOSPITAL | Age: 85
End: 2020-01-01

## 2020-01-01 ENCOUNTER — AMBULATORY - HEALTHEAST (OUTPATIENT)
Dept: ONCOLOGY | Facility: HOSPITAL | Age: 85
End: 2020-01-01

## 2020-01-01 ENCOUNTER — AMBULATORY - HEALTHEAST (OUTPATIENT)
Dept: INFUSION THERAPY | Facility: HOSPITAL | Age: 85
End: 2020-01-01

## 2020-01-01 ENCOUNTER — AMBULATORY - HEALTHEAST (OUTPATIENT)
Dept: FAMILY MEDICINE | Facility: CLINIC | Age: 85
End: 2020-01-01

## 2020-01-01 ENCOUNTER — COMMUNICATION - HEALTHEAST (OUTPATIENT)
Dept: ONCOLOGY | Facility: HOSPITAL | Age: 85
End: 2020-01-01

## 2020-01-01 ENCOUNTER — COMMUNICATION - HEALTHEAST (OUTPATIENT)
Dept: CARE COORDINATION | Facility: CLINIC | Age: 85
End: 2020-01-01

## 2020-01-01 ENCOUNTER — COMMUNICATION - HEALTHEAST (OUTPATIENT)
Dept: SCHEDULING | Facility: CLINIC | Age: 85
End: 2020-01-01

## 2020-01-01 ENCOUNTER — COMMUNICATION - HEALTHEAST (OUTPATIENT)
Dept: ADMINISTRATIVE | Facility: HOSPITAL | Age: 85
End: 2020-01-01

## 2020-01-01 ENCOUNTER — COMMUNICATION - HEALTHEAST (OUTPATIENT)
Dept: PHYSICAL THERAPY | Facility: CLINIC | Age: 85
End: 2020-01-01

## 2020-01-01 ENCOUNTER — OFFICE VISIT - HEALTHEAST (OUTPATIENT)
Dept: FAMILY MEDICINE | Facility: CLINIC | Age: 85
End: 2020-01-01

## 2020-01-01 ENCOUNTER — AMBULATORY - HEALTHEAST (OUTPATIENT)
Dept: CARDIOLOGY | Facility: CLINIC | Age: 85
End: 2020-01-01

## 2020-01-01 ENCOUNTER — AMBULATORY - HEALTHEAST (OUTPATIENT)
Dept: CARE COORDINATION | Facility: CLINIC | Age: 85
End: 2020-01-01

## 2020-01-01 ENCOUNTER — COMMUNICATION - HEALTHEAST (OUTPATIENT)
Dept: NUTRITION | Facility: CLINIC | Age: 85
End: 2020-01-01

## 2020-01-01 ENCOUNTER — OFFICE VISIT - HEALTHEAST (OUTPATIENT)
Dept: CARDIOLOGY | Facility: CLINIC | Age: 85
End: 2020-01-01

## 2020-01-01 ENCOUNTER — INFUSION - HEALTHEAST (OUTPATIENT)
Dept: INFUSION THERAPY | Facility: HOSPITAL | Age: 85
End: 2020-01-01

## 2020-01-01 ENCOUNTER — OFFICE VISIT - HEALTHEAST (OUTPATIENT)
Dept: ONCOLOGY | Facility: CLINIC | Age: 85
End: 2020-01-01

## 2020-01-01 ENCOUNTER — COMMUNICATION - HEALTHEAST (OUTPATIENT)
Dept: MEDSURG UNIT | Facility: CLINIC | Age: 85
End: 2020-01-01

## 2020-01-01 DIAGNOSIS — Z79.899 LONG TERM USE OF DRUG: ICD-10-CM

## 2020-01-01 DIAGNOSIS — C91.12 CLL (CHRONIC LYMPHOID LEUKEMIA) IN RELAPSE (H): ICD-10-CM

## 2020-01-01 DIAGNOSIS — C91.10 CLL (CHRONIC LYMPHOCYTIC LEUKEMIA) (H): ICD-10-CM

## 2020-01-01 DIAGNOSIS — Z20.828 EXPOSURE TO INFLUENZA: ICD-10-CM

## 2020-01-01 DIAGNOSIS — I48.91 ATRIAL FIBRILLATION WITH RVR (H): ICD-10-CM

## 2020-01-01 DIAGNOSIS — D64.9 ANEMIA, UNSPECIFIED TYPE: ICD-10-CM

## 2020-01-01 DIAGNOSIS — D59.10 AUTOIMMUNE HEMOLYTIC ANEMIA (H): ICD-10-CM

## 2020-01-01 DIAGNOSIS — R53.1 GENERALIZED WEAKNESS: ICD-10-CM

## 2020-01-01 DIAGNOSIS — I25.10 CORONARY ATHEROSCLEROSIS: ICD-10-CM

## 2020-01-01 DIAGNOSIS — R91.1 NODULE OF LOWER LOBE OF RIGHT LUNG: ICD-10-CM

## 2020-01-01 DIAGNOSIS — I50.23 ACUTE ON CHRONIC SYSTOLIC HEART FAILURE (H): ICD-10-CM

## 2020-01-01 DIAGNOSIS — E78.5 HYPERLIPIDEMIA: ICD-10-CM

## 2020-01-01 DIAGNOSIS — J90 PLEURAL EFFUSION: ICD-10-CM

## 2020-01-01 DIAGNOSIS — E86.0 DEHYDRATION: ICD-10-CM

## 2020-01-01 DIAGNOSIS — R06.02 SOB (SHORTNESS OF BREATH): ICD-10-CM

## 2020-01-01 DIAGNOSIS — E03.9 HYPOTHYROIDISM, UNSPECIFIED TYPE: ICD-10-CM

## 2020-01-01 DIAGNOSIS — R60.9 EDEMA, UNSPECIFIED TYPE: ICD-10-CM

## 2020-01-01 DIAGNOSIS — J98.4 SCARRING OF LUNG: ICD-10-CM

## 2020-01-01 DIAGNOSIS — I10 BENIGN ESSENTIAL HTN: ICD-10-CM

## 2020-01-01 DIAGNOSIS — R05.9 COUGH: ICD-10-CM

## 2020-01-01 DIAGNOSIS — R53.83 FATIGUE, UNSPECIFIED TYPE: ICD-10-CM

## 2020-01-01 DIAGNOSIS — K44.9 DIAPHRAGMATIC HERNIA WITHOUT OBSTRUCTION AND WITHOUT GANGRENE: ICD-10-CM

## 2020-01-01 DIAGNOSIS — K29.70 GASTRITIS WITHOUT BLEEDING, UNSPECIFIED CHRONICITY, UNSPECIFIED GASTRITIS TYPE: ICD-10-CM

## 2020-01-01 DIAGNOSIS — J06.9 VIRAL URI WITH COUGH: ICD-10-CM

## 2020-01-01 DIAGNOSIS — B37.0 THRUSH: ICD-10-CM

## 2020-01-01 DIAGNOSIS — I25.10 CORONARY ARTERY DISEASE: ICD-10-CM

## 2020-01-01 DIAGNOSIS — E11.9 TYPE 2 DIABETES MELLITUS WITHOUT COMPLICATION, WITHOUT LONG-TERM CURRENT USE OF INSULIN (H): ICD-10-CM

## 2020-01-01 DIAGNOSIS — M62.81 GENERALIZED MUSCLE WEAKNESS: ICD-10-CM

## 2020-01-01 DIAGNOSIS — Z09 HOSPITAL DISCHARGE FOLLOW-UP: ICD-10-CM

## 2020-01-01 DIAGNOSIS — D59.19 OTHER AUTOIMMUNE HEMOLYTIC ANEMIAS: ICD-10-CM

## 2020-01-01 DIAGNOSIS — Z53.09 CONTRAINDICATION TO ANTICOAGULATION THERAPY: ICD-10-CM

## 2020-01-01 DIAGNOSIS — K21.9 ACID REFLUX: ICD-10-CM

## 2020-01-01 DIAGNOSIS — I48.0 PAROXYSMAL ATRIAL FIBRILLATION (H): ICD-10-CM

## 2020-01-01 DIAGNOSIS — E03.9 HYPOTHYROIDISM: ICD-10-CM

## 2020-01-01 DIAGNOSIS — F43.21 ADJUSTMENT DISORDER WITH DEPRESSED MOOD: ICD-10-CM

## 2020-01-01 DIAGNOSIS — D69.6 THROMBOCYTOPENIA (H): ICD-10-CM

## 2020-01-01 DIAGNOSIS — R12 HEARTBURN: ICD-10-CM

## 2020-01-01 LAB
ALBUMIN SERPL-MCNC: 3.9 G/DL (ref 3.5–5)
ALBUMIN SERPL-MCNC: 3.9 G/DL (ref 3.5–5)
ALBUMIN SERPL-MCNC: 4 G/DL (ref 3.5–5)
ALP SERPL-CCNC: 66 U/L (ref 45–120)
ALP SERPL-CCNC: 72 U/L (ref 45–120)
ALP SERPL-CCNC: 73 U/L (ref 45–120)
ALT SERPL W P-5'-P-CCNC: 10 U/L (ref 0–45)
ALT SERPL W P-5'-P-CCNC: 10 U/L (ref 0–45)
ALT SERPL W P-5'-P-CCNC: <9 U/L (ref 0–45)
ANION GAP SERPL CALCULATED.3IONS-SCNC: 10 MMOL/L (ref 5–18)
ANION GAP SERPL CALCULATED.3IONS-SCNC: 10 MMOL/L (ref 5–18)
ANION GAP SERPL CALCULATED.3IONS-SCNC: 9 MMOL/L (ref 5–18)
AST SERPL W P-5'-P-CCNC: 12 U/L (ref 0–40)
AST SERPL W P-5'-P-CCNC: 13 U/L (ref 0–40)
AST SERPL W P-5'-P-CCNC: 14 U/L (ref 0–40)
BASOPHILS # BLD AUTO: 0 THOU/UL (ref 0–0.2)
BASOPHILS # BLD AUTO: 0.1 THOU/UL (ref 0–0.2)
BASOPHILS NFR BLD AUTO: 0 % (ref 0–2)
BASOPHILS NFR BLD AUTO: 1 % (ref 0–2)
BILIRUB SERPL-MCNC: 0.6 MG/DL (ref 0–1)
BILIRUB SERPL-MCNC: 0.7 MG/DL (ref 0–1)
BILIRUB SERPL-MCNC: 0.7 MG/DL (ref 0–1)
BNP SERPL-MCNC: 141 PG/ML (ref 0–93)
BUN SERPL-MCNC: 15 MG/DL (ref 8–28)
BUN SERPL-MCNC: 16 MG/DL (ref 8–28)
BUN SERPL-MCNC: 18 MG/DL (ref 8–28)
CALCIUM SERPL-MCNC: 8.7 MG/DL (ref 8.5–10.5)
CALCIUM SERPL-MCNC: 8.7 MG/DL (ref 8.5–10.5)
CALCIUM SERPL-MCNC: 8.8 MG/DL (ref 8.5–10.5)
CHLORIDE BLD-SCNC: 103 MMOL/L (ref 98–107)
CHLORIDE BLD-SCNC: 104 MMOL/L (ref 98–107)
CHLORIDE BLD-SCNC: 105 MMOL/L (ref 98–107)
CO2 SERPL-SCNC: 25 MMOL/L (ref 22–31)
CO2 SERPL-SCNC: 26 MMOL/L (ref 22–31)
CO2 SERPL-SCNC: 26 MMOL/L (ref 22–31)
CORTIS SERPL-MCNC: 8.5 UG/DL
CREAT SERPL-MCNC: 1.02 MG/DL (ref 0.7–1.3)
CREAT SERPL-MCNC: 1.04 MG/DL (ref 0.7–1.3)
CREAT SERPL-MCNC: 1.07 MG/DL (ref 0.7–1.3)
EOSINOPHIL COUNT (ABSOLUTE): 0 THOU/UL (ref 0–0.4)
EOSINOPHIL COUNT (ABSOLUTE): 0.1 THOU/UL (ref 0–0.4)
EOSINOPHIL NFR BLD AUTO: 0 % (ref 0–6)
EOSINOPHIL NFR BLD AUTO: 1 % (ref 0–6)
ERYTHROCYTE [DISTWIDTH] IN BLOOD BY AUTOMATED COUNT: 12.4 % (ref 11–14.5)
ERYTHROCYTE [DISTWIDTH] IN BLOOD BY AUTOMATED COUNT: 15.5 % (ref 11–14.5)
ERYTHROCYTE [DISTWIDTH] IN BLOOD BY AUTOMATED COUNT: 15.9 % (ref 11–14.5)
ERYTHROCYTE [DISTWIDTH] IN BLOOD BY AUTOMATED COUNT: 16 % (ref 11–14.5)
ERYTHROCYTE [DISTWIDTH] IN BLOOD BY AUTOMATED COUNT: 16.3 % (ref 11–14.5)
ERYTHROCYTE [DISTWIDTH] IN BLOOD BY AUTOMATED COUNT: 17.1 % (ref 11–14.5)
ERYTHROCYTE [DISTWIDTH] IN BLOOD BY AUTOMATED COUNT: 17.4 % (ref 11–14.5)
ERYTHROCYTE [DISTWIDTH] IN BLOOD BY AUTOMATED COUNT: 19.1 % (ref 11–14.5)
ERYTHROCYTE [DISTWIDTH] IN BLOOD BY AUTOMATED COUNT: 21.8 % (ref 11–14.5)
ERYTHROCYTE [DISTWIDTH] IN BLOOD BY AUTOMATED COUNT: 27.1 % (ref 11–14.5)
ERYTHROCYTE [DISTWIDTH] IN BLOOD BY AUTOMATED COUNT: 30.6 % (ref 11–14.5)
FLUAV AG SPEC QL IA: NORMAL
FLUBV AG SPEC QL IA: NORMAL
FOLATE SERPL-MCNC: >20 NG/ML
GFR SERPL CREATININE-BSD FRML MDRD: >60 ML/MIN/1.73M2
GLUCOSE BLD-MCNC: 153 MG/DL (ref 70–125)
GLUCOSE BLD-MCNC: 176 MG/DL (ref 70–125)
GLUCOSE BLD-MCNC: 197 MG/DL (ref 70–125)
HAPTOGLOB SERPL-MCNC: 15 MG/DL (ref 33–171)
HCT VFR BLD AUTO: 30.5 % (ref 40–54)
HCT VFR BLD AUTO: 35.6 % (ref 40–54)
HCT VFR BLD AUTO: 37.6 % (ref 40–54)
HCT VFR BLD AUTO: 37.7 % (ref 40–54)
HCT VFR BLD AUTO: 37.7 % (ref 40–54)
HCT VFR BLD AUTO: 37.8 % (ref 40–54)
HCT VFR BLD AUTO: 38.5 % (ref 40–54)
HCT VFR BLD AUTO: 38.9 % (ref 40–54)
HCT VFR BLD AUTO: 39.2 % (ref 40–54)
HCT VFR BLD AUTO: 40.8 % (ref 40–54)
HCT VFR BLD AUTO: 42.2 % (ref 40–54)
HGB BLD-MCNC: 11 G/DL (ref 14–18)
HGB BLD-MCNC: 11.8 G/DL (ref 14–18)
HGB BLD-MCNC: 11.8 G/DL (ref 14–18)
HGB BLD-MCNC: 11.9 G/DL (ref 14–18)
HGB BLD-MCNC: 12.2 G/DL (ref 14–18)
HGB BLD-MCNC: 12.3 G/DL (ref 14–18)
HGB BLD-MCNC: 12.5 G/DL (ref 14–18)
HGB BLD-MCNC: 12.6 G/DL (ref 14–18)
HGB BLD-MCNC: 12.8 G/DL (ref 14–18)
HGB BLD-MCNC: 13.3 G/DL (ref 14–18)
HGB BLD-MCNC: 9.8 G/DL (ref 14–18)
LDH SERPL L TO P-CCNC: 136 U/L (ref 125–220)
LDH SERPL L TO P-CCNC: 221 U/L (ref 125–220)
LYMPHOCYTES # BLD AUTO: 28.1 THOU/UL (ref 0.8–4.4)
LYMPHOCYTES # BLD AUTO: 46.5 THOU/UL (ref 0.8–4.4)
LYMPHOCYTES # BLD AUTO: 47.3 THOU/UL (ref 0.8–4.4)
LYMPHOCYTES # BLD AUTO: 54.3 THOU/UL (ref 0.8–4.4)
LYMPHOCYTES # BLD AUTO: 56.3 THOU/UL (ref 0.8–4.4)
LYMPHOCYTES # BLD AUTO: 58.5 THOU/UL (ref 0.8–4.4)
LYMPHOCYTES # BLD AUTO: 6.6 THOU/UL (ref 0.8–4.4)
LYMPHOCYTES # BLD AUTO: 61.1 THOU/UL (ref 0.8–4.4)
LYMPHOCYTES # BLD AUTO: 9.6 THOU/UL (ref 0.8–4.4)
LYMPHOCYTES NFR BLD AUTO: 73 % (ref 20–40)
LYMPHOCYTES NFR BLD AUTO: 74 % (ref 20–40)
LYMPHOCYTES NFR BLD AUTO: 79 % (ref 20–40)
LYMPHOCYTES NFR BLD AUTO: 85 % (ref 20–40)
LYMPHOCYTES NFR BLD AUTO: 85 % (ref 20–40)
LYMPHOCYTES NFR BLD AUTO: 87 % (ref 20–40)
LYMPHOCYTES NFR BLD AUTO: 88 % (ref 20–40)
LYMPHOCYTES NFR BLD AUTO: 94 % (ref 20–40)
LYMPHOCYTES NFR BLD AUTO: 97 % (ref 20–40)
MAGNESIUM SERPL-MCNC: 1.8 MG/DL (ref 1.8–2.6)
MANUAL NRBC PER 100 CELLS: 1
MANUAL NRBC PER 100 CELLS: 1
MCH RBC QN AUTO: 33.5 PG (ref 27–34)
MCH RBC QN AUTO: 33.9 PG (ref 27–34)
MCH RBC QN AUTO: 34.6 PG (ref 27–34)
MCH RBC QN AUTO: 34.8 PG (ref 27–34)
MCH RBC QN AUTO: 35.2 PG (ref 27–34)
MCH RBC QN AUTO: 37 PG (ref 27–34)
MCH RBC QN AUTO: 37.3 PG (ref 27–34)
MCH RBC QN AUTO: 37.9 PG (ref 27–34)
MCH RBC QN AUTO: 38.7 PG (ref 27–34)
MCH RBC QN AUTO: 38.9 PG (ref 27–34)
MCH RBC QN AUTO: 39 PG (ref 27–34)
MCHC RBC AUTO-ENTMCNC: 30.9 G/DL (ref 32–36)
MCHC RBC AUTO-ENTMCNC: 30.9 G/DL (ref 32–36)
MCHC RBC AUTO-ENTMCNC: 31.3 G/DL (ref 32–36)
MCHC RBC AUTO-ENTMCNC: 31.3 G/DL (ref 32–36)
MCHC RBC AUTO-ENTMCNC: 31.4 G/DL (ref 32–36)
MCHC RBC AUTO-ENTMCNC: 31.5 G/DL (ref 32–36)
MCHC RBC AUTO-ENTMCNC: 31.6 G/DL (ref 32–36)
MCHC RBC AUTO-ENTMCNC: 31.9 G/DL (ref 32–36)
MCHC RBC AUTO-ENTMCNC: 32.1 G/DL (ref 32–36)
MCHC RBC AUTO-ENTMCNC: 32.3 G/DL (ref 32–36)
MCHC RBC AUTO-ENTMCNC: 33.5 G/DL (ref 32–36)
MCV RBC AUTO: 103 FL (ref 80–100)
MCV RBC AUTO: 106 FL (ref 80–100)
MCV RBC AUTO: 107 FL (ref 80–100)
MCV RBC AUTO: 111 FL (ref 80–100)
MCV RBC AUTO: 113 FL (ref 80–100)
MCV RBC AUTO: 115 FL (ref 80–100)
MCV RBC AUTO: 118 FL (ref 80–100)
MCV RBC AUTO: 122 FL (ref 80–100)
MCV RBC AUTO: 123 FL (ref 80–100)
MCV RBC AUTO: 124 FL (ref 80–100)
MCV RBC AUTO: 125 FL (ref 80–100)
MONOCYTES # BLD AUTO: 0 THOU/UL (ref 0–0.9)
MONOCYTES # BLD AUTO: 0.2 THOU/UL (ref 0–0.9)
MONOCYTES # BLD AUTO: 0.3 THOU/UL (ref 0–0.9)
MONOCYTES # BLD AUTO: 0.4 THOU/UL (ref 0–0.9)
MONOCYTES # BLD AUTO: 0.7 THOU/UL (ref 0–0.9)
MONOCYTES # BLD AUTO: 1.1 THOU/UL (ref 0–0.9)
MONOCYTES # BLD AUTO: 1.6 THOU/UL (ref 0–0.9)
MONOCYTES NFR BLD AUTO: 0 % (ref 2–10)
MONOCYTES NFR BLD AUTO: 1 % (ref 2–10)
MONOCYTES NFR BLD AUTO: 2 % (ref 2–10)
MONOCYTES NFR BLD AUTO: 3 % (ref 2–10)
OVALOCYTES: ABNORMAL
PLAT MORPH BLD: ABNORMAL
PLAT MORPH BLD: NORMAL
PLATELET # BLD AUTO: 119 THOU/UL (ref 140–440)
PLATELET # BLD AUTO: 120 THOU/UL (ref 140–440)
PLATELET # BLD AUTO: 120 THOU/UL (ref 140–440)
PLATELET # BLD AUTO: 125 THOU/UL (ref 140–440)
PLATELET # BLD AUTO: 128 THOU/UL (ref 140–440)
PLATELET # BLD AUTO: 141 THOU/UL (ref 140–440)
PLATELET # BLD AUTO: 146 THOU/UL (ref 140–440)
PLATELET # BLD AUTO: 147 THOU/UL (ref 140–440)
PLATELET # BLD AUTO: 165 THOU/UL (ref 140–440)
PLATELET # BLD AUTO: 94 THOU/UL (ref 140–440)
PLATELET # BLD AUTO: 96 THOU/UL (ref 140–440)
PMV BLD AUTO: 11.5 FL (ref 8.5–12.5)
PMV BLD AUTO: 11.6 FL (ref 8.5–12.5)
PMV BLD AUTO: 11.9 FL (ref 8.5–12.5)
PMV BLD AUTO: 12 FL (ref 8.5–12.5)
PMV BLD AUTO: 12 FL (ref 8.5–12.5)
PMV BLD AUTO: 12.2 FL (ref 8.5–12.5)
PMV BLD AUTO: 12.4 FL (ref 8.5–12.5)
PMV BLD AUTO: 12.6 FL (ref 8.5–12.5)
PMV BLD AUTO: 8.8 FL (ref 7–10)
POLYCHROMASIA BLD QL SMEAR: ABNORMAL
POTASSIUM BLD-SCNC: 4.4 MMOL/L (ref 3.5–5)
POTASSIUM BLD-SCNC: 4.5 MMOL/L (ref 3.5–5)
POTASSIUM BLD-SCNC: 4.6 MMOL/L (ref 3.5–5)
PROT SERPL-MCNC: 6 G/DL (ref 6–8)
PROT SERPL-MCNC: 6.3 G/DL (ref 6–8)
PROT SERPL-MCNC: 6.4 G/DL (ref 6–8)
RBC # BLD AUTO: 2.51 MILL/UL (ref 4.4–6.2)
RBC # BLD AUTO: 2.84 MILL/UL (ref 4.4–6.2)
RBC # BLD AUTO: 3.03 MILL/UL (ref 4.4–6.2)
RBC # BLD AUTO: 3.14 MILL/UL (ref 4.4–6.2)
RBC # BLD AUTO: 3.3 MILL/UL (ref 4.4–6.2)
RBC # BLD AUTO: 3.3 MILL/UL (ref 4.4–6.2)
RBC # BLD AUTO: 3.35 MILL/UL (ref 4.4–6.2)
RBC # BLD AUTO: 3.64 MILL/UL (ref 4.4–6.2)
RBC # BLD AUTO: 3.69 MILL/UL (ref 4.4–6.2)
RBC # BLD AUTO: 3.82 MILL/UL (ref 4.4–6.2)
RBC # BLD AUTO: 3.82 MILL/UL (ref 4.4–6.2)
REACTIVE LYMPHS: ABNORMAL
RETICS # AUTO: 0.25 MILL/UL (ref 0.01–0.11)
RETICS # AUTO: 0.26 MILL/UL (ref 0.01–0.11)
RETICS/RBC NFR AUTO: 9.12 % (ref 0.8–2.7)
RETICS/RBC NFR AUTO: 9.97 % (ref 0.8–2.7)
SMUDGE CELLS BLD QL SMEAR: PRESENT
SODIUM SERPL-SCNC: 139 MMOL/L (ref 136–145)
SODIUM SERPL-SCNC: 139 MMOL/L (ref 136–145)
SODIUM SERPL-SCNC: 140 MMOL/L (ref 136–145)
T4 FREE SERPL-MCNC: 1.4 NG/DL (ref 0.7–1.8)
TEAR DROP: ABNORMAL
TOTAL NEUTROPHILS-ABS(DIFF): 1.5 THOU/UL (ref 2–7.7)
TOTAL NEUTROPHILS-ABS(DIFF): 16.3 THOU/UL (ref 2–7.7)
TOTAL NEUTROPHILS-ABS(DIFF): 2.3 THOU/UL (ref 2–7.7)
TOTAL NEUTROPHILS-ABS(DIFF): 2.9 THOU/UL (ref 2–7.7)
TOTAL NEUTROPHILS-ABS(DIFF): 3.6 THOU/UL (ref 2–7.7)
TOTAL NEUTROPHILS-ABS(DIFF): 4.3 THOU/UL (ref 2–7.7)
TOTAL NEUTROPHILS-ABS(DIFF): 6.2 THOU/UL (ref 2–7.7)
TOTAL NEUTROPHILS-ABS(DIFF): 8 THOU/UL (ref 2–7.7)
TOTAL NEUTROPHILS-ABS(DIFF): 8 THOU/UL (ref 2–7.7)
TOTAL NEUTROPHILS-REL(DIFF): 12 % (ref 50–70)
TOTAL NEUTROPHILS-REL(DIFF): 12 % (ref 50–70)
TOTAL NEUTROPHILS-REL(DIFF): 13 % (ref 50–70)
TOTAL NEUTROPHILS-REL(DIFF): 13 % (ref 50–70)
TOTAL NEUTROPHILS-REL(DIFF): 21 % (ref 50–70)
TOTAL NEUTROPHILS-REL(DIFF): 22 % (ref 50–70)
TOTAL NEUTROPHILS-REL(DIFF): 25 % (ref 50–70)
TOTAL NEUTROPHILS-REL(DIFF): 3 % (ref 50–70)
TOTAL NEUTROPHILS-REL(DIFF): 6 % (ref 50–70)
TROPONIN I SERPL-MCNC: <0.01 NG/ML (ref 0–0.29)
TSH SERPL DL<=0.005 MIU/L-ACNC: 4.95 UIU/ML (ref 0.3–5)
VIT B12 SERPL-MCNC: 530 PG/ML (ref 213–816)
WBC: 11.6 THOU/UL (ref 4–11)
WBC: 13 THOU/UL (ref 4–11)
WBC: 33.1 THOU/UL (ref 4–11)
WBC: 48.8 THOU/UL (ref 4–11)
WBC: 50.6 THOU/UL (ref 4–11)
WBC: 53.8 THOU/UL (ref 4–11)
WBC: 59.9 THOU/UL (ref 4–11)
WBC: 63.9 THOU/UL (ref 4–11)
WBC: 66.5 THOU/UL (ref 4–11)
WBC: 77.8 THOU/UL (ref 4–11)
WBC: 9.1 THOU/UL (ref 4–11)

## 2020-01-01 ASSESSMENT — MIFFLIN-ST. JEOR: SCORE: 1658.69

## 2020-08-05 ENCOUNTER — HOME CARE/HOSPICE - HEALTHEAST (OUTPATIENT)
Dept: HOSPICE | Facility: HOSPICE | Age: 85
End: 2020-08-05

## 2021-05-25 ENCOUNTER — RECORDS - HEALTHEAST (OUTPATIENT)
Dept: ADMINISTRATIVE | Facility: CLINIC | Age: 86
End: 2021-05-25

## 2021-05-26 VITALS
SYSTOLIC BLOOD PRESSURE: 118 MMHG | RESPIRATION RATE: 16 BRPM | OXYGEN SATURATION: 93 % | DIASTOLIC BLOOD PRESSURE: 64 MMHG | TEMPERATURE: 97.3 F | HEART RATE: 61 BPM

## 2021-05-26 VITALS
OXYGEN SATURATION: 98 % | DIASTOLIC BLOOD PRESSURE: 64 MMHG | RESPIRATION RATE: 16 BRPM | TEMPERATURE: 98.2 F | SYSTOLIC BLOOD PRESSURE: 114 MMHG | HEART RATE: 58 BPM

## 2021-05-26 VITALS
SYSTOLIC BLOOD PRESSURE: 119 MMHG | DIASTOLIC BLOOD PRESSURE: 75 MMHG | TEMPERATURE: 98.3 F | OXYGEN SATURATION: 97 % | HEART RATE: 85 BPM

## 2021-05-26 VITALS
SYSTOLIC BLOOD PRESSURE: 112 MMHG | DIASTOLIC BLOOD PRESSURE: 64 MMHG | HEART RATE: 72 BPM | RESPIRATION RATE: 17 BRPM | SYSTOLIC BLOOD PRESSURE: 110 MMHG | HEART RATE: 83 BPM | OXYGEN SATURATION: 98 % | DIASTOLIC BLOOD PRESSURE: 72 MMHG | OXYGEN SATURATION: 95 % | TEMPERATURE: 98.5 F | TEMPERATURE: 98 F

## 2021-05-26 VITALS
HEART RATE: 91 BPM | TEMPERATURE: 98.2 F | DIASTOLIC BLOOD PRESSURE: 71 MMHG | OXYGEN SATURATION: 95 % | SYSTOLIC BLOOD PRESSURE: 107 MMHG

## 2021-05-26 VITALS
DIASTOLIC BLOOD PRESSURE: 73 MMHG | TEMPERATURE: 97.5 F | SYSTOLIC BLOOD PRESSURE: 113 MMHG | HEART RATE: 117 BPM | OXYGEN SATURATION: 95 %

## 2021-05-26 VITALS
TEMPERATURE: 96.6 F | RESPIRATION RATE: 22 BRPM | SYSTOLIC BLOOD PRESSURE: 100 MMHG | HEART RATE: 124 BPM | DIASTOLIC BLOOD PRESSURE: 60 MMHG | OXYGEN SATURATION: 79 %

## 2021-05-26 VITALS
HEART RATE: 63 BPM | DIASTOLIC BLOOD PRESSURE: 60 MMHG | OXYGEN SATURATION: 97 % | SYSTOLIC BLOOD PRESSURE: 110 MMHG | TEMPERATURE: 98.1 F

## 2021-05-26 VITALS
OXYGEN SATURATION: 93 % | DIASTOLIC BLOOD PRESSURE: 68 MMHG | SYSTOLIC BLOOD PRESSURE: 122 MMHG | TEMPERATURE: 97.8 F | HEART RATE: 78 BPM

## 2021-05-27 VITALS
TEMPERATURE: 98.3 F | HEART RATE: 60 BPM | DIASTOLIC BLOOD PRESSURE: 58 MMHG | OXYGEN SATURATION: 97 % | SYSTOLIC BLOOD PRESSURE: 104 MMHG

## 2021-05-27 VITALS — TEMPERATURE: 97.3 F | DIASTOLIC BLOOD PRESSURE: 80 MMHG | HEART RATE: 90 BPM | SYSTOLIC BLOOD PRESSURE: 115 MMHG

## 2021-05-27 VITALS
TEMPERATURE: 97.3 F | DIASTOLIC BLOOD PRESSURE: 79 MMHG | OXYGEN SATURATION: 98 % | HEART RATE: 69 BPM | SYSTOLIC BLOOD PRESSURE: 106 MMHG

## 2021-05-27 VITALS
TEMPERATURE: 97.5 F | SYSTOLIC BLOOD PRESSURE: 117 MMHG | DIASTOLIC BLOOD PRESSURE: 73 MMHG | OXYGEN SATURATION: 94 % | HEART RATE: 135 BPM

## 2021-05-27 VITALS
SYSTOLIC BLOOD PRESSURE: 114 MMHG | RESPIRATION RATE: 18 BRPM | TEMPERATURE: 97 F | HEART RATE: 72 BPM | OXYGEN SATURATION: 94 % | DIASTOLIC BLOOD PRESSURE: 70 MMHG

## 2021-05-27 VITALS — SYSTOLIC BLOOD PRESSURE: 97 MMHG | RESPIRATION RATE: 16 BRPM | DIASTOLIC BLOOD PRESSURE: 68 MMHG | HEART RATE: 66 BPM

## 2021-05-27 VITALS — DIASTOLIC BLOOD PRESSURE: 58 MMHG | OXYGEN SATURATION: 96 % | SYSTOLIC BLOOD PRESSURE: 97 MMHG | HEART RATE: 95 BPM

## 2021-05-27 VITALS
TEMPERATURE: 98.4 F | RESPIRATION RATE: 16 BRPM | SYSTOLIC BLOOD PRESSURE: 100 MMHG | DIASTOLIC BLOOD PRESSURE: 64 MMHG | HEART RATE: 75 BPM | OXYGEN SATURATION: 94 %

## 2021-05-27 VITALS
OXYGEN SATURATION: 97 % | TEMPERATURE: 97.7 F | HEART RATE: 75 BPM | DIASTOLIC BLOOD PRESSURE: 81 MMHG | SYSTOLIC BLOOD PRESSURE: 138 MMHG

## 2021-05-27 VITALS
TEMPERATURE: 98.2 F | DIASTOLIC BLOOD PRESSURE: 67 MMHG | OXYGEN SATURATION: 96 % | SYSTOLIC BLOOD PRESSURE: 110 MMHG | HEART RATE: 82 BPM

## 2021-05-27 VITALS
SYSTOLIC BLOOD PRESSURE: 112 MMHG | HEART RATE: 124 BPM | DIASTOLIC BLOOD PRESSURE: 58 MMHG | RESPIRATION RATE: 32 BRPM | OXYGEN SATURATION: 82 %

## 2021-05-27 VITALS
DIASTOLIC BLOOD PRESSURE: 68 MMHG | OXYGEN SATURATION: 95 % | TEMPERATURE: 97.9 F | SYSTOLIC BLOOD PRESSURE: 120 MMHG | HEART RATE: 114 BPM

## 2021-05-27 VITALS — HEART RATE: 90 BPM | SYSTOLIC BLOOD PRESSURE: 127 MMHG | OXYGEN SATURATION: 96 % | DIASTOLIC BLOOD PRESSURE: 67 MMHG

## 2021-05-27 VITALS
SYSTOLIC BLOOD PRESSURE: 118 MMHG | OXYGEN SATURATION: 95 % | TEMPERATURE: 97.7 F | DIASTOLIC BLOOD PRESSURE: 62 MMHG | HEART RATE: 65 BPM

## 2021-05-27 NOTE — PROGRESS NOTES
Pt's chart was sent to Dr Oseguera for review for possible LAAO.  Dr Oseguera did sent message to PCP for possible consideration (ASAP-TOO) trial.   PCP was instructed to reach out to our team directly should they feel the pt would benefit from an implant.

## 2021-05-27 NOTE — PATIENT INSTRUCTIONS - HE
Recent Results (from the past 24 hour(s))   Comprehensive Metabolic Panel   Result Value Ref Range    Sodium 142 136 - 145 mmol/L    Potassium 4.4 3.5 - 5.0 mmol/L    Chloride 107 98 - 107 mmol/L    CO2 26 22 - 31 mmol/L    Anion Gap, Calculation 9 5 - 18 mmol/L    Glucose 135 (H) 70 - 125 mg/dL    BUN 19 8 - 28 mg/dL    Creatinine 0.98 0.70 - 1.30 mg/dL    GFR MDRD Af Amer >60 >60 mL/min/1.73m2    GFR MDRD Non Af Amer >60 >60 mL/min/1.73m2    Bilirubin, Total 0.7 0.0 - 1.0 mg/dL    Calcium 9.5 8.5 - 10.5 mg/dL    Protein, Total 6.2 6.0 - 8.0 g/dL    Albumin 4.1 3.5 - 5.0 g/dL    Alkaline Phosphatase 93 45 - 120 U/L    AST 14 0 - 40 U/L    ALT <9 0 - 45 U/L   HM1 (CBC with Diff)   Result Value Ref Range    WBC 6.3 4.0 - 11.0 thou/uL    RBC 3.69 (L) 4.40 - 6.20 mill/uL    Hemoglobin 13.2 (L) 14.0 - 18.0 g/dL    Hematocrit 39.9 (L) 40.0 - 54.0 %     (H) 80 - 100 fL    MCH 35.8 (H) 27.0 - 34.0 pg    MCHC 33.1 32.0 - 36.0 g/dL    RDW 14.1 11.0 - 14.5 %    Platelets 89 (L) 140 - 440 thou/uL    MPV 11.7 8.5 - 12.5 fL    Neutrophils % 48 (L) 50 - 70 %    Lymphocytes % 45 (H) 20 - 40 %    Monocytes % 6 2 - 10 %    Eosinophils % 1 0 - 6 %    Basophils % 0 0 - 2 %    Neutrophils Absolute 3.0 2.0 - 7.7 thou/uL    Lymphocytes Absolute 2.8 0.8 - 4.4 thou/uL    Monocytes Absolute 0.4 0.0 - 0.9 thou/uL    Eosinophils Absolute 0.0 0.0 - 0.4 thou/uL    Basophils Absolute 0.0 0.0 - 0.2 thou/uL

## 2021-05-27 NOTE — PROGRESS NOTES
[facility] Hematology and Oncology Progress Note    Patient: Nixon Velasquez Jr.  MRN: [id number]  Date of Service:         Reason for Visit    Chief Complaint   Patient presents with     Malignant Hematology     CLL (chronic lymphoid leukemia) in relapse       Assessment and Plan      1. CLL: Received single agent Rituxan for this with his last dose being August 7, 2017. relapse in February so he started imbruvica.  The only tolerated it for a couple of weeks.  He was hospitalized twice.  We have really been holding it since march 2018.  His labs continue to look overall stable.  We will continue to keep him on observation.  We will have him return in 3-4 month to repeat labs.    2.  Anemia and thrombocytopenia: These are likely from his CLL.  His hemoglobin is stable.  His platelets are coming down just a little bit but definitely not significant and he is not having any complications.  Encouraged him to call us with any more bruising or bleeding complications.      ECOG Performance   ECOG Performance Status: 1     Distress Assessment  Distress Assessment Score: No distress:   Pain  Currently in Pain: No/denies:       Problem List    1. Thrombocytopenia (H)     2. 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)        ______________________________________________________________________________    History of Present Illness      Diagnosis:     1. Chronic lymphocytic leukemia initially diagnosed in 2/2008. 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   2. Thyroid cancer, status post thyroidectomy and radioactive iodine ablation.   3. Prostate cancer diagnosed in 1992.  4. Skin cancers, multiple.     Treatment:  Started single agent Rituxan for his CLL on July 18, 2017.  This was initiated for worsening symptomatic anemia.  He received 4 weekly doses.  Completed on August 7, 2017.   relapse of CLL and has started Imbruvica on 2/27/18.  He probably only took it for a couple of weeks and then was back in the  hospital and then he was holding it.  He then took it for a couple of days and then started holding it again around March 27, 2018. Continues to hold.         Interim History:      Nixon is here today for follow-up.  He states he is doing well.  He says he always gets some bruises on his hands and arms but that is not new.  No bleeding complications.  His energy levels are good.  He went to [place] a couple of months ago and that was fine.  He really has no other complaints.  He just always feels a little anxious about seeing his lab results    Pain Status  Currently in Pain: No/denies    Review of Systems    Constitutional  Constitutional (WDL): Exceptions to WDL  Fatigue: Concerns  Fever: No Concerns  Chills: No Concerns  Weight Gain: No Concerns  Weight Loss: No Concerns  Hot flashes/Night Sweats: No Concerns  Neurosensory  Neurosensory (WDL): Exceptions to WDL  Peripheral Motor Neuropathy: No Concerns  Ataxia: Concerns  Peripheral Sensory Neuropathy: No Concerns  Confusion: No Concerns  Dizziness: No Concerns  Syncope: No Concerns  Eye   Eye Disorder (WDL): All eye disorder elements are within defined limits(glasses)  Blurred Vision: No Concerns  Dry Eye: No Concerns  Eye Pain: No Concerns  Watering Eyes: No Concerns  Ear  Ear Disorder (WDL): All ear disorder elements are within defined limits  Ear Pain: No Concerns  Tinnitus: No Concerns  Cardiovascular  Cardiovascular (WDL): All cardiovascular elements are within defined limits  Palpitations: No Concerns  Edema: No Concerns  SVT, DVT/PE: No Concerns  Chest Pain - Cardiac: No Concerns  Pulmonary  Respiratory (WDL): Within Defined Limits  Cough: No Concerns  Dyspnea: No Concerns  Hypoxia: No Concerns  Gastrointestinal  Gastrointestinal (WDL): All gastrointestinal elements are within defined limits  Anorexia: No Concerns  Nausea: No Concerns  Vomiting: No Concerns  Dehydration: No Concerns  Dysgeusia: No Concerns  Dysphagia: No Concerns  Mucositis Oral: No  Concerns  Esophagitis: No Concerns  Constipation: No Concerns  Diarrhea: No Concerns  Pharyngitis: No Concerns  Dry Mouth: No Concerns  Genitourinary  Genitourinary (WDL): All genitourinary elements are within defined limits  Urinary Frequency: No Concerns  Urinary Retention: No Concerns  Urinary Tract Pain: No Concerns  Lymphatic  Lymph (WDL): All lymph disorder elements are within defined limits  Lymphedema: No Concerns  Lymph Node Discomfort: No Concerns  Musculoskeletal and Connective Tissue  Musculoskeletal and Connetive Tissue Disorders (WDL): All Musculoskeletal and Connetive Tissue Disorder elements are within defined limits  Arthralgia: No Concerns  Bone Pain: No Concerns  Muscle Weakness : No Concerns  Myalgia: No Concerns  Integumentary  Integumentary (WDL): All integumentary elements are within defined limits(skin spots; seeing dermatology in 1 month for exam)  Alopecia: No Concerns  Rash Maculo-Papular: No Concerns  Pruritus: No Concerns  Urticaria: No Concerns  Palmar-Plantar Erythrodysesthesia Syndrome: No Concerns  Flushing: No Concerns  Patient Coping  Patient Coping: Accepting  Accompanied by  Accompanied by: Family Member  Oral Chemo Adherence         Past History  Past Medical History:   Diagnosis Date     Acute pulmonary edema (H)      Adenocarcinoma of prostate (H)      Adjustment disorder      Anemia      Autoimmune hemolytic anemia (H)      Cataract     bilateral     Cerebral atherosclerosis      Cervical radiculopathy      Cholecystitis      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)      Coronary artery disease      Corrosive esophagitis      Diabetes mellitus (H)      DVT (deep vein thrombosis) in pregnancy (H)      Fatigue      Fecal impaction (H)      Foot fracture, right      Generalized weakness      Hiatal hernia      History of transfusion      Hx of papillary thyroid carcinoma      Hypertension      Hypocalcemia      Hypothyroidism      Hypoxia      Infection of right ear lobe       Inguinal hernia      Iron deficiency      Leukemia (H)      Mixed hyperlipidemia      Neutropenia associated with infection (H)      Paroxysmal atrial fibrillation (H)      PE (pulmonary embolism)      Pleural effusion      Pneumonia      Prostate cancer (H)      Restless legs syndrome      Skin cancer      Squamous cell carcinoma     skin     Stroke (H)      Thrombocytopenia (H)      Thyroid cancer (H)        PHYSICAL EXAM:  /68   Pulse 71   Wt 213 lb 8 oz (96.8 kg)   SpO2 97%   BMI 27.41 kg/m    GENERAL: no acute distress. Cooperative in conversation. Here with son.   HEENT: no ulcerations or mucositis noted. No bleeding noted.  RESP: lungs are clear bilaterally per auscultation. Regular respiratory rate. No wheezes or rhonchi.  CV: Regular, rate and rhythm. Slight systolic murmur.  ABD: soft, nontender. Positive bowel sounds. No organomegaly.   MUSCULOSKELETAL: No lower extremity swelling.   NEURO: non focal. Alert and oriented x3.   PSYCH: within normal limits. No depression or anxiety.   SKIN: warm dry intact, some bruises on arms.  Has scar on forehead from dermatology procedure.       Lab Results  Recent Results (from the past 24 hour(s))   Comprehensive Metabolic Panel   Result Value Ref Range    Sodium 142 136 - 145 mmol/L    Potassium 4.4 3.5 - 5.0 mmol/L    Chloride 107 98 - 107 mmol/L    CO2 26 22 - 31 mmol/L    Anion Gap, Calculation 9 5 - 18 mmol/L    Glucose 135 (H) 70 - 125 mg/dL    BUN 19 8 - 28 mg/dL    Creatinine 0.98 0.70 - 1.30 mg/dL    GFR MDRD Af Amer >60 >60 mL/min/1.73m2    GFR MDRD Non Af Amer >60 >60 mL/min/1.73m2    Bilirubin, Total 0.7 0.0 - 1.0 mg/dL    Calcium 9.5 8.5 - 10.5 mg/dL    Protein, Total 6.2 6.0 - 8.0 g/dL    Albumin 4.1 3.5 - 5.0 g/dL    Alkaline Phosphatase 93 45 - 120 U/L    AST 14 0 - 40 U/L    ALT <9 0 - 45 U/L   TSH   Result Value Ref Range    TSH 1.21 0.30 - 5.00 uIU/mL   HM1 (CBC with Diff)   Result Value Ref Range    WBC 6.3 4.0 - 11.0 thou/uL    RBC  3.69 (L) 4.40 - 6.20 mill/uL    Hemoglobin 13.2 (L) 14.0 - 18.0 g/dL    Hematocrit 39.9 (L) 40.0 - 54.0 %     (H) 80 - 100 fL    MCH 35.8 (H) 27.0 - 34.0 pg    MCHC 33.1 32.0 - 36.0 g/dL    RDW 14.1 11.0 - 14.5 %    Platelets 89 (L) 140 - 440 thou/uL    MPV 11.7 8.5 - 12.5 fL    Neutrophils % 48 (L) 50 - 70 %    Lymphocytes % 45 (H) 20 - 40 %    Monocytes % 6 2 - 10 %    Eosinophils % 1 0 - 6 %    Basophils % 0 0 - 2 %    Neutrophils Absolute 3.0 2.0 - 7.7 thou/uL    Lymphocytes Absolute 2.8 0.8 - 4.4 thou/uL    Monocytes Absolute 0.4 0.0 - 0.9 thou/uL    Eosinophils Absolute 0.0 0.0 - 0.4 thou/uL    Basophils Absolute 0.0 0.0 - 0.2 thou/uL       Imaging    No results found.      Signed by: Vy Huerta, CNP

## 2021-05-28 ENCOUNTER — RECORDS - HEALTHEAST (OUTPATIENT)
Dept: ADMINISTRATIVE | Facility: CLINIC | Age: 86
End: 2021-05-28

## 2021-05-28 NOTE — TELEPHONE ENCOUNTER
RN cannot approve Refill Request    RN can NOT refill this medication PCP messaged that patient is overdue for Office Visit. Last office visit: 9/21/2018 Riley Cuevas MD Last Physical: 5/2/2018 Last MTM visit: Visit date not found Last visit same specialty: 9/21/2018 Riley Cuevas MD.  Next visit within 3 mo: Visit date not found  Next physical within 3 mo: Visit date not found      Shanika Underwood, [facility] Triage/Med Refill 5/12/2019    Requested Prescriptions   Pending Prescriptions Disp Refills     clopidogrel (PLAVIX) 75 mg tablet [Pharmacy Med Name: CLOPIDOGREL BISULFATE 75MG TABS] 90 tablet 1     Sig: TAKE ONE TABLET BY MOUTH EVERY DAY       Clopidogrel/Prasugrel/Ticagrelor Refill Protocol Failed - 5/11/2019  4:01 PM        Failed - PCP or prescribing provider visit in past 6 months       Last office visit with prescriber/PCP: Visit date not found OR same dept: 9/21/2018 Riley Cuevas MD OR same specialty: 9/21/2018 Riley Cuevas MD Last physical: Visit date not found Last MTM visit: Visit date not found     Next appt within 3 mo: Visit date not found  Next physical within 3 mo: Visit date not found  Prescriber OR PCP: Riley Cuevas MD  Last diagnosis associated with med order: 1. Coronary artery disease  - clopidogrel (PLAVIX) 75 mg tablet [Pharmacy Med Name: CLOPIDOGREL BISULFATE 75MG TABS]; TAKE ONE TABLET BY MOUTH EVERY DAY  Dispense: 90 tablet; Refill: 1    If protocol passes may refill for 6 months if within 3 months of last provider visit (or a total of 9 months).              Passed - Hemoglobin in past 12 months     Hemoglobin   Date Value Ref Range Status   04/12/2019 13.2 (L) 14.0 - 18.0 g/dL Final

## 2021-05-29 ENCOUNTER — RECORDS - HEALTHEAST (OUTPATIENT)
Dept: ADMINISTRATIVE | Facility: CLINIC | Age: 86
End: 2021-05-29

## 2021-05-30 VITALS — BODY MASS INDEX: 22.79 KG/M2 | WEIGHT: 182.3 LBS

## 2021-05-30 VITALS — BODY MASS INDEX: 22.87 KG/M2 | WEIGHT: 183 LBS

## 2021-05-30 VITALS — BODY MASS INDEX: 22.66 KG/M2 | WEIGHT: 181.3 LBS

## 2021-05-30 VITALS — WEIGHT: 186.2 LBS | BODY MASS INDEX: 23.27 KG/M2

## 2021-05-30 VITALS — BODY MASS INDEX: 22.51 KG/M2 | WEIGHT: 180.12 LBS

## 2021-05-30 NOTE — PROGRESS NOTES
Assessment/ Plan  1. Cough  Most likely infectious  Some history of swallow dysfunction, aspiration  Recent chest x-ray negative    Plan empiric treatment with doxycycline and prednisone, consider change to Augmentin if not improving after a few days  If still not improving, further work-up with chest x-ray, BNP further evaluation    - predniSONE (DELTASONE) 10 mg tablet; Take 20 mg by mouth daily for 5 days.  Dispense: 10 tablet; Refill: 0  - doxycycline (MONODOX) 100 MG capsule; Take 1 capsule (100 mg total) by mouth 2 (two) times a day.  Dispense: 20 capsule; Refill: 0    Body mass index is 27.09 kg/m .    Subjective  CC:  Chief Complaint   Patient presents with     Hospital Visit Follow Up     HPI:  Cough  -----------------------------------------   3week(s)    Associated URI symptoms?  No  Productive?  Yes  SOB?  No  moderate  Context, Other associated symptoms:       Wakes up in the middle of sleep  Wearing down  Not eating well/ much    H/o problems swallowing  Went speech therapy      Chest pain, similar to what he had in the past with aspiration.  Different than his heart.  Was in the emergency room in [facility]  Did chest xrays  Large hiatal hernia, otherwise clear      Given ranitidine      Wt Readings from Last 3 Encounters:   06/26/19 211 lb (95.7 kg)   04/12/19 213 lb 8 oz (96.8 kg)   12/11/18 206 lb (93.4 kg)     Patient Active Problem List   Diagnosis     Restless Legs Syndrome     Corrosive Esophagitis     Hiatal Hernia     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)     Mixed hyperlipidemia     Cerebral atherosclerosis     Adenocarcinoma Of The Prostate Gland     Papillary Carcinoma Of The Thyroid Gland     Hypothyroidism     Type 2 Diabetes Mellitus     Coronary Artery Disease     Cervical Radiculopathy     Iron Deficiency     Advanced directives, counseling/discussion     Infection of ear lobe, right     Right ear pain     Benign essential HTN     Anemia     Other  autoimmune hemolytic anemias (H)     Thrombocytopenia (H)     Squamous cell carcinoma of skin     Hemorrhage     Paroxysmal atrial fibrillation (H)     Hypocalcemia     Acute pulmonary edema (H)     Abdominal pain, unspecified abdominal location     Fecal impaction (H)     Cholecystitis     Encounter for family conference with patient present     Adjustment disorder     Fatigue     Near syncope     Generalized weakness     Dehydration     Diarrhea     Pleural effusion     Deep vein thrombosis (DVT) of upper extremity (H)     Contraindication to anticoagulation therapy     Mild aortic stenosis     Healthcare maintenance     Current medications reviewed as follows:  Current Outpatient Medications on File Prior to Visit   Medication Sig     acetaminophen (TYLENOL) 500 MG tablet Take 1,000 mg by mouth every 6 (six) hours as needed for pain.     allopurinol (ZYLOPRIM) 300 MG tablet TAKE ONE TABLET BY MOUTH EVERY DAY WITH BREAKFAST     atorvastatin (LIPITOR) 20 MG tablet TAKE ONE TABLET BY MOUTH AT BEDTIME     clopidogrel (PLAVIX) 75 mg tablet TAKE ONE TABLET BY MOUTH EVERY DAY     escitalopram oxalate (LEXAPRO) 5 MG tablet Take 5 mg by mouth daily.      folic acid (FOLVITE) 1 MG tablet Take 1 tablet (1,000 mcg total) by mouth daily.     levothyroxine (SYNTHROID, LEVOTHROID) 150 MCG tablet Take 150 mcg by mouth Daily at 6:00 am.     levothyroxine (SYNTHROID, LEVOTHROID) 150 MCG tablet TAKE ONE TABLET BY MOUTH EVERY MORNING     miconazole (MICOTIN) 2 % cream Apply 1 application topically 2 (two) times a day as needed.     mirtazapine (REMERON) 45 MG tablet Take 1 tablet (45 mg total) by mouth at bedtime.     mupirocin (BACTROBAN) 2 % ointment APPLY TO AFFECTED AREA ONCE DAILY.     omeprazole (PRILOSEC) 40 MG capsule TAKE ONE CAPSULE BY MOUTH TWICE A DAY     ranitidine (ZANTAC) 150 MG tablet TAKE 1 TABLET(S) EVERY DAY BY ORAL ROUTE AT BEDTIME.     rOPINIRole (REQUIP) 0.25 MG tablet Take 1 tablet (0.25 mg total) by mouth at  bedtime.     triamcinolone (KENALOG) 0.1 % cream Apply 1 application topically 2 (two) times a day as needed.     escitalopram oxalate (LEXAPRO) 5 MG tablet TAKE 1 TABLET BY MOUTH EVERY DAY     No current facility-administered medications on file prior to visit.      Social History     Tobacco Use   Smoking Status Never Smoker   Smokeless Tobacco Never Used     Social History     Social History Narrative     Not on file     Patient Care Team:  Riley Cuevas MD as PCP - General (Family Medicine)  ROS  Full 10 system review including constitutional, respiratory, cardiac, gi, urinary, rheumatologic, neurologic, reproductive, dermatologic psychiatric is  performed (via questionnaire) and is negative except weight loss, fatigue, sleep difficulties, difficulty swallowing, cough, heartburn, change in appetite, weakness          Objective  Physical Exam  Vitals:    06/26/19 1109   BP: 110/69   Patient Site: Right Arm   Patient Position: Sitting   Cuff Size: Adult Large   Pulse: 70   Resp: 16   Weight: 211 lb (95.7 kg)     Patient is alert, oriented and in no distress.    Conjunctiva, lids appear normal.  Nares are normal bilaterally.    TMs are visualized bilaterally and appear normal    There is no adenopathy in the neck.  Oral cavity is without any notable lesion, oropharynx appears normal without any erythema, exudate, petechia    Chest appears normal, auscultation reveals normal breath sounds, no wheezing, rales or rhonchi.    Diagnostics      Please note: Voice recognition software was used in this dictation.  It may therefore contain typographical errors.

## 2021-05-31 ENCOUNTER — RECORDS - HEALTHEAST (OUTPATIENT)
Dept: ADMINISTRATIVE | Facility: CLINIC | Age: 86
End: 2021-05-31

## 2021-05-31 VITALS — WEIGHT: 179.5 LBS | BODY MASS INDEX: 22.44 KG/M2

## 2021-05-31 VITALS — BODY MASS INDEX: 22.36 KG/M2 | WEIGHT: 179.8 LBS | HEIGHT: 75 IN

## 2021-05-31 VITALS — BODY MASS INDEX: 22.47 KG/M2 | WEIGHT: 179.8 LBS

## 2021-05-31 VITALS — WEIGHT: 179 LBS | BODY MASS INDEX: 22.37 KG/M2

## 2021-05-31 VITALS — WEIGHT: 178 LBS | BODY MASS INDEX: 22.25 KG/M2

## 2021-05-31 VITALS — WEIGHT: 178.4 LBS | BODY MASS INDEX: 22.3 KG/M2

## 2021-05-31 VITALS — BODY MASS INDEX: 22.5 KG/M2 | WEIGHT: 181 LBS | HEIGHT: 75 IN

## 2021-05-31 VITALS — HEIGHT: 75 IN | WEIGHT: 177.5 LBS | BODY MASS INDEX: 22.07 KG/M2

## 2021-05-31 NOTE — TELEPHONE ENCOUNTER
RN cannot approve Refill Request    RN can NOT refill this medication med is not covered by policy/route to provider. Last office visit: 6/26/2019 Riley Cuevas MD Last Physical: 5/2/2018 Last MTM visit: Visit date not found Last visit same specialty: 6/26/2019 Riley Cuevas MD.  Next visit within 3 mo: Visit date not found  Next physical within 3 mo: Visit date not found      Bri Braxton, [facility] Triage/Med Refill 8/28/2019    Requested Prescriptions   Pending Prescriptions Disp Refills     folic acid (FOLVITE) 1 MG tablet [Pharmacy Med Name: FOLIC ACID 1MG TABS] 30 tablet 5     Sig: TAKE 1 TABLET BY MOUTH DAILY       There is no refill protocol information for this order

## 2021-05-31 NOTE — PROGRESS NOTES
[facility] Hematology and Oncology Progress Note    Patient: Nixon Velasquez Jr.  MRN: [id number]  Date of Service: July 31, 2019      Assessment and Plan:    1.  CLL: Numbers look good.  White count is stable with no evidence of progressive disease.  Lymphocyte count is normal.  Hemoglobin is good as are his platelets.  No transfusions needed.  No indication for treatment at this time.  We will see him back in 4 months.    2.  Prophylaxis: He received a Pneumovax in mid 2016.  He sees the dermatologist regularly.  He does have a history of skin cancers.    3.  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    ECOG Performance   ECOG Performance Status: 0    Distress Assessment  Distress Assessment Score: 1    Pain  Currently in Pain: No/denies    Diagnosis:    1. Chronic lymphocytic leukemia initially diagnosed in 2/2008. 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  Relapse in January 2018.  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  No p53 mutation noted.    2. Thyroid cancer, status post thyroidectomy and radioactive iodine ablation.   3.  Prostate cancer diagnosed in 1992.  4.  Skin cancers, multiple.  5.  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  PICC line associated DVT upper extremity in March 2018.  Treated with a short course of Lovenox.  7.  Cholecystitis: He had a cholecystectomy on May 10, 2018.    Treatment:    Started single agent Rituxan for his CLL on July 18, 2017.  This was initiated for worsening symptomatic anemia.  He received 4 weekly doses.  Completed on August 7, 2017.    Started on ibrutinib early March 2018 due to worsening anemia and thrombocytopenia..  He was hospitalized on March 9, about a week after starting ibrutinib, with cholecystitis and sepsis.  Ibrutinib was held at that time.  Reinitiated end of March but he preferred to hold it.    Prednisone taper for the hemolytic anemia in early 2018    Interim History:     Nixon is here today  for a follow-up visit.  No changes in his health since his last visit.  Overall he is doing okay.  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  No fevers, chills, night sweats.  No abdominal pain or shortness of breath or lightheadedness.  He has no acute complaints today.      Review of Systems:    Constitutional  Constitutional (WDL): All constitutional elements are within defined limits  Neurosensory  Neurosensory (WDL): All neurosensory elements are within defined limits  Cardiovascular  Cardiovascular (WDL): All cardiovascular elements are within defined limits  Pulmonary  Respiratory (WDL): Within Defined Limits  Gastrointestinal  Gastrointestinal (WDL): All gastrointestinal elements are within defined limits  Genitourinary  Genitourinary (WDL): All genitourinary elements are within defined limits  Integumentary  Integumentary (WDL): All integumentary elements are within defined limits  Patient Coping  Patient Coping: Accepting  Accompanied by  Accompanied by: Alone    Past History:    Past Medical History:   Diagnosis Date     Acute pulmonary edema (H)      Adenocarcinoma of prostate (H)      Adjustment disorder      Anemia      Autoimmune hemolytic anemia (H)      Cataract     bilateral     Cerebral atherosclerosis      Cervical radiculopathy      Cholecystitis      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)      Coronary artery disease      Corrosive esophagitis      Diabetes mellitus (H)      DVT (deep vein thrombosis) in pregnancy (H)      Fatigue      Fecal impaction (H)      Foot fracture, right      Generalized weakness      Hiatal hernia      History of transfusion      Hx of papillary thyroid carcinoma      Hypertension      Hypocalcemia      Hypothyroidism      Hypoxia      Infection of right ear lobe      Inguinal hernia      Iron deficiency      Leukemia (H)      Mixed hyperlipidemia      Neutropenia associated with infection (H)      Paroxysmal atrial fibrillation (H)       PE (pulmonary embolism)      Pleural effusion      Pneumonia      Prostate cancer (H)      Restless legs syndrome      Skin cancer      Squamous cell carcinoma     skin     Stroke (H)      Thrombocytopenia (H)      Thyroid cancer (H)      Physical Exam:    Recent Vitals 7/31/2019   Weight 215 lbs 2 oz   BSA (m2) 2.26 m2   /67   Pulse 75   Temp 97.5   Temp src 1   SpO2 98   Some recent data might be hidden     General: patient appears stated age of 89 y.o.. Nontoxic and in no distress.   HEENT: Head: atraumatic, normocephalic. Sclerae anicteric.  Chest:  Normal respiratory effort.    Cardiac:  No edema.    Abdomen: abdomen is non-distended.    Extremities: normal tone and muscle bulk.   Skin: Scattered ecchymoses.   CNS: alert and oriented. Grossly non-focal.   Psychiatric: normal mood and affect.     Lab Results:    Recent Results (from the past 168 hour(s))   Comprehensive Metabolic Panel   Result Value Ref Range    Sodium 140 136 - 145 mmol/L    Potassium 4.1 3.5 - 5.0 mmol/L    Chloride 105 98 - 107 mmol/L    CO2 27 22 - 31 mmol/L    Anion Gap, Calculation 8 5 - 18 mmol/L    Glucose 164 (H) 70 - 125 mg/dL    BUN 20 8 - 28 mg/dL    Creatinine 1.01 0.70 - 1.30 mg/dL    GFR MDRD Af Amer >60 >60 mL/min/1.73m2    GFR MDRD Non Af Amer >60 >60 mL/min/1.73m2    Bilirubin, Total 0.7 0.0 - 1.0 mg/dL    Calcium 9.1 8.5 - 10.5 mg/dL    Protein, Total 6.5 6.0 - 8.0 g/dL    Albumin 4.0 3.5 - 5.0 g/dL    Alkaline Phosphatase 89 45 - 120 U/L    AST 13 0 - 40 U/L    ALT <9 0 - 45 U/L   HM1 (CBC with Diff)   Result Value Ref Range    WBC 8.8 4.0 - 11.0 thou/uL    RBC 3.85 (L) 4.40 - 6.20 mill/uL    Hemoglobin 13.3 (L) 14.0 - 18.0 g/dL    Hematocrit 40.6 40.0 - 54.0 %     (H) 80 - 100 fL    MCH 34.5 (H) 27.0 - 34.0 pg    MCHC 32.8 32.0 - 36.0 g/dL    RDW 14.4 11.0 - 14.5 %    Platelets 101 (L) 140 - 440 thou/uL    MPV 11.7 8.5 - 12.5 fL    Neutrophils % 39 (L) 50 - 70 %    Lymphocytes % 52 (H) 20 - 40 %     Monocytes % 8 2 - 10 %    Eosinophils % 1 0 - 6 %    Basophils % 1 0 - 2 %    Neutrophils Absolute 3.4 2.0 - 7.7 thou/uL    Lymphocytes Absolute 4.6 (H) 0.8 - 4.4 thou/uL    Monocytes Absolute 0.7 0.0 - 0.9 thou/uL    Eosinophils Absolute 0.1 0.0 - 0.4 thou/uL    Basophils Absolute 0.0 0.0 - 0.2 thou/uL   Manual Differential   Result Value Ref Range    Reactive Lymphocytes 1+ (!) Negative    Platelet Estimate Decreased (!) Normal      Imaging:    No results found.      Signed by: Mushtaq Pennington MD

## 2021-06-01 ENCOUNTER — RECORDS - HEALTHEAST (OUTPATIENT)
Dept: ADMINISTRATIVE | Facility: CLINIC | Age: 86
End: 2021-06-01

## 2021-06-01 VITALS — WEIGHT: 176 LBS | HEIGHT: 75 IN | BODY MASS INDEX: 21.88 KG/M2

## 2021-06-01 VITALS — BODY MASS INDEX: 22.49 KG/M2 | WEIGHT: 175.25 LBS | HEIGHT: 74 IN

## 2021-06-01 VITALS — BODY MASS INDEX: 22.5 KG/M2 | HEIGHT: 75 IN | WEIGHT: 181 LBS

## 2021-06-01 VITALS — HEIGHT: 75 IN | BODY MASS INDEX: 23.87 KG/M2 | WEIGHT: 192 LBS

## 2021-06-01 VITALS — BODY MASS INDEX: 23.37 KG/M2 | WEIGHT: 187 LBS

## 2021-06-01 VITALS — WEIGHT: 206.8 LBS | BODY MASS INDEX: 25.71 KG/M2 | HEIGHT: 75 IN

## 2021-06-01 VITALS — WEIGHT: 191.2 LBS | BODY MASS INDEX: 23.9 KG/M2

## 2021-06-01 VITALS — HEIGHT: 74 IN | WEIGHT: 180.8 LBS | BODY MASS INDEX: 23.2 KG/M2

## 2021-06-01 VITALS — BODY MASS INDEX: 24.34 KG/M2 | WEIGHT: 189.6 LBS

## 2021-06-01 VITALS — BODY MASS INDEX: 24.26 KG/M2 | WEIGHT: 189 LBS | HEIGHT: 74 IN

## 2021-06-01 VITALS — BODY MASS INDEX: 24.86 KG/M2 | WEIGHT: 193.6 LBS

## 2021-06-01 VITALS — WEIGHT: 189.2 LBS | BODY MASS INDEX: 23.65 KG/M2

## 2021-06-01 VITALS — BODY MASS INDEX: 23.86 KG/M2 | WEIGHT: 190.9 LBS

## 2021-06-01 VITALS — WEIGHT: 193.2 LBS | BODY MASS INDEX: 24.15 KG/M2

## 2021-06-01 VITALS — WEIGHT: 190 LBS | BODY MASS INDEX: 23.62 KG/M2 | HEIGHT: 75 IN

## 2021-06-01 VITALS — WEIGHT: 183 LBS | BODY MASS INDEX: 22.87 KG/M2

## 2021-06-01 VITALS — BODY MASS INDEX: 23.93 KG/M2 | WEIGHT: 191.44 LBS

## 2021-06-01 VITALS — BODY MASS INDEX: 23.36 KG/M2 | WEIGHT: 182 LBS | HEIGHT: 74 IN

## 2021-06-01 NOTE — TELEPHONE ENCOUNTER
RN cannot approve Refill Request    RN can NOT refill this medication med is not covered by policy/route to provider     . Last office visit: 6/26/2019 Riley Ceuvas MD Last Physical: 5/2/2018 Last MTM visit: Visit date not found Last visit same specialty: 6/26/2019 Riley Cuevas MD.  Next visit within 3 mo: Visit date not found  Next physical within 3 mo: Visit date not found      Batool Mccall, [facility] Triage/Med Refill 9/16/2019    Requested Prescriptions   Pending Prescriptions Disp Refills     allopurinol (ZYLOPRIM) 300 MG tablet [Pharmacy Med Name: ALLOPURINOL 300MG TABS] 90 tablet 3     Sig: TAKE 1 TABLET BY MOUTH ONCE DAILY WITH BREAKFAST.       There is no refill protocol information for this order

## 2021-06-01 NOTE — TELEPHONE ENCOUNTER
RN cannot approve Refill Request    RN can NOT refill this medication historical medication requested. Last office visit: 6/26/2019 Riley Cuevas MD Last Physical: 5/2/2018 Last MTM visit: Visit date not found Last visit same specialty: 6/26/2019 Riley Cuevas MD.  Next visit within 3 mo: Visit date not found  Next physical within 3 mo: Visit date not found      Bri Braxton, [facility] Triage/Med Refill 9/16/2019    Requested Prescriptions   Pending Prescriptions Disp Refills     escitalopram oxalate (LEXAPRO) 5 MG tablet [Pharmacy Med Name: ESCITALOPRAM OXALATE 5MG TABS] 30 tablet 6     Sig: TAKE ONE TABLET BY MOUTH EVERY DAY       [facility] Refill Protocol  Passed - 9/16/2019  5:31 AM        Passed - PCP or prescribing provider visit in last year     Last office visit with prescriber/PCP: 6/26/2019 Riley Cuevas MD OR same dept: 6/26/2019 Riley Cuevas MD OR same specialty: 6/26/2019 Riley Cuevas MD  Last physical: 5/2/2018 Last MTM visit: Visit date not found   Next visit within 3 mo: Visit date not found  Next physical within 3 mo: Visit date not found  Prescriber OR PCP: Riley Cuevas MD  Last diagnosis associated with med order: There are no diagnoses linked to this encounter.  If protocol passes may refill for 12 months if within 3 months of last provider visit (or a total of 15 months).

## 2021-06-02 ENCOUNTER — RECORDS - HEALTHEAST (OUTPATIENT)
Dept: ADMINISTRATIVE | Facility: CLINIC | Age: 86
End: 2021-06-02

## 2021-06-02 VITALS — WEIGHT: 201 LBS | BODY MASS INDEX: 25.81 KG/M2

## 2021-06-02 VITALS — BODY MASS INDEX: 26.45 KG/M2 | WEIGHT: 206 LBS

## 2021-06-02 VITALS — BODY MASS INDEX: 26.03 KG/M2 | WEIGHT: 202.75 LBS

## 2021-06-03 ENCOUNTER — RECORDS - HEALTHEAST (OUTPATIENT)
Dept: ADMINISTRATIVE | Facility: CLINIC | Age: 86
End: 2021-06-03

## 2021-06-03 VITALS — WEIGHT: 215.1 LBS | BODY MASS INDEX: 27.62 KG/M2

## 2021-06-03 VITALS — BODY MASS INDEX: 27.09 KG/M2 | WEIGHT: 211 LBS

## 2021-06-03 VITALS — WEIGHT: 213.5 LBS | BODY MASS INDEX: 27.41 KG/M2

## 2021-06-03 NOTE — PROGRESS NOTES
[facility] Hematology and Oncology Progress Note    Patient: Nixon Velasquez Jr.  MRN: [id number]  Date of Service:  November 19, 2019      Assessment and Plan:    1.  CLL: His laboratories remained stable and appropriate.  Seems to still be in remission from his CLL as his white count and lymphocyte count are normal.  Hemoglobin is quite good considering his age and stable.  Mild thrombocytopenia.  We will continue to follow him every 4 months.  He has not had any treatment now in about 1 year and 8 months. No indication for treatment at this time.    2.  Prophylaxis: He received a Pneumovax in mid 2016.  He sees the dermatologist regularly.  He does have a history of skin cancers.    3.  Autoimmune hemolytic anemia: No evidence of hemolysis.    ECOG Performance   ECOG Performance Status: 1    Distress Assessment  Distress Assessment Score: No distress    Pain  Currently in Pain: No/denies    Diagnosis:    1.  Chronic lymphocytic leukemia initially diagnosed in 2/2008. 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  Relapse in January 2018.  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  No p53 mutation noted.    2.  Thyroid cancer, status post thyroidectomy and radioactive iodine ablation.   3.  Prostate cancer diagnosed in 1992.  4.  Skin cancers, multiple.  5.  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  PICC line associated DVT upper extremity in March 2018.  Treated with a short course of Lovenox.  7.  Cholecystitis: He had a cholecystectomy on May 10, 2018.    Treatment:    Started single agent Rituxan for his CLL on July 18, 2017.  This was initiated for worsening symptomatic anemia.  He received 4 weekly doses.  Completed on August 7, 2017.    Started on ibrutinib early March 2018 due to worsening anemia and thrombocytopenia.. He was hospitalized on March 9, about a week after starting ibrutinib, with cholecystitis and sepsis.  Ibrutinib was held at that time.  Reinitiated end of March but  he preferred to hold it.    Prednisone taper for the hemolytic anemia in early 2018    Interim History:     Nixon is here today for a follow-up visit.  He was last seen about 4 months ago.  In the interim he is been doing okay.  No acute complaints today.  Energy and appetite are okay.  No bleeding or bruising.  No fevers or night sweats.     Review of Systems:    Constitutional  Constitutional (WDL): All constitutional elements are within defined limits  Neurosensory  Neurosensory (WDL): Exceptions to WDL  Ataxia: Asymptomatic, clinical or diagnostic observations only, intervention not indicated(Slow gait. )  Cardiovascular  Cardiovascular (WDL): All cardiovascular elements are within defined limits  Pulmonary  Respiratory (WDL): Within Defined Limits  Gastrointestinal  Gastrointestinal (WDL): All gastrointestinal elements are within defined limits  Diarrhea: Increase of <4 stools per day over baseline, mild increase in ostomy output compared to baseline(Wears an incontinence brief.)  Genitourinary  Genitourinary (WDL): Exceptions to WDL( Some urine incontinence. )  Integumentary  Integumentary (WDL): Exceptions to WDL(Bruised, fragil skin: hands and arms. )  Patient Coping  Patient Coping: Accepting  Accompanied by  Accompanied by: Family Member    Past History:    Past Medical History:   Diagnosis Date     Acute pulmonary edema (H)      Adenocarcinoma of prostate (H)      Adjustment disorder      Anemia      Autoimmune hemolytic anemia (H)      Cataract     bilateral     Cerebral atherosclerosis      Cervical radiculopathy      Cholecystitis      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)      Coronary artery disease      Corrosive esophagitis      Diabetes mellitus (H)      DVT (deep vein thrombosis) in pregnancy      Fatigue      Fecal impaction (H)      Foot fracture, right      Generalized weakness      Hiatal hernia      History of transfusion      Hx of papillary thyroid carcinoma      Hypertension       Hypocalcemia      Hypothyroidism      Hypoxia      Infection of right ear lobe      Inguinal hernia      Iron deficiency      Leukemia (H)      Mixed hyperlipidemia      Neutropenia associated with infection (H)      Paroxysmal atrial fibrillation (H)      PE (pulmonary embolism)      Pleural effusion      Pneumonia      Prostate cancer (H)      Restless legs syndrome      Skin cancer      Squamous cell carcinoma     skin     Stroke (H)      Thrombocytopenia (H)      Thyroid cancer (H)      Physical Exam:    Recent Vitals 11/19/2019   Weight 211 lbs 11 oz   BSA (m2) 2.24 m2   /63   Pulse 66   Temp 97.4   Temp src 1   SpO2 96   Some recent data might be hidden     General: patient appears stated age of 89 y.o.. Nontoxic and in no distress.   HEENT: Head: atraumatic, normocephalic. Sclerae anicteric.  Chest:  Normal respiratory effort.    Cardiac:  No edema.    Abdomen: abdomen is soft, non-distended.    Extremities: normal tone and muscle bulk.   Skin: Scattered ecchymoses on the forearms bilaterally.  No bullae.  CNS: alert and oriented. Grossly non-focal.   Psychiatric: normal mood and affect.     Lab Results:    Recent Results (from the past 168 hour(s))   HM1 (CBC with Diff)   Result Value Ref Range    WBC 8.6 4.0 - 11.0 thou/uL    RBC 3.72 (L) 4.40 - 6.20 mill/uL    Hemoglobin 13.1 (L) 14.0 - 18.0 g/dL    Hematocrit 39.6 (L) 40.0 - 54.0 %     (H) 80 - 100 fL    MCH 35.2 (H) 27.0 - 34.0 pg    MCHC 33.1 32.0 - 36.0 g/dL    RDW 14.4 11.0 - 14.5 %    Platelets 99 (L) 140 - 440 thou/uL    MPV 11.7 8.5 - 12.5 fL    Neutrophils % 35 (L) 50 - 70 %    Lymphocytes % 56 (H) 20 - 40 %    Monocytes % 9 2 - 10 %    Eosinophils % 1 0 - 6 %    Basophils % 0 0 - 2 %    Neutrophils Absolute 3.0 2.0 - 7.7 thou/uL    Lymphocytes Absolute 4.8 (H) 0.8 - 4.4 thou/uL    Monocytes Absolute 0.8 0.0 - 0.9 thou/uL    Eosinophils Absolute 0.1 0.0 - 0.4 thou/uL    Basophils Absolute 0.0 0.0 - 0.2 thou/uL      Imaging:    No  results found.      Signed by: Mushtaq Pennington MD

## 2021-06-04 VITALS
BODY MASS INDEX: 24.22 KG/M2 | DIASTOLIC BLOOD PRESSURE: 58 MMHG | OXYGEN SATURATION: 97 % | WEIGHT: 199 LBS | TEMPERATURE: 97.7 F | RESPIRATION RATE: 16 BRPM | SYSTOLIC BLOOD PRESSURE: 90 MMHG

## 2021-06-04 VITALS
OXYGEN SATURATION: 94 % | SYSTOLIC BLOOD PRESSURE: 115 MMHG | BODY MASS INDEX: 24.71 KG/M2 | WEIGHT: 203 LBS | TEMPERATURE: 98 F | DIASTOLIC BLOOD PRESSURE: 79 MMHG | HEART RATE: 100 BPM

## 2021-06-04 VITALS
DIASTOLIC BLOOD PRESSURE: 74 MMHG | SYSTOLIC BLOOD PRESSURE: 111 MMHG | OXYGEN SATURATION: 97 % | WEIGHT: 188 LBS | TEMPERATURE: 97.8 F | HEART RATE: 81 BPM | BODY MASS INDEX: 22.88 KG/M2

## 2021-06-04 VITALS
WEIGHT: 204 LBS | SYSTOLIC BLOOD PRESSURE: 126 MMHG | TEMPERATURE: 97.5 F | DIASTOLIC BLOOD PRESSURE: 72 MMHG | HEART RATE: 90 BPM | OXYGEN SATURATION: 99 % | BODY MASS INDEX: 24.83 KG/M2

## 2021-06-04 VITALS
TEMPERATURE: 97.9 F | RESPIRATION RATE: 14 BRPM | HEART RATE: 73 BPM | WEIGHT: 210 LBS | BODY MASS INDEX: 26.96 KG/M2 | SYSTOLIC BLOOD PRESSURE: 143 MMHG | DIASTOLIC BLOOD PRESSURE: 85 MMHG

## 2021-06-04 VITALS
SYSTOLIC BLOOD PRESSURE: 124 MMHG | DIASTOLIC BLOOD PRESSURE: 83 MMHG | WEIGHT: 200 LBS | OXYGEN SATURATION: 96 % | HEIGHT: 76 IN | HEART RATE: 95 BPM | BODY MASS INDEX: 24.36 KG/M2 | TEMPERATURE: 98 F

## 2021-06-04 VITALS
DIASTOLIC BLOOD PRESSURE: 63 MMHG | TEMPERATURE: 97.4 F | HEART RATE: 66 BPM | BODY MASS INDEX: 27.18 KG/M2 | SYSTOLIC BLOOD PRESSURE: 137 MMHG | OXYGEN SATURATION: 96 % | WEIGHT: 211.7 LBS

## 2021-06-04 VITALS
BODY MASS INDEX: 24.89 KG/M2 | WEIGHT: 204.5 LBS | HEART RATE: 75 BPM | OXYGEN SATURATION: 94 % | SYSTOLIC BLOOD PRESSURE: 112 MMHG | TEMPERATURE: 97.4 F | DIASTOLIC BLOOD PRESSURE: 62 MMHG

## 2021-06-04 VITALS
TEMPERATURE: 97.2 F | HEART RATE: 84 BPM | WEIGHT: 204.8 LBS | RESPIRATION RATE: 24 BRPM | SYSTOLIC BLOOD PRESSURE: 120 MMHG | DIASTOLIC BLOOD PRESSURE: 70 MMHG | OXYGEN SATURATION: 90 % | BODY MASS INDEX: 24.93 KG/M2

## 2021-06-04 VITALS
SYSTOLIC BLOOD PRESSURE: 118 MMHG | DIASTOLIC BLOOD PRESSURE: 74 MMHG | HEIGHT: 76 IN | HEART RATE: 112 BPM | RESPIRATION RATE: 24 BRPM | BODY MASS INDEX: 23.86 KG/M2 | TEMPERATURE: 97.7 F | OXYGEN SATURATION: 97 %

## 2021-06-04 VITALS
BODY MASS INDEX: 26.96 KG/M2 | WEIGHT: 210 LBS | DIASTOLIC BLOOD PRESSURE: 73 MMHG | HEART RATE: 81 BPM | SYSTOLIC BLOOD PRESSURE: 139 MMHG | RESPIRATION RATE: 16 BRPM | TEMPERATURE: 97.4 F

## 2021-06-04 VITALS
SYSTOLIC BLOOD PRESSURE: 106 MMHG | DIASTOLIC BLOOD PRESSURE: 72 MMHG | RESPIRATION RATE: 18 BRPM | HEART RATE: 92 BPM | TEMPERATURE: 96.7 F | OXYGEN SATURATION: 96 %

## 2021-06-04 NOTE — TELEPHONE ENCOUNTER
Refill Approved    Rx renewed per Medication Renewal Policy. Medication was last renewed on 5/13/19.    Batool Mccall, [facility] Triage/Med Refill 12/17/2019     Requested Prescriptions   Pending Prescriptions Disp Refills     clopidogrel (PLAVIX) 75 mg tablet [Pharmacy Med Name: CLOPIDOGREL BISULFATE 75MG TABS] 90 tablet 1     Sig: TAKE ONE TABLET BY MOUTH EVERY DAY       Clopidogrel/Prasugrel/Ticagrelor Refill Protocol Passed - 12/16/2019  4:33 AM        Passed - PCP or prescribing provider visit in past 6 months       Last office visit with prescriber/PCP: Visit date not found OR same dept: 6/26/2019 Riley Cuevas MD OR same specialty: 6/26/2019 Riley Cuevas MD Last physical: Visit date not found Last MTM visit: Visit date not found     Next appt within 3 mo: Visit date not found  Next physical within 3 mo: Visit date not found  Prescriber OR PCP: Maral Cadet MD  Last diagnosis associated with med order: 1. Coronary artery disease  - clopidogrel (PLAVIX) 75 mg tablet [Pharmacy Med Name: CLOPIDOGREL BISULFATE 75MG TABS]; TAKE ONE TABLET BY MOUTH EVERY DAY  Dispense: 90 tablet; Refill: 1    If protocol passes may refill for 6 months if within 3 months of last provider visit (or a total of 9 months).              Passed - Hemoglobin in past 12 months     Hemoglobin   Date Value Ref Range Status   11/19/2019 13.1 (L) 14.0 - 18.0 g/dL Final

## 2021-06-05 NOTE — TELEPHONE ENCOUNTER
Refill Approved    Rx renewed per Medication Renewal Policy. Medication was last renewed on 1/18/19.    Batool Mccall, [facility] Triage/Med Refill 1/17/2020     Requested Prescriptions   Pending Prescriptions Disp Refills     levothyroxine (SYNTHROID, LEVOTHROID) 150 MCG tablet [Pharmacy Med Name: LEVOTHYROXINE SODIUM 150MCG TABS] 90 tablet 3     Sig: TAKE ONE TABLET BY MOUTH EVERY MORNING       Thyroid Hormones Protocol Passed - 1/15/2020  4:24 PM        Passed - Provider visit in past 12 months or next 3 months     Last office visit with prescriber/PCP: 6/26/2019 Riley Cuevas MD OR same dept: 6/26/2019 Riley Cuevas MD OR same specialty: 6/26/2019 Riley Cuevas MD  Last physical: 5/2/2018 Last MTM visit: Visit date not found   Next visit within 3 mo: Visit date not found  Next physical within 3 mo: Visit date not found  Prescriber OR PCP: Riley Cuevas MD  Last diagnosis associated with med order: 1. Hypothyroidism  - levothyroxine (SYNTHROID, LEVOTHROID) 150 MCG tablet [Pharmacy Med Name: LEVOTHYROXINE SODIUM 150MCG TABS]; TAKE ONE TABLET BY MOUTH EVERY MORNING  Dispense: 90 tablet; Refill: 3    If protocol passes may refill for 12 months if within 3 months of last provider visit (or a total of 15 months).             Passed - TSH on file in past 12 months for patient age 12 & older     TSH   Date Value Ref Range Status   04/12/2019 1.21 0.30 - 5.00 uIU/mL Final

## 2021-06-05 NOTE — PROGRESS NOTES
"ASSESSMENT/PLAN:  1. Cough  Influenza A/B Rapid Test    predniSONE (DELTASONE) 20 MG tablet   2. Exposure to influenza  Influenza A/B Rapid Test    predniSONE (DELTASONE) 20 MG tablet       This is a 89 yo male , accompanied by a family member.  Here for cough - he has just returned in last 1-2 days, from [place]/[facility].  He was travelling with family members, many of whom have become ill at end of trip, including some who have tested positive for Influenza.  Patient's influenza testing is negative.  Will treat with Prednisone for viral bronchitis - discussed warning signs/symptoms for which he should seek more immediate re-evaluation.   Return in about 1 week (around 2/3/2020) for if not getting better.      There are no discontinued medications.  There are no Patient Instructions on file for this visit.    Chief Complaint:  Chief Complaint   Patient presents with     Cough       HPI:   Nixon Velasquez Jr. is a 89 y.o. male c/o  Went to [place] - came home just 2 days ago  Group of 10 went - 5 are sick - 2 tested positive for \"viral infection\" - treated with prednisone  1 tested positive for influenza    Patient has lots of coughing  Lives with son who is ill but not influenza positive    No fever -   Had Tylenol at midnight, Advil at 6 am  This morning - hands were \"hot\" - but now not  Wore out from the coughing -   Not able to sleep   Has been eating okay -   Soft stools          PMH:   Patient Active Problem List    Diagnosis Date Noted     Healthcare maintenance 09/21/2018     Contraindication to anticoagulation therapy 06/25/2018     Mild aortic stenosis 06/25/2018     Deep vein thrombosis (DVT) of upper extremity (H) 04/11/2018     Pleural effusion 04/09/2018     Fatigue 04/01/2018     Near syncope 04/01/2018     Generalized weakness 04/01/2018     Dehydration      Diarrhea      Adjustment disorder 03/30/2018     Encounter for family conference with patient present      Cholecystitis      Acute " pulmonary edema (H)      Abdominal pain, unspecified abdominal location      Fecal impaction (H)      Hemorrhage      Paroxysmal atrial fibrillation (H)      Hypocalcemia      Squamous cell carcinoma of skin 01/29/2018     Thrombocytopenia (H) 01/23/2018     Other autoimmune hemolytic anemias (H)      Anemia 01/19/2018     Benign essential HTN 12/01/2017     Right ear pain      Infection of ear lobe, right 11/30/2017     Advanced directives, counseling/discussion 06/06/2016     Restless Legs Syndrome      Corrosive Esophagitis      Hiatal Hernia      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)      Mixed hyperlipidemia      Cerebral atherosclerosis      Adenocarcinoma Of The Prostate Gland      Papillary Carcinoma Of The Thyroid Gland      Hypothyroidism      Type 2 Diabetes Mellitus      Coronary Artery Disease      Cervical Radiculopathy      Iron Deficiency      Past Medical History:   Diagnosis Date     Acute pulmonary edema (H)      Adenocarcinoma of prostate (H)      Adjustment disorder      Anemia      Autoimmune hemolytic anemia (H)      Cataract     bilateral     Cerebral atherosclerosis      Cervical radiculopathy      Cholecystitis      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)      Coronary artery disease      Corrosive esophagitis      Diabetes mellitus (H)      DVT (deep vein thrombosis) in pregnancy      Fatigue      Fecal impaction (H)      Foot fracture, right      Generalized weakness      Hiatal hernia      History of transfusion      Hx of papillary thyroid carcinoma      Hypertension      Hypocalcemia      Hypothyroidism      Hypoxia      Infection of right ear lobe      Inguinal hernia      Iron deficiency      Leukemia (H)      Mixed hyperlipidemia      Neutropenia associated with infection (H)      Paroxysmal atrial fibrillation (H)      PE (pulmonary embolism)      Pleural effusion      Pneumonia      Prostate cancer (H)      Restless legs syndrome      Skin cancer      Squamous cell  carcinoma     skin     Stroke (H)      Thrombocytopenia (H)      Thyroid cancer (H)      Past Surgical History:   Procedure Laterality Date     CATARACT EXTRACTION, BILATERAL       Cholecystostomy  03/2018    Place during hospitalization with E. coli sepsis, at time not surgical candidate     LAPAROSCOPIC CHOLECYSTECTOMY N/A 5/10/2018    Procedure: LAPAROSCOPIC CHOLECYSTECTOMY;  Surgeon: Ramy Mcguire MD;  Location: [facility];  Service:      NY REANOMAL CORON ART PA ORIGIN BY GRAFT      Description: Anomalous Coronary Artery Graft;  Proc Date: 01/01/1996;     [place] REMV PROSTATE,RETROPUB,RAD,LTD NODES      Description: Prostatectomy Retropubic Radical With Lymph Node Biopsy(S);  Recorded: 09/13/2007;     Social History     Socioeconomic History     Marital status:      Spouse name: Not on file     Number of children: Not on file     Years of education: Not on file     Highest education level: Not on file   Occupational History     Not on file   Social Needs     Financial resource strain: Not on file     Food insecurity:     Worry: Not on file     Inability: Not on file     Transportation needs:     Medical: Not on file     Non-medical: Not on file   Tobacco Use     Smoking status: Never Smoker     Smokeless tobacco: Never Used   Substance and Sexual Activity     Alcohol use: No     Drug use: No     Sexual activity: Never   Lifestyle     Physical activity:     Days per week: Not on file     Minutes per session: Not on file     Stress: Not on file   Relationships     Social connections:     Talks on phone: Not on file     Gets together: Not on file     Attends Restorationist service: Not on file     Active member of club or organization: Not on file     Attends meetings of clubs or organizations: Not on file     Relationship status: Not on file     Intimate partner violence:     Fear of current or ex partner: Not on file     Emotionally abused: Not on file     Physically abused: Not on file     Forced  sexual activity: Not on file   Other Topics Concern     Not on file   Social History Narrative     Not on file     Family History   Problem Relation Age of Onset     Prostate cancer Father      Cancer Father      Alzheimer's disease Father      Parkinsonism Mother      Diabetes Brother      Alzheimer's disease Sister      No Medical Problems Daughter      No Medical Problems Daughter      Parkinsonism Son      No Medical Problems Son      No Medical Problems Son      No Medical Problems Son      Parkinsonism Son      No Medical Problems Son      No Medical Problems Son      No Medical Problems Son        Meds:    Current Outpatient Medications:      allopurinol (ZYLOPRIM) 300 MG tablet, TAKE 1 TABLET BY MOUTH ONCE DAILY WITH BREAKFAST., Disp: 90 tablet, Rfl: 3     atorvastatin (LIPITOR) 20 MG tablet, Take 1 tablet (20 mg total) by mouth at bedtime., Disp: 90 tablet, Rfl: 3     clopidogrel (PLAVIX) 75 mg tablet, TAKE ONE TABLET BY MOUTH EVERY DAY, Disp: 90 tablet, Rfl: 1     escitalopram oxalate (LEXAPRO) 5 MG tablet, Take 5 mg by mouth daily. , Disp: , Rfl:      folic acid (FOLVITE) 1 MG tablet, TAKE 1 TABLET BY MOUTH DAILY, Disp: 30 tablet, Rfl: 5     levothyroxine (SYNTHROID, LEVOTHROID) 150 MCG tablet, TAKE ONE TABLET BY MOUTH EVERY MORNING, Disp: 90 tablet, Rfl: 0     omeprazole (PRILOSEC) 40 MG capsule, TAKE ONE CAPSULE BY MOUTH TWICE A DAY, Disp: 60 capsule, Rfl: 5     acetaminophen (TYLENOL) 500 MG tablet, Take 1,000 mg by mouth every 6 (six) hours as needed for pain., Disp: , Rfl:      albuterol (VENTOLIN HFA) 90 mcg/actuation inhaler, Inhale 2 puffs every 4 (four) hours as needed for wheezing. Please dispense with a spacer, Disp: 1 Inhaler, Rfl: 3     miconazole (MICOTIN) 2 % cream, Apply 1 application topically 2 (two) times a day as needed., Disp: 28.35 g, Rfl: 0     mirtazapine (REMERON) 45 MG tablet, Take 1 tablet (45 mg total) by mouth at bedtime. (Patient taking differently: Take 45 mg by mouth at  bedtime as needed.    ), Disp: 30 tablet, Rfl: 5     mupirocin (BACTROBAN) 2 % ointment, APPLY TO AFFECTED AREA ONCE DAILY., Disp: , Rfl: 3     rOPINIRole (REQUIP) 0.25 MG tablet, Take 1 tablet (0.25 mg total) by mouth at bedtime. (Patient taking differently: Take 0.25 mg by mouth at bedtime as needed.    ), Disp: 30 tablet, Rfl: 6     triamcinolone (KENALOG) 0.1 % cream, Apply 1 application topically 2 (two) times a day as needed., Disp: , Rfl:     Allergies:  Allergies   Allergen Reactions     Dairy Aid [Lactase]      Dairy products- severe shaking in legs     Other Allergy (See Comments) Unknown, Myalgia and Other (See Comments)     Restless legs     Benzonatate      Blood-Group Specific Substance      Warm autoantibodies present. Expect up to 24 hour delay in blood for transfusion. Draw 2 lavender and 1 red tube for type and screen orders   Notify Blood bank as soon as possible if transfusions are needed.     Ciprofloxacin      Ephedrine Sulfate      Erythromycin Base      Hyoscyamine Sulfate      Lactose      Methocarbamol      Terazosin      Warfarin        ROS:  Pertinent positives as noted in HPI; otherwise 12 point ROS negative.      Physical Exam:  EXAM:  /73 (Patient Site: Left Arm, Patient Position: Sitting, Cuff Size: Adult Large)   Pulse 81   Temp 97.4  F (36.3  C)   Resp 16   Wt 210 lb (95.3 kg)   BMI 26.96 kg/m     Gen:  NAD, appears chronically ill, well-hydrated  HEENT:  TMs nl, oropharynx benign, nasal mucosa nl, conjunctiva clear  Neck:  Supple, no adenopathy, no thyromegaly, no carotid bruits, no JVD  Lungs:  occ coarse wheeze; occ cough  Cor:  RRR no murmur  Abd:  Soft, nontender, BS+, no masses, no guarding or rebound, no HSM  Extr:  Neg.  Neuro:  No asymmetry  Skin:  Warm/dry        Results:  Results for orders placed or performed in visit on 01/27/20   Influenza A/B Rapid Test   Result Value Ref Range    Influenza  A, Rapid Antigen No Influenza A antigen detected No Influenza A  antigen detected    Influenza B, Rapid Antigen No Influenza B antigen detected No Influenza B antigen detected

## 2021-06-05 NOTE — PROGRESS NOTES
Assessment/ Plan  1. Viral URI with cough  Chest x-ray shows opacity at left base, possible small pleural effusion, probable scarring.  Radiology read this no acute infiltrate though it would be hard to assess left base for pneumonia    White blood cell count is slightly elevated but patient has CLL.    No constitutional symptoms    I think this is sequelae of a viral illness  Discussed the futility of trying to treat cough.  Dexamethasone has the most data, he is allergic to Tessalon  Since he is having some wheezing subjectively, will prescribe albuterol, his daughter is a nurse and understands how to use this.    Follow-up if not improving/shortness of breath    2. Cough  See above, reason for chest x-ray which was personally reviewed today  - HM2(CBC w/o Differential)  - XR Chest 2 Views; Future  - albuterol (VENTOLIN HFA) 90 mcg/actuation inhaler; Inhale 2 puffs every 4 (four) hours as needed for wheezing. Please dispense with a spacer  Dispense: 1 Inhaler; Refill: 3    3. Scarring of lung  As above    Body mass index is 26.96 kg/m .    Subjective  CC:  Chief Complaint   Patient presents with     Cough     HPI:  89-year-old male with history of CLL for which he follows regularly with Dr. Pennington, coronary artery disease, status post CABG, cerebral atherosclerosis, prostate cancer, type 2 diabetes, hypertension, resents after returning from [place] this weekend with a cough.      Cough  -----------------------------------------   4day(s)    Associated URI symptoms?  Yes: cough  Mild runny nose, st  Productive?  No  SOB?  Yes: with walking  severe at night    Context, Other associated symptoms:   No pep, shortness of breath, no fever, no pain  aches    Seen 1/27/2024 such cough and treated with:  Prednisone 40 mg x 5  Not much of a dent  miserable at nighttime, some wheezing    No fevers or chills  Patient Active Problem List   Diagnosis     Restless Legs Syndrome     Corrosive Esophagitis     Hiatal Hernia      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)     Mixed hyperlipidemia     Cerebral atherosclerosis     Adenocarcinoma Of The Prostate Gland     Papillary Carcinoma Of The Thyroid Gland     Hypothyroidism     Type 2 Diabetes Mellitus     Coronary Artery Disease     Cervical Radiculopathy     Iron Deficiency     Advanced directives, counseling/discussion     Infection of ear lobe, right     Right ear pain     Benign essential HTN     Anemia     Other autoimmune hemolytic anemias (H)     Thrombocytopenia (H)     Squamous cell carcinoma of skin     Hemorrhage     Paroxysmal atrial fibrillation (H)     Hypocalcemia     Acute pulmonary edema (H)     Abdominal pain, unspecified abdominal location     Fecal impaction (H)     Cholecystitis     Encounter for family conference with patient present     Adjustment disorder     Fatigue     Near syncope     Generalized weakness     Dehydration     Diarrhea     Pleural effusion     Deep vein thrombosis (DVT) of upper extremity (H)     Contraindication to anticoagulation therapy     Mild aortic stenosis     Healthcare maintenance     Current medications reviewed as follows:  Current Outpatient Medications on File Prior to Visit   Medication Sig     acetaminophen (TYLENOL) 500 MG tablet Take 1,000 mg by mouth every 6 (six) hours as needed for pain.     allopurinol (ZYLOPRIM) 300 MG tablet TAKE 1 TABLET BY MOUTH ONCE DAILY WITH BREAKFAST.     atorvastatin (LIPITOR) 20 MG tablet Take 1 tablet (20 mg total) by mouth at bedtime.     clopidogrel (PLAVIX) 75 mg tablet TAKE ONE TABLET BY MOUTH EVERY DAY     escitalopram oxalate (LEXAPRO) 5 MG tablet Take 5 mg by mouth daily.      folic acid (FOLVITE) 1 MG tablet TAKE 1 TABLET BY MOUTH DAILY     levothyroxine (SYNTHROID, LEVOTHROID) 150 MCG tablet TAKE ONE TABLET BY MOUTH EVERY MORNING     omeprazole (PRILOSEC) 40 MG capsule TAKE ONE CAPSULE BY MOUTH TWICE A DAY     predniSONE (DELTASONE) 20 MG tablet Take 40 mg by mouth daily for 5  doses.     miconazole (MICOTIN) 2 % cream Apply 1 application topically 2 (two) times a day as needed.     mirtazapine (REMERON) 45 MG tablet Take 1 tablet (45 mg total) by mouth at bedtime. (Patient taking differently: Take 45 mg by mouth at bedtime as needed.       )     mupirocin (BACTROBAN) 2 % ointment APPLY TO AFFECTED AREA ONCE DAILY.     rOPINIRole (REQUIP) 0.25 MG tablet Take 1 tablet (0.25 mg total) by mouth at bedtime. (Patient taking differently: Take 0.25 mg by mouth at bedtime as needed.       )     triamcinolone (KENALOG) 0.1 % cream Apply 1 application topically 2 (two) times a day as needed.     No current facility-administered medications on file prior to visit.      Social History     Tobacco Use   Smoking Status Never Smoker   Smokeless Tobacco Never Used     Social History     Social History Narrative     Not on file     Patient Care Team:  Riley Cuevas MD as PCP - General (Family Medicine)  Riley Cuevas MD as Assigned PCP  ROS  As above      Objective  Physical Exam  Vitals:    01/30/20 1442 01/30/20 1448   BP: 149/83 143/85   Patient Site: Left Arm Left Arm   Patient Position: Sitting Sitting   Cuff Size: Adult Regular Adult Regular   Pulse: 73    Resp: 14    Temp: 97.9  F (36.6  C)    TempSrc: Oral    Weight: 210 lb (95.3 kg)      Patient is alert, oriented and in no distress.    Conjunctiva, lids appear normal.  Nares are normal bilaterally.    TMs are visualized bilaterally and appear normal    There is no adenopathy in the neck.  Oral cavity is without any notable lesion,   oropharynx appears normal without any erythema, exudate, petechia    Chest appears normal,   auscultation reveals :  normal breath sounds,   no wheezing,  no rales   no rhonchi.    Diagnostics  Personally reviewed view chest x-ray which shows scarlike opacity left base, may be small pleural effusion bilaterally.  Results for orders placed or performed in visit on 01/30/20   HM2(CBC w/o Differential)    Result Value Ref Range    WBC 11.6 (H) 4.0 - 11.0 thou/uL    RBC 3.64 (L) 4.40 - 6.20 mill/uL    Hemoglobin 12.6 (L) 14.0 - 18.0 g/dL    Hematocrit 37.6 (L) 40.0 - 54.0 %     (H) 80 - 100 fL    MCH 34.6 (H) 27.0 - 34.0 pg    MCHC 33.5 32.0 - 36.0 g/dL    RDW 12.4 11.0 - 14.5 %    Platelets 96 (L) 140 - 440 thou/uL    MPV 8.8 7.0 - 10.0 fL       Please note: Voice recognition software was used in this dictation.  It may therefore contain typographical errors.

## 2021-06-06 NOTE — TELEPHONE ENCOUNTER
RN cannot approve Refill Request    RN can NOT refill this medication med is not covered by policy/route to provider. Last office visit: 1/30/2020 Riley Cuevas MD Last Physical: 5/2/2018 Last MTM visit: Visit date not found Last visit same specialty: 1/30/2020 Riley Cuevas MD.  Next visit within 3 mo: Visit date not found  Next physical within 3 mo: Visit date not found      Maria T Heath, [facility] Triage/Med Refill 2/18/2020    Requested Prescriptions   Pending Prescriptions Disp Refills     folic acid (FOLVITE) 1 MG tablet [Pharmacy Med Name: FOLIC ACID 1MG TABS] 30 tablet 5     Sig: TAKE ONE TABLET BY MOUTH EVERY DAY       There is no refill protocol information for this order

## 2021-06-07 NOTE — TELEPHONE ENCOUNTER
Patient's daughter Namrata calls into the clinic today wondering about upcoming appointments.  Her brother and dad said that they were supposed to have an appointment this week but have not heard anything.  I let them know I would reach out to Dr. Pennington and get back to them.  Per Dr. Pennington patient should be coming in on Thursday or Friday of this week for a lab only appointment and then he should also be coming back in next week with the labs 1 day prior to a telephone visit with Dr. Pennington.  I called Namrata back with this information and she verbalized understanding.  She asked that our schedulers reach out to her brother Jean-Claude who lives with Nixon and will get him to the appointment.    Message sent to  to reach out to them.    Hien Gill RN

## 2021-06-07 NOTE — TELEPHONE ENCOUNTER
"Oral Chemotherapy Monitoring Program    Primary Oncologist: Dr. Pennington  Primary Oncology Clinic: [facility]  Cancer Diagnosis: CLL    Drug: Ibrutinib 280 mg (140 mg x 2) PO daily  Start Date: ASAP  Dose is appropriate for patients: hepatic function  Expected duration of therapy: until disease progression    Drug Interaction Assessment: Completed, no concerning DDI.     Subjective/Objective:  Nixon Velasquez Jr. is a 89 y.o. male called by phone for an initial visit for oral chemotherapy education.    Vitals:  BP:   BP Readings from Last 1 Encounters:   04/07/20 127/60     Wt Readings from Last 1 Encounters:   04/02/20 92.9 kg (204 lb 14.4 oz)     Estimated body surface area is 2.23 meters squared as calculated from the following:    Height as of 4/2/20: 6' 4\" (1.93 m).    Weight as of 4/2/20: 92.9 kg (204 lb 14.4 oz).      Labs:  Lab Results   Component Value Date    WBC 77.8 (HH) 04/16/2020    HGB 11.0 (L) 04/16/2020    HCT 35.6 (L) 04/16/2020     (H) 04/16/2020     04/16/2020     Results for orders placed or performed during the hospital encounter of 04/02/20   Comprehensive Metabolic Panel   Result Value Ref Range    Sodium 141 136 - 145 mmol/L    Potassium 4.4 3.5 - 5.0 mmol/L    Chloride 110 (H) 98 - 107 mmol/L    CO2 22 22 - 31 mmol/L    Anion Gap, Calculation 9 5 - 18 mmol/L    Glucose 146 (H) 70 - 125 mg/dL    BUN 19 8 - 28 mg/dL    Creatinine 0.92 0.70 - 1.30 mg/dL    GFR MDRD Af Amer >60 >60 mL/min/1.73m2    GFR MDRD Non Af Amer >60 >60 mL/min/1.73m2    Bilirubin, Total 2.1 (H) 0.0 - 1.0 mg/dL    Calcium 8.3 (L) 8.5 - 10.5 mg/dL    Protein, Total 5.7 (L) 6.0 - 8.0 g/dL    Albumin 3.7 3.5 - 5.0 g/dL    Alkaline Phosphatase 72 45 - 120 U/L    AST 14 0 - 40 U/L    ALT <9 0 - 45 U/L           Assessment:  Patient is appropriate to start therapy.    Plan:  Basic chemotherapy teaching was reviewed with Nixon's son, Jean-Claude (lives with Nixon), including indication, start date of therapy, dose, " administration, adverse effects, missed doses, food and drug interactions, monitoring, side effect management, office contact information, and safe handling. Written materials were provided and all questions answered.    [place]Claude [place] on things since they have not received the medication yet. He will also call the clinic to set up lab appointments to monitor counts.    Follow-Up:  Will plan to call patient in ~2 weeks to see when he received the ibrutinib and how things are going.     Porter Sommers, PharmD, [facility]  Oral Chemotherapy Pharmacist

## 2021-06-07 NOTE — PROGRESS NOTES
"Nixon Velasquez Jr. is a 89 y.o. male who is being evaluated via a billable telephone visit.      The patient has been notified of following:     \"This telephone visit will be conducted via a call between you and your physician/provider. We have found that certain health care needs can be provided without the need for a physical exam.  This service lets us provide the care you need with a short phone conversation.  If a prescription is necessary we can send it directly to your pharmacy.  If lab work is needed we can place an order for that and you can then stop by our lab to have the test done at a later time.    Telephone visits are billed at different rates depending on your insurance coverage. During this emergency period, for some insurers they may be billed the same as an in-person visit.  Please reach out to your insurance provider with any questions.    If during the course of the call the physician/provider feels a telephone visit is not appropriate, you will not be charged for this service.\"    Patient has given verbal consent to a Telephone visit? Yes    Patient would like to receive their AVS by     Additional provider notes    Phone call duration: 7 minutes    Ramonita Etienne RN    "

## 2021-06-07 NOTE — PROGRESS NOTES
"The patient has been notified of following:     \"This telephone visit will be conducted via a call between you and your physician/provider. We have found that certain health care needs can be provided without the need for a physical exam.  This service lets us provide the care you need with a short phone conversation.  If a prescription is necessary we can send it directly to your pharmacy.  If lab work is needed we can place an order for that and you can then stop by our lab to have the test done at a later time.    If during the course of the call the physician/provider feels a telephone visit is not appropriate, you will not be charged for this service.\"       Subjective     CC: weakness         History:  89-year-old male, whom I have never seen, and who has a history of diabetes, atrial fibrillation, carcinoma of the thyroid as well as CLL, he is presenting via his daughter on the phone.  This is because of clinic restrictions due to of coronavirus.  The patient has had increased weakness, decreased blood pressure, and when he is sitting up he has O2 sats in the 82% range.      History       Not marked as reviewed during this visit.            Review of Systems       Objective    Gen: Patient is alert, oriented            Assessment/Plan:  1. Generalized muscle weakness  I recommended to the daughter that the patient sounds as if he is decompensating rapidly.  I felt that the best thing to do would be to have him evaluated at [facility] and she will transport him there via POV presently.    2. SOB (shortness of breath)  As above      Phone call duration: 21 minutes    Jack Mathews MD    "

## 2021-06-07 NOTE — TELEPHONE ENCOUNTER
"Pts daughter Namrata is calling. He is there with her    C/O body aches  No appetite, drinking less than usual  Trouble sleeping  shortness of breath with exertion  Feels unsteady on his feet  Laying down, vitals are:/50 , temp 97.2F, Pulse 56 and regular resp 24, Oxygen sats at 94%  Sitting vital signs are:/62, HR 69 Resp 28 Oxygen sats are 82%  No sweating  No fever, cough, nasal congestion or runny nose  No wheezing, lungs sound clear and moving air well per his daughter and son.  Took Tylenol for body aches with no help  Sipping fluids  Urinating less than usual.  Last urination was 1 hour ago        Reason for Disposition    MILD difficulty breathing (e.g., minimal/no SOB at rest, SOB with walking, pulse < 100) of new onset or worse than normal    Answer Assessment - Initial Assessment Questions  1. RESPIRATORY STATUS: \"Describe your breathing?\" (e.g., wheezing, shortness of breath, unable to speak, severe coughing)       No cough. Lungs are clear.  2. ONSET: \"When did this breathing problem begin?\"       X 1 weel  3. PATTERN \"Does the difficult breathing come and go, or has it been constant since it started?\"       Doesn't come and go. Constant  4. SEVERITY: \"How bad is your breathing?\" (e.g., mild, moderate, severe)     - MILD: No SOB at rest, mild SOB with walking, speaks normally in sentences, can lay down, no retractions, pulse < 100.     - MODERATE: SOB at rest, SOB with minimal exertion and prefers to sit, cannot lie down flat, speaks in phrases, mild retractions, audible wheezing, pulse 100-120.     - SEVERE: Very SOB at rest, speaks in single words, struggling to breathe, sitting hunched forward, retractions, pulse > 120       He can speak in short sentences. Some confusion x 1-2 days  5. RECURRENT SYMPTOM: \"Have you had difficulty breathing before?\" If so, ask: \"When was the last time?\" and \"What happened that time?\"       2 years previous to this  6. CARDIAC HISTORY: \"Do you have any " "history of heart disease?\" (e.g., heart attack, angina, bypass surgery, angioplasty)       Stent placed 30 years ago with stroke  7. LUNG HISTORY: \"Do you have any history of lung disease?\"  (e.g., pulmonary embolus, asthma, emphysema)      No  8. CAUSE: \"What do you think is causing the breathing problem?\"       Illness  9. OTHER SYMPTOMS: \"Do you have any other symptoms? (e.g., dizziness, runny nose, cough, chest pain, fever)      Confusion, unsteady, shortness of breath with exertion  10. PREGNANCY: \"Is there any chance you are pregnant?\" \"When was your last menstrual period?\"        N/A  11. TRAVEL: \"Have you traveled out of the country in the last month?\" (e.g., travel history, exposures)        No    Protocols used: BREATHING DIFFICULTY-A-[place]    Pt's daughter, Namrata verbalized understanding and will await call back for TV with provider.   Per protocol, reviewed s/s to watch out for and to call back if needed  She verbalized understanding  TV with provider today.    Lilian Goodson RN [facility]        "

## 2021-06-07 NOTE — TELEPHONE ENCOUNTER
New Appointment Needed  What is the reason for the visit:    Inpatient/ED Follow Up Appt Request  At what hospital or facility were you seen?: [facility], Schedule is not up for me to schedule a tele visit or anything please advise.  What is the reason you were seen?: Anemia  What date were you admitted?: date: 04/2/20  What date were you discharged?: date: 4/7/20  What was the recommended timeframe for your follow up appointment?: 10-14 days  Provider Preference: PCP only  How soon do you need to be seen?: 10-14 days from 4-7-20  Waitlist offered?: No  Okay to leave a detailed message:  No

## 2021-06-07 NOTE — PROGRESS NOTES
During Start of Care visit with Pt for [facility] Home Care services, pt expressed he would like to hold off and decline Home care services until further notice due to Covid-19 and expressed he didn't feel he would be able to participate in therapies at this time due to his labs in particular his Hgb level below 8. Writer explained to pt, benefits of services to improve pt's endurance and that  a new referral will need to be requested b  y his PMD if he would like home care services in the future. Pt verbalized understanding and will not admit to Home care at this time d/t pt declined services.

## 2021-06-07 NOTE — PROGRESS NOTES
"Clinic Care Coordination Contact  Reason: \"[id number] - Ambulatory referral to Care Management (Primary Care)   Note     [facility] discharge referral list.  Patient was recently hospitalized at [facility] for generalized weakness and found to anemia.  History of autoimmune hemolytic anemia, CLL, mild aortic stenosis, A-Fib.\"        The Clinic Community Health Worker called the patient today regarding reason above. Call and spoke with the patient's daughter, Namrata. Introduced self. Confirmed permission to speak with Namrata. Explain reason of called. Have discuss possible Clinic Care Coordination enrollment. The service was described to the patient's daughter and immediate needs were discussed. Daughter is informed CCC team including the following staff and working closely with the pt's PCP and PharmD in the clinic: CCC SW, CCC CHW, CCC RN, and [facility] FRW/FRG. The patient's daughter needs time to discuss with the patient and family about service need for the patient. Daughter agreed with CHW to follow up with her/patient in 1 week and at [facility] direct phone number \"[phone]\" per Namrata. The patient was provided with contact information for the clinic CHW.     Note/comment:   Pt and other family member (brother) present in this called; no communication with other family member. Spend longer time due to wait time, listening to what Namrata have to shared about the pt hospitalization and that she's a \"nurse.\"  Reminds the daughter for pt's upcoming appt's 4/10 through 4/17 per request.              Plan:   CHW follow up with the patient/daughter in 1 week 4/16/2020.             "

## 2021-06-07 NOTE — TELEPHONE ENCOUNTER
Patient's daughter Namrata calls in asking what is happening with her dad's appointments with Dr Pennington.  I confirmed with Dr Pennington what he wants and called Namrata back.  Patient is coming in tomorrow morning at 10:15am for labs and then a nurse will call him on Friday 4/17 around 9:45am and then transfer to Dr Pennington after they are done with the assessment for the tele-visit.  Namrata verbalized understanding.    Hien Gill RN

## 2021-06-07 NOTE — PROGRESS NOTES
Clinic Care Coordination Contact    Attempt #2: The clinic Community Health Worker called the patient's daughter Namrata today following up notes below. CHW talked with the patient today at the request of the PCP to discuss possible Clinic Care Coordination enrollment. The service was described to the patient's daughter again and immediate needs were discussed. The patient declined enrollement at this time. The PCP is encouraged to refer in the future if the patient's needs change.    Note:   -Patient's daughter Namrata stated; Jean-Claude Velasquez is my brother and my dad live with him. Did talked with my dad, Jean-Claude, and my sister. No needs at this time. Will mention to my dad that I talked with you today too. Will reach out to Dr. Cuevas in the future if needs. Thank you for calling today.   -Message route to update PCP.     Plan:   No further outreach follow up at this time.

## 2021-06-07 NOTE — TELEPHONE ENCOUNTER
Hmm- this is a tough one  How about we make it a phone visit for now, itf it turns out he will need blood work, we can switch it to whatever provider is here.  I dont have a discharge summary note available to know- he may have arranged a follow-up with oncology as well in which case he probably can just do a phone visit.

## 2021-06-07 NOTE — TELEPHONE ENCOUNTER
Pt was seen in hospital for anemia and generalized muscle weakness. Would you like to see pt in clinic or telephone visit? Please advise?

## 2021-06-07 NOTE — TELEPHONE ENCOUNTER
Called and spoke with Nixon Velasquez Jr.'s son, Jean-Claude (onCTC), Message was given, Nixon Velasquez Jr.'s son understood, no further questions. Scheduled patient for follow telephone visit on 04/15/2020 @ 1:00pm

## 2021-06-07 NOTE — PROGRESS NOTES
"Nixon Velasquez Jr. is a 89 y.o. male who is being evaluated via a billable telephone visit.      The patient has been notified of following:     \"This telephone visit will be conducted via a call between you and your physician/provider. We have found that certain health care needs can be provided without the need for a physical exam.  This service lets us provide the care you need with a short phone conversation.  If a prescription is necessary we can send it directly to your pharmacy.  If lab work is needed we can place an order for that and you can then stop by our lab to have the test done at a later time.    Telephone visits are billed at different rates depending on your insurance coverage. During this emergency period, for some insurers they may be billed the same as an in-person visit.  Please reach out to your insurance provider with any questions.    If during the course of the call the physician/provider feels a telephone visit is not appropriate, you will not be charged for this service.\"    Patient has given verbal consent to a Telephone visit? Yes    Patient would like to receive their AVS by AVS Preference: Mail a copy.    Additional provider notes:  Will 4/2 through 4/7. Hemolytic anemia. Hemoglobin 6.5 on admission. Two units of packed red blood cells. Hemoglobin discharge 7.3 but subsequently measured at 9.8. Peripheral smear showed CLL plus spherocytes. Haptoglobin was Undetectable    Patient reports being still weak. Feeling a little better.  Patient reports being still weak. Feeling a little better.    Finally, discussion as to whether upper respiratory illness in January could have been coronavirus. They were flying back from [place] just before this. I discussed that this seems unlikely based on the time course of the pandemic. First [place] cases were probably early February.    Problem List Items Addressed This Visit        High    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)     Per  " Aditi, follow up visit Next week. Patient reports that he may be starting a new chemotherapeutic agents. Recent white blood cell count markedly elevated though patient on prednisone for hemolytic anemia            Unprioritized    Autoimmune hemolytic anemia (H)     Recurrence- Hospitalized. Transfused two units to 7.3, follow up hemoglobin recently 9.8 with additional CBC ordered by hematology for next week. Continue prednisone, per Dr. Pennington.           Other Visit Diagnoses     Hospital discharge follow-up    -  Primary    4/2 through 4/7 for severe weakness, auto immune hemolytic anemia due to CLL  last hgb better - 9.8 on pred per Aditi    Generalized muscle weakness        still feeling somewhat week          Phone call duration:  12 minutes    Pedro Burgos MA

## 2021-06-07 NOTE — PROGRESS NOTES
[facility] Hematology and Oncology Progress Note    Patient: Nixon Velasquez Jr.  MRN: [id number]  Date of Service: April 17, 2020         The patient has chosen to have the visit conducted as a telephone visit, to reduce risk of exposure given the current status of Coronavirus in our community. This telephone visit is being conducted via a call between the patient and physician/provider. Health care needs are being provided without a physical exam.     The patient has been notified of following:      We have found that certain health care needs can be provided without the need for a physical exam.  This service lets us provide the care you need with a short phone conversation.  If a prescription is necessary we can send it directly to your pharmacy.  If lab work is needed we can place an order for that and you can then stop by our lab to have the test done at a later time.  If during the course of the call the physician/provider feels a telephone visit is not appropriate, you will not be charged for this service    The patient consents to a telephone visit.     Assessment and Plan:    1.  CLL: We are going to restart ibrutinib as his lymphocyte count is increasing and he now has hemolytic anemia again.  We will start at a dose of 280 mg daily.  Titrate up as needed if he is not responding.  We will check labs again in a week.  We will have a phone consultation with labs in 2 weeks.    2.  Prophylaxis: He received a Pneumovax in mid 2016.  He sees the dermatologist regularly.  He does have a history of skin cancers.    3.  Autoimmune hemolytic anemia: Recently admitted to the hospital with a relapse.  Hemoglobin is improving.  Currently on a steroid taper.    Total time spent on the phone call was 10 minutes.    ECOG Performance   ECOG Performance Status: 2    Distress Assessment  Distress Assessment Score: Unable to rate    Pain  Currently in Pain: No/denies    Diagnosis:    1.  Chronic lymphocytic leukemia  initially diagnosed in 2/2008. 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  Relapse in January 2018.  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  No p53 mutation noted.    2.  Thyroid cancer, status post thyroidectomy and radioactive iodine ablation.   3.  Prostate cancer diagnosed in 1992.  4.  Skin cancers, multiple.  5.  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  PICC line associated DVT upper extremity in March 2018.  Treated with a short course of Lovenox.  7.  Cholecystitis: He had a cholecystectomy on May 10, 2018.    Treatment:    Started single agent Rituxan for his CLL on July 18, 2017.  This was initiated for worsening symptomatic anemia.  He received 4 weekly doses.  Completed on August 7, 2017.    Started on ibrutinib early March 2018 due to worsening anemia and thrombocytopenia.. He was hospitalized on March 9, about a week after starting ibrutinib, with cholecystitis and sepsis.  Ibrutinib was held at that time.  Reinitiated end of March but he preferred to hold it.    Prednisone taper for the hemolytic anemia in early 2018.  Again and April 2020 upon relapse.    Interim History:     Nixon is contacted for a phone visit.  He was recently in the hospital with hemolytic anemia.  Feeling better today.  Still with low energy.  He is taking his prednisone.  No fevers, chills, night sweats.  No acute complaints.    Review of Systems:    Constitutional  Constitutional (WDL): Exceptions to WDL  Fatigue: Fatigue relieved by rest  Neurosensory  Neurosensory (WDL): Exceptions to WDL  Ataxia: Moderate symptoms, limiting instrumental ADL  Cardiovascular  Cardiovascular (WDL): All cardiovascular elements are within defined limits  Pulmonary  Respiratory (WDL): Within Defined Limits  Dyspnea: Shortness of breath with minimal exertion, limiting instrumental ADL  Gastrointestinal  Gastrointestinal (WDL): All gastrointestinal elements are within defined limits  Genitourinary  Genitourinary (WDL):  All genitourinary elements are within defined limits  Integumentary  Integumentary (WDL): All integumentary elements are within defined limits  Patient Coping  Patient Coping: Accepting  Accompanied by       Past History:    Past Medical History:   Diagnosis Date     Acute pulmonary edema (H)      Adenocarcinoma of prostate (H)      Adjustment disorder      Anemia      Autoimmune hemolytic anemia (H)      Cataract     bilateral     Cerebral atherosclerosis      Cervical radiculopathy      Cholecystitis      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)      Coronary artery disease      Corrosive esophagitis      Diabetes mellitus (H)      DVT (deep vein thrombosis) in pregnancy      Fatigue      Fecal impaction (H)      Foot fracture, right      Generalized weakness      Hiatal hernia      History of transfusion      Hx of papillary thyroid carcinoma      Hypertension      Hypocalcemia      Hypothyroidism      Hypoxia      Infection of right ear lobe      Inguinal hernia      Iron deficiency      Leukemia (H)      Mixed hyperlipidemia      Neutropenia associated with infection (H)      Paroxysmal atrial fibrillation (H)      PE (pulmonary embolism)      Pleural effusion      Pneumonia      Prostate cancer (H)      Restless legs syndrome      Skin cancer      Squamous cell carcinoma     skin     Stroke (H)      Thrombocytopenia (H)      Thyroid cancer (H)      Physical Exam:    Recent Vitals 4/7/2020   Height -   Weight -   BSA (m2) -   /60   Pulse 54   Temp 98   Temp src 1   SpO2 94   Some recent data might be hidden       Lab Results:    Recent Results (from the past 168 hour(s))   HM1 (CBC with Diff)   Result Value Ref Range    WBC 66.5 (HH) 4.0 - 11.0 thou/uL    RBC 3.03 (L) 4.40 - 6.20 mill/uL    Hemoglobin 11.8 (L) 14.0 - 18.0 g/dL    Hematocrit 37.7 (L) 40.0 - 54.0 %     (H) 80 - 100 fL    MCH 38.9 (H) 27.0 - 34.0 pg    MCHC 31.3 (L) 32.0 - 36.0 g/dL    RDW 21.8 (H) 11.0 - 14.5 %    Platelets 147  140 - 440 thou/uL    MPV 11.6 8.5 - 12.5 fL   Manual Differential   Result Value Ref Range    Total Neutrophils % 12 (L) 50 - 70 %    Lymphocytes % 88 (H) 20 - 40 %    Monocytes % 0 (L) 2 - 10 %    Eosinophils %  0 0 - 6 %    Basophils % 0 0 - 2 %    Total Neutrophils Absolute 8.0 (H) 2.0 - 7.7 thou/ul    Lymphocytes Absolute 58.5 (H) 0.8 - 4.4 thou/uL    Monocytes Absolute 0.0 0.0 - 0.9 thou/uL    Eosinophils Absolute 0.0 0.0 - 0.4 thou/uL    Basophils Absolute 0.0 0.0 - 0.2 thou/uL    Platelet Estimate Normal Normal    Ovalocytes 1+ (!) Negative    Polychromasia 1+ (!) Negative      Imaging:    Xr Chest 1 View Portable    Result Date: 4/2/2020  EXAM: XR CHEST 1 VIEW PORTABLE LOCATION: [facility] DATE/TIME: 4/2/2020 6:40 PM INDICATION: Fatigue. COMPARISON: 1/30/2020 and 4/9/2018.     Previous sternotomy. Heart size magnified in AP projection with normal vascularity. Large retrocardiac hiatal hernia redemonstrated. Right lung is clear. Stable pleural-based opacity in the left lateral lung base could represent loculated pleural fluid versus round atelectasis.      Signed by: Mushtaq Pennington MD

## 2021-06-07 NOTE — TELEPHONE ENCOUNTER
Dr. Pennington reviewed Nixon's labs and wants him to decrease his prednisone to 20 mg daily.  Patient agreeable to plan.

## 2021-06-07 NOTE — PROGRESS NOTES
[facility] Hematology and Oncology Progress Note    Patient: Nixon Velasquez Jr.  MRN: [id number]  Date of Service: May 1, 2020        Assessment and Plan:    1.  CLL: He restarted ibrutinib on April 25.  280 mg daily.  Seems to be tolerating it okay.  We will continue at this dose for now.  Will check labs weekly.  Expect the white count to increase before gets better.  Reviewed some of the more common side effects.  He did receive a lot of literature on the drug with the prescription.  Questions answered.  Phone conversation in 2 weeks.  Titrate dose if he does not seem to be responding.    2.  Prophylaxis: He received a Pneumovax in mid 2016.  He sees the dermatologist regularly.  He does have a history of skin cancers.    3.  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  This was started on April 23.  Tomorrow we will have him start 10 mg daily for 5 days and then stop.      4.  Thrombocytopenia: Acute.  I assume this is from CLL in the marrow.  Could also be from ibrutinib.  Will follow.    5.  Insomnia: Probably from the prednisone.  Would expect to improve with cessation of the prednisone.  Will follow clinically.    ECOG Performance   ECOG Performance Status: 1    Distress Assessment  Distress Assessment Score: No distress    Pain  Currently in Pain: No/denies    Diagnosis:    1.  Chronic lymphocytic leukemia initially diagnosed in 2/2008. 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  Relapse in January 2018.  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  No p53 mutation noted.    2.  Thyroid cancer, status post thyroidectomy and radioactive iodine ablation.   3.  Prostate cancer diagnosed in 1992.  4.  Skin cancers, multiple.  5.  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  Relapse in April 2020.  6.  PICC line associated DVT upper extremity in March 2018.  Treated with a short course of Lovenox.  7.  Cholecystitis: He had a cholecystectomy on May 10,  2018.    Treatment:    Started single agent Rituxan for his CLL on July 18, 2017.  This was initiated for worsening symptomatic anemia.  He received 4 weekly doses.  Completed on August 7, 2017.    Started on ibrutinib early March 2018 due to worsening anemia and thrombocytopenia.. He was hospitalized on March 9, about a week after starting ibrutinib, with cholecystitis and sepsis.  Ibrutinib was held at that time.  Reinitiated end of March but he preferred to hold it.    Prednisone taper for the hemolytic anemia in early 2018.  Again and April 2020 upon relapse.    Interim History:     Nixon is seen in clinic today for a follow-up visit.  His son is on the phone.  Overall he is doing okay.  Still has not recovered completely from his hemolytic episode.  Having some more fatigue.  Napping during the day but is having difficulty sleeping at night from the prednisone.  His son notes that when he wakes up he is confused for a few minutes in terms of time of day and day of the week.  He denies bleeding or bruising.  No palpitations.  His stamina remains decreased.    Review of Systems:    Constitutional  Constitutional (WDL): All constitutional elements are within defined limits  Neurosensory  Neurosensory (WDL): Exceptions to WDL  Ataxia: Asymptomatic, clinical or diagnostic observations only, intervention not indicated(uses wheeled walker)  Cardiovascular  Cardiovascular (WDL): All cardiovascular elements are within defined limits  Pulmonary  Respiratory (WDL): Within Defined Limits  Gastrointestinal  Gastrointestinal (WDL): All gastrointestinal elements are within defined limits  Genitourinary  Genitourinary (WDL): All genitourinary elements are within defined limits  Integumentary  Integumentary (WDL): Exceptions to WDL(skin cancer)  Patient Coping  Patient Coping: Accepting  Accompanied by  Accompanied by: Alone    Past History:    Past Medical History:   Diagnosis Date     Acute pulmonary edema (H)       Adenocarcinoma of prostate (H)      Adjustment disorder      Anemia      Autoimmune hemolytic anemia (H)      Cataract     bilateral     Cerebral atherosclerosis      Cervical radiculopathy      Cholecystitis      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)      Coronary artery disease      Corrosive esophagitis      Diabetes mellitus (H)      DVT (deep vein thrombosis) in pregnancy      Fatigue      Fecal impaction (H)      Foot fracture, right      Generalized weakness      Hiatal hernia      History of transfusion      Hx of papillary thyroid carcinoma      Hypertension      Hypocalcemia      Hypothyroidism      Hypoxia      Infection of right ear lobe      Inguinal hernia      Iron deficiency      Leukemia (H)      Mixed hyperlipidemia      Neutropenia associated with infection (H)      Paroxysmal atrial fibrillation (H)      PE (pulmonary embolism)      Pleural effusion      Pneumonia      Prostate cancer (H)      Restless legs syndrome      Skin cancer      Squamous cell carcinoma     skin     Stroke (H)      Thrombocytopenia (H)      Thyroid cancer (H)      Physical Exam:    Recent Vitals 5/1/2020   Height -   Weight 204 lbs   BSA (m2) 2.23 m2   /72   Pulse 90   Temp 97.5   Temp src 1   SpO2 99   Some recent data might be hidden     General: patient appears stated age of 89 y.o.. Nontoxic and in no distress.   HEENT: Head: atraumatic, normocephalic. Sclerae anicteric.  Chest:  Normal respiratory effort  Cardiac:  No edema.   Abdomen: abdomen is non-distended  Extremities: normal tone and muscle bulk.  Skin: no lesions or rash. Warm and dry.   CNS: alert and oriented. Grossly non-focal.   Psychiatric: normal mood and affect.     Lab Results:    Recent Results (from the past 168 hour(s))   HM1 (CBC with Diff)   Result Value Ref Range    WBC 59.9 (HH) 4.0 - 11.0 thou/uL    RBC 3.14 (L) 4.40 - 6.20 mill/uL    Hemoglobin 11.9 (L) 14.0 - 18.0 g/dL    Hematocrit 38.5 (L) 40.0 - 54.0 %     (H) 80 -  100 fL    MCH 37.9 (H) 27.0 - 34.0 pg    MCHC 30.9 (L) 32.0 - 36.0 g/dL    RDW 19.1 (H) 11.0 - 14.5 %    Platelets 94 (L) 140 - 440 thou/uL    MPV 12.0 8.5 - 12.5 fL   Manual Differential   Result Value Ref Range    Total Neutrophils % 6 (L) 50 - 70 %    Lymphocytes % 94 (H) 20 - 40 %    Monocytes % 0 (L) 2 - 10 %    Eosinophils %  0 0 - 6 %    Basophils % 0 0 - 2 %    Total Neutrophils Absolute 3.6 2.0 - 7.7 thou/ul    Lymphocytes Absolute 56.3 (H) 0.8 - 4.4 thou/uL    Monocytes Absolute 0.0 0.0 - 0.9 thou/uL    Eosinophils Absolute 0.0 0.0 - 0.4 thou/uL    Basophils Absolute 0.0 0.0 - 0.2 thou/uL    Platelet Estimate Decreased (!) Normal    Ovalocytes 1+ (!) Negative    Polychromasia 1+ (!) Negative    Tear Drop Cells 1+ (!) Negative      Imaging:    Xr Chest 1 View Portable    Result Date: 4/2/2020  EXAM: XR CHEST 1 VIEW PORTABLE LOCATION: [facility] DATE/TIME: 4/2/2020 6:40 PM INDICATION: Fatigue. COMPARISON: 1/30/2020 and 4/9/2018.     Previous sternotomy. Heart size magnified in AP projection with normal vascularity. Large retrocardiac hiatal hernia redemonstrated. Right lung is clear. Stable pleural-based opacity in the left lateral lung base could represent loculated pleural fluid versus round atelectasis.      Signed by: Mushtaq Pennington MD

## 2021-06-08 NOTE — TELEPHONE ENCOUNTER
Daughter (Cha) is the caller.  Pt stated he feels like his mouth is fire.  Daughter states his mouth looks like thrush.  Pt hx being treated with Chemo which was stopped yesterday.  Daughter requesting Rx thrush.  On call paged @ 8:06 am.  On call re paged @ 8:27 am.  Dr. Dick returned page @ 8:29 am.  Rx Nystatin swish and swallow. Noted contraindication to Benzonatate, daughter states pt did not have a severe reaction and would want the Nystatin Rx.  Pt can also try Alovera juice which may relieve the fire fell in pt mouth.  Pt to f/u with PCP if no improvement.    Shanika Underwood RN, MA  [facility]    Triage Nurse Advisor

## 2021-06-08 NOTE — PROGRESS NOTES
[facility] Hematology and Oncology Progress Note    Patient: Nixon Velasquez Jr.  MRN: [id number]      Telephone visit for COVID precautionary measures.    Assessment and Plan:    1.  CLL:   He has now been on ibrutinib at 280 mg daily for about a month, but started holding it around 5/21 for fatigue.  It was controlling his lymphocyte count, stabilizing it since late April. WBC today stable.    His fatigue/decreased appetite is likely multifactorial, but in case the ibrutinib is contributing this was stopped a little over a week ago. Since doing so, he is not noted any improvement.    Will continue holding ibrutinib for next few weeks until we can see some further improvement in his overall functional status. Will have him return in 2 weeks with labs/provider visit.    2.  Prophylaxis:   He received a Pneumovax in mid 2016.  He sees the dermatologist regularly.  He does have a history of skin cancers.    3.  Autoimmune hemolytic anemia:   Completed Prednisone taper Friday, May 22.  His hemoglobin is stable at baseline.     4.  Thrombocytopenia:   Resolved now. Continue to follow.      5.   Oral candidiasis  Started Nystatin last week, resolved. Use as needed.    6.  Fatigue/poor appetite:   7.  Dehydration   He feels his overall energy and appetite decline correlate to him coming off his steroids. Cortisol on May 15 was 8.5 so I do not think he has significant adrenal insufficiency. Dr. 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  Ibrutinib certainly can cause fatigue as well, but since holding this last week he has not had correlative improvement.    Will start low-dose hydrocortisone 20 mg AM and 10 afternoon to see if this helps him feel better in general. Will come in for IVF twice this week. Encouraged nutritional supplements and increased oral hydration.     Schedule virtual visit with him next week to reassess  progress. Will bring him into clinic for reassessment in two weeks.    ADDENDUM: National shortage of hydrocortisone. Will substitute Prednisone 7.5 mg daily. Can reassess chronic mgmt/taper at next visit in a couple weeks      ECOG Performance   ECOG Performance Status: 2    Distress Assessment  Distress Assessment Score: 3    Pain  Currently in Pain: No/denies    Diagnosis:    1.  Chronic lymphocytic leukemia initially diagnosed in 2/2008. 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  Relapse in January 2018.  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  No p53 mutation noted.    2.  Thyroid cancer, status post thyroidectomy and radioactive iodine ablation.   3.  Prostate cancer diagnosed in 1992.  4.  Skin cancers, multiple.  5.  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  Relapse in April 2020.  6.  PICC line associated DVT upper extremity in March 2018.  Treated with a short course of Lovenox.  7.  Cholecystitis: He had a cholecystectomy on May 10, 2018.    Treatment:    CLL:  Started single agent Rituxan for his CLL on July 18, 2017.  This was initiated for worsening symptomatic anemia.  He received 4 weekly doses.  Completed on August 7, 2017.    Started on ibrutinib early March 2018 due to worsening anemia and thrombocytopenia.. He was hospitalized on March 9, about a week after starting ibrutinib, with cholecystitis and sepsis.  Ibrutinib was held at that time.  Reinitiated end of March but he preferred to hold it.    Restarted for relapsed disease on April 25, 2020.      AIHA:  Prednisone taper for the hemolytic anemia in early 2018.    Again and April 2020 upon relapse.    Interim History:     Telephone visit check in with Nixon to reassess fatigue and steroid taper. For the hemolytic anemia, he was on 20 mg of prednisone daily and was tapered down/off on 5/22.  He's had continued general failure to thrive the last few weeks - increasing fatigue and poor appetite. He and his family feel he  is likely dehydrated, having less urinary output the last few days. He had a headache last night, but nothing recurrent.  No chest pain.  No fevers. No dyspnea.    Nystatin was started last week for oral thrush, much better.    Review of Systems:  Constitutional  Constitutional (WDL): Exceptions to WDL  Fatigue: Concerns(very weak and fatigued)  Neurosensory  Neurosensory (WDL): Exceptions to WDL  Ataxia: Concerns(using walker at times)  Eye   Eye Disorder (WDL): All eye disorder elements are within defined limits  Ear  Ear Disorder (WDL): All ear disorder elements are within defined limits  Cardiovascular  Cardiovascular (WDL): All cardiovascular elements are within defined limits  Pulmonary  Respiratory (WDL): Exceptions to WDL  Dyspnea: Concerns(no activity)  Gastrointestinal  Gastrointestinal (WDL): Exceptions to WDL  Anorexia: Concerns(diminished)  Genitourinary  Genitourinary (WDL): Exceptions to WDL  Urinary Retention: Concerns  Lymphatic  Lymph (WDL): All lymph disorder elements are within defined limits  Musculoskeletal and Connective Tissue  Musculoskeletal and Connetive Tissue Disorders (WDL): Exceptions to WDL  Muscle Weakness : Concerns(arms and legs)  Integumentary  Integumentary (WDL): All integumentary elements are within defined limits  Patient Coping  Patient Coping: Accepting;Open/discussion  Accompanied by  Accompanied by: Family Member      Past History:    Past Medical History:   Diagnosis Date     Acute pulmonary edema (H)      Adenocarcinoma of prostate (H)      Adjustment disorder      Anemia      Autoimmune hemolytic anemia (H)      Cataract     bilateral     Cerebral atherosclerosis      Cervical radiculopathy      Cholecystitis      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)      Coronary artery disease      Corrosive esophagitis      Diabetes mellitus (H)      DVT (deep vein thrombosis) in pregnancy      Fatigue      Fecal impaction (H)      Foot fracture, right      Generalized  weakness      Hiatal hernia      History of transfusion      Hx of papillary thyroid carcinoma      Hypertension      Hypocalcemia      Hypothyroidism      Hypoxia      Infection of right ear lobe      Inguinal hernia      Iron deficiency      Leukemia (H)      Mixed hyperlipidemia      Neutropenia associated with infection (H)      Paroxysmal atrial fibrillation (H)      PE (pulmonary embolism)      Pleural effusion      Pneumonia      Prostate cancer (H)      Restless legs syndrome      Skin cancer      Squamous cell carcinoma     skin     Stroke (H)      Thrombocytopenia (H)      Thyroid cancer (H)      Physical Exam:    Recent Vitals 5/28/2020   Height -   Weight -   BSA (m2) -   /75   Pulse 85   Temp 98.3   Temp src -   SpO2 97   Some recent data might be hidden     General: Alert and oriented per phone. Son and daughter on call.    Lab Results:    Recent Results (from the past 168 hour(s))   HM2 (CBC W/O DIFF)   Result Value Ref Range    WBC 50.6 (HH) 4.0 - 11.0 thou/uL    RBC 3.35 (L) 4.40 - 6.20 mill/uL    Hemoglobin 11.8 (L) 14.0 - 18.0 g/dL    Hematocrit 37.7 (L) 40.0 - 54.0 %     (H) 80 - 100 fL    MCH 35.2 (H) 27.0 - 34.0 pg    MCHC 31.3 (L) 32.0 - 36.0 g/dL    RDW 16.3 (H) 11.0 - 14.5 %    Platelets 141 140 - 440 thou/uL    MPV 11.5 8.5 - 12.5 fL      Imaging:    No results found.      Signed by: Itzel Stokes, FLEX

## 2021-06-08 NOTE — TELEPHONE ENCOUNTER
RN cannot approve Refill Request    RN can NOT refill this medication med is not covered by policy/route to provider     . Last office visit: 1/30/2020 Riley Cuevas MD Last Physical: 5/2/2018 Last MTM visit: Visit date not found Last visit same specialty: Visit date not found.  Next visit within 3 mo: Visit date not found  Next physical within 3 mo: Visit date not found      Batool Mccall, [facility] Triage/Med Refill 5/8/2020    Requested Prescriptions   Pending Prescriptions Disp Refills     predniSONE (DELTASONE) 20 MG tablet [Pharmacy Med Name: PREDNISONE 20MG TABS] 90 tablet 0     Sig: TAKE 3 TABLETS BY MOUTH DAILY WITH BREAKFAST       There is no refill protocol information for this order

## 2021-06-08 NOTE — TELEPHONE ENCOUNTER
[facility] = Oncology clinic  Dr Villasenor .  - Goal - keep him comfortable and is DNR and DNI .    FNA triage call : Hx leukemia -last on oral Rx for 1 week .   Presenting problem : Daughter Namrata called with Pt . 5/2/20 developed a white patches with red around the in his mouth . Going to Physical therapy and Rx for nystatin swish started 5/2/20  And needs a refills and Persisting  . Currently : no fever or cough or shortness of breath . Very weak and   eating or drinking poorly   since 5/2/20 to mouth tender . Currently :  last wet diaper is now , and urine looked darker the last change , and activity is independent dressing and  Bathroom and chair in living room.   Guideline used : Fluid intake decreased P oH and then mouth ulcers  P OH .   Disposition and recommendations :  COVID 19 Nurse Triage Plan/Patient Instructions    Please be aware that novel coronavirus (COVID-19) may be circulating in the community. If you develop symptoms such as fever, cough, or SOB or if you have concerns about the presence of another infection including coronavirus (COVID-19), please contact your health care provider or visit [email or web address].     Disposition/Instructions     Patient to have scheduled Telephone Visit with a provider. Follow System Ambulatory Workflow for COVID 19.   Family / Pt declines phone or face to face visit, has appt on Mondaywith Dr Villasenor and that is enough for now .      The clinic staff will assist you to schedule an appointment to complete the Telephone Visit with a provider during normal clinic hours.       Call Back If: Your symptoms worsen before you are able to complete your Telephone Visit with a provider.        Thank you for limiting contact with others, wearing a simple mask to cover your cough, practice good hand hygiene habits and accessing our virtual services where possible to limit the spread of this virus.    For more information about COVID19 and options for caring for yourself at home,  please visit the CDC website at https://www.cdc.gov/coronavirus/2019-ncov/about/steps-when-sick.html  For more options for care at [facility], please visit our website at [email or web address]    For more information, please use the [place] Department of Health COVID-19 Website: [email or web address]  [place] Department of Health ([facility]) COVID-19 Hotlines (Interpreters available):      Health questions: Phone Number: [phone] or [phone] and Hours: 7 a.m. to 7 p.m.    Schools and  questions: Phone Number: [phone] or [phone] and Hours 7 a.m. to 7 p.m.                      Caller verbalizes understanding and denies further questions and will call back if further symptoms to triage or questions  . Tracey Carvalho RN  - [place] Nurse Advisor     Reason for Disposition    Child sounds very sick or weak to the triager    Protocols used: MOUTH ULCERS-P-[place]    nn

## 2021-06-08 NOTE — PROGRESS NOTES
[facility] Hematology and Oncology Progress Note    Patient: Nixon Velasquez Jr.  MRN: [id number]  Date of Service:       Assessment and Plan:    1. CLL:  Lymphocyte count is increased.  Hemoglobin and platelets are stable.  He remains asymptomatic.  We'll continue to observe him.  He'll return to clinic in 3 months.  Will start treatment with a platelet count of 80, or if he develops neutropenia.  Treatment would likely consist of single agent Rituxan. Possibly bendamustine and Rituxan or ibrutinib.     2. Skin cancers: Continues to follow with  Wilbur Vazquez every 3 months.    3. Prophylaxis: He received a Pneumovax in mid 2016.    ECOG Performance   ECOG Performance Status: 1    Distress Assessment  Distress Assessment Score: 5 (wife has Alzheimers)    Pain  Currently in Pain: No/denies    Diagnosis:    1. Chronic lymphocytic leukemia initially diagnosed in 2/2008. 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   2. Thyroid cancer, status post thyroidectomy and radioactive iodine ablation.   3. Prostate cancer diagnosed in 1992.  4. Skin cancers, multiple.    Treatment:    He has been observed for CLL with no treatment.   He is on Synthroid for his thyroidectomy.    Interim History:     Nixon is here today for a follow-up visit.  He was seen about 3 months ago.  In general he's feeling the same.  Energy and appetite are baseline.  He is able to get out and do things.  No nausea or vomiting.  No fevers, chills, or night sweats.  He is accompanied by his daughter today.    Review of Systems:    Constitutional  Constitutional (WDL): Exceptions to WDL  Weight Loss: to <10% from baseline, intervention not indicated (down 3lbs since 10/14/16)  Neurosensory  Neurosensory (WDL): All neurosensory elements are within defined limits  Cardiovascular  Cardiovascular (WDL): All cardiovascular elements are within defined limits  Pulmonary  Respiratory (WDL): Within Defined Limits  Gastrointestinal  Gastrointestinal (WDL):  All gastrointestinal elements are within defined limits  Genitourinary  Genitourinary (WDL): All genitourinary elements are within defined limits  Integumentary  Integumentary (WDL): All integumentary elements are within defined limits  Patient Coping  Patient Coping: Accepting  Accompanied by  Accompanied by: Family Member    Past History:    Past Medical History   Diagnosis Date     Cataract      bilateral     Foot fracture, right      History of transfusion      Hypertension      Inguinal hernia      Leukemia      PE (pulmonary embolism)      Pneumonia      Prostate cancer      Skin cancer      Thyroid cancer      Physical Exam:    Recent Vitals 1/13/2017   Weight 186 lbs 3 oz   /65   Pulse 65   Temp (No Data)   Temp src -   SpO2 98     General: patient appears stated age of 87 y.o.. Nontoxic and in no distress.   HEENT: Head: atraumatic, normocephalic. Sclerae anicteric.  Chest:  Normal respiratory effort.  Clear to auscultation bilaterally.  Cardiac:  No edema.  Regular rate and rhythm.  2/6 systolic ejection murmur.  Abdomen: abdomen is non-distended.  Soft.  No splenomegaly noted.    Extremities: normal tone and muscle bulk.   Skin: no lesions or rash. Warm and dry.   CNS: alert and oriented x3. Grossly non-focal.   Psychiatric: normal mood and affect.     Lab Results:    Recent Results (from the past 168 hour(s))   Comprehensive Metabolic Panel   Result Value Ref Range    Sodium 142 136 - 145 mmol/L    Potassium 4.3 3.5 - 5.0 mmol/L    Chloride 107 98 - 107 mmol/L    CO2 29 22 - 31 mmol/L    Anion Gap, Calculation 6 5 - 18 mmol/L    Glucose 108 70 - 125 mg/dL    BUN 28 8 - 28 mg/dL    Creatinine 1.18 0.70 - 1.30 mg/dL    GFR MDRD Af Amer >60 >60 mL/min/1.73m2    GFR MDRD Non Af Amer 58 (L) >60 mL/min/1.73m2    Bilirubin, Total 0.4 0.0 - 1.0 mg/dL    Calcium 9.7 8.5 - 10.5 mg/dL    Protein, Total 6.5 6.0 - 8.0 g/dL    Albumin 4.3 3.5 - 5.0 g/dL    Alkaline Phosphatase 64 45 - 120 U/L    AST 15 0 - 40  U/L    ALT 7 0 - 45 U/L   HM1 (CBC with Diff)   Result Value Ref Range    .6 (HH) 4.0 - 11.0 thou/uL    RBC 3.16 (L) 4.40 - 6.20 mill/uL    Hemoglobin 11.1 (L) 14.0 - 18.0 g/dL    Hematocrit 33.0 (L) 40.0 - 54.0 %     (H) 80 - 100 fL    MCH 34.9 (H) 27.0 - 34.0 pg    MCHC 33.5 32.0 - 36.0 g/dL    RDW 16.5 (H) 11.0 - 14.5 %    Platelets 99 (L) 140 - 440 thou/uL    MPV 8.4 7.0 - 10.0 fL     Imaging:    No results found.      Signed by: Mushtaq Pennington MD

## 2021-06-08 NOTE — TELEPHONE ENCOUNTER
DANDRE completed for patient, requesting the following orders after hospitalization with SOB, Afib with RVR:  RN: 2w2 for cardiac assessment, medication education and home safety  PT: 1w1 for strengthening and gait training  OT: 1w1 for safety with ADLs  HHA: 1w4 for safety with personal cares  MSW: 1M1 for f/u on resources given prior to hospitalization    PLEASE REVIEW THE FOLLOWING MEDICATION DISCREPENCIES:   Patient has order in med list for famotidine 20mg BID and omeprazole 40mg BID, med list from hospital states only the omeprazole 40mg BID. Family wondering if patient should be on both or just the omeprazole.    PLEASE REVIEW THE FOLLOWING MEDICATION INTERACTIONS AND ADDRESS THE SEVERE INTERACTIONS:  Drug-Drug: amiodarone and escitalopram oxalate: The concurrent use of amiodarone with other agents that prolong the QTc interval may result in potentially life-threatening cardiac arrhythmias, including torsades de pointes. Contraindicated Drug Combination   Drug-Drug: omeprazole and clopidogreL: Concurrent use of esomeprazole, omeprazole, or cimetidine may result in decreased clopidogrel effectiveness, resulting in increased risk of adverse cardiac events. Severe Interaction   Drug-Drug: ibrutinib and clopidogreL: Concurrent use of ibrutinib with either anticoagulants or antiplatelets may increase the risk of hemorrhage.Moderate Interaction  Drug-Drug: metoprolol tartrate and amiodarone: The concurrent administration of hepatically metabolized beta-blockers with amiodarone(1) or dronedarone may result in bradycardia and hypotension. Moderate Interaction   Drug-Drug: amiodarone, atorvastatin and atorvastatin: Concurrent use of amiodarone(2) with certain HMG CoA reductase inhibitors may increase the risk of rhabdomyolysis. Moderate Interaction   Drug-Drug: clopidogreL and escitalopram oxalate: Concurrent use of a selective serotonin reuptake inhibitor(1-5) or a serotonin-norepinephrine reuptake inhibitor(7-9) and  agents that affect coagulation may result in bleeding. Moderate Interaction   Drug-Drug: omeprazole and escitalopram oxalate: Concurrent use of an agent which significantly inhibits CGO1Y16, or which inhibits both IWJ6R86 and CY may result in elevated concentrations and toxicity from escitalopram, including risks for serotonin syndrome or prolongation of the QTc interval.(1,5) Prolongation of the QT interval may result in life-threatening arrhythmias, including torsades de pointes.(2) Symptoms of serotonin syndrome may include tremor, agitation, diaphoresis, hyperreflexia, clonus, tachycardia, hyperthermia, and muscle rigidity.(3) Inhibitors of both LKK7I88 and CY linked to this monograph are fluoxetine, fluvoxamine, and voriconazole.(4) Inhibitors of GGR8Q50 linked to this monograph are cannabidiol (CBD), esomeprazole, omeprazole, ticlopidine and triclabendazole. Moderate Interaction   Drug-Drug: metoprolol tartrate and escitalopram oxalate: Concurrent use of CYP2D6 inhibitors may result in elevated levels of and toxicity from metoprolol. Filtered   Drug-Drug: levothyroxine and calcium (as carbonate): The simultaneous administration of thyroid preparations with aluminum, calcium, iron, lanthanum, magnesium, simethicone, or sucralfate may result in decreased levels and clinical effects of thyroid preparations. Filtered

## 2021-06-08 NOTE — PROGRESS NOTES
"Nixon Velasquez Jr. is a 89 y.o. male who is being evaluated via a billable telephone visit.      The patient has been notified of following:     \"This telephone visit will be conducted via a call between you and your physician/provider. We have found that certain health care needs can be provided without the need for a physical exam.  This service lets us provide the care you need with a short phone conversation.  If a prescription is necessary we can send it directly to your pharmacy.  If lab work is needed we can place an order for that and you can then stop by our lab to have the test done at a later time.    Telephone visits are billed at different rates depending on your insurance coverage. During this emergency period, for some insurers they may be billed the same as an in-person visit.  Please reach out to your insurance provider with any questions.    If during the course of the call the physician/provider feels a telephone visit is not appropriate, you will not be charged for this service.\"    Patient has given verbal consent to a Telephone visit? Yes    What phone number would you like to be contacted at? [phone]    Patient would like to receive their AVS by AVS Preference: Ashleigh.    Phone call duration: 15 minutes    Saba Sarabia CMA    "

## 2021-06-08 NOTE — TELEPHONE ENCOUNTER
Called and spoke with pharmacy. Informed pharmacy of 's message below. Pharmacy will send Rx request to . Please reject this so we can close the encounter.

## 2021-06-08 NOTE — TELEPHONE ENCOUNTER
Dr. Pennington has reviewed patient's labs from today.  His labs are unremarkable and do not explain the weakness he is having.  He should continue his prednisone taper.  He has had 3 days of 5 mg and will continue this for a total of 1 week.  He is then to go down to 2.5 mg x 1 week and then off.  He is encouraging him to get up and moving with assistance if needed.  Patient does have an appointment in the clinic at [facility] on Monday 5/18.  No further questions at this time.    Hien Gill RN

## 2021-06-08 NOTE — TELEPHONE ENCOUNTER
Request for Orders    Who's Requesting: Home Care Occupational Therapist    Orders being requested: OT  2 visit(s) every 1 week(s) for 2 week(s)  for ADL retraining, home safety,activity tolerance.    Where to send Orders: [facility], response through Epic preferred. Please call if you have questions.    Thank you!  Hailee Unger, OTR/L  [phone]

## 2021-06-08 NOTE — TELEPHONE ENCOUNTER
MED REC    The following severe drug interaction was found at home care assessment:     Drug-Drug: omeprazole and clopidogreL   Concurrent use of esomeprazole, omeprazole, or cimetidine may result in decreased clopidogrel effectiveness, resulting in increased risk of adverse cardiac events.    HOMECARE ORDERS    Requesting verbal okay for the following orders:    PT 2w3 to address home safety, mobility, and HEP  MSW for discharge planning  HHA for 1w4 for showers    Please reply to this telephone encounter. Thanks.    Sincerely, Ammon SLOAN

## 2021-06-08 NOTE — TELEPHONE ENCOUNTER
Refill of this should go to Dr Pennington or associate for refill- they are manageing the hemolyitc anemia

## 2021-06-08 NOTE — PROGRESS NOTES
"Nixon Velasquez Jr. is a 89 y.o. 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     The patient has been notified of following:     \"This video visit will be conducted via a call between you and your physician/provider. We have found that certain health care needs can be provided without the need for an in-person physical exam.  This service lets us provide the care you need with a video conversation.  If a prescription is necessary we can send it directly to your pharmacy.  If lab work is needed we can place an order for that and you can then stop by our lab to have the test done at a later time.    Video visits are billed at different rates depending on your insurance coverage. Please reach out to your insurance provider with any questions.    If during the course of the call the physician/provider feels a video visit is not appropriate, you will not be charged for this service.\"    Patient has given verbal consent to a Video visit? Yes    Patient would like to receive their AVS by AVS Preference: Ashleigh.    Patient would like the video invitation sent by: Text to cell phone: [phone]    Will anyone else be joining your video visit? No              Azra Lloyd CMA  "

## 2021-06-08 NOTE — TELEPHONE ENCOUNTER
Patient's daughter Namrata calls back in after the video visit with Vy Huerta CNP and leaves to messages on nurse triage line.  She wanted to let us know that Nixon has decided that he does want to go to the ER.  They are bringing him in.    Hien Gill RN

## 2021-06-08 NOTE — TELEPHONE ENCOUNTER
Oral Chemotherapy Monitoring Program    Primary Oncologist: Dr. Pennington  Primary Oncology Clinic: [facility]  Cancer Diagnosis: CLL    Therapy History:  Ibrutinib 280 mg (140 mg x 2) PO daily  C1D1 - 4/25/2020    Drug Interaction Assessment: no DDI    Subjective/Objective:  Nixon Velasquez Jr. is a 89 y.o. male called by phone and spoke to his son, Jean-Claude, for a follow-up visit for oral chemotherapy. Denies missing any doses in the last 2 weeks.    Fatigue (grade 2)- Nixon has steadily been having less and less energy. The last few days he'd get up and go sleep in his chair all day. Today he has been in bed all day, except for coming into clinic for labs.    Denies any rash, myalgias, arthralgias, nausea, or any other AE.    Assessment/Plan:  Nixon had labs drawn today and his Hgb = 12.3 g/dL, actually an improvement. Fatigue is worse from when seen by Dr. Pennington 1 week ago.     Discussed with Dr. Pennington and plan to continue treatment for now at same dose of ibrutinib.    Follow-Up:  I'll plan to call Nixon on Monday 5/11 to check in on him and see if he's feeling better/more rested.    Porter Sommers, PharmD, BCOP  Oral Chemotherapy Pharmacist       n/a

## 2021-06-08 NOTE — PROGRESS NOTES
Hospital Follow-up Visit:    Assessment/Plan:     1. Hospital discharge follow-up  Overall feeling better since hospital discharge.  However still not back to normal.  No acute concerns today.  See remainder of plan as below.    2. Atrial fibrillation with RVR (H)  Has started amiodarone and metoprolol.  Is decreasing amlodipine dose today, as directed at hospital discharge.  He had low blood pressure of 98/54 yesterday.  Daughter will continue to monitor this.  She is a nurse.    I reviewed that since he is decreasing his blood pressure medicine as planned above, should help improve his blood pressure.  BP today grossly normal.  Has virtual visit with cardiology next week.  Continue all medications as directed.  If any of his symptoms worsen, he will contact cardiology.  Currently he does have episodes where he notices the A. fib, lasting approximately 10 minutes at a time.  He make sure to rest when this happens.  He is restarting Plavix.    3. SOB (shortness of breath)  4. Pleural effusion  Improving.  Effusion drained at hospital.  Continue to monitor.  COVID test at hospital was negative.  Vitals stable today.  Hospital recommended 1 month chest CT follow-up or PET scan to rule out neoplasm.  This has not been scheduled yet.  I will discuss with his PCP.    5. Autoimmune hemolytic anemia (H)  Had visit with hematology Dr. Pennington yesterday.  Planning on decreasing prednisone further down to 2.5 mg daily, and probably eventually stopping it completely.  He will not restart Imbruvica.  See HPI for discussion.       Subjective:     Nixon Velasquez Jr. is a 89 y.o. male who presents for a hospital discharge follow up.  89-year-old male with significant chronic medical history including autoimmune hemolytic anemia, upper extremity PVD, DVT, chronic left pleural effusion, paroxysmal A. fib, CLL, type 2 diabetes, thyroid disorder, CAD.  He presented to the ER because of increasing dyspnea and weakness.  He was found to  be in A. fib with RVR at the ER.  He was also noted to have a worsening left pleural effusion on CT scan.  Incidentally they noted a large hiatal hernia on scan, partially intrathoracic stomach.  They tapped his lung which improved his breathing symptoms significantly.    Cardiology consult at the hospital, started on amiodarone and metoprolol.  He has follow-up with the A. fib clinic next week.  COVID testing is negative at hospital.        A1c checked in the hospital was 6.9%.  Tests pending at the time of discharge were:   AFB culture which was negative, cytology which was negative for metastatic carcinoma, and Gram stain culture which was negative.  I reviewed all of these lab results today.  Home nurse orders signed and just started for patient.  There had been some discrepancy with omeprazole and famotidine on his med list.  Patient never actually ever picked up famotidine.  Will discontinue that and stick with omeprazole for now.  Nurse is planning to come out twice a week.  Daughter is here with him today.  She notes that he uses wheelchairs when transporting.  For the most part he is walking around on his own at home.  He lives with family member.  She notes that he tried Imbruvica through hematology sometime ago.  It caused A. fib so he stopped it.  They decided to retry it again recently.  Daughter notes that he had been on Imbruvica for several weeks before current A. fib episode started.  He has now stopped that, under direction from Dr. Pennington.    Overall, he is feeling better than when he when he went in to the emergency room.  He certainly does not feel back to normal yet.    He continues to have episodes of A. fib.  These are tolerable with rest.  No fevers since hospital discharge.  Appetite is improving.  No constipation, no trouble urinating.      Hospital/Nursing Home/IP Rehab Facility:   Date of Admission: 6/8/20  Date of Discharge:6/11/20  Reason(s) for Admission:dyspnea,  weakness, A fib            Do you have any problems taking your medication regularly?  None       Have you had any changes in your medication since discharge? None       Have you had any difficulty following your discharge or treatment plan?  No    Summary of hospitalization:  Hospital discharge summary reviewed  Diagnostic Tests/Treatments reviewed.  Follow up needed: f/u chest CT in 1 month, A Fib clinic next week  Other Healthcare Providers Involved in Patient's Care: Patient Care Team:  Riley Cuevas MD as PCP - General (Family Medicine)  Riley Cuevsa MD as Assigned PCP  Mushtaq Pennington MD as Physician (Hematology and Oncology)      Update since discharge: {improved   Information from family, SNF, care coordination: daughter thinks he is still not back to normal, but doing better than when he went to the ER     Post Discharge Medication Reconciliation: discharge medications reconciled, continue medications without change  Plan of care communicated with: patient and family    Objective:     Vitals:    06/16/20 0956   BP: 106/72   Pulse: 92   Resp: 18   Temp: 96.7  F (35.9  C)   TempSrc: Oral   SpO2: 96%         Physical Exam:  General: A and O x3, in no apparent distress, appropriately groomed  Cardiac: irregularly irregular rhythm, no murmurs  Pulmonary: Left lower lobe has mild crackles, remainder of pulmonary exam is grossly normal, no wheezing.    Extremities: No lower extremity edema noted bilaterally  Neuro: Normal mentation, answers questions appropriately      Electronically signed by Clementine Sparrow PA-C 06/16/20 5:27 PM

## 2021-06-08 NOTE — TELEPHONE ENCOUNTER
Patient's daughter Cha calls in this morning stating that her father Nixon is coming in today for labs.  She is wondering if there is anything additional Dr. Pennington would want as Nixon is so weak.  She states that he is currently on 5 mg prednisone daily which is on the downward side of his taper.  BP is 102/62.  His oxygen levels were down at the 90% and they did have oxygen at home and put him on 2 L nasal cannula and he increased to 95%.  She states that his pulse is 88 and his respirations are 28.  He felt okay when he woke up this morning however got in to the shower and could hardly get out.  They will bring him in in the wheelchair but wanted Dr. Pennington to have some time to think about any additional labs he might want.    Per Dr. Pennington we will draw a HM 1, cortisol level.  We will follow-up with the patient later today via phone.    Hien Gill RN

## 2021-06-08 NOTE — TELEPHONE ENCOUNTER
"FYI - Status Update  Who is Calling: Hang Madsen Namrata Mares  Update: Calling to let Clementine Kimchristiano know they are taking Nixon to [place]'s ED due to current atrial fibrillation that is making him very \"uncomfortable\".  Okay to leave a detailed message?:  No return call needed    "

## 2021-06-08 NOTE — TELEPHONE ENCOUNTER
pts daughter Dorothy called concerned that pt is so tired and not eating the best. He stopped prednisone 2 days ago. Reviewed with Dr Pennington and prednisone taper sent to pharmacy. Spoke with pts son Jean-Claude peña to  and directions.

## 2021-06-08 NOTE — TELEPHONE ENCOUNTER
Pt advised his labs are improving per Dr Pennington and he should continue with weekly blood draw as scheduled.  Pt verbalized understanding and was pleased to hear the report.

## 2021-06-08 NOTE — PROGRESS NOTES
[facility] Hematology and Oncology Progress Note    Patient: Nixon Velasquez Jr.  MRN: [id number]  Date of Service: 06/08/20          Reason for Visit    Chief Complaint   Patient presents with     HE Cancer     CLL (chronic lymphoid leukemia) in relapse       Assessment and Plan      1. CLL: Received single agent Rituxan for this with his last dose being August 7, 2017. relapse in February 2018 so he started imbruvica.  He did not tolerate.  He was hospitalized twice.  We have really been holding it since march 2018.  Recent relapse again. Started back on low dose imbruvica in April. Took for a couple of weeks. Stopped 5/21 and has not been doing well. Continue to hold. His counts are fine. No signs that CLL is worse. Continue to monitor counts.     2.  Weakness, shortness of breath: these have all gotten worse over the last couple of weeks. I will see pt tomorrow in clinic. I will draw some labs. He may need to see cardiology. I will likely do EGD, Chest xray or CT. I will troponin and BNP. I did tell pt and family that they could go to the ER if his symptoms get worse. Pt refused this for now. He did feel better when he was on higher doses of prednisone. He has been tapered down and currently is taking 7.5mg daily. I told him to take 40mg today and we will reassess tomorrow.     3. 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 Per family still at 7.5mg daily. Per family his symptoms have really all gotten worse with tapering this medication. We will address a different taper tomorrow depending on how much better he feels with the bigger dose today.     4. ?irregular heartbeat: daughter who is a nurse has been checking and to her it is irregular. No chest pain. I will get EKG in clinic tomorrow am. He did have some a fib when he was in the hospital in 2018 as well.       ECOG Performance   ECOG Performance Status: 3     Distress Assessment  Distress Assessment Score: Unable to rate(just tired;  limitations):   Pain  Currently in Pain: No/denies:       Problem List    1. 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)  ECG 12 lead with MUSE    Magnesium   2. SOB (shortness of breath)  Troponin I    BNP(B-type Natriuretic Peptide)    Comprehensive Metabolic Panel    TSH   3. Edema, unspecified type   TSH      ______________________________________________________________________________    History of Present Illness      Diagnosis:     1. Chronic lymphocytic leukemia initially diagnosed in 2/2008. 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   2. Thyroid cancer, status post thyroidectomy and radioactive iodine ablation.   3. Prostate cancer diagnosed in 1992.  4. Skin cancers, multiple.  5. 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      Treatment:  Started single agent Rituxan for his CLL on July 18, 2017.  This was initiated for worsening symptomatic anemia.  He received 4 weekly doses.  Completed on August 7, 2017.   relapse of CLL and has started Imbruvica on 2/27/18.  He probably only took it for a couple of weeks and then was back in the hospital and then he was holding it.  He then took it for a couple of days and then started holding it again around March 27, 2018.     Restarted Imbruvica at 280mg daily on 4/25. Took until 5/21. Has been on hold since.     AIHA: Originally got prednisone January 2018.   relapsed in April 2020, restarted prednisone. Tapered down        Interim History:      Nixon was due for follow up visit today. We are doing video visit. 3 children present with him. In general, he is not doing well. Feels more tired, weak. Gets quite shortness of breath with minimal exertion and talking. Family thinks this has all gotten worse since he starting taking the imbruvica again. They also think the prednisone taper has made him worse. He has not taken imbruvica since May 21. Prednisone is currently at 7.5mg daily, down from 60mg daily in April.   He gets wheezy at  times. Feels weakn in his legs, no falls. Very slight swelling in both legs/ankles. Mild appetite changes with mild diarrhea.   No chest pain or palpitation.     Pain Status  Currently in Pain: No/denies    Review of Systems    Constitutional  Constitutional (WDL): Exceptions to WDL  Fatigue: Concerns  Neurosensory  Neurosensory (WDL): Exceptions to WDL  Ataxia: Concerns  Eye   Eye Disorder (WDL): All eye disorder elements are within defined limits  Ear  Ear Disorder (WDL): All ear disorder elements are within defined limits  Cardiovascular  Cardiovascular (WDL): Exceptions to WDL  Edema: Concerns(mild bilateral)  Pulmonary  Respiratory (WDL): Exceptions to WDL(worsening in breathing )  Dyspnea: Concerns(constant)  Gastrointestinal  Gastrointestinal (WDL): Exceptions to WDL  Anorexia: Concerns  Dehydration: Concerns  Diarrhea: Concerns  Genitourinary  Genitourinary (WDL): Exceptions to WDL(urinary incontinence)  Urinary Frequency: Concerns  Urinary Retention: Concerns  Lymphatic  Lymph (WDL): All lymph disorder elements are within defined limits  Musculoskeletal and Connective Tissue  Musculoskeletal and Connetive Tissue Disorders (WDL): Exceptions to WDL  Arthralgia: Concerns  Muscle Weakness : Concerns  Myalgia: Concerns  Integumentary  Integumentary (WDL): All integumentary elements are within defined limits  Patient Coping  Patient Coping: Accepting;Open/discussion  Accompanied by  Accompanied by: Family Member  Oral Chemo Adherence         Past History  Past Medical History:   Diagnosis Date     Acute pulmonary edema (H)      Adenocarcinoma of prostate (H)      Adjustment disorder      Anemia      Autoimmune hemolytic anemia (H)      Cataract     bilateral     Cerebral atherosclerosis      Cervical radiculopathy      Cholecystitis      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)      Coronary artery disease      Corrosive esophagitis      Diabetes mellitus (H)      DVT (deep vein thrombosis) in pregnancy       Fatigue      Fecal impaction (H)      Foot fracture, right      Generalized weakness      Hiatal hernia      History of transfusion      Hx of papillary thyroid carcinoma      Hypertension      Hypocalcemia      Hypothyroidism      Hypoxia      Infection of right ear lobe      Inguinal hernia      Iron deficiency      Leukemia (H)      Mixed hyperlipidemia      Neutropenia associated with infection (H)      Paroxysmal atrial fibrillation (H)      PE (pulmonary embolism)      Pleural effusion      Pneumonia      Prostate cancer (H)      Restless legs syndrome      Skin cancer      Squamous cell carcinoma     skin     Stroke (H)      Thrombocytopenia (H)      Thyroid cancer (H)        PHYSICAL EXAM:  /70 (Patient Position: Sitting) Comment: family reported  Pulse 84 Comment: irregular family reported  Temp 97.2  F (36.2  C) Comment: family reported  Resp 24 Comment: family related  Wt 204 lb 12.8 oz (92.9 kg) Comment: family reported  SpO2 90% Comment: family reported  BMI 24.93 kg/m      GENERAL: no acute distress. Cooperative in conversation. Sitting in chair in his home. 3 children present.   RESP: Regular respiratory rate, slightly fast. No significant expiratory wheezes   MUSCULOSKELETAL:mild bilateral leg swelling  NEURO: non focal. Alert and oriented x3.   PSYCH: within normal limits. No depression or anxiety.  SKIN: exposed skin is dry intact.         Lab Results  No visits with results within 10 Day(s) from this visit.   Latest known visit with results is:   Lab Standing Order on 05/29/2020   Component Date Value Ref Range Status     WBC 05/29/2020 50.6* 4.0 - 11.0 thou/uL Final     RBC 05/29/2020 3.35* 4.40 - 6.20 mill/uL Final     Hemoglobin 05/29/2020 11.8* 14.0 - 18.0 g/dL Final     Hematocrit 05/29/2020 37.7* 40.0 - 54.0 % Final     MCV 05/29/2020 113* 80 - 100 fL Final     MCH 05/29/2020 35.2* 27.0 - 34.0 pg Final     MCHC 05/29/2020 31.3* 32.0 - 36.0 g/dL Final     RDW 05/29/2020 16.3*  "11.0 - 14.5 % Final     Platelets 05/29/2020 141  140 - 440 thou/uL Final     MPV 05/29/2020 11.5  8.5 - 12.5 fL Final   ]  Imaging    No results found.  Nixon Velasquez Jr. is a 89 y.o. male who is being evaluated via a billable video visit.      The patient has been notified of following:     \"This video visit will be conducted via a call between you and your physician/provider. We have found that certain health care needs can be provided without the need for an in-person physical exam.  This service lets us provide the care you need with a video conversation.  If a prescription is necessary we can send it directly to your pharmacy.  If lab work is needed we can place an order for that and you can then stop by our lab to have the test done at a later time.    Video visits are billed at different rates depending on your insurance coverage. Please reach out to your insurance provider with any questions.    If during the course of the call the physician/provider feels a video visit is not appropriate, you will not be charged for this service.\"    Patient has given verbal consent to a Video visit? Yes    Patient would like to receive their AVS by AVS Preference: Ashleigh.    Patient would like the video invitation sent by: Text to cell phone: [phone]    Will anyone else be joining your video visit? Family is present in home with pt.         Video Start Time: 1:33    Additional provider notes: see above      Video-Visit Details    Type of service:  Video Visit    Video End Time (time video stopped): 1:50  Originating Location (pt. Location): Home    Distant Location (provider location):  [facility] CANCER CARE AND HEMATOLOGY     Platform used for Video Visit: Hector Huerta CNP      Signed by: Vy Huerta CNP  "

## 2021-06-08 NOTE — TELEPHONE ENCOUNTER
Request for Orders    Who's Requesting: Home Care Occupational Therapist    Orders being requested: OT  2 visit(s) every 1 week(s) for 2 week(s)  For ADL retraining, home safety, breathing ex, and activity tolerance.    Where to send Orders: [facility], response through Epic preferred. Please call if you have questions.    Thank you!  Hailee Unger, OTR/L  [phone]

## 2021-06-08 NOTE — TELEPHONE ENCOUNTER
Requested Prescriptions     Pending Prescriptions Disp Refills     clopidogreL (PLAVIX) 75 mg tablet [Pharmacy Med Name: CLOPIDOGREL BISULFATE 75MG TABS] 90 tablet 1     Sig: TAKE ONE TABLET BY MOUTH EVERY DAY

## 2021-06-08 NOTE — PROGRESS NOTES
Pt arrived via wheelchair to clinic for IVFs.  IV was started using aseptic without difficulties with excellent blood return.  Spoke with Itzel about length of infusion, and she would like IVFs run over 2 hours.  Administered NS per MD order.  Pt tolerated infusion well, no s/s of fluid overload.  IV was flushed with NS then D/C'd using 2x2 and Coban.  Pt verbalized understanding of plan of care and return to clinic.

## 2021-06-08 NOTE — TELEPHONE ENCOUNTER
Patient's daughter Namrata calls in today stating that her father is having more trouble with weakness and shortness of breath.  She reports that she is a retired geriatric nurse so has been is not listening to his heart and lung sounds.  She states that he does have an irregular heartbeat.  Today she reports diminished lung sounds in the lower left lung.  She states that the lower right lung on Saturday and Sunday did sound quite odd.  He is having trace amounts of edema in his feet.  Overall, she and her brother Jean-Claude are concerned about their father's wellbeing.  They are requesting an in person visit with Dr. Pennington.  Since Dr. Pennington is at the [facility] location today with no available appointments and Vy Huerta CNP is doing virtual visits from home, I discussed this with Dr. Brown.  He reports that the will continue the visit with Vy Huerta today and if she deemed that he needs to be seen in the clinic, we can always cancel the visit with her today and get him into see Dr. Pennington.  I discussed this with the daughter who verbalized understanding.  She was very appreciative.  She does feel that this is the beginning of the end and does want Dr. Pennington assess this and determine if she is correct.  I have updated Vy Huerta CNP as well as Dr. Pennington.    Hien Gooden RN

## 2021-06-08 NOTE — PROGRESS NOTES
CCC RN was requested to follow up with patient today by CCC CHW regarding medication concern. RSC RN currently off today. Writer called and spoke with patient's son, Jean-Cladue. Jean-Claude stated his sister, who has knowledge of patient's medicatons just showed up to patient's home and was able to assist with patient with his medication question. Jean-Claude denied needing any other intervention at this time. Patient has an initial assessment to discuss CCC enrollment with the MSW on 6/22/20.

## 2021-06-08 NOTE — PROGRESS NOTES
"6/12/2020  TCM DISCHARGE FOLLOW UP CALL  \"Hi, my name is  Aun, CHW  , and I am calling from  [facility].  I am calling to follow up and see how things are going for you after your recent hospitalization.    Tell me how you are doing now that you are home?\"   Doing okay.    Discharge Instructions     Do you understand your discharge instructions?  Pt. Response: Yes, confusing with the discharge instructions about the medications. Don't know what the 2 medications are for.  meds were sent to [facility]    \"Has an appointment with your primary care provider been scheduled?\" Yes  \"If yes, when is that appointment? 6-16-20 at   If no, date of appointment scheduled:  9:40 am   I can assist you to schedule that now.   CC refer to AVS to schedule when instructed.     Medications  \"Did you have any medication changes?   Yes   Do you have any concerns with obtaining the medications or questions about your medications that you would like a RN to review with you?  Yes.  Confused with the instructions. Don't know what the 2 medications are for. Stated he has home care nurse coming out tomorrow but not sure.Would like to talk the nurse to clarify the medications and instructions.  **If yes, escalate to CC RN responsible for patient's clinic or [facility] RN  Send an inbasket Epic message if RN is unavailable after call.   Called and consulted with David Myhre, CCC RN regarding patient confused about his medications and discharge instructions.   Plan: David Myhre, CCC RN will schedule to call patient at 11am today and outreach to [facility] .   Sent staff message to David Myhre, CCC RN to place order for CCC.  \"When you see the provider, I would recommend that you bring your medications with you.\"   I will talk to the home care nurse tomorrow if they come out or wait to talk to ALPHONSO Lozada on 6-16-20. Provided CHW contact number for support.    Call Summary    \"What questions or concerns do you have about your " "recent visit and your follow-up care?\"        Can be addressed if scheduled with RN or SW either Care Coordination or if declining to triage for further questions.     \"If you have questions or things don't continue to improve, we encourage you contact us through the main clinic number _______________ (give number).  Even if the clinic is not open, triage nurses are available 24/7 to help you.      I am with Clinic Care Coordination, and we are a team of nurses, social workers, community health workers, and financial resource workers. We work together with your primary doctor.   We are here to see if we can help you with a variety of things - from resources in the community such as food assistance, transportation, help in the home, equipment needs, assistance with medications, coordination of your appointments, help with any medical questions, and things like this.      We would have a nurse or  speak with you and together come up with goals to help you to improve your health and wellness and do the best to help you stay out of the hospital.      If the patient agrees, then explain that the appointment can be in person (If has RN or SW in clinic) or on the telephone if remote or if easier.      I would like to assist to help you to make that appointment now.  The next available appointment is   .       Appointment for Care Coordination assessment has been made with 6-22-20 at 9am with [facility] SW due to [facility] RN schedule booked out to 7-7-20.    Send in basket to CCC RN at clinic or in hub to put in order.   Sent staff message to David Myhre, CCC RN to place order for CCC.      "

## 2021-06-08 NOTE — PROGRESS NOTES
[facility] Hematology and Oncology Progress Note    Patient: Nixon Velasquez Jr.  MRN: [id number]  Date of Service:  May 18, 2020      Assessment and Plan:    1.  CLL: He has now been on ibrutinib at 280 mg daily for just over 3 weeks.  Seems to be controlling his lymphocyte count.  Not getting worse since April 25.  Plan to continue for now.  However, if his fatigue does not improve by the end of the week may need to hold the ibrutinib until he feels stronger.    2.  Prophylaxis: He received a Pneumovax in mid 2016.  He sees the dermatologist regularly.  He does have a history of skin cancers.    3.  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 Taper will be completed on Friday, May 22.  His hemoglobin is back to baseline so he really needs to get off of the dexamethasone.    4.  Thrombocytopenia: Resolved now.  Continue to follow.      5.  Fatigue: I think this is a direct result of his prednisone.  Cortisol on May 15 was 8.5 so I do not think he has significant adrenal insufficiency.  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  Ibrutinib certainly can cause fatigue.  Additionally, his sleep-wake cycle is flipped and he is up most of the night.   If things do not improve at the end of his taper, then we can hold the ibrutinib and see if things get better.  We can also try low-dose hydrocortisone in that case.      6.  Insomnia: Most likely from his steroid.  I asked him to try to shorten the duration of his naps and limit his napping as much as possible.  Hopefully things will get back on track when he is done with the steroids.  I think this is also contributing to his generalized fatigue and weakness.    ECOG Performance   ECOG Performance Status: 2    Distress Assessment  Distress Assessment Score: No distress    Pain  Currently in Pain: No/denies    Diagnosis:    1.  Chronic lymphocytic leukemia initially diagnosed in 2/2008. 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  Relapse in January 2018.  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  No p53 mutation noted.    2.  Thyroid cancer, status post thyroidectomy and radioactive iodine ablation.   3.  Prostate cancer diagnosed in 1992.  4.  Skin cancers, multiple.  5.  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  Relapse in April 2020.  6.  PICC line associated DVT upper extremity in March 2018.  Treated with a short course of Lovenox.  7.  Cholecystitis: He had a cholecystectomy on May 10, 2018.    Treatment:    CLL:  Started single agent Rituxan for his CLL on July 18, 2017.  This was initiated for worsening symptomatic anemia.  He received 4 weekly doses.  Completed on August 7, 2017.    Started on ibrutinib early March 2018 due to worsening anemia and thrombocytopenia.. He was hospitalized on March 9, about a week after starting ibrutinib, with cholecystitis and sepsis.  Ibrutinib was held at that time.  Reinitiated end of March but he preferred to hold it.    Restarted for relapsed disease on April 25, 2020.      AIHA:  Prednisone taper for the hemolytic anemia in early 2018.    Again and April 2020 upon relapse.    Interim History:     Nixon is seen in clinic today for a follow-up visit.  I last saw him on May 1.  At that time he was tolerating ibrutinib, 280 mg daily, well.  For the hemolytic anemia, he was on 20 mg of prednisone daily.  I tried to do 10 mg daily for 5 days and then stop.  However he developed symptoms on May 8 consisting of less and less energy and fatigue.  He was in bed a lot.  I started a taper of prednisone on that day, May 8, starting at 10 mg daily.  He is just starting the last 5 days of 21/2 mg daily today.  He had some labs drawn 3 days ago on the 15th and is here to review those results.  Continues to have significant fatigue.  He is out of bed most of the day but is napping.  He tires out easily.  Does not sleep well at night.  Has insomnia.  No chest pain.  No  headaches.    Review of Systems:    Constitutional  Fatigue: Fatigue not relieved by rest - Limiting instrumental ADL(weakness)  Fever: None  Chills: None  Weight Gain: None  Weight Loss: None  Neurosensory  Neurosensory (WDL): Exceptions to WDL  Peripheral Motor Neuropathy: None  Ataxia: Asymptomatic, clinical or diagnostic observations only, intervention not indicated(uses walker)  Peripheral Sensory Neuropathy: None  Confusion: None  Syncope: None  Cardiovascular  Cardiovascular (WDL): Exceptions to WDL(irregular, hx of Afib)  Palpitations: None  Edema: No  Phlebitis: None  Superficial thrombophlebitis: None  Pulmonary  Respiratory (WDL): Exceptions to WDL  Cough: None  Dyspnea: Shortness of breath with minimal exertion, limiting instrumental ADL  Hypoxia: None  Gastrointestinal  Gastrointestinal (WDL): Exceptions to WDL  Anorexia: None  Constipation: None  Diarrhea: None  Dysphagia: None  Esophagitis: Asymptomatic, clinical or diagnostic observations only, intervention not indicated(occ, meds control)  Nausea: None  Pharyngitis: None  Vomiting: None  Dysgeusia: None  Dry Mouth: None  Genitourinary  Genitourinary (WDL): Exceptions to WDL(incontinence-wears depends)  Urinary Frequency: None  Urinary Retention: None  Urinary Tract Pain: None  Integumentary  Integumentary (WDL): All integumentary elements are within defined limits  Patient Coping  Patient Coping: Open/discussion  Accompanied by  Accompanied by: Alone    Past History:    Past Medical History:   Diagnosis Date     Acute pulmonary edema (H)      Adenocarcinoma of prostate (H)      Adjustment disorder      Anemia      Autoimmune hemolytic anemia (H)      Cataract     bilateral     Cerebral atherosclerosis      Cervical radiculopathy      Cholecystitis      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)      Coronary artery disease      Corrosive esophagitis      Diabetes mellitus (H)      DVT (deep vein thrombosis) in pregnancy      Fatigue      Fecal  impaction (H)      Foot fracture, right      Generalized weakness      Hiatal hernia      History of transfusion      Hx of papillary thyroid carcinoma      Hypertension      Hypocalcemia      Hypothyroidism      Hypoxia      Infection of right ear lobe      Inguinal hernia      Iron deficiency      Leukemia (H)      Mixed hyperlipidemia      Neutropenia associated with infection (H)      Paroxysmal atrial fibrillation (H)      PE (pulmonary embolism)      Pleural effusion      Pneumonia      Prostate cancer (H)      Restless legs syndrome      Skin cancer      Squamous cell carcinoma     skin     Stroke (H)      Thrombocytopenia (H)      Thyroid cancer (H)      Physical Exam:    Recent Vitals 5/18/2020   Height -   Weight 203 lbs   BSA (m2) 2.22 m2   /79   Pulse 100   Temp 98   Temp src 1   SpO2 94   Some recent data might be hidden     General: patient appears stated age of 89 y.o.. Nontoxic and in no distress.   HEENT: Head: atraumatic, normocephalic. Sclerae anicteric.  Chest:  Normal respiratory effort  Cardiac:  No edema.   Abdomen: abdomen is non-distended  Extremities: normal tone and muscle bulk.  Skin: no lesions or rash.  CNS: alert and oriented. Grossly non-focal.   Psychiatric: normal mood and affect.     Lab Results:    Recent Results (from the past 168 hour(s))   Cortisol   Result Value Ref Range    Cortisol 8.5 ug/dL   HM1 (CBC with Diff)   Result Value Ref Range    WBC 53.8 (HH) 4.0 - 11.0 thou/uL    RBC 3.30 (L) 4.40 - 6.20 mill/uL    Hemoglobin 12.2 (L) 14.0 - 18.0 g/dL    Hematocrit 37.8 (L) 40.0 - 54.0 %     (H) 80 - 100 fL    MCH 37.0 (H) 27.0 - 34.0 pg    MCHC 32.3 32.0 - 36.0 g/dL    RDW 17.1 (H) 11.0 - 14.5 %    Platelets 165 140 - 440 thou/uL    MPV 11.5 8.5 - 12.5 fL   COVID-19 Virus Antibody ARDL   Result Value Ref Range    COVID-19 Antibody Screen Negative     COVID-19 IgG Titer Not Applicable    Manual Differential   Result Value Ref Range    Total Neutrophils % 12 (L) 50 -  70 %    Lymphocytes % 87 (H) 20 - 40 %    Monocytes % 2 2 - 10 %    Eosinophils %  0 0 - 6 %    Basophils % 0 0 - 2 %    Total Neutrophils Absolute 6.2 2.0 - 7.7 thou/ul    Lymphocytes Absolute 46.5 (H) 0.8 - 4.4 thou/uL    Monocytes Absolute 1.1 (H) 0.0 - 0.9 thou/uL    Eosinophils Absolute 0.0 0.0 - 0.4 thou/uL    Basophils Absolute 0.0 0.0 - 0.2 thou/uL    Platelet Estimate Normal Normal      Imaging:    No results found.      Signed by: Mushtaq Pennington MD

## 2021-06-08 NOTE — PROGRESS NOTES
[facility] Hematology and Oncology Progress Note    Patient: Nixon Velasquez Jr.  MRN: [id number]  Date of Service: 06/15/20          Reason for Visit    Chief Complaint   Patient presents with     Malignant Hematology     CLL (chronic lymphoid leukemia) in relapse       Assessment and Plan      1. CLL:   Received single agent Rituxan for this with his last dose being August 7, 2017.   Relapse in February 2018 so he started imbruvica.  He did not tolerate.  He was hospitalized twice; once for atrial fibrillation. On hold since approximately March, 2018.  Recent relapse again in April 2020.   Started back on low dose (280 mg) imbruvica in April, 2020 for a couple of weeks but stopped 5/21 for overall failure to thrive - possibly related, but likely multifactorial. He has continued holding over the last month.    WBC are starting to trend back up a little (33K today, compared to 20 K while on imbruvica; compared to 70K in April before restarting it). Rest of counts are stable.    It's not clear how much the imbruvica was contributing to his overall fatigue; there is a 9% risk of it causing atrial fibrillation. The steroids could have also triggered that.     He is feeling notably better since his hospitalization last week. He is working with home PT/OT and we will give him a few more weeks to recover before deciding whether to resume the imbruvica. We could consider restarting at 50% dose (140 mg daily) and titrate up from there.     Return in 2 weeks with labs and provider visit. He preferred face to face for the next visit.     2.  Weakness, shortness of breath:   3.  Atrial fib with RVR, s/p recent hospitalization  With progressive symptoms despite a retrial of low-dose Prednisone and IV fluids outpatient, patient continued feeling worse.     Hospitalized 6/8 - 6/11/20 for symptomatic Afib/RVR, reduced EF and worsening chronic left pleural effusion (moderate). PE and infection (including COVID) ruled out. Underwent  left thoracentesis (0.85 liters drained, cytology shows this to be lymphocytic) and Cardiology started him on amiodarone and metoprolol with notable improvement in his dyspnea and fatigue since last week. He feels he is slowly recovering. He has outpatient follow up with Cardiology on 6/25.    We will reassess after his Cardiology follow-up. Will appreciate their input regarding comfort of restarting the imbruvica (possibilty it could contribute to afib) and ongoing management.    3. Autoimmune hemolytic anemia:   Has tapered on prednisone. Went from 7.5 mg daily to 5 mg daily at dismissal last week. Hemoglobin improved to 13.3 g/dL. He will decrease Prednisone to 2.5 mg daily tomorrow x 5 days and stop all together 6/21. Will recheck his hemoglobin at next follow up in two weeks.      ECOG Performance   ECOG Performance Status: 2     Distress Assessment  Distress Assessment Score: No distress:   Pain  Currently in Pain: No/denies:       Problem List    1. CLL (chronic lymphocytic leukemia) (H)  HM1(CBC and Differential)    Comprehensive Metabolic Panel   2. Autoimmune hemolytic anemia (H)  HM1(CBC and Differential)    Comprehensive Metabolic Panel      ______________________________________________________________________________    History of Present Illness      Diagnosis:     1. Chronic lymphocytic leukemia initially diagnosed in 2/2008. 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   2. Thyroid cancer, status post thyroidectomy and radioactive iodine ablation.   3. Prostate cancer diagnosed in 1992.  4. Skin cancers, multiple.  5. 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      Treatment:  Started single agent Rituxan for his CLL on July 18, 2017.  This was initiated for worsening symptomatic anemia.  He received 4 weekly doses.  Completed on August 7, 2017.   relapse of CLL and has started Imbruvica on 2/27/18.  He probably only took it for a couple of weeks and then was back in the  hospital and then he was holding it.  He then took it for a couple of days and then started holding it again around March 27, 2018.     Restarted Imbruvica at 280mg daily on 4/25. Took until 5/21. Has been on hold since for fatigue/weakness, atrial fib.     AIHA: Originally got prednisone January 2018.   relapsed in April 2020, restarted prednisone. Tapered down/off.        Interim History:      Nixon returned for post-hospital follow up. His son and daughter joined via speaker phone. He dismissed last Thursday, and is doing well at home. He noted instant improvement of his dyspnea and weakness after starting his cardiac medications and undergoing thoracentesis last week. No recurrent dyspnea. No palpitations. No edema. He is still tired and deconditioned, but getting a bit stronger each day. Just started outpt home OT/PT. Appetite is better. On Prednisone 5 mg daily since 6/11, tolerating this lower dose.      Pain Status  Currently in Pain: No/denies    Review of Systems    Constitutional  Constitutional (WDL): Exceptions to WDL  Fatigue: Concerns(still feeling weak, but slightly improved)  Neurosensory  Neurosensory (WDL): Exceptions to WDL  Ataxia: Concerns(wheelchair for longer distances)  Eye   Eye Disorder (WDL): All eye disorder elements are within defined limits  Ear  Ear Disorder (WDL): All ear disorder elements are within defined limits  Cardiovascular  Cardiovascular (WDL): All cardiovascular elements are within defined limits  Pulmonary  Respiratory (WDL): Exceptions to WDL  Dyspnea: Concerns(constant)  Gastrointestinal  Gastrointestinal (WDL): All gastrointestinal elements are within defined limits  Genitourinary  Genitourinary (WDL): All genitourinary elements are within defined limits  Lymphatic  Lymph (WDL): All lymph disorder elements are within defined limits  Musculoskeletal and Connective Tissue  Musculoskeletal and Connetive Tissue Disorders (WDL): All Musculoskeletal and Connetive Tissue  "Disorder elements are within defined limits  Integumentary  Integumentary (WDL): All integumentary elements are within defined limits  Patient Coping  Patient Coping: Accepting;Open/discussion  Accompanied by  Accompanied by: Alone  Oral Chemo Adherence         Past History  Past Medical History:   Diagnosis Date     Acute pulmonary edema (H)      Adenocarcinoma of prostate (H)      Adjustment disorder      Anemia      Autoimmune hemolytic anemia (H)      Cataract     bilateral     Cerebral atherosclerosis      Cervical radiculopathy      Cholecystitis      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)      Coronary artery disease      Corrosive esophagitis      Diabetes mellitus (H)      DVT (deep vein thrombosis) in pregnancy      Fatigue      Fecal impaction (H)      Foot fracture, right      Generalized weakness      Hiatal hernia      History of transfusion      Hx of papillary thyroid carcinoma      Hypertension      Hypocalcemia      Hypothyroidism      Hypoxia      Infection of right ear lobe      Inguinal hernia      Iron deficiency      Leukemia (H)      Mixed hyperlipidemia      Neutropenia associated with infection (H)      Paroxysmal atrial fibrillation (H)      PE (pulmonary embolism)      Pleural effusion      Pneumonia      Prostate cancer (H)      Restless legs syndrome      Skin cancer      Squamous cell carcinoma     skin     Stroke (H)      Thrombocytopenia (H)      Thyroid cancer (H)        PHYSICAL EXAM:  /83   Pulse 95   Temp 98  F (36.7  C) (Oral)   Ht 6' 4\" (1.93 m)   Wt 200 lb (90.7 kg)   SpO2 96%   BMI 24.34 kg/m      GENERAL: no acute distress. Cooperative in conversation. Sitting in wheelchair. Mask on . Son and daughter on speaker phone.  LYMPH: No palpable cervical nor axillary adenopathy.  RESP: Regular respiratory rate. Diminished left lung base, otherwise clear.  HEART: Irregular, apical rate x 1 min is 91.  MUSCULOSKELETAL: trace bilateral leg swelling  NEURO: non " focal. Alert and oriented x3.   PSYCH: within normal limits. No depression or anxiety.    Lab Results  Lab Standing Order on 06/15/2020   Component Date Value Ref Range Status     Sodium 06/15/2020 140  136 - 145 mmol/L Final     Potassium 06/15/2020 4.6  3.5 - 5.0 mmol/L Final     Chloride 06/15/2020 105  98 - 107 mmol/L Final     CO2 06/15/2020 26  22 - 31 mmol/L Final     Anion Gap, Calculation 06/15/2020 9  5 - 18 mmol/L Final     Glucose 06/15/2020 176* 70 - 125 mg/dL Final     BUN 06/15/2020 15  8 - 28 mg/dL Final     Creatinine 06/15/2020 1.02  0.70 - 1.30 mg/dL Final     GFR MDRD Af Amer 06/15/2020 >60  >60 mL/min/1.73m2 Final     GFR MDRD Non Af Amer 06/15/2020 >60  >60 mL/min/1.73m2 Final     Bilirubin, Total 06/15/2020 0.7  0.0 - 1.0 mg/dL Final     Calcium 06/15/2020 8.7  8.5 - 10.5 mg/dL Final     Protein, Total 06/15/2020 6.3  6.0 - 8.0 g/dL Final     Albumin 06/15/2020 4.0  3.5 - 5.0 g/dL Final     Alkaline Phosphatase 06/15/2020 66  45 - 120 U/L Final     AST 06/15/2020 13  0 - 40 U/L Final     ALT 06/15/2020 10  0 - 45 U/L Final     Troponin I 06/15/2020 <0.01  0.00 - 0.29 ng/mL Final     BNP 06/15/2020 141* 0 - 93 pg/mL Final     TSH 06/15/2020 4.95  0.30 - 5.00 uIU/mL Final     Magnesium 06/15/2020 1.8  1.8 - 2.6 mg/dL Final     WBC 06/15/2020 33.1* 4.0 - 11.0 thou/uL Preliminary     RBC 06/15/2020 3.82* 4.40 - 6.20 mill/uL Preliminary     Hemoglobin 06/15/2020 13.3* 14.0 - 18.0 g/dL Preliminary     Hematocrit 06/15/2020 42.2  40.0 - 54.0 % Preliminary     MCV 06/15/2020 111* 80 - 100 fL Preliminary     MCH 06/15/2020 34.8* 27.0 - 34.0 pg Preliminary     MCHC 06/15/2020 31.5* 32.0 - 36.0 g/dL Preliminary     RDW 06/15/2020 15.9* 11.0 - 14.5 % Preliminary     Platelets 06/15/2020 128* 140 - 440 thou/uL Preliminary     MPV 06/15/2020 12.2  8.5 - 12.5 fL Preliminary   No results displayed because visit has over 200 results.      ]  Imaging    Xr Chest 2 Views    Result Date: 6/8/2020  EXAM: XR CHEST  2 VIEWS LOCATION: [facility] DATE/TIME: 6/8/2020 4:43 PM INDICATION: sob COMPARISON: 02/20/2020 and older studies. Chest CT 02/13/2018     Poststernotomy changes. Chronic left-sided effusion has increased since January and is now moderate in size. Associated compressive atelectasis. Right lung is clear. Heart and pulmonary vascularity are normal. Large hiatal hernia with partially intrathoracic stomach.    Cta Chest Pe Run    Result Date: 6/8/2020  EXAM: CTA CHEST PE RUN LOCATION: [facility] DATE/TIME: 6/8/2020 7:09 PM INDICATION: PE suspected, intermediate prob, positive D-dimer pe COMPARISON: CT chest, abdomen and pelvis 03/10/2018 TECHNIQUE: CT chest pulmonary angiogram during arterial phase injection of IV contrast. Multiplanar reformats and MIP reconstructions were performed. Dose reduction techniques were used. CONTRAST: Iohexol (Omni) 100 mL FINDINGS: ANGIOGRAM CHEST: Pulmonary arteries are normal caliber and negative for pulmonary emboli. Thoracic aorta is negative for dissection. No CT evidence of right heart strain. LUNGS AND PLEURA: Moderate size left pleural effusion and small right pleural effusion. Atelectasis involving most of the left lower lobe. Subsegmental atelectasis right base posteriorly. Irregular pleural-based nodular density measuring 3.3 x 1.8 cm on image 96 of series 402.2 MEDIASTINUM/AXILLAE: Large hiatal hernia with most of the stomach located in the lower chest. Prior median sternotomy. Numerous borderline enlarged mediastinal lymph nodes. Several calcified lymph nodes right hilum and subcarinal mediastinum. Coronary artery calcifications. UPPER ABDOMEN: Reflux of contrast material into the IVC and hepatic veins. Several slightly enlarged retroperitoneal and retrocrural lymph nodes. Atherosclerotic disease abdominal aorta. MUSCULOSKELETAL: Hypertrophic changes thoracic spine.     1.  No evidence for pulmonary emboli. 2.  Moderate size left pleural effusion with  compressive atelectasis involving most of the left lower lobe. Small right pleural effusion and right basilar atelectasis. 3.  Irregular pleural-based density right lower lobe, indeterminate, and could represent focal atelectasis, loculated pleural fluid or neoplasm. Recommend follow-up CT chest exam in one month or PET scan for further assessment. 4.  Scattered borderline enlarged lymph nodes within the mediastinum, hilar regions, and upper abdomen. 5.  Reflux of contrast material into the IVC and hepatic veins which can be seen with heart failure. 6.  Large esophageal hiatal hernia.    Us Thoracentesis    Result Date: 6/9/2020  EXAM: 1. LEFT THORACENTESIS 2. ULTRASOUND GUIDANCE LOCATION: [facility] DATE/TIME: 6/9/2020 5:12 PM INDICATION: Pleural effusion. PROCEDURE: Informed consent obtained. Time out performed. The chest was prepped and draped in sterile fashion. 8 mL of 1 % lidocaine was infused into the local soft tissues. Under direct ultrasound guidance, a 5 Uzbek catheter system was placed into the  pleural effusion. 0.85 liters of serosanguineous fluid were removed and sent to lab, if requested. Patient tolerated procedure well. Ultrasound imaging was obtained and placed in the patient's permanent medical record.     Status post left ultrasound-guided thoracentesis. Reference CPT Code: 64597      Itzel Stokes CNP      Signed by: Itzel Stokes CNP

## 2021-06-08 NOTE — TELEPHONE ENCOUNTER
Oral Chemotherapy Monitoring Program    Placed call to Nixon WHIPPLE Eva Jr. for a follow-up visit for oral chemotherapy.  Last Friday his son, Jean-Claude, reported excessive fatigue. Nixon did get labs drawn on Friday as well.    Today Nixon says he is doing well and then put Jean-Claude on the phone. Jean-Claude agreed that Nixon is doing better and attributes it to the 13 day prednisone taper that Dr. Pennington added on Friday.     Nixon has not held/missed any doses of ibrutinib.    Assessment/Plan:  Nixon is tolerating ibrutinib therapy (on 2/3 dose- 280 mg PO daily) from a cytologic stand point and doing better than last week from an energy stand point.    Follow-Up:  Will review appointment with Dr. Pennington on 5/18 and plan to call to do another assessment a couple of weeks later.     Porter Sommers, PharmD, BCOP  Oral Chemotherapy Pharmacist

## 2021-06-08 NOTE — PROGRESS NOTES
Pt arrived via wheelchair to clinic for IVFs.  IV was started using aseptic without difficulties with excellent blood return.  Administered NS over 2 hours per MD order.  Pt's initial HR was tachy and irreg prior to starting infusion.  1 hour into fluids rechecked BP and HR.  Pt's HR was still tachy and irreg, so reviewed this with Dr. Brown since he was in clinic.  Per Dr. Brown, no need for EKG since pt has H/O arrhythmias and has minimal symptoms.  Pt tolerated infusion well, no s/s of fluid overload.  IV was flushed with NS then D/C'd using 2x2 and Coban.  Instructed pt to call clinic with any questions or concerns.  Pt verbalized understanding of plan of care and return to clinic.

## 2021-06-08 NOTE — TELEPHONE ENCOUNTER
"Called patient, per Dr. Pennington's request, to see how he is feeling. He continues to reports \"I'm wore out\". Reports shortness of breath due to fatigue.  He verbalized it was ok to talk to his son, Jean-Claude.  Jean-Claude reports he is sleeping a lot.  He said, too \"He is wore out and needs some energy\".  I told him that Dr. Pennington is out of the office this week and next. I told him I would follow-up with a provider to discuss holding ibrutinib until he's stronger (see Dr. Villasenor's note from 5/18/20).  Patient has lab only appt scheduled on 5/29 and follow-up with BROOKLYN Stokes CNP on 6/1/20.    Reviewed with Dr. Milner since Jules Robbins and Dr. Pennington out of the office today.  She agreed with plan.     Called and talked to son, Jean-Claude and told him to stop ibrutinib. Keep lab appt on 5/29 and 6/1 appt with Itzel.  I told Jean-Claude that I would let Itzel and Dr. Pennington know the plan and call them with any changes. He agreed and verbalized understanding. Message to [facility] and Dr. Pennington/KELSEY Magdaleno RN      "

## 2021-06-09 NOTE — PROGRESS NOTES
Review of Systems - History obtained from son,daughter and the patient  General ROS: positive for  - weight loss  Psychological ROS: negative  Ophthalmic ROS: negative  ENT ROS: negative  Allergy and Immunology ROS: negative  Hematological and Lymphatic ROS: positive for bruising  Endocrine ROS: negative  Respiratory ROS: per daughter crackles in the lt lung   Cardiovascular ROS: positive for - dyspnea on exertion, irregular heartbeat and palpitations  Gastrointestinal ROS: negative  Genito-Urinary ROS: positive for - incontinence  Musculoskeletal ROS: positive for - muscular weakness  Neurological ROS: positive for - daytime sleepiness  Dermatological ROS: negative

## 2021-06-09 NOTE — PATIENT INSTRUCTIONS - HE
Nixon Velasquez Jr.,    It was a pleasure to talk to you today from the [facility].     My recommendations after this visit include:    Increase amiodarone 200 mg 1 tab orally twice a day for 2 weeks starting today and then July 9 decrease back to 1 tab orally every day.  I discussed it will take about 2 weeks for amiodarone to work to decrease frequency of atrial fibrillation.    Call or go to ER if short of breath AND oxygen saturation below 90% consistently at rest.    To followup with video visit with me in 1 month.       My contact information:  Hero العراقي, FLEX  After Hours or Scheduling  [phone]  My Nurse---Molly Marcum [phone]

## 2021-06-09 NOTE — TELEPHONE ENCOUNTER
Requesting new verbal order for Skilled Nursing resumption of care assessment to be completed on 7/8/2020 per patient's request.     This is outside of the original 48 hour referral, requiring a new verbal order.     Thank you

## 2021-06-09 NOTE — TELEPHONE ENCOUNTER
Please let him know that there is an interaction between heart rhythm drug and antidepressant  We should see him in the office to do an ekg and talk- can you call an set up ftf in 2 weeks- I'm too busy next week

## 2021-06-09 NOTE — PROGRESS NOTES
[facility]   Speech Therapy Daily Progress     Patient Name: Nixon Velasquez Jr.  Date: 3/27/2017  Visit #: 2  Referral Diagnosis: swallowing dysfunction  Referring provider: Riley Cuevas, *  Visit Diagnosis:     ICD-10-CM    1. Dysphagia, pharyngeal phase R13.13    2. Swallowing dysfunction R13.10          Session 2 of 12    Assessment:   Patient is benefitting from skilled speech therapy and is making steady progress toward functional goals.  Patient is appropriate to continue with skilled speech therapy intervention, as indicated by initial plan of care.    Goal Status:   Long Term Goals  Pt. tolerate safest, least restrictive diet without clinical signs and symptoms of aspriation by: : by 12 weeks  Short Term Goals  Pt. will perform pharyngeal exercises: 10 times;with minimal cuing;in 8 weeks(progressing)  Pt. will follow swallow strategies : independently;in 8 weeks (progressing)             Plan / Patient Education:     Continue with initial plan of care.    Subjective:     Patient presents as alert and cooperative during the session.  Pain Ratin        Objective:     Pharyngeal exercises: He performed the following pharyngeal exs 10 times each- tongue base retraction. Gargle ex,  Effortful swallow, falsetto ex, and tongue hold ex.  He was taught the hyoid ex which he performed 5 times accurately. Patient has difficulty performing the tongue hold exercise, but can do it with increased effort and concentration.  Swallow strategies: Patient is using the chin tuck and small bites/sips 1 at a time. He was observed drinking nectar thick liquids 5 times and using the chin tuck. The patient reports that he feels he is swallowing better when using the chin tuck. He has not had any coughing episodes at home. He is consciously taking smaller bites/sips at home also.      Interventions Today   Interventions MINUTES   Speech - Language    Cognition    Dysphagia 30   Assistive technology    Voice             Total 30     Stephenie Blackmon MS, CCC-SLP  3/27/2017

## 2021-06-09 NOTE — TELEPHONE ENCOUNTER
Could you confirm no AC? And then let me know...      I still would suggest presentation to the ER which will get him feeling the best quickest. He can try increasing the metoprolol to 12.5 two times a day from once daily. However, with the faster HR, and low BP, he is borderline being in trouble for respiratory distress.

## 2021-06-09 NOTE — TELEPHONE ENCOUNTER
Spoke to Jean-Claude Ott and pt Nixon regarding recommendations.  They agree to take extra dose of metoprolol tonight and will await call from either Hero or her nurse Molly tomorrow on steps moving forward.  Pt and daughter expressed that they would like to change the appt with Hero to an in clinic appt vs the virtual visit.  Namrata- I let the daughter know that you will call her to confirm if the time or date has changed.  I think the pt would benefit from earlier appt if that is an option.    Encouraged pt to go to the ER if he continues to feel worsen as this is the best place for him given his current situation.    Saba

## 2021-06-09 NOTE — TELEPHONE ENCOUNTER
"Lisa- please review in Hero's absence:  Pt hx of paroxysmal AFIB- on amio and metoprolol.  Recent ER visit 7/5.  Chronic left pleural effusion.    Spoke to daughter Namrata, pt Nixon and son Jean-Claude regarding concerns of pt \"going downhill\".  Daughter Namrata is a LPN previously worked in geriatrics.     Pt BP this am was 100/60, [id number] and irregular, RR40s- has been increasing all weekend, O2 91%.  Pt lungs crackles and diminished (Per the daughter crackles are his baseline LS)    Pt has no appetite and only is able to drink about 800cc of fluid per day.     He states he is extremely weak, tired and short of breath.    Medications: Metoprolol 12.5mg daily, amiodarone 200mg daily sine 3/22/18.  Pt was started on a loading dose of amiodarone 200mg two times a day 6/25 for 2 weeks and then decreased back on 7/9/20 to 200mg daily-  He thinks he felt better on the loading dose of amiodarone and would like to go back up to that amount.  Educated pt that there is a concern of amiodarone toxicity, but I will forward over to NP to discuss possibility.  Pt has declined being on anticoagulation.      I have advised pt to go to ER with his RR increasing and being extremely weak, fatigue and shortness of breath.  Pt has declined at this time, would like to hear back from NP.    Lisa- what are your thoughts?    Thanks,    Saba      "

## 2021-06-09 NOTE — TELEPHONE ENCOUNTER
I saw him for hospital f/u last week.    Discharge summary:  Irregular right lower lobe density  -Needs 1 month CT chest follow-up or PET scan to rule out neoplasm      Not sure if you would want him to get CT vs PET scan.  Nothing ordered yet.    Thanks,   vira

## 2021-06-09 NOTE — TELEPHONE ENCOUNTER
Inpatient/ED Follow Up Appt Request  At what hospital or facility were you seen?: [facility]    What is the reason you were seen?: NA    What date were you admitted?: date: 7/2  What date were you discharged?: date: 7/5  What was the recommended timeframe for your follow up appointment?: ASAP    Provider Preference: PCP only  How soon do you need to be seen?: today-ASAP    Waitlist offered?: No    Okay to leave a detailed message:  No

## 2021-06-09 NOTE — TELEPHONE ENCOUNTER
Called pt back spoke to daughter, concerned because ptis just not doing better since 6/25 Jan visit see notes  BP 90/60 O2 SAT 98 HR'S  66  Pt able to say it is just harder to get around  With amiodarone increase they feel he should be feeling better  Reviewed with Hero, who felt it is to early with amiodarone increase to see great results, if O2 sats drop pt would need to go to ER  As I was speaking to daughter ,her sister did lung check and states there is diminished sounds so I told them to got to ER and they agree to plan.

## 2021-06-09 NOTE — PROGRESS NOTES
"Speech Language/Pathology  Videofluoroscopic Swallow Study       Problem:  Patient Active Problem List   Diagnosis     Restless Legs Syndrome     Corrosive Esophagitis     Emesis     Hiatal Hernia     CLL (chronic lymphocytic leukemia)     Hyperlipidemia     Cerebral Atherosclerosis     Hyperglycemia     Adenocarcinoma Of The Prostate Gland     Papillary Carcinoma Of The Thyroid Gland     Hypothyroidism     Type 2 Diabetes Mellitus     Coronary Artery Disease     Cervical Radiculopathy     Iron Deficiency     CAP (community acquired pneumonia)     Hypoxia     Advanced directives, counseling/discussion       Onset date: 3/3/2017 (order date)  Reason for evaluation: Concern for dysphagia  Pertinent History: 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  He participated in an Upper GI exam on 2/24/17 and was found to have a large hiatal hernia.  Patient was unable to provide SLP with much detail regarding his swallowing difficulty, except to mention the hiatal hernia and to state that, \"Sometimes, I really have to work hard for the food to go down.\"  He states that he hasn't had much difficulty with swallowing in recent weeks.    Patient presents as alert and cooperative during this evaluation.  His son, Ronnell, was present after study to receive education re: results and recommendations.  An  was not applicable    Patient was given honey-thick, nectar-thick, puree, and thin consistencies of barium.    Oral Phase:    Dentition/Oral hygiene: Adequate    Bolus prep and oral control was not impaired.     Anterior-Posterior transit was not impaired.    Premature spillage did not occur with any consistency.    Tongue base retraction was not impaired.    Oral stasis did not occur with any consistency.    Pharyngeal Phase:    Aspiration did not occur " with any consistency trialed during this study..     Deep laryngeal penetration occurred with thin and nectar-thick liquid.  This was reached the vocal folds and did not clear.  There was no throat clear or cough at the vocal folds.  Use of a chin tuck prior to swallowing greatly reduced laryngeal penetration with nectar-thick liquid, but not with thin.    Swallow response was timely with all consistencies.  Swallow response occurred at the level of the tongue base.     Epiglottic movement was to horizontal position consistently across texture trials.    Pharyngeal stasis occurred with all consistencies.  There was moderate stasis in the vallecular space and pyriform sinuses following boluses of each consistency.  Use of a chin tuck prior to swallowing greatly reduced the amount of pharyngeal stasis.    Pharyngeal constriction was mildly impaired.    Hyolaryngeal elevation was weak and reduced. Hyolaryngeal excursion was weak and reduced.    Cricopharyngeal function was not impaired. Cervical esophageal function was not impaired.    Assessment:    Patient demonstrated no oral and moderate pharyngeal dysphagia.    Patient is at high aspiration risk with thin liquid.  He is at moderate risk for aspiration with nectar-thick liquid unless he uses a chin tuck prior to swallowing.    Rehab potential is fair based on prior level of function, evaluation results, motivation and cooperation and time post onset.    Recommendations:    Plan: Downgrade liquids to nectar-thick consistency.  Continue Regular textures as tolerated.    Strategies: Patient to sit fully upright 90 degrees for all intake, eat slowly, and take one small bite or sip at a time.  Patient to complete a chin tuck prior to swallowing both solid food and liquids.    Outpatient Speech Therapy 2-3 times per week for dysphagia.  Patient may be a candidate for NMES (Vital Stim) therapy for swallow.    Referrals: Outpatient (or in-home) Speech Therapy to continue  to address oropharyngeal dysphagia.    SLP provided patient and his son, Ronnell, with extensive verbal and written education re: purpose/results of Video Swallow Study, dysphagia, role of the SLP, recommendation for nectar-thick liquids at home, where to purchase liquid thickener, how to thicken liquids to nectar consistency, recommendation for use of the chin tuck maneuver prior to swallowing, and recommendation for outpatient (or in-home) Speech Therapy for ongoing dysphagia treatment.  Patient and his son verbalized understanding of the education provided and asked appropriate questions, which SLP addressed.  Patient was able to demonstrate accurate use of the chin tuck when swallowing liquid.  Later in the day, SLP also called his daughter, Cha Reyez (an RN) to educate her on the above information, and answer her questions.  Cha also verbalized understanding of the education provided and is in agreement with plan of care.    45 dysphagia minutes    Alicia Dennison MA, CCC-SLP

## 2021-06-09 NOTE — PROGRESS NOTES
"Clinic Care Coordination Contact  Community Health Worker Initial Outreach    Patient accepts CC: No, Enrolled in home care. CHW will do no further outreaches at this time    TCM DISCHARGE FOLLOW UP CALL      \"Hi, my name is  Yuniel, and I am calling from  [facility].  I am calling to follow up and see how things are going for you after your recent hospitalization.      Tell me how you are doing now that you are home?\" It has been up and down, and now better.      Discharge Instructions     Do you understand your discharge instructions?  Pt. Response: Yes    \"Has an appointment with your primary care provider been scheduled?\" Yes  \"If yes, when is that appointment?  7/9/20    Medications    \"Did you have any medication changes?   Yes   Do you have any concerns with obtaining the medications or questions about your medications that you would like a RN to review with you?  No  **If yes, escalate to CC RN responsible for patient's clinic or [facility] RN  Send an inbasket Epic message if RN is unavailable after call.   \"When you see the provider, I would recommend that you bring your medications with you.\"    Call Summary    \"What questions or concerns do you have about your recent visit and your follow-up care?\"Nothing at this time.        Can be addressed if scheduled with RN or SW either Care Coordination or if declining to triage for further questions.     \"If you have questions or things don't continue to improve, we encourage you contact us through the main clinic number [phone].  Even if the clinic is not open, triage nurses are available 24/7 to help you.      I am with Clinic Care Coordination, and we are a team of nurses, social workers, community health workers, and financial resource workers. We work together with your primary doctor.   We are here to see if we can help you with a variety of things - from resources in the community such as food assistance, transportation, help in the " home, equipment needs, assistance with medications, coordination of your appointments, help with any medical questions, and things like this.      We would have a nurse or  speak with you and together come up with goals to help you to improve your health and wellness and do the best to help you stay out of the hospital.

## 2021-06-09 NOTE — TELEPHONE ENCOUNTER
He is not taking AC- just confirmed with him again.    He was previously on metoprolol 12.5mg two times a day but was decreased earlier this month because of low BP.  He stated he would be willing to try increasing again if that is recommended.  I informed him I would talk with you again and let him know.    He has a virtual visit follow up with Jan next week as well.     Saba

## 2021-06-09 NOTE — TELEPHONE ENCOUNTER
Request for Orders    Who s Requesting: Home Care Physical Therapist    Orders being requested: PT 2w3 for strengthening HEP, endurance, transfers, gait, stairs, and balance training    Where to send Orders: please reply ok to this encounter asap

## 2021-06-09 NOTE — PROGRESS NOTES
[facility] Speech Therapy  Clinical Swallow Evaluation and Initial Plan of care    Patient Name: Nixon Velasquez Jr.  Date of evaluation: 3/22/2017  Referral Diagnosis: swallowing dysfunction  Referring provider: Riley Cuevas, *  Visit Diagnosis:     ICD-10-CM    1. Dysphagia, pharyngeal phase R13.13        Assessment:      Patient presents with no signs and symptoms of aspiration with solid texture and nectar thick liquids, but he did have 1 episode of throat clearing/coughing with his initial swallow of puree texture. He consistently had to swallow multiple times to clear the bolus from his throat and he appeared to have to swallow hard. Patient performs at a  level 5 on the KALEE FCM: Swallowing is safe with minimal diet restriction and /or occasionally requires minimal cueing to use compensatory strategies. May occasionally self cue. All nutrition and hydration needs are met by mouth at mealtime.    Patient demonstrated no oral and moderate pharyngeal dysphagia.    Long Term Goals  Pt. tolerate safest, least restrictive diet without clinical signs and symptoms of aspriation by: : by 12 weeks  Short Term Goals  Pt. will perform pharyngeal exercises: 10 times;with minimal cuing;in 8 weeks to improve pharyngeal strength and overall swallow function.  Pt. will follow swallow strategies : independently;in 8 weeks to reduce the risk for aspiration.    Patient goal:  To swallow better  Patient's expectations/goals are realistic.    Rehab potential is good based on prior level of function, evaluation results and motivation and cooperation.     Plan / Patient Instructions:        Plan of Care:   Authorization / Certification Start Date: 03/22/17  Authorization / Certification End Date: 06/20/17  Authorization / Certification Number of Visits: 12  Communication with: Referral Source;Patient Caregiver  Patient Related Instruction: Nature of Condition;Treatment plan and rationale;Swallow Strategies;Aspiration  "Precautions, Risks;Next steps  Times per Week: 2  Number of Weeks: 6  Number of Visits: 12    Plan:  Speech therapy 2 times a week for 6 weeks.    Diet/Swallow Plan: Regular and nectar thick liquids using a chin tuck    Strategies: No Straws, Alternate 1-2 bites, then 1-2 sips, Take small 1/2 - 1 tsp. bites and sips, Sit fully upright for all meals and oral intake, Chin tuck with both solids and liquids, Swallow twice with each bite/ sip    Education:   Patient, Family participated in education regarding procedure and results of clinical swallow assessment, diet recommendations, aspiration risks, how to mix liquids to nectar consistency, how to obtain liquid thickener, swallow strategies, and 3 pharyngeal exercises. He received written hand outs on the swallow strategies and the pharyngeal exercises. The patient performed each pharyngeal ex( effortful swallow, hyoid ex, and tongue hold ex) 3 times with mod cues.  Patient, Family demonstrated understanding the education provided.    Referrals: He might benefit from NMES(Vital stim) therapy for swallow. The speech therapist will first try pharyngeal exercises.       Subjective:      Social information:   Living Situation:single family home and lives with others    Occupation:retired    History of Present Illness:    Nixon is a 87 y.o. male who presents to therapy today with complaints of swallowing difficulties. He states that \"he has to work hard for the food to go down\". He recently had a video swallow xray (3/13/2017) with results showing no oral dysphagia but moderate pharyngeal dysphagia. He had deep laryngeal penetration to vocal folds with both nectar and thin liquids. Use of a chin tuck prior to swallowing greatly reduced laryngeal penetration with nectar thick liquid, but not with thin liquid. Thus, his diet was downgraded to nectar thick liquids and regular food textures. Date of onset/duration of symptoms is 1996 after his stroke.      Pertinent history " includes : stroke (1996) with residual dysphagia, large hiatal hernia, corrosive esophagitis, chronic lymphocytic leukemia, CAD, DMI,Papillary carcinoma of the thyroid gland    Patient presents as alert and cooperative during the session.    Patient reports no pain     Objective:        Examination:    Patient was given puree, nectar and regular solid.    Oral Phase  Dentition: Missing teeth    Oral hygiene: Good    Oral motor function was Grossly intact.Slight deviation to the right side noted for tongue protrusion and elevation.  Bolus prep and oral control was not impaired. Mastication was not impaired and the patient used rotary chewing.    Anterior-Posterior transit was not impaired.    Oral stasis did not occur with any texture tried.    Pharyngeal Phase:    Puree: Patient trialed 3 bites and presented with multiple swallows. Initiation of swallow appeared timely. Hyolaryngeal movement appeared reduced.      Nectar: Patient trialed 3 sips by cup and presented with no s/s of aspiration. Initiation of swallow appeared timely. Hyolaryngeal movement appeared reduced.    Thin:Not tried due to high aspiration risk as witnessed on video swallow xray on 3/13/17.  .    Regular solid: Patient trialed 3 bites by spoon and presented with no s/s of aspiration. Initiation of swallow appeared timely. Hyolaryngeal movement appeared reduced        Interventions Today  Interventions MINUTES   Speech - Language    Cognition    Dysphagia 58   Assistive technology    Voice            Total 58          Stephenie Blackmon MS, CCC-SLP  3/22/2017

## 2021-06-09 NOTE — TELEPHONE ENCOUNTER
Request for orders to continue Skilled Nurse Visits 2x/week for 2 more weeks through 7/11/20 for cardiopulmonary assess, medication management assess/teaching and home safety assess.    Please respond through Epic    Thank you!    Davide COLE RN

## 2021-06-09 NOTE — PROGRESS NOTES
[facility]ology [facility] On[facility] Progress Note    Patient: Nixon Velasquez Jr.  MRN: [id number]  Date of Service: 06/30/20          Reason for Visit    Chief Complaint   Patient presents with     HE Cancer       Assessment and Plan      1. CLL:   Received single agent Rituxan for this with his last dose being August 7, 2017.   Relapse in February 2018 so he started imbruvica.  He did not tolerate.  He was hospitalized twice; once for atrial fibrillation. On hold since approximately March, 2018.  Recent relapse again in April 2020.   Started back on low dose (280 mg) imbruvica in April, 2020 for a couple of weeks but stopped 5/21 for overall failure to thrive - possibly related, but likely multifactorial and more related to atrial fibrillation. He has continued holding over the past 5 weeks..    WBC and ALC are actually trending down even though he's been off the Imbruvica. Rest of counts are stable.    There is a 9% risk of Imbruvica causing atrial fibrillation. The steroids could have also triggered that. The patient's son is quite concerned he has experienced atrial fibrillation both times he's been on Imbruvica (another time a few years ago) and this is contributing.    He is still dealing with his paroxysmal atrial fibrillation and has not completely recovered his overall stamina. Dr. Pennington is recommending resuming the Imbruvica; can consider restarting at 50% dose (140 mg daily) and titrate up from there; however, since his counts are stable/improved and without recurrent hemolytic anemia the patient desires continued close observation at this time.     Return in 3-4 wks with labs and Dr. Pennington.    2.  Weakness, shortness of breath:   3.  Atrial fib with RVR, s/p recent hospitalization  Hospitalized 6/8 - 6/11/20 for symptomatic Afib/RVR, reduced EF and worsening chronic left pleural effusion (moderate). PE and infection (including COVID) ruled out. Underwent left thoracentesis (0.85 liters drained, cytology  shows this to be lymphocytic) and Cardiology started him on amiodarone and metoprolol.    Cardiology follow-up last week, noting paroxysmal atrial fib ongoing. Amiodorone increased and will follow up in one month. Patient opted against anticoagulation. He's felt a bit better in the last 3 days.    3. Autoimmune hemolytic anemia:   Has tapered off prednisone, completing all together 6/21. Hemoglobin stable at 12.8. Follow closely off steroids, recheck in 3-4 weeks.    4. RLL lung nodule  Incidentally noted on CT chest while hospitalized in June - indeterminate. Follow-up CT at return in late July.      ECOG Performance   ECOG Performance Status: 2     Distress Assessment  Distress Assessment Score: No distress:   Pain  Currently in Pain: No/denies:       _____________________________________________________________________________    History of Present Illness      Diagnosis:     1. Chronic lymphocytic leukemia initially diagnosed in 2/2008. 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   2. Thyroid cancer, status post thyroidectomy and radioactive iodine ablation.   3. Prostate cancer diagnosed in 1992.  4. Skin cancers, multiple.  5. 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      Treatment:  Started single agent Rituxan for his CLL on July 18, 2017.  This was initiated for worsening symptomatic anemia.  He received 4 weekly doses.  Completed on August 7, 2017.   relapse of CLL and has started Imbruvica on 2/27/18.  He probably only took it for a couple of weeks and then was back in the hospital and then he was holding it.  He then took it for a couple of days and then started holding it again around March 27, 2018.     Restarted Imbruvica at 280mg daily on 4/25. Took until 5/21. Has been on hold since for fatigue/weakness, atrial fib.     AIHA: Originally got prednisone January 2018.   relapsed in April 2020, restarted prednisone. Tapered down/off.        Interim History:      Nixon returns  for reassessment, now about 3 weeks post-hospitalization for atrial fibrillation. He was started on new meds and was doing better by way of his stamina and dyspnea a few weeks ago, but has had some paroxysmal atrial fib episodes with correlating fatigue and dyspnea. He's felt better just in the past 3 days. His PT/OT at home are on hold, but likely to resume. Hi amiodarone was increased by Cardiology late last week. His imbruvica has been on  Hold about 5 weeks. Tapered off Prednisone completely a few weeks ago.    Pain Status  Currently in Pain: No/denies    Review of Systems  Constitutional  Constitutional (WDL): Exceptions to WDL  Fatigue: Fatigue relieved by rest  Neurosensory  Neurosensory (WDL): All neurosensory elements are within defined limits  Eye   Eye Disorder (WDL): All eye disorder elements are within defined limits  Ear  Ear Disorder (WDL): All ear disorder elements are within defined limits  Cardiovascular  Cardiovascular (WDL): Exceptions to WDL  Edema: No  Pulmonary  Respiratory (WDL): Within Defined Limits  Gastrointestinal  Gastrointestinal (WDL): All gastrointestinal elements are within defined limits  Genitourinary  Genitourinary (WDL): All genitourinary elements are within defined limits  Lymphatic  Lymph (WDL): All lymph disorder elements are within defined limits  Musculoskeletal and Connective Tissue  Musculoskeletal and Connetive Tissue Disorders (WDL): Exceptions to WDL  Arthralgia: Mild pain  Integumentary  Integumentary (WDL): All integumentary elements are within defined limits  Patient Coping  Patient Coping: Accepting;Open/discussion  Distress Assessment  Distress Assessment Score: No distress  Accompanied by  Accompanied by: Family Member (Son)      Past History  Past Medical History:   Diagnosis Date     Acute pulmonary edema (H)      Adenocarcinoma of prostate (H)      Adjustment disorder      Anemia      Autoimmune hemolytic anemia (H)      Cataract     bilateral     Cerebral  atherosclerosis      Cervical radiculopathy      Cholecystitis      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)      Coronary artery disease      Corrosive esophagitis      Dehydration      Diabetes mellitus (H)      DVT (deep vein thrombosis) in pregnancy      Fatigue      Fecal impaction (H)      Foot fracture, right      Generalized weakness      Hiatal hernia      History of transfusion      Hx of papillary thyroid carcinoma      Hypertension      Hypocalcemia      Hypothyroidism      Hypoxia      Infection of ear lobe, right 11/30/2017     Infection of right ear lobe      Inguinal hernia      Iron deficiency      Leukemia (H)      Mixed hyperlipidemia      Neutropenia associated with infection (H)      Paroxysmal atrial fibrillation (H)      PE (pulmonary embolism)      Pleural effusion      Pneumonia      Prostate cancer (H)      Restless legs syndrome      Skin cancer      Squamous cell carcinoma     skin     Stroke (H)      Thrombocytopenia (H)      Thyroid cancer (H)        PHYSICAL EXAM:  /62   Pulse 75   Temp 97.4  F (36.3  C) (Oral)   Wt 204 lb 8 oz (92.8 kg)   SpO2 94%   BMI 24.89 kg/m      GENERAL: no acute distress. Cooperative in conversation. Sitting in wheelchair. Mask on. Son here.  LYMPH: No palpable cervical nor axillary adenopathy.  RESP: Regular respiratory rate. Diminished left lung base, otherwise clear.  HEART: Regular, apical rate x 1 min is 77.  MUSCULOSKELETAL: trace bilateral leg swelling  NEURO: non focal. Alert and oriented x3.   PSYCH: within normal limits. No depression or anxiety.    Lab Results  Lab Standing Order on 06/30/2020   Component Date Value Ref Range Status     Sodium 06/30/2020 139  136 - 145 mmol/L Final     Potassium 06/30/2020 4.4  3.5 - 5.0 mmol/L Final     Chloride 06/30/2020 104  98 - 107 mmol/L Final     CO2 06/30/2020 25  22 - 31 mmol/L Final     Anion Gap, Calculation 06/30/2020 10  5 - 18 mmol/L Final     Glucose 06/30/2020 197* 70 - 125 mg/dL  Final     BUN 06/30/2020 18  8 - 28 mg/dL Final     Creatinine 06/30/2020 1.07  0.70 - 1.30 mg/dL Final     GFR MDRD Af Amer 06/30/2020 >60  >60 mL/min/1.73m2 Final     GFR MDRD Non Af Amer 06/30/2020 >60  >60 mL/min/1.73m2 Final     Bilirubin, Total 06/30/2020 0.6  0.0 - 1.0 mg/dL Final     Calcium 06/30/2020 8.7  8.5 - 10.5 mg/dL Final     Protein, Total 06/30/2020 6.4  6.0 - 8.0 g/dL Final     Albumin 06/30/2020 3.9  3.5 - 5.0 g/dL Final     Alkaline Phosphatase 06/30/2020 72  45 - 120 U/L Final     AST 06/30/2020 12  0 - 40 U/L Final     ALT 06/30/2020 <9  0 - 45 U/L Final     WBC 06/30/2020 13.0* 4.0 - 11.0 thou/uL Final     RBC 06/30/2020 3.82* 4.40 - 6.20 mill/uL Final     Hemoglobin 06/30/2020 12.8* 14.0 - 18.0 g/dL Final     Hematocrit 06/30/2020 40.8  40.0 - 54.0 % Final     MCV 06/30/2020 107* 80 - 100 fL Final     MCH 06/30/2020 33.5  27.0 - 34.0 pg Final     MCHC 06/30/2020 31.4* 32.0 - 36.0 g/dL Final     RDW 06/30/2020 15.5* 11.0 - 14.5 % Final     Platelets 06/30/2020 119* 140 - 440 thou/uL Final     MPV 06/30/2020 11.5  8.5 - 12.5 fL Final     Total Neutrophils % 06/30/2020 22* 50 - 70 % Final     Lymphocytes % 06/30/2020 74* 20 - 40 % Final     Monocytes % 06/30/2020 2  2 - 10 % Final     Eosinophils %  06/30/2020 1  0 - 6 % Final     Basophils % 06/30/2020 1  0 - 2 % Final     Total Neutrophils Absolute 06/30/2020 2.9  2.0 - 7.7 thou/ul Final     Lymphocytes Absolute 06/30/2020 9.6* 0.8 - 4.4 thou/uL Final     Monocytes Absolute 06/30/2020 0.3  0.0 - 0.9 thou/uL Final     Eosinophils Absolute 06/30/2020 0.1  0.0 - 0.4 thou/uL Final     Basophils Absolute 06/30/2020 0.1  0.0 - 0.2 thou/uL Final     Platelet Estimate 06/30/2020 Normal  Normal Final     Ovalocytes 06/30/2020 1+* Negative Final     Polychromasia 06/30/2020 1+* Negative Final   ]  Imaging    Xr Chest 1 View Portable    Result Date: 6/17/2020  EXAM: XR CHEST 1 VIEW PORTABLE LOCATION: [facility] DATE/TIME: 6/17/2020 12:19 PM  INDICATION: Previous left pleural effusion; left chest pain. COMPARISON: 06/08/2020     Heart and mediastinal size are normal. There is a small left pleural effusion, decreased from prior study with left basilar consolidation, also improved. Pulmonary vascular congestion is stable. No acute right lung infiltrate. There is biapical pleural parenchymal scarring. Stable large hiatal hernia.    Xr Chest 2 Views    Result Date: 6/8/2020  EXAM: XR CHEST 2 VIEWS LOCATION: [facility] DATE/TIME: 6/8/2020 4:43 PM INDICATION: sob COMPARISON: 02/20/2020 and older studies. Chest CT 02/13/2018     Poststernotomy changes. Chronic left-sided effusion has increased since January and is now moderate in size. Associated compressive atelectasis. Right lung is clear. Heart and pulmonary vascularity are normal. Large hiatal hernia with partially intrathoracic stomach.    Cta Chest Pe Run    Result Date: 6/8/2020  EXAM: CTA CHEST PE RUN LOCATION: [facility] DATE/TIME: 6/8/2020 7:09 PM INDICATION: PE suspected, intermediate prob, positive D-dimer pe COMPARISON: CT chest, abdomen and pelvis 03/10/2018 TECHNIQUE: CT chest pulmonary angiogram during arterial phase injection of IV contrast. Multiplanar reformats and MIP reconstructions were performed. Dose reduction techniques were used. CONTRAST: Iohexol (Omni) 100 mL FINDINGS: ANGIOGRAM CHEST: Pulmonary arteries are normal caliber and negative for pulmonary emboli. Thoracic aorta is negative for dissection. No CT evidence of right heart strain. LUNGS AND PLEURA: Moderate size left pleural effusion and small right pleural effusion. Atelectasis involving most of the left lower lobe. Subsegmental atelectasis right base posteriorly. Irregular pleural-based nodular density measuring 3.3 x 1.8 cm on image 96 of series 402.2 MEDIASTINUM/AXILLAE: Large hiatal hernia with most of the stomach located in the lower chest. Prior median sternotomy. Numerous borderline enlarged  mediastinal lymph nodes. Several calcified lymph nodes right hilum and subcarinal mediastinum. Coronary artery calcifications. UPPER ABDOMEN: Reflux of contrast material into the IVC and hepatic veins. Several slightly enlarged retroperitoneal and retrocrural lymph nodes. Atherosclerotic disease abdominal aorta. MUSCULOSKELETAL: Hypertrophic changes thoracic spine.     1.  No evidence for pulmonary emboli. 2.  Moderate size left pleural effusion with compressive atelectasis involving most of the left lower lobe. Small right pleural effusion and right basilar atelectasis. 3.  Irregular pleural-based density right lower lobe, indeterminate, and could represent focal atelectasis, loculated pleural fluid or neoplasm. Recommend follow-up CT chest exam in one month or PET scan for further assessment. 4.  Scattered borderline enlarged lymph nodes within the mediastinum, hilar regions, and upper abdomen. 5.  Reflux of contrast material into the IVC and hepatic veins which can be seen with heart failure. 6.  Large esophageal hiatal hernia.    Us Thoracentesis    Result Date: 6/9/2020  EXAM: 1. LEFT THORACENTESIS 2. ULTRASOUND GUIDANCE LOCATION: [facility] DATE/TIME: 6/9/2020 5:12 PM INDICATION: Pleural effusion. PROCEDURE: Informed consent obtained. Time out performed. The chest was prepped and draped in sterile fashion. 8 mL of 1 % lidocaine was infused into the local soft tissues. Under direct ultrasound guidance, a 5 Wallisian catheter system was placed into the  pleural effusion. 0.85 liters of serosanguineous fluid were removed and sent to lab, if requested. Patient tolerated procedure well. Ultrasound imaging was obtained and placed in the patient's permanent medical record.     Status post left ultrasound-guided thoracentesis. Reference CPT Code: 25489      Itzel Stokes CNP      Signed by: Itzel Stokes CNP

## 2021-06-09 NOTE — PROGRESS NOTES
Assessment/Plan:  Nixon was seen today for infection.    Diagnoses and all orders for this visit:    Paronychia of finger of left hand: 87-year-old man with what appears to be a bronchial that is not amenable to drainage.  Given his leukemia as well as its appearance I am recommending antibiotics.  He will apply topical Bactroban 3 times daily.  Keflex 4 times daily ×10 days.  If not improving, close follow-up.  Follow plan was discussed with the patient and his son is present with him in the room.    Return if symptoms worsen or fail to improve.    Roland Raza MD  _______________________________    Chief Complaint   Patient presents with     infection     in left ring finger x 10 days.  Had work done on this finger, Had cancer removed from this hand,     Subjective: Nixon Velasquez Jr. is a 87 y.o. year old male who I have not seen in clinic before who presents with the following acute complaint(s):    Finger infection:   - worsening for the past 10 days   - no fevers   - has been soaking with hydrogen peroxide   -No previous episodes   -No drainage   -The patient has a history of CLL with white counts that have been increasing.  He also has some type of globus sensation and underwent endoscopy yesterday but has not received these results yet.   -No palliative measures other than soaking it    ROS: Complete review of systems obtained.  Pertinent items are listed above.     The following portions of the patient's history were reviewed and updated as appropriate: allergies, current medications, past medical history and problem list.    Objective:    weight is 181 lb 4.8 oz (82.2 kg). His oral temperature is 97.7  F (36.5  C). His blood pressure is 138/66 and his pulse is 85. His respiration is 20 and oxygen saturation is 96%.   General: No acute distress  Skin: Left ring finger has a area of erythema with mild induration of the lateral aspect of the nailbed..  He is able to move his finger normal in all respects.   Tender palpation.  No obvious accumulation of pus amenable to drainage.    Most recent white count was 250 K    This note has been dictated using voice recognition software. Any grammatical or context distortions are unintentional and inherent to the software

## 2021-06-09 NOTE — TELEPHONE ENCOUNTER
----- Message from Vanessa Solo, RN sent at 7/21/2020  1:59 PM CDT -----  Joanne Barnes can you please follow up with this patient?  Thanks!  Carolyn  ----- Message -----  From: Patriciase Glendy  Sent: 7/21/2020   1:46 PM CDT  To: [facility] Ep Rn Support Pool    General phone call:  DAUGHTER CALLING, SHE WOULD LIKE TO SPEAK WITH GINETTE AGAIN,   SHE WANTS TO UPDATE GINETTE,   PLEASE CALL    Caller: LYDIA KRISHNA  Primary cardiologist: ALONSO SOSAREMA  Detailed reason for call: SEE ABOVE  New or active symptoms? YES, HOSPITAL  Best phone number: [phone]  Best time to contact: ANY TIME  Ok to leave a detailed message? YES  Device? NO    Additional Info:

## 2021-06-09 NOTE — PROGRESS NOTES
Over 25 minutes spent, greater than 50% was counseling regarding following issues:    1. Swallowing dysfunction  Presenting problem to seek healthcare recently was difficulty swallowing.  Initial endoscopy done and reviewed.  Recommendation was to consider swallow evaluation/video swallow study.  This will be ordered today.  Focus of evaluation by [facility] after this switched from this problem to the inability to pass the scope beyond the hiatal hernia and difficult order to a tapering duodenum, duodenal wall thickening and lymphadenopathy seen on subsequent CTs.  Weight here to sort this all out.  - SLP video swallow study Eval & Treat; Future    2. Weight loss  Over the last few months, related to process in abdomen/possible lymphoma    3. Abnormal abdominal CT scan  As above, we are waiting to hear from GI regarding results of recent CT scan.  This is still pending    4. CLL (chronic lymphocytic leukemia)  Follows with Dr. Pennington    5. Adjustment disorder  Patient under a lot of stress due to wife's progressive dementia in his difficulty/stress in being her caregiver.  She has some behavioral issues now with obsessions and compulsions.  Discussed that we will plan on referring her to memory care in search of support for the situation

## 2021-06-09 NOTE — PROGRESS NOTES
HOME HEALTH MISSED VISIT NOTIFICATION (FYI)     Your patient who receives home health services missed a visit on: 7/21/20    Type of service missed: Physical therapy    Reason for missed visit (pick applicable reason):          Patient/Family refused (explain): Pt refused visit as he was still recovering from previous weekend and ex. Pt requested to miss this visit.     Provider(s) to be notified: Riley Cuevas MD, Tapan Pennington MD, Supervisng PT

## 2021-06-09 NOTE — TELEPHONE ENCOUNTER
There is two different calls on this encounter. I will complete this message and create a new one for Dr. Cuevas.

## 2021-06-09 NOTE — PROGRESS NOTES
[facility] [place] IN-OFFICE Visit    Chief Complaint:  Chief Complaint   Patient presents with     Hospital Visit Follow Up     7/2/20-7/5/20 fatigue SOB     Labs Only     thyroid,         Assessment/Plan:  1. Acute on chronic systolic heart failure (H)  Stable now that he is on lasix 20mg daily.  Ongoing polyuria but not too bad at this point.  No swelling and weight is stable.  F/u with CHF NP as scheduled later this month.  Labs as requested today. Continue b-blocker, plavix.      2. Benign essential HTN  BP Readings from Last 3 Encounters:   07/09/20 111/74   07/08/20 114/70   07/05/20 115/71       well controlled today.  Plan for bloodpressure management includes ongoing focus on healthy DASH type diet and increased activity, encouraged to avoid tobacco products and limit alcohol use, stress reduction, continue metoprolol 12.5mg daily.  Will check bp daily at home and review with Home RN. If SBP consistently 140 or higher okay to increase back to 12.5mg two times a day.  Labwork and meds ordered and reviewed as below  Lab Results   Component Value Date    K 4.5 07/09/2020      Lab Results   Component Value Date    CREATININE 1.04 07/09/2020         3. Hypothyroidism, unspecified type  TSH has been checked twice in past month once normal and once borderline high.  Amiodarone dose decreasing.  One check may have been under stressful situation.  contiue 150mcg daily - goal TSH 1.0-2.5.  Recheck again later this month per cardiology.  F/u with PCP next month virtually to review this further and agree on dose adjustment as needed.      4. Paroxysmal atrial fibrillation (H)  On amiodarone at lower dose- will be checking level prior to cardiology f/u as scheduled.  Heart rate and bp remain stable on 12.5mg metoprolol daily and decreased dosing of amiodarone.   - amiodarone (PACERONE) 200 MG tablet; Take 1 tablet (200 mg total) by mouth daily.  Dispense: 110 tablet; Refill: 0    5. Type 2 diabetes  "mellitus without complication, without long-term current use of insulin (H)   pt didn't know he had diabetes.  Reviewed this with pt based on A1c of 6.8 and hyperglycemia in hospital setting pt has been on and off prednisone for his CLL - recently weaned.  No need for meds but will need to follow a1c every 4-6 months and treat as needed.  Anticipate and treat hyperglycemia in acute care settings.      6. 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 Thrombocytopenia (H)  Stable- continue to monitor as part of CLL    8. Hospital discharge follow-up  Reviewed as above.     9. CLL (chronic lymphocytic leukemia) (H)  - HM1(CBC and Differential)  - Comprehensive Metabolic Panel  - LD(LDH)  - HM1 (CBC with Diff)  - Manual Differential      Return in about 6 weeks (around 8/20/2020) for Virtual Visit.    Patient Education/AVS:  There are no Patient Instructions on file for this visit.    HPI:   Nixon WHIPPLE Eva Grier is a 89 y.o. male c/o hospital f/u 7/2-7/5/20 as below.    TAKING metoprolol 25mg daily, amiodarone 200mg daily.  Home RN did check bp yesterday and lower in right arm.  Using a walker and able to walk from living room through kitchen and dining room- yesterday did 6 loops in a row and was puffing at the end and notes next time he will stop at 5 loops.  Wears a pullup/depends due to urinary incontinence- yesterday needed 4 depends and usually uses 3-4/day.  Seems like he is peeing more with new lasix.  Not drinking as much- milk and pop with each meal, ice water by bed all night long.  popsicles and jello for snacks.  Daughter notes he is doing much better with fluids.  No constipation but gets a \"blow out\" every once in a while since he can't always make it to the bathroom in time to get pants and depends down.      Checked bp yesterday at home in evening after RN left  Right sitting 160/80  Right standing 120/74  Left sitting 134/70  Left standing " 120/68    Yesterday was more tired with left shoulder pain.      ROS:  Constitutional, ENT, CV, Resp, GI, , MSK, skin, neuro, psych all negative except as outlined in the HPI above and patient denies any other symptoms.      History summarized from1-2:d/c summary and hosptital records from 7/2/20-7/5/20.  Admitted for MACEDO.  Found to have CLL in relapse with Acute CHF exacerbation.  Improved sx on iv lasix and home on 20mg lasix daily.  Paroxysmal afib on metoprolol reduced dose to 12.5mg (use to be two times a day) and amiodarone held due to low bp- may need to restart on f/u.  F/u with cardiology in 1 month scheduled.  Stable left pleural effusion.  On plavix and b-blocer for known CAD s/p cabg.  Heme onc weaned off prednisone earlier mid June for CLL- no active tx.  DM- diet controlled with a1c 6.9.  Getting home care with pt/ot.    Old Records-1: Outside allergies, meds, problems and immunizations were reconciled as needed  Radiology tests reviewed-1: CXR and chest CT reviewed  Lab tests reviewed-1: 7/2020  Medicine tests reviewed-1: EKG- a flutter with variable av block.      Health Maintenance reviewed and ordered as appropriate as part of shared decision making with patient.     Social History     Tobacco Use   Smoking Status Never Smoker   Smokeless Tobacco Never Used     Social History     Substance and Sexual Activity   Sexual Activity Never     Social History     Social History Narrative     Not on file       Physical Exam:  /74 (Patient Site: Left Arm, Patient Position: Sitting, Cuff Size: Adult Regular)   Pulse 81   Temp 97.8  F (36.6  C) (Oral)   Wt 188 lb (85.3 kg)   SpO2 97%   BMI 22.88 kg/m   Body mass index is 22.88 kg/m . No LMP for male patient.  Vital signs reviewed  Wt Readings from Last 3 Encounters:   07/09/20 188 lb (85.3 kg)   07/05/20 191 lb (86.6 kg)   06/30/20 204 lb 8 oz (92.8 kg)     No data recorded  No data recorded  PHQ-2 Total Score: 1 (6/16/2020  9:55 AM)    No data  recorded    All normal as below except abnormalities include: pt appears well- sitting in wheel chair. Accompanied by his daughter today.  Alert and appropriately interactive.  Asking good questions and able to answer history well.  Does appear tired and winded at times and does refer to daughter to answer questions.  No swelling.  Heart rate normal but irregular.  Decreased breath sounds on left compared to right.  No wheezing.  Moving air well right lung base.   General is a  89 y.o. male sitting comfortably in no apparent distress.   HEENT:  Eye exams within normal   Neck: Supple without lymphadenopathy or thyromegally  Abd:  +BS, soft NT/ND,  No masses or organomegally  Extremities: Warm, No Edema, 2+ Pedal and radial pulses bilaterally  Skin: No lesions or rashes noted      Results for orders placed or performed in visit on 07/09/20   Comprehensive Metabolic Panel   Result Value Ref Range    Sodium 139 136 - 145 mmol/L    Potassium 4.5 3.5 - 5.0 mmol/L    Chloride 103 98 - 107 mmol/L    CO2 26 22 - 31 mmol/L    Anion Gap, Calculation 10 5 - 18 mmol/L    Glucose 153 (H) 70 - 125 mg/dL    BUN 16 8 - 28 mg/dL    Creatinine 1.04 0.70 - 1.30 mg/dL    GFR MDRD Af Amer >60 >60 mL/min/1.73m2    GFR MDRD Non Af Amer >60 >60 mL/min/1.73m2    Bilirubin, Total 0.7 0.0 - 1.0 mg/dL    Calcium 8.8 8.5 - 10.5 mg/dL    Protein, Total 6.0 6.0 - 8.0 g/dL    Albumin 3.9 3.5 - 5.0 g/dL    Alkaline Phosphatase 73 45 - 120 U/L    AST 14 0 - 40 U/L    ALT 10 0 - 45 U/L   LD(LDH)   Result Value Ref Range    LD (LDH) 136 125 - 220 U/L   HM1 (CBC with Diff)   Result Value Ref Range    WBC 9.1 4.0 - 11.0 thou/uL    RBC 3.69 (L) 4.40 - 6.20 mill/uL    Hemoglobin 12.5 (L) 14.0 - 18.0 g/dL    Hematocrit 39.2 (L) 40.0 - 54.0 %     (H) 80 - 100 fL    MCH 33.9 27.0 - 34.0 pg    MCHC 31.9 (L) 32.0 - 36.0 g/dL    RDW 16.0 (H) 11.0 - 14.5 %    Platelets 120 (L) 140 - 440 thou/uL    MPV 12.4 8.5 - 12.5 fL   Manual Differential   Result Value  Ref Range    Total Neutrophils % 25 (L) 50 - 70 %    Lymphocytes % 73 (H) 20 - 40 %    Monocytes % 2 2 - 10 %    Eosinophils %  0 0 - 6 %    Basophils % 0 0 - 2 %    Total Neutrophils Absolute 2.3 2.0 - 7.7 thou/ul    Lymphocytes Absolute 6.6 (H) 0.8 - 4.4 thou/uL    Monocytes Absolute 0.2 0.0 - 0.9 thou/uL    Eosinophils Absolute 0.0 0.0 - 0.4 thou/uL    Basophils Absolute 0.0 0.0 - 0.2 thou/uL    Smudge Cells Present (!) None Seen    Platelet Estimate Decreased (!) Normal    Ovalocytes 1+ (!) Negative       Med list and active problem list reviewed and updated as part of this encounter    Current Outpatient Medications on File Prior to Visit   Medication Sig Dispense Refill     acetaminophen (TYLENOL) 500 MG tablet Take 1,000 mg by mouth every 6 (six) hours as needed for pain.       allopurinoL (ZYLOPRIM) 300 MG tablet Take 300 mg by mouth daily.       atorvastatin (LIPITOR) 20 MG tablet Take 1 tablet (20 mg total) by mouth at bedtime. 90 tablet 3     calcium, as carbonate, (TUMS) 200 mg calcium (500 mg) chewable tablet Chew 1 tablet 4 (four) times a day as needed for heartburn.       clopidogreL (PLAVIX) 75 mg tablet Take 75 mg by mouth daily.       diclofenac sodium (VOLTAREN) 1 % Gel Apply topically 4 (four) times a day as needed. Joint pain       escitalopram oxalate (LEXAPRO) 5 MG tablet Take 5 mg by mouth daily.       folic acid (FOLVITE) 1 MG tablet Take 1 mg by mouth at bedtime.       furosemide (LASIX) 20 MG tablet Take 1 tablet (20 mg total) by mouth daily. 30 tablet 0     ketoconazole (NIZORAL) 2 % cream Apply 1 application topically 2 (two) times a day as needed. Skin folds rash       levothyroxine (SYNTHROID, LEVOTHROID) 150 MCG tablet Take 150 mcg by mouth Daily at 6:00 am.       metoprolol tartrate (LOPRESSOR) 25 MG tablet Take 0.5 tablets (12.5 mg total) by mouth daily. 30 tablet 0     omeprazole (PRILOSEC) 40 MG capsule Take 40 mg by mouth 2 (two) times a day before meals.       No current  facility-administered medications on file prior to visit.          Ramonita Ferrer MD

## 2021-06-09 NOTE — PROGRESS NOTES
HOME HEALTH MISSED VISIT NOTIFICATION (FYI)     Your patient who receives home health services missed a visit on: 6/24/2020       Type of service missed:Home Health Aide     Reason for missed visit (pick applicable reason):           Other (explain): Patient refuse hha at the door He shower himself earlyier before HHA visit    Provider(s) to be notified:  (if NOT in our system)    This request is sent as an inbasket message to p hh his,   , and provider if in our system.   Riley Cuevas MD; Namrata Spangler RN;

## 2021-06-09 NOTE — TELEPHONE ENCOUNTER
A few things-  No call back number here-I would be okay talking with the daughter but would need phone number and would prefer to do this tomorrow since it seems like it will take quite a bit of time    PCPs dont go to the hospital anymore- I dont have privileges  We definitely rely on specialist in the hospital doctors to make diagnosis and recommend treatment courses.    The decision about transitional care is often made by a number of different people at the same time.  Yes, in general I would recommend physical therapy if there is concern about safety and strength-because it leads to better outcomes.  So I would support the multidisciplinary team that made this recommendation    Certainly there are times when recommendations go out of the range of patient's wishes for scope of care.  That usually in the realm of surgery/chemotherapy etc.  I would be happy to discuss those.    I do not think however that I would second-guess a cancer doctors diagnosis of lung cancer  I do not think it would second-guess hospitals recommendation for physical therapy

## 2021-06-09 NOTE — TELEPHONE ENCOUNTER
Hi,  I saw pt for hospital f/u last week.    Discharge summary recommended f/u with irregular RLL density, to get chest   CT or PET scan in 1 month.    SAUL Lozada

## 2021-06-09 NOTE — PROGRESS NOTES
3 days diarrhea,   Wt loss.  Sore throat.  No fever.  No blood.  No melena.   It blew out of me.  No contacts with similar.  Is better in last few hours.  Imodium at 10 am.  No stools in 24 hours.  Four stools in prior 24 hours.  Soup last night and toast this morning.  Stayed down.  Poor sleep due to lump sensation in throat.  Both with solids and liquids.  Is having lots of gas.    Some wobbly and weak.    CLL.  Recent labs stable    Had stroke years ago.  No lump sensation.    Hx thyroid cancer.  It's gone.    ROS: as noted above    OBJECTIVE:   Vitals:    02/20/17 0924   BP: 124/70   Temp: 97  F (36.1  C)    frail  oriented  Head: atraumatic   Eyes: nl eom, anicteric   Ears: nl external ears   Neck: nl nodes, supple   Lungs: clear to ausc   Heart: regular rhythm  Back: no tenderness  Abd: soft nontender   Joints: uninflamed   Ext: nontender calves   Mental: euthymic  Neuro: general weakness  Gait: slow needing some guidance assist    Labs reviewed    ASSESSMENT/PLAN:    Additional diagnoses and related orders:  1. Sore throat  Rapid Strep A Screen-Throat    Group A Strep, RNA Direct Detection, Throat   2. Oropharyngeal dysphagia  Ambulatory referral for Upper GI Endoscopy   3. Viral syndrome     4. CLL (chronic lymphocytic leukemia)       Viral gi/ expect continued resolution.  Replenish nutritionally  cll stable/ per heme

## 2021-06-09 NOTE — PROGRESS NOTES
Home PT eval completed on 7/12/2020.  PATIENT GOAL: walk without walker, improve endurance, eventually wants [place] so [place] can go fishing again  REASON FOR THIS EPISODE: Most recently hospitalized 7/2-7/5 with dyspnea on exertion and increasing weakness likely related to acute heart failure exacerbation. He has had recurrent hospital stays over the last few months for a-fib, pleural effu  musa, and heart failure related concerns.  PERTINENT MEDICAL HISTORY: chronic lymphoid leukemia in relapse, hyperlipidemia, hypothyroidism, CAD, a-fib, heart failure, diabetes, restless leg lsyndrome, mild aortic stenosis  PRECAUTIONS/SAFETY: fall risk  PRIOR LEVEL OF FUNCTION: pt reports initially did not use a walker prior to onset of recent recurrent hospitalizations in April/May but has been using it consistently for the last month   or so now  CURRENT LEVEL OF FUNCTION: Pt completed TUG in 22 seconds (increased fall risk). He is using 3-wheeled walker at all times with ambulation now but would like to get back to walking without AD more regularly once his strength and balance improves.  Pt tolerated ~200' ambulation during eval before requiring seated rest break to recover breathing (saO2 98%, HR 75). He transfers in/out of elevated recliner chair with supervision   and cues for hand placement. Required Oswald to stand from standard height kitchen chair without armrests.  Bed transfer with mod I using bed rail and grab bar on wall.  Pt has weakness of both LEs and is motivated to participate in physical therapy to help progress his strength, endurance, and balance.   HOME ENVIRONMENT: Lives with son in multi-level home but able to stay on main level. 3 stairs to enter through back door.  ASSISTANCE A  VAILABLE: son Jean-Claude lives with patient, daughter Namrata lives down the block  MULTIDISCIPLINARY PLAN/FOLLOW UP NEEDS:   PT: 2w3 for strengthening HEP, endurance, gait, stairs,  transfers, and balance training

## 2021-06-09 NOTE — PROGRESS NOTES
Clinic Care Coordination Contact  Care Team Conversations     SW contacted Nixon to complete an assessment for CCC. Spoke with Nixon and his children on speaker phone. Currently Nixon has all needs met, no CCC concerns. He has Home Care coming to his house regularly. KAYLYNN will message HomeCare SW just as FYMARIANN. Explained if they need CCC in the future, they can reach CHW or discuss with PCP.

## 2021-06-09 NOTE — TELEPHONE ENCOUNTER
Who is calling:    Daughter    Reason for Call:    Wants PCP review patient chart do to recent diagnosis of lung cancer    Caller wants to make sure her dads wishes are being respected, such physical therapy verse no PT?    Not sure is all the other stuff such as transitional care and PT is necessary for a good outcome, or can he be discharged home?    Date of last appointment with primary care: 04/15/2020    Okay to leave a detailed message: Yes    Requesting PCP visit patient in the hospital and discuss an appropriate treatment plan.

## 2021-06-09 NOTE — TELEPHONE ENCOUNTER
Physician response required within 24 hours:    The following contraindicated drug combination was identified at home physical therapy recertification visit on 7/16/2020:  Drug-Drug: amiodarone and escitalopram oxalate   The concurrent use of amiodarone with other agents that prolong the QTc interval may result in potentially life-threatening cardiac arrhythmias, including torsades de pointes.    Please respond ASAP with any recommended changes or ok to continue current medication regimen.  Thank you!    Jeannine Barr, PT

## 2021-06-09 NOTE — TELEPHONE ENCOUNTER
"Namrata calls in on her father's behalf stating that Nixon wanted Dr. Pennington to be aware that he is in the hospital at [facility].  He is on P3.  Namrata reports that his respirations were in the 40s when they brought him in on Tuesday 7/21.  She said since June this is the third time he has been in the hospital.  She reports he does good for a stent of time and then takes a turn for the worse.  She tells me that they pulled off 1.1 L of fluid from around his lungs.  They are sending this fluid for medical cytology and checking for cancer cells.  Per Namrata, they are looking for cancer since he had an abnormal scan back in June.  There is a 1 month follow-up CT order in the system to follow-up on this.  She tells me that he has told 4 of his children that \"I don't think I can fight another cancer.\"  Nixon just wanted to make sure that Dr Pennington knew and that he is up to date on what is happening.    Hien Gill RN   "

## 2021-06-09 NOTE — TELEPHONE ENCOUNTER
----- Message from Vandana Martell sent at 7/2/2020  8:42 AM CDT -----      Caller: daughter Cha  Primary cardiologist: Hero العراقي    Detailed reason for call: Patient's daughter is calling because she stated that her dad isn't doing as well. He isn't feeling well and not drinking as much. Please advise    Best phone number: Daughter [phone]  Best time to contact: today  Ok to leave a detailed message? yes  Device? No

## 2021-06-09 NOTE — TELEPHONE ENCOUNTER
----- Message from Glendy Cook sent at 7/20/2020 10:12 AM CDT -----  General phone call:  DAUGHTER CALLING  STATES PATIENT IS GOING DOWN HILL, HEART RATE LOW, FATIGUE, shortness of breath, EXTREME WEAKNESS, RESPIRATIONS ARE LOW, APPETITE DOWN, FLUID INTAKE IS DOWN, PLEASE CALL    Caller: LYDIA KRISHNA  Primary cardiologist: ALONSO ROBERSON  Detailed reason for call: SEE ABOVE  New or active symptoms? YES, SEE ABOVE  Best phone number: [phone]  Best time to contact: ANY TIME  Ok to leave a detailed message? YES  Device? NO    Additional Info:

## 2021-06-09 NOTE — TELEPHONE ENCOUNTER
"Switch apmt to in clinic with Jan if patient is ok with it. Take an extra dose of metoprolol tonight. I will forward for Jan to address. Long term if he is not going to have cardioversion he does NOT need to be on amiodarone.     If he is on amiodarone for eventual CV, needs AC.  Noted from Jan \"on Plavix and contraindication to anticoagulation with spontaneous bruising on Plavix, history of falls and CLL. \"  "

## 2021-06-09 NOTE — PROGRESS NOTES
TCM DISCHARGE FOLLOW UP CALL    Discharge Date:  7/5/2020  Reason for hospital stay (discharge diagnosis)::  MACEDO  Are you feeling better, the same or worse since your discharge?:  Patient is feeling better (Denies SOB, LE edema, PND, orthopnea, palpitations, or chest pain. Pt doesn't always weigh daily. Son cooks with some salt.)  Do you feel like you have a plan in the event of a health emergency?: Yes (son)    As part of your discharge plan, were  home care services ordered for you?: Yes    Have you seen them yet, or are they scheduled to visit?: Yes    Do you have any follow up visits scheduled with your PCP or Specialist?:  Yes, with PCP  (RN) Is PCP appt scheduled soon enough (within 14 days of discharge date)?: Yes (7/9 OV)    RN NOTES::  Declined enrolling in [employer] tracker for CHF tele-monitoring.   Instructed pt and son that pt needs to avoid high sodium foods and limit salt use. He and son chuckled, not committed to change.

## 2021-06-09 NOTE — PROGRESS NOTES
NATHALIE   Pt's son Jean-Claude informed therapist that one of patient's other sons tested positive for COVID-19 today (7/16/2020).   Jean-Claude reports neither he nor patient have been in contact with this son in the last 10-12 days. Pt and Jean-Claude are not exhibiting any current symptoms (no fever, no cough, no new SOB).  PT followed up with leadership to discuss case.  Since pt is aymptomatic and has not had recent contact with this son, [facility] and follow standard precautions of wearing mask and face shield during visits.  Called son back and informed him of this; also educated son to monitor for symptoms, check his and patient's temp daily, and call [facility] with any changes in status or onset of symptoms.  Son verbalized understanding and agreeable with this plan.    Please let me know if you have any questions,  Jeannine Barr, PT

## 2021-06-10 NOTE — PROGRESS NOTES
[facility] Hematology and Oncology Progress Note    Patient: Nixon Velasquez Jr.  MRN: [id number]  Date of Service: 04/14/2017        Reason for Visit    Chief Complaint   Patient presents with     CLL (chronic lymphocytic leukemia) - follow up       Assessment and Plan      1. CLL: His white count has been stable for about 3 months. Hemoglobin and Platelet counts are slightly lower than previous. Symptomatically he is the same. We will continue observation for now. He will return in 3 months with labs.Will start treatment with a platelet count of 80, or if he develops neutropenia. Treatment would likely consist of single agent Rituxan. Possibly bendamustine and Rituxan or ibrutinib.    2. Leukocytosis, anemia, thrombocytopenia: from disease.     ECOG Performance   ECOG Performance Status: 1     Distress Assessment  Distress Assessment Score: No distress    Pain  Currently in Pain: No/denies      Problem List    1. CLL (chronic lymphocytic leukemia)  HM1(CBC and Differential)    Comprehensive Metabolic Panel   2. Anemia, chronic disease     3. Thrombocytopenia     4. Leukocytosis        ______________________________________________________________________________    History of Present Illness      Diagnosis:     1. Chronic lymphocytic leukemia initially diagnosed in 2/2008. 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   2. Thyroid cancer, status post thyroidectomy and radioactive iodine ablation.   3. Prostate cancer diagnosed in 1992.  4. Skin cancers, multiple.     Treatment:     He has been observed for CLL with no treatment.   He is on Synthroid for his thyroidectomy.     Interim History:      Nixon is here today for a follow-up visit. He is doing fine. No changes to his health for the most part. Denies drenching night sweats. Hasn't noticed any lymph nodes.     Pain Status  Currently in Pain: No/denies    Review of Systems    Constitutional  Constitutional (WDL): Exceptions to WDL  Fatigue: Fatigue relieved  by rest (Getting better.)  Weight Loss: to <10% from baseline, intervention not indicated (Down 4 lbs since January. )  Neurosensory  Neurosensory (WDL): All neurosensory elements are within defined limits  Eye   Eye Disorder (WDL): All eye disorder elements are within defined limits  Ear  Ear Disorder (WDL): All ear disorder elements are within defined limits  Cardiovascular  Cardiovascular (WDL): All cardiovascular elements are within defined limits  Pulmonary  Respiratory (WDL): Within Defined Limits  Gastrointestinal  Gastrointestinal (WDL): Exceptions to WDL  Dysphagia: Symptomatic, able to eat regular diet (Some difficulty due h/o stroke. Better. Under control now.)  Genitourinary  Genitourinary (WDL): All genitourinary elements are within defined limits  Lymphatic  Lymph (WDL): All lymph disorder elements are within defined limits  Musculoskeletal and Connective Tissue  Musculoskeletal and Connetive Tissue Disorders (WDL): All Musculoskeletal and Connetive Tissue Disorder elements are within defined limits  Integumentary  Integumentary (WDL): All integumentary elements are within defined limits  Patient Coping  Patient Coping: Accepting  Distress Assessment  Distress Assessment Score: No distress  Accompanied by  Accompanied by: Family Member  Oral Chemo Adherence       Past History  Past Medical History:   Diagnosis Date     Cataract     bilateral     Foot fracture, right      History of transfusion      Hypertension      Inguinal hernia      Leukemia      PE (pulmonary embolism)      Pneumonia      Prostate cancer      Skin cancer      Thyroid cancer        PHYSICAL EXAM:  /70  Pulse 66  Temp 97.5  F (36.4  C) (Oral)   Wt 182 lb 4.8 oz (82.7 kg)  SpO2 96%  BMI 22.79 kg/m2  GENERAL: no acute distress. Cooperative in conversation. Here with daughter.  HEENT: pupils are equal, round and reactive. Oromucosa is clean and intact. No ulcerations or mucositis noted. No bleeding noted.  RESP: lungs are  clear bilaterally per auscultation. Regular respiratory rate. No wheezes or rhonchi.  CV: Regular, rate and rhythm. Slight systolic murmurs.  ABD: soft, nontender. Positive bowel sounds. No organomegaly.   MUSCULOSKELETAL: No lower extremity swelling.   NEURO: non focal. Alert and oriented x3.   PSYCH: within normal limits. No depression or anxiety.  SKIN: warm dry intact   LYMPH: mild bilateral cervical nodes, no supraclavicular or axillary lymphadenopathy        Lab Results    Recent Results (from the past 168 hour(s))   Comprehensive Metabolic Panel   Result Value Ref Range    Sodium 141 136 - 145 mmol/L    Potassium 4.2 3.5 - 5.0 mmol/L    Chloride 107 98 - 107 mmol/L    CO2 26 22 - 31 mmol/L    Anion Gap, Calculation 8 5 - 18 mmol/L    Glucose 104 70 - 125 mg/dL    BUN 25 8 - 28 mg/dL    Creatinine 1.12 0.70 - 1.30 mg/dL    GFR MDRD Af Amer >60 >60 mL/min/1.73m2    GFR MDRD Non Af Amer >60 >60 mL/min/1.73m2    Bilirubin, Total 0.5 0.0 - 1.0 mg/dL    Calcium 9.2 8.5 - 10.5 mg/dL    Protein, Total 6.5 6.0 - 8.0 g/dL    Albumin 4.2 3.5 - 5.0 g/dL    Alkaline Phosphatase 59 45 - 120 U/L    AST 18 0 - 40 U/L    ALT 8 0 - 45 U/L   HM1 (CBC with Diff)   Result Value Ref Range    .1 (HH) 4.0 - 11.0 thou/uL    RBC 3.27 (L) 4.40 - 6.20 mill/uL    Hemoglobin 10.2 (L) 14.0 - 18.0 g/dL    Hematocrit 35.8 (L) 40.0 - 54.0 %     (H) 80 - 100 fL    MCH 31.2 27.0 - 34.0 pg    MCHC 28.5 (L) 32.0 - 36.0 g/dL    RDW  11.0 - 14.5 %    Platelets 86 (L) 140 - 440 thou/uL    MPV 11.4 8.5 - 12.5 fL   Manual Differential   Result Value Ref Range    Total Neutrophils % 1 (L) 50 - 70 %    Lymphocytes % 99 (H) 20 - 40 %    Monocytes % 0 (L) 2 - 10 %    Eosinophils %  0 0 - 6 %    Basophils % 0 0 - 2 %    Total Neutrophils Absolute 2.4 2.0 - 7.7 thou/ul    Lymphocytes Absolute 240.7 (H) 0.8 - 4.4 thou/uL    Monocytes Absolute 0.0 0.0 - 0.9 thou/uL    Eosinophils Absolute 0.0 0.0 - 0.4 thou/uL    Basophils Absolute 0.0 0.0 - 0.2  thou/uL    Manual nRBC per 100 Cells 0 0-<1    Platelet Estimate Decreased (!) Normal    Ovalocytes 1+ (!) Negative    Polychromasia 1+ (!) Negative       Imaging    No results found.      Signed by: Vy Huerta, CNP

## 2021-06-10 NOTE — TELEPHONE ENCOUNTER
FYI - Status Update  Who is Calling: [facility]  Update: Calling to inform Care Team that patient passed away this morning, 7/30/20   10:16 AM    Okay to leave a detailed message?:  No return call needed

## 2021-06-10 NOTE — PROGRESS NOTES
[facility]  Discharge Summary    Patient Name: Nixon Velasquez Jr.  Date: 5/15/2017  Referral Diagnosis: swallowing dysfunction  Referring provider: Riley Cuevas, *  Visit Diagnosis:     ICD-10-CM    1. Dysphagia, pharyngeal phase R13.13    2. Swallowing dysfunction R13.10          Assessment:   Patient demonstrates understanding/independence with home program. Pt has met his speech therapy goals and he requested that speech therapy be discontinued. He reports that he is swallowing better at home and following the swallow strategies. Patient knows the pharyngeal exs to continue at home if he wishes. He is presently on regular foods and nectar thick liquids.    Goal Status:   Pt. will perform pharyngeal exs 10 times each with min cues in 8 weeks. (Met)  Pt will follow swallow strategies independently in 8 weeks(Met)          Plan / Patient Education:     Initial plan of care has been completed.   Patient has responded appropriately to skilled Speech Therapy intervention.  No further therapy is required at this time.  The patient will initiate contact if questions or concerns arise.      Stephenie Blackmon MS, CCC-SLP  5/15/2017

## 2021-06-11 NOTE — PROGRESS NOTES
Pt here ambulatory with daughter for first Rituxan txt. Reviewed txt with pt and possiblity of reaction and what to watch out for and report if any symptoms occur during infusion. Reviewed duration with pt. Iv placed with brisk blood return/smooth ns flush. Tylenol po given and iv benadryl given. Rituxan infused starting rate of 50 ml per hour and increased every 30 minutes by 50 ml to max rate of 400 ml per hour. At 100ml per hour pt states feels flushed and nauseated. Stopped rituxan and normal saline infusing at 100ml per hour. Dr Pennington ordered dexamethasone 12mg and this was given . Pt reports feeling much better and side effects of nausea and flushing are gone. Restarted rituxan at 50 ml and titrated infusion rate by 50 ml each 30 minutes to max rated of 400 ml per hour.  Upon completion of txt/ normals saline flush to iv tubing/iv dc'd with pressure dressing to site PT reports no further symptoms from treatment and feeling pretty good. Follow up reviewed and pt dc'd steady gait with daughter.

## 2021-06-11 NOTE — PROGRESS NOTES
[facility] Hematology and Oncology Progress Note    Patient: Nixon Velasquez Jr.  MRN: [id number]  Date of Service: July 14, 2014      Assessment and Plan:    1. CLL: Labs from today were reviewed.  His hemoglobin is slowly dropping.  Now on the ninth.  Platelet count also continues to drop.  He is feeling more fatigued this summer and doing less.  Given all these findings are going to treat him.  Given his frailty and age and going to use single agent Rituxan.  Weekly ×4 doses.  He will start next week.  He will be seen in clinic weeks 2 and 4.  I am going to give him allopurinol for tumor lysis prophylaxis.  I also encouraged him to stay well hydrated.  We reviewed some of the more common side effects of Rituxan including infusion reactions, fatigue, shortness of breath.  Consent form was signed.  He was given written information on the medication to take home and review.  Questions were answered.    2. Prophylaxis: He received a Pneumovax in mid 2016.    ECOG Performance   ECOG Performance Status: 2    Distress Assessment  Distress Assessment Score: 5 (wife has alzheimers and it is hard)    Pain  Currently in Pain: No/denies    Diagnosis:    1. Chronic lymphocytic leukemia initially diagnosed in 2/2008. 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   2. Thyroid cancer, status post thyroidectomy and radioactive iodine ablation.   3. Prostate cancer diagnosed in 1992.  4. Skin cancers, multiple.    Treatment:    He has been observed for CLL with no treatment.   He is on Synthroid for his thyroidectomy.    Interim History:     Nixon is here today for a follow-up visit.  He was seen about 3 months ago.  Having more fatigue this summer less energy and stamina.  Not fishing as much.  No fevers, chills, night sweats.  Appetite has been okay.    Review of Systems:    Constitutional  Constitutional (WDL): Exceptions to WDL  Weight Loss: to <10% from baseline, intervention not indicated (down 2lbs since  4/14)  Neurosensory  Neurosensory (WDL): Exceptions to WDL  Ataxia: Asymptomatic, clinical or diagnostic observations only, intervention not indicated  Cardiovascular  Cardiovascular (WDL): All cardiovascular elements are within defined limits  Pulmonary  Respiratory (WDL): Within Defined Limits  Gastrointestinal  Gastrointestinal (WDL): All gastrointestinal elements are within defined limits  Genitourinary  Genitourinary (WDL): Exceptions to WDL  Urinary Frequency: Present  Integumentary  Integumentary (WDL): Exceptions to WDL (skin cancer all over body)  Patient Coping  Patient Coping: Accepting  Accompanied by  Accompanied by: Family Member    Past History:    Past Medical History:   Diagnosis Date     Cataract     bilateral     Foot fracture, right      History of transfusion      Hypertension      Inguinal hernia      Leukemia      PE (pulmonary embolism)      Pneumonia      Prostate cancer      Skin cancer      Thyroid cancer      Physical Exam:    Recent Vitals 7/14/2017   Weight 179 lbs 13 oz   /60   Pulse 60   Temp 98.1   Temp src 1   SpO2 98   Some recent data might be hidden     General: patient appears stated age of 88 y.o.. Nontoxic and in no distress.   HEENT: Head: atraumatic, normocephalic. Sclerae anicteric.  Chest:  Normal respiratory effort.    Cardiac:  No edema.  Abdomen: abdomen is non-distended.   Extremities: normal tone and muscle bulk.   Skin: no lesions or rash. Warm and dry.   CNS: alert and oriented x3. Grossly non-focal.   Psychiatric: normal mood and affect.     Lab Results:    Recent Results (from the past 168 hour(s))   Comprehensive Metabolic Panel   Result Value Ref Range    Sodium 141 136 - 145 mmol/L    Potassium 4.0 3.5 - 5.0 mmol/L    Chloride 106 98 - 107 mmol/L    CO2 29 22 - 31 mmol/L    Anion Gap, Calculation 6 5 - 18 mmol/L    Glucose 121 70 - 125 mg/dL    BUN 22 8 - 28 mg/dL    Creatinine 1.20 0.70 - 1.30 mg/dL    GFR MDRD Af Amer >60 >60 mL/min/1.73m2    GFR MDRD  Non Af Amer 57 (L) >60 mL/min/1.73m2    Bilirubin, Total 0.5 0.0 - 1.0 mg/dL    Calcium 9.3 8.5 - 10.5 mg/dL    Protein, Total 6.3 6.0 - 8.0 g/dL    Albumin 4.0 3.5 - 5.0 g/dL    Alkaline Phosphatase 53 45 - 120 U/L    AST 18 0 - 40 U/L    ALT 8 0 - 45 U/L   HM1 (CBC with Diff)   Result Value Ref Range    .1 (HH) 4.0 - 11.0 thou/uL    RBC 3.21 (L) 4.40 - 6.20 mill/uL    Hemoglobin 9.8 (L) 14.0 - 18.0 g/dL    Hematocrit 34.9 (L) 40.0 - 54.0 %     (H) 80 - 100 fL    MCH 30.5 27.0 - 34.0 pg    MCHC 28.1 (L) 32.0 - 36.0 g/dL    RDW 19.5 (H) 11.0 - 14.5 %    Platelets 72 (L) 140 - 440 thou/uL    MPV 11.6 8.5 - 12.5 fL   Manual Differential   Result Value Ref Range    Total Neutrophils % 1 (L) 50 - 70 %    Lymphocytes % 99 (H) 20 - 40 %    Monocytes % 0 (L) 2 - 10 %    Eosinophils %  0 0 - 6 %    Basophils % 0 0 - 2 %    Total Neutrophils Absolute 2.4 2.0 - 7.7 thou/ul    Lymphocytes Absolute 234.7 (H) 0.8 - 4.4 thou/uL    Monocytes Absolute 0.0 0.0 - 0.9 thou/uL    Eosinophils Absolute 0.0 0.0 - 0.4 thou/uL    Basophils Absolute 0.0 0.0 - 0.2 thou/uL    Platelet Estimate Decreased (!) Normal    Ovalocytes 1+ (!) Negative     Imaging:    No results found.      Signed by: Mushtaq Pennington MD

## 2021-06-12 NOTE — PROGRESS NOTES
Pt amb into clinic with son, Jean-Claude, for chemo and IVF infusion. Reviewed meds with pt. Pt tolerated infusions well. PIV flushed, discontinued. Pt to return for IVF next week. Pt amb out of clinic.

## 2021-06-12 NOTE — PROGRESS NOTES
[facility] Hematology and Oncology Progress Note    Patient: Nixon Velasquez Jr.  MRN: [id number]  Date of Service:         Reason for Visit    Chief Complaint   Patient presents with     HE Cancer       Assessment and Plan      1. CLL: His counts were worsening and he was having worsening fatigue, so he was started on single agent, weekly rituxan. He got 4 weekly doses and tolerated for the most part. His WBC is normal today and the anemia and thrombocytopenia is improving. This should continue to improve. He will return in one month with labs and MD visit.       ECOG Performance   ECOG Performance Status: 1     Distress Assessment  Distress Assessment Score: 5 (wife has dementia)    Pain  Currently in Pain: No/denies      Problem List    1. CLL (chronic lymphocytic leukemia)  HM1(CBC and Differential)    Comprehensive Metabolic Panel      ______________________________________________________________________________    History of Present Illness      Diagnosis:     1. Chronic lymphocytic leukemia initially diagnosed in 2/2008. 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   2. Thyroid cancer, status post thyroidectomy and radioactive iodine ablation.   3. Prostate cancer diagnosed in 1992.  4. Skin cancers, multiple.     Treatment:     He has been observed for CLL with no treatment until last week, 7/18 and was started on weekly Rituxan.   He is on Synthroid for his thyroidectomy.     Interim History:      Nixon is here today for labs and toxicity check. He has completed 4 weekly doses of rituxan and is doing and feeling better. Last week he got some extra fluids and has been drinking more and that has helped. No night sweats or weight loss.     Pain Status  Currently in Pain: No/denies    Review of Systems    Constitutional  Constitutional (WDL): Exceptions to WDL  Weight Gain: 5 - <10% from baseline (up 1lb since 8/7)  Neurosensory  Neurosensory (WDL): All neurosensory elements are within defined limits  Eye    Eye Disorder (WDL): All eye disorder elements are within defined limits  Ear  Ear Disorder (WDL): All ear disorder elements are within defined limits  Cardiovascular  Cardiovascular (WDL): All cardiovascular elements are within defined limits  Pulmonary  Respiratory (WDL): Within Defined Limits  Gastrointestinal  Gastrointestinal (WDL): All gastrointestinal elements are within defined limits  Genitourinary  Genitourinary (WDL): Exceptions to WDL (incontinence)  Lymphatic  Lymph (WDL): All lymph disorder elements are within defined limits  Musculoskeletal and Connective Tissue  Musculoskeletal and Connetive Tissue Disorders (WDL): Exceptions to WDL  Arthralgia: Mild pain  Muscle Weakness : Symptomatic, perceived by patient but not evident on physical exam  Myalgia: Mild pain  Integumentary  Integumentary (WDL): All integumentary elements are within defined limits  Patient Coping  Patient Coping: Accepting  Distress Assessment  Distress Assessment Score: 5 (wife has dementia)  Accompanied by  Accompanied by: Family Member (son Jean-Claude)  Oral Chemo Adherence       Past History  Past Medical History:   Diagnosis Date     Cataract     bilateral     Foot fracture, right      History of transfusion      Hypertension      Inguinal hernia      Leukemia      PE (pulmonary embolism)      Pneumonia      Prostate cancer      Skin cancer      Thyroid cancer        PHYSICAL EXAM:  /70  Pulse 73  Temp 97.5  F (36.4  C) (Oral)   Wt 178 lb 6.4 oz (80.9 kg)  SpO2 98%  BMI 22.3 kg/m2  GENERAL: no acute distress. Cooperative in conversation. Here with son.  HEENT: pupils are equal, round and reactive. Oromucosa is clean and intact. No ulcerations or mucositis noted. No bleeding noted.  RESP: lungs are clear bilaterally per auscultation. Regular respiratory rate. No wheezes or rhonchi.  CV: Regular, rate and rhythm. Slight systolic murmur.  ABD: soft, nontender. Positive bowel sounds. No organomegaly.   MUSCULOSKELETAL: No lower  extremity swelling.   NEURO: non focal. Alert and oriented x3.   PSYCH: within normal limits. No depression or anxiety.  SKIN: warm dry intact   LYMPH: mild bilateral cervical nodes, no supraclavicular or axillary lymphadenopathy        Lab Results    Recent Results (from the past 168 hour(s))   Comprehensive Metabolic Panel   Result Value Ref Range    Sodium 140 136 - 145 mmol/L    Potassium 4.5 3.5 - 5.0 mmol/L    Chloride 104 98 - 107 mmol/L    CO2 27 22 - 31 mmol/L    Anion Gap, Calculation 9 5 - 18 mmol/L    Glucose 140 (H) 70 - 125 mg/dL    BUN 22 8 - 28 mg/dL    Creatinine 1.08 0.70 - 1.30 mg/dL    GFR MDRD Af Amer >60 >60 mL/min/1.73m2    GFR MDRD Non Af Amer >60 >60 mL/min/1.73m2    Bilirubin, Total 0.5 0.0 - 1.0 mg/dL    Calcium 8.6 8.5 - 10.5 mg/dL    Protein, Total 6.3 6.0 - 8.0 g/dL    Albumin 3.8 3.5 - 5.0 g/dL    Alkaline Phosphatase 60 45 - 120 U/L    AST 11 0 - 40 U/L    ALT <6 0 - 45 U/L   HM1 (CBC with Diff)   Result Value Ref Range    WBC 7.2 4.0 - 11.0 thou/uL    RBC 3.37 (L) 4.40 - 6.20 mill/uL    Hemoglobin 11.0 (L) 14.0 - 18.0 g/dL    Hematocrit 33.7 (L) 40.0 - 54.0 %     80 - 100 fL    MCH 32.6 27.0 - 34.0 pg    MCHC 32.6 32.0 - 36.0 g/dL    RDW 16.5 (H) 11.0 - 14.5 %    Platelets 94 (L) 140 - 440 thou/uL    MPV 12.6 (H) 8.5 - 12.5 fL       Imaging    No results found.      Signed by: Vy Huerta, CNP

## 2021-06-12 NOTE — PROGRESS NOTES
[facility] Hematology and Oncology Progress Note    Patient: Nixon Velasquez Jr.  MRN: [id number]  Date of Service:  August 7, 2017      Assessment and Plan:    1. CLL: He is now finishing up his course of Rituxan today.  Today is week 4.  He had a fever after his last cycle and some flushing.  I am going to give him dexamethasone 4 mg today with his treatment and then 4 mg daily for 2 days after.  I am also going to give him an extra liter of fluid today.  We will see him back in 1 week to check his symptoms and his labs.  Clinically he has had a good hematologic response so far.    2. Prophylaxis: He received a Pneumovax in mid 2016.    3.  Anemia: He has had a significant improvement in his anemia since starting his treatment.    ECOG Performance   ECOG Performance Status: 1    Distress Assessment  Distress Assessment Score: 5 (pt reports his stress is r/t to being primary caregiver for spouse with dementia)    Pain  Currently in Pain: No/denies    Diagnosis:    1. Chronic lymphocytic leukemia initially diagnosed in 2/2008. 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   2. Thyroid cancer, status post thyroidectomy and radioactive iodine ablation.   3. Prostate cancer diagnosed in 1992.  4. Skin cancers, multiple.    Treatment:    Restarted single agent Rituxan for his CLL on July 18, 2017.  This was initiated for worsening symptomatic anemia.  He is on Synthroid for his thyroidectomy.    Interim History:     Nixon is here today for a follow-up visit.  Today is his fourth dose of Rituxan.  He had a fever 5 days ago.  None since.  Also a feeling of flushing in the face.  Some lightheadedness.  He does not feel unsteady on his feet.  No recent falls.  No edema.  No shortness of breath.  Still has a lot of fatigue.    Review of Systems:    Constitutional  Constitutional (WDL): Exceptions to WDL  Fatigue: Fatigue relieved by rest  Weight Loss: to <10% from baseline, intervention not indicated (2  "lbs)  Neurosensory  Neurosensory (WDL): Exceptions to WDL  Ataxia: Asymptomatic, clinical or diagnostic observations only, intervention not indicated  Cardiovascular  Cardiovascular (WDL): All cardiovascular elements are within defined limits  Pulmonary  Respiratory (WDL): Within Defined Limits  Gastrointestinal  Gastrointestinal (WDL): All gastrointestinal elements are within defined limits  Genitourinary  Genitourinary (WDL): Exceptions to WDL (incontinence)  Urinary Frequency: Present  Urinary Retention: Urinary, suprapubic or intermittent catheter placement not indicated, able to void with some residual  Integumentary  Integumentary (WDL): All integumentary elements are within defined limits  Patient Coping  Patient Coping: Accepting  Accompanied by  Accompanied by: Alone    Past History:    Past Medical History:   Diagnosis Date     Cataract     bilateral     Foot fracture, right      History of transfusion      Hypertension      Inguinal hernia      Leukemia      PE (pulmonary embolism)      Pneumonia      Prostate cancer      Skin cancer      Thyroid cancer      Physical Exam:    Recent Vitals 8/7/2017   Height 6' 3\"   Weight 177 lbs 8 oz   BSA (m2) 2.06 m2   /65   Pulse 85   Temp 97.4   Temp src 1   SpO2 97   Some recent data might be hidden     General: patient appears stated age of 88 y.o.. Nontoxic and in no distress.   HEENT: Head: atraumatic, normocephalic. Sclerae anicteric.  Chest:  Normal respiratory effort.  There to auscultation bilaterally.  No wheezing or rhonchi.  Cardiac:  No edema.  Regular rate and rhythm.  3 out of 6 systolic ejection murmur best at the base.  Abdomen: abdomen is non-distended.   Extremities: normal tone and muscle bulk.   Skin: no lesions or rash. Warm and dry.   CNS: alert and oriented x3. Grossly non-focal.   Psychiatric: normal mood and affect.     Lab Results:    Recent Results (from the past 168 hour(s))   Comprehensive Metabolic Panel   Result Value Ref Range    " Sodium 140 136 - 145 mmol/L    Potassium 3.8 3.5 - 5.0 mmol/L    Chloride 103 98 - 107 mmol/L    CO2 25 22 - 31 mmol/L    Anion Gap, Calculation 12 5 - 18 mmol/L    Glucose 114 70 - 125 mg/dL    BUN 21 8 - 28 mg/dL    Creatinine 1.08 0.70 - 1.30 mg/dL    GFR MDRD Af Amer >60 >60 mL/min/1.73m2    GFR MDRD Non Af Amer >60 >60 mL/min/1.73m2    Bilirubin, Total 0.6 0.0 - 1.0 mg/dL    Calcium 9.1 8.5 - 10.5 mg/dL    Protein, Total 6.7 6.0 - 8.0 g/dL    Albumin 4.1 3.5 - 5.0 g/dL    Alkaline Phosphatase 64 45 - 120 U/L    AST 11 0 - 40 U/L    ALT <6 0 - 45 U/L   HM1 (CBC with Diff)   Result Value Ref Range    WBC 11.5 (H) 4.0 - 11.0 thou/uL    RBC 3.39 (L) 4.40 - 6.20 mill/uL    Hemoglobin 11.1 (L) 14.0 - 18.0 g/dL    Hematocrit 33.1 (L) 40.0 - 54.0 %    MCV 98 80 - 100 fL    MCH 32.7 27.0 - 34.0 pg    MCHC 33.5 32.0 - 36.0 g/dL    RDW 16.2 (H) 11.0 - 14.5 %    Platelets 95 (L) 140 - 440 thou/uL    MPV 11.8 8.5 - 12.5 fL     Imaging:    No results found.      Signed by: Mushtaq Pennington MD

## 2021-06-12 NOTE — PROGRESS NOTES
[facility] Hematology and Oncology Progress Note    Patient: Nixon Velasquez Jr.  MRN: [id number]  Date of Service:         Reason for Visit    Chief Complaint   Patient presents with     HE Cancer       Assessment and Plan      1. CLL: His counts were worsening and he was having worsening fatigue, so he was started on single agent, weekly rituxan. He will get week 2 today. He will return next week for week 3 and then see Dr. Pennington on week 4, which will be his final cycle.     2. Leukocytosis, anemia, thrombocytopenia: from disease. Monitor with treatment.     ECOG Performance   ECOG Performance Status: 2     Distress Assessment  Distress Assessment Score: 5 (taking care of his wife)    Pain  Currently in Pain: No/denies      Problem List    1. CLL (chronic lymphocytic leukemia)  HM1(CBC and Differential)      ______________________________________________________________________________    History of Present Illness      Diagnosis:     1. Chronic lymphocytic leukemia initially diagnosed in 2/2008. 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   2. Thyroid cancer, status post thyroidectomy and radioactive iodine ablation.   3. Prostate cancer diagnosed in 1992.  4. Skin cancers, multiple.     Treatment:     He has been observed for CLL with no treatment until last week, 7/18 and was started on weekly Rituxan.   He is on Synthroid for his thyroidectomy.     Interim History:      Nixon is here today to continue on rituxan. He started last week and did well. No complaints.continues to have mild fatigue.     Pain Status  Currently in Pain: No/denies    Review of Systems    Constitutional  Constitutional (WDL): Exceptions to WDL  Fatigue: Fatigue relieved by rest  Neurosensory  Neurosensory (WDL): Exceptions to WDL  Ataxia: Asymptomatic, clinical or diagnostic observations only, intervention not indicated  Eye   Eye Disorder (WDL): All eye disorder elements are within defined limits  Ear  Ear Disorder (WDL): All ear  disorder elements are within defined limits  Cardiovascular  Cardiovascular (WDL): All cardiovascular elements are within defined limits  Pulmonary  Respiratory (WDL): Within Defined Limits  Gastrointestinal  Gastrointestinal (WDL): All gastrointestinal elements are within defined limits  Genitourinary  Genitourinary (WDL): Exceptions to WDL  Urinary Frequency: Present  Lymphatic  Lymph (WDL): All lymph disorder elements are within defined limits  Musculoskeletal and Connective Tissue  Musculoskeletal and Connetive Tissue Disorders (WDL): All Musculoskeletal and Connetive Tissue Disorder elements are within defined limits  Integumentary  Integumentary (WDL): All integumentary elements are within defined limits  Patient Coping  Patient Coping: Accepting  Distress Assessment  Distress Assessment Score: 5 (taking care of his wife)  Accompanied by  Accompanied by: Family Member  Oral Chemo Adherence       Past History  Past Medical History:   Diagnosis Date     Cataract     bilateral     Foot fracture, right      History of transfusion      Hypertension      Inguinal hernia      Leukemia      PE (pulmonary embolism)      Pneumonia      Prostate cancer      Skin cancer      Thyroid cancer        PHYSICAL EXAM:  /65  Pulse 76  Temp 97.7  F (36.5  C) (Oral)   Wt 179 lb (81.2 kg)  SpO2 97%  BMI 22.37 kg/m2  GENERAL: no acute distress. Cooperative in conversation. Here with son.  HEENT: pupils are equal, round and reactive. Oromucosa is clean and intact. No ulcerations or mucositis noted. No bleeding noted.  RESP: lungs are clear bilaterally per auscultation. Regular respiratory rate. No wheezes or rhonchi.  CV: Regular, rate and rhythm. Slight systolic murmur.  ABD: soft, nontender. Positive bowel sounds. No organomegaly.   MUSCULOSKELETAL: No lower extremity swelling.   NEURO: non focal. Alert and oriented x3.   PSYCH: within normal limits. No depression or anxiety.  SKIN: warm dry intact   LYMPH: mild bilateral  cervical nodes, no supraclavicular or axillary lymphadenopathy        Lab Results    Recent Results (from the past 168 hour(s))   HM1 (CBC with Diff)   Result Value Ref Range    WBC 61.2 (HH) 4.0 - 11.0 thou/uL    RBC 3.27 (L) 4.40 - 6.20 mill/uL    Hemoglobin 10.6 (L) 14.0 - 18.0 g/dL    Hematocrit 33.6 (L) 40.0 - 54.0 %     (H) 80 - 100 fL    MCH 32.4 27.0 - 34.0 pg    MCHC 31.5 (L) 32.0 - 36.0 g/dL    RDW 16.7 (H) 11.0 - 14.5 %    Platelets 55 (L) 140 - 440 thou/uL    MPV 13.0 (H) 8.5 - 12.5 fL       Imaging    No results found.      Signed by: Vy Huerta, CNP

## 2021-06-12 NOTE — PROGRESS NOTES
PT here for rituxan. PT is ambulatory with his son. Reviewed txt with pt and pt states has tolerated previous treatments without any problems afterwards. Iv placed with brisk blood return/smooth ns flush. Premeds tyelenol and benadryl given. Rituxan infused at rapid infusion rated; initiated at 100 ml per hour for 30 minutes then rated increased to 200 ml per hour for remainder of txt. Txt completed and pt tolerated without any side effects. Tubing flushed with ns then iv dc'd with pressure dressing to site. Follow up reviewed and pt dc'd steady gait

## 2021-06-12 NOTE — PROGRESS NOTES
[facility] Hematology and Oncology Progress Note    Patient: Nixon Velasquez Jr.  MRN: [id number]  Date of Service: 9/13/2017          Reason for Visit:    Scheduled follow up    Assessment and Plan:    1) CLL:     88 y.o.     History of Thyroid cancer - status post thyroidectomy and radioactive iodine ablation.    On synthroid.    1992 - Prostate cancer diagnosed.    History of multiple skin cancers.    2008, February - diagnosed with Chronic lymphocytic leukemia.     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     Observed for 9+ years.    Worsening hematologies and progressive fatigue prompted treatment:      07/18 - 08/07/17 received 4 doses Rituximab    09/13/17:    CBC - WBC 12.8.  HgB stable @ 11.1.  Plts quite stable @ 70,000.    CMP - WNL.    Looks and feels quite good.  Significant improvement symptomatically,  lymphocytosis and HgB count, but no lasting improvement in thrombocytopenia.    Thrombocytopenic precautions reviewed.    10/13/17 - follow up with labs.  Sooner if fatigue, infections, s/s bleeding or bruising.    ECOG Performance:     ECOG Performance Status: 1     Distress Assessment:    Distress Assessment Score: 5 (issues with wife and her dementia)    Pain:    Currently in Pain: No/denies        TT > 25 minutes face to face with > 50% counseling and care coordination.    Walt Renee, FLEX   ______________________________________________________________________________     Interim History:      The patient presents in scheduled follow up.  Before his appointment he stopped at [employer] for his yearly burger there.  He completed 4 weekly doses of rituxan last month and states he is feeling quite good other than his vision doesn't seem as good since getting his new eye glass Rx a few weeks back.  He denies cough, fever, chills, night sweats, unusual headaches, visual or mentation disturbance, bowel or bladder concerns.  His appetite is good and he's noted no weight loss. His  performance status is very stable.    Pain Status:    Currently in Pain: No/denies    Review of Systems    Constitutional  Weight Gain: 5 - <10% from baseline (up 1lb since 8/14)  Neurosensory  Neurosensory (WDL): All neurosensory elements are within defined limits  Eye   Eye Disorder (WDL): All eye disorder elements are within defined limits (new prescription; going in today to the eye doctor)  Ear  Ear Disorder (WDL): All ear disorder elements are within defined limits  Cardiovascular  Cardiovascular (WDL): All cardiovascular elements are within defined limits  Pulmonary  Respiratory (WDL): Exceptions to WDL  Cough: Mild symptoms, nonprescription intervention indicated (comes and goes)  Gastrointestinal  Gastrointestinal (WDL): Exceptions to WDL  Esophagitis: Asymptomatic, clinical or diagnostic observations only, intervention not indicated (food goes down the wrong tube frequently)  Genitourinary  Genitourinary (WDL): Exceptions to WDL (incontinence)  Lymphatic  Lymph (WDL): All lymph disorder elements are within defined limits  Musculoskeletal and Connective Tissue  Musculoskeletal and Connetive Tissue Disorders (WDL): Exceptions to WDL  Arthralgia: Mild pain  Myalgia: Mild pain  Integumentary  Integumentary (WDL): Exceptions to WDL (sees dermatology at least every 3 months)  Patient Coping  Patient Coping: Accepting  Distress Assessment  Distress Assessment Score: 5 (issues with wife and her dementia)  Accompanied by  Accompanied by: Family Member (son Jean-Claude)  Oral Chemo Adherence       Past Histories:    Past Medical History:   Diagnosis Date     Cataract     bilateral     Foot fracture, right      History of transfusion      Hypertension      Inguinal hernia      Leukemia      PE (pulmonary embolism)      Pneumonia      Prostate cancer      Skin cancer      Thyroid cancer        PHYSICAL EXAM:    /56  Pulse 73  Temp 97.6  F (36.4  C) (Oral)   Wt 179 lb 8 oz (81.4 kg)  SpO2 99%  BMI 22.44  kg/m2    GENERAL: no acute distress. Cooperative in conversation. Here with son.  HEENT: pupils are equal, round and reactive. Oromucosa is clean and intact. No ulcerations, mucositis or bleeding noted.  RESP: lungs are clear bilaterally per auscultation. Regular respiratory rate. No wheezes or rhonchi.  CV: Regular, rate and rhythm. Slight systolic murmur.  ABD: soft, nontender. Positive bowel sounds. No organomegaly.   MUSCULOSKELETAL: No lower extremity swelling.   NEURO: non focal. Alert and oriented x3.   PSYCH: within normal limits. No depression or anxiety.  SKIN: warm dry intact   LYMPH: mild bilateral cervical nodes, no supraclavicular or axillary lymphadenopathy    Lab Results:    Reviewed with patient and his son.    Recent Results (from the past 24 hour(s))   Comprehensive Metabolic Panel   Result Value Ref Range    Sodium 139 136 - 145 mmol/L    Potassium 4.0 3.5 - 5.0 mmol/L    Chloride 105 98 - 107 mmol/L    CO2 28 22 - 31 mmol/L    Anion Gap, Calculation 6 5 - 18 mmol/L    Glucose 115 70 - 125 mg/dL    BUN 23 8 - 28 mg/dL    Creatinine 1.08 0.70 - 1.30 mg/dL    GFR MDRD Af Amer >60 >60 mL/min/1.73m2    GFR MDRD Non Af Amer >60 >60 mL/min/1.73m2    Bilirubin, Total 0.8 0.0 - 1.0 mg/dL    Calcium 9.1 8.5 - 10.5 mg/dL    Protein, Total 6.4 6.0 - 8.0 g/dL    Albumin 4.2 3.5 - 5.0 g/dL    Alkaline Phosphatase 59 45 - 120 U/L    AST 16 0 - 40 U/L    ALT 7 0 - 45 U/L   HM1 (CBC with Diff)   Result Value Ref Range    WBC 12.8 (H) 4.0 - 11.0 thou/uL    RBC 3.39 (L) 4.40 - 6.20 mill/uL    Hemoglobin 11.0 (L) 14.0 - 18.0 g/dL    Hematocrit 34.2 (L) 40.0 - 54.0 %     (H) 80 - 100 fL    MCH 32.4 27.0 - 34.0 pg    MCHC 32.2 32.0 - 36.0 g/dL    RDW 17.0 (H) 11.0 - 14.5 %    Platelets 70 (L) 140 - 440 thou/uL    MPV 12.2 8.5 - 12.5 fL         Imaging:    No results found.

## 2021-06-12 NOTE — PROGRESS NOTES
Pt here for 2nd Rituxan.  He had some issues with first infusion so this infusion ran at 50cc/hr for 1 hour then 100cc/hr for and hour then 150cc for 30 min and then 200cc for remainder.  Pt tolerated without incident and IV removed upon completion and pt d/c ambulatory to lobby with his son

## 2021-06-13 NOTE — PROGRESS NOTES
[facility] Hematology and Oncology Progress Note    Patient: Nixon Velasquez Jr.  MRN: [id number]  Date of Service: October 6, 2070      Assessment and Plan:    1. CLL: He finished up his course of Rituxan 2 months ago.  Feeling a little bit better in terms of energy.  His counts have improved.  Still has a slight lymphocytosis.  We will continue to monitor for now.  See him back in clinic in 4 months.  I asked the patient and his daughter to let us know if he develops any new symptoms in the interim.      2. Prophylaxis: He received a Pneumovax in mid 2016.    3.  Anemia:  Anemia is better today than it has been in some time.    ECOG Performance   ECOG Performance Status: 0    Distress Assessment  Distress Assessment Score: 5 (r/t caring for spouse with dementia, and making decisions on her care)    Pain  Currently in Pain: No/denies    Diagnosis:    1. Chronic lymphocytic leukemia initially diagnosed in 2/2008. 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   2. Thyroid cancer, status post thyroidectomy and radioactive iodine ablation.   3. Prostate cancer diagnosed in 1992.  4. Skin cancers, multiple.    Treatment:    Started single agent Rituxan for his CLL on July 18, 2017.  This was initiated for worsening symptomatic anemia.  He received 4 weekly doses.  Completed on August 7, 2017.    He is on Synthroid for his thyroidectomy.    Interim History:     Nixon is here today for a follow-up visit.  He finished Rituxan about 2 months ago.  Overall he is doing okay.  No complaints today.  Energy is a little bit better.  No shortness of breath or lower extremity edema.  No fevers, chills, or night sweats.    Review of Systems:    Constitutional  Constitutional (WDL): All constitutional elements are within defined limits  Neurosensory  Neurosensory (WDL): All neurosensory elements are within defined limits  Cardiovascular  Cardiovascular (WDL): All cardiovascular elements are within defined  limits  Pulmonary  Respiratory (WDL): Within Defined Limits  Gastrointestinal  Gastrointestinal (WDL): All gastrointestinal elements are within defined limits  Genitourinary  Genitourinary (WDL): All genitourinary elements are within defined limits  Integumentary  Integumentary (WDL): Exceptions to WDL (hx - skin cancer on scalp; seeing derm next month)  Patient Coping  Patient Coping: Accepting  Accompanied by  Accompanied by: Family Member    Past History:    Past Medical History:   Diagnosis Date     Cataract     bilateral     Foot fracture, right      History of transfusion      Hypertension      Inguinal hernia      Leukemia      PE (pulmonary embolism)      Pneumonia      Prostate cancer      Skin cancer      Thyroid cancer      Physical Exam:    Recent Vitals 10/6/2017   Height -   Weight 178 lbs   BSA (m2) -   /64   Pulse 69   Temp 97.7   Temp src 1   SpO2 96   Some recent data might be hidden     General: patient appears stated age of 88 y.o.. Nontoxic and in no distress.   HEENT: Head: atraumatic, normocephalic. Sclerae anicteric.  Chest:  Normal respiratory effort.    Cardiac:  No edema.  Regular rate and rhythm.   Abdomen: abdomen is non-distended.   Extremities: normal tone and muscle bulk.   Skin: no lesions or rash. Warm and dry.   CNS: alert and oriented x3. Grossly non-focal.   Psychiatric: normal mood and affect.     Lab Results:    Recent Results (from the past 168 hour(s))   HM1 (CBC with Diff)   Result Value Ref Range    WBC 14.0 (H) 4.0 - 11.0 thou/uL    RBC 3.34 (L) 4.40 - 6.20 mill/uL    Hemoglobin 11.0 (L) 14.0 - 18.0 g/dL    Hematocrit 33.3 (L) 40.0 - 54.0 %     80 - 100 fL    MCH 32.9 27.0 - 34.0 pg    MCHC 33.0 32.0 - 36.0 g/dL    RDW 16.8 (H) 11.0 - 14.5 %    Platelets 65 (L) 140 - 440 thou/uL    MPV 12.3 8.5 - 12.5 fL   Manual Differential   Result Value Ref Range    Total Neutrophils % 8 (L) 50 - 70 %    Lymphocytes % 92 (H) 20 - 40 %    Monocytes % 0 (L) 2 - 10 %     Eosinophils %  0 0 - 6 %    Basophils % 0 0 - 2 %    Total Neutrophils Absolute 1.1 (L) 2.0 - 7.7 thou/ul    Lymphocytes Absolute 12.9 (H) 0.8 - 4.4 thou/uL    Monocytes Absolute 0.0 0.0 - 0.9 thou/uL    Eosinophils Absolute 0.0 0.0 - 0.4 thou/uL    Basophils Absolute 0.0 0.0 - 0.2 thou/uL    Platelet Estimate Decreased (!) Normal     Imaging:    No results found.      Signed by: Mushtaq Pennington MD

## 2021-06-14 NOTE — PROGRESS NOTES
Assessment/ Plan  1. Hospital discharge follow-up  See below for specific    2. Cellulitis of other specified site  Right ear, Pseudomonas, following biopsy, see below for details.  Still some drainage and mild erythema, will extend levofloxacin out for another 5 days.  Renewed oxycodone    3. Infection of ear   As above, this is the auricle of the ear  - oxyCODONE (ROXICODONE) 5 MG immediate release tablet; Take 1-2 tablets (5-10 mg total) by mouth every 3 (three) hours as needed.  Dispense: 20 tablet; Refill: 0  - levoFLOXacin (LEVAQUIN) 500 MG tablet; Take 1 tablet (500 mg total) by mouth Daily at 6:00 am for 5 days.  Dispense: 5 tablet; Refill: 0    4. Benign essential HTN  Well-controlled, hydrochlorothiazide    5. Adjustment disorder with depressed mood  Caregiver stress, already on mirtazapine, not sleeping well at night.  Add Lexapro, low-dose, explore options for respite for his wife, explore caregiver support groups through memory care clinic  Body mass index is 22.62 kg/(m^2).    Subjective  CC:  Chief Complaint   Patient presents with     Hospital Visit Follow Up     [facility]     HPI:  Patient begins visit today obviously very somber and stating that he cannot take it.  He is exhausted from the responsibility of caring for his wife.  A few months ago, he and his wife moved in with his son.  There is strong support from family members.  Balbina gets up and walks, refusing to use a walker and is fallen a number of times, one time, few months ago she was significantly injured.  There is recommendation to consider a supervised living center.  He is quite worried about her falling there and thinks that family members can watch her better at home.    Still, he worries.  He also is worried about their adult children giving up something to take care of their parents.    All of this got a lot worse when the cellulitis in his ear began with significant pain.  He notes this is gotten somewhat better but still is  needing oxycodone.  He is worried about getting hooked on that medication.      Hospital Follow-up  Date of admission: 11/30  Date of discharge: 12/2  Hospital: [facility]  Chief Diagnosis: Right auricle cellulitis, Pseudomonas  Narrative: Patient with biopsy of right ear for carcinoma with Dr. Singh around 11/20.  Ear became infected.  Seen 11/20 7I Dr. Singh, culture done, treated for presumed gram-positive infection with cephalexin.  Culture subsequently came back positive for Pseudomonas around 11/30.  Dr. Singh contacted me.  I contacted the patient who is in a lot of pain.  Considering insurance options and antibiotic sensitivities, there was really no alternative but admission to the hospital for IV antibiotics and it seems warranted anyway given patient's severity of pain.    Procedures During hospital stay not mentioned: None  Radiology studies: None  Labs: Hemoglobin was 9.9, white blood cell count 26, A1c 6.5, BUN 31 but creatinine was normal   Nixon Velasquez Jr.   Home Medication Instructions JOSÉ:    Printed on:12/07/17 9506   Medication Information                      allopurinol (ZYLOPRIM) 300 MG tablet  Take 1 tablet (300 mg total) by mouth daily.             CHOLECALCIFEROL, VITAMIN D3, ORAL  Take 1,500 Units by mouth every morning. Takes 500 units x3 for 1500 unit dose             clopidogrel (PLAVIX) 75 mg tablet  Take 75 mg by mouth daily.             hydroCHLOROthiazide (MICROZIDE) 12.5 mg capsule  Take 12.5 mg by mouth daily.             ibuprofen (ADVIL,MOTRIN) 200 MG tablet  Take 2 tablets (400 mg total) by mouth every 6 (six) hours as needed for pain.             levoFLOXacin (LEVAQUIN) 500 MG tablet  Take 1 tablet (500 mg total) by mouth Daily at 6:00 am for 5 days.             levothyroxine (SYNTHROID, LEVOTHROID) 150 MCG tablet  Take 150 mcg by mouth Daily at 6:00 am.             mirtazapine (REMERON) 45 MG tablet  Take 45 mg by mouth at bedtime.             omeprazole (PRILOSEC) 40  MG capsule  Take 1 capsule (40 mg total) by mouth 2 (two) times a day.             ondansetron (ZOFRAN-ODT) 8 MG disintegrating tablet  Take 1 tablet (8 mg total) by mouth every 8 (eight) hours as needed for nausea.             oxyCODONE (ROXICODONE) 5 MG immediate release tablet  Take 1-2 tablets (5-10 mg total) by mouth every 3 (three) hours as needed.             polyethylene glycol (MIRALAX) 17 gram packet  Take 1 packet (17 g total) by mouth daily as needed. While on narcotic pain medication to prevent constipation             senna-docusate (PERICOLACE) 8.6-50 mg tablet  Take 1 tablet by mouth daily as needed for constipation.             simvastatin (ZOCOR) 40 MG tablet  Take 40 mg by mouth at bedtime.               Follow-up recommended by discharging doctor:Cleansed with tap water, pat dry and place onMepilex 3x3.  On thigh    4 ear, cleansed with sterile water, pat dry,Apply: piece of silvercel over wound, then cover with dry gauze or a cotton ball, secure with tape, every other day    She discharged on levofloxacin, recommended by infectious disease.  Also discharged on oxycodone      HTN Follow-up    Adherent with antihypertensives?  Hydrochlorothiazide (see assessment section for drugs)  Patient Active Problem List   Diagnosis     Restless Legs Syndrome     Corrosive Esophagitis     Hiatal Hernia     CLL (chronic lymphocytic leukemia)     Hyperlipidemia     Cerebral atherosclerosis     Adenocarcinoma Of The Prostate Gland     Papillary Carcinoma Of The Thyroid Gland     Hypothyroidism     Type 2 Diabetes Mellitus     Coronary Artery Disease     Cervical Radiculopathy     Iron Deficiency     Hypoxia     Advanced directives, counseling/discussion     Infection of ear lobe, right     Other elevated white blood cell (WBC) count     Right ear pain     Benign essential HTN     Current medications reviewed as follows:  Current Outpatient Prescriptions on File Prior to Visit   Medication Sig     allopurinol  (ZYLOPRIM) 300 MG tablet Take 1 tablet (300 mg total) by mouth daily.     CHOLECALCIFEROL, VITAMIN D3, ORAL Take 1,500 Units by mouth every morning. Takes 500 units x3 for 1500 unit dose     clopidogrel (PLAVIX) 75 mg tablet Take 75 mg by mouth daily.     hydroCHLOROthiazide (MICROZIDE) 12.5 mg capsule Take 12.5 mg by mouth daily.     ibuprofen (ADVIL,MOTRIN) 200 MG tablet Take 2 tablets (400 mg total) by mouth every 6 (six) hours as needed for pain.     levothyroxine (SYNTHROID, LEVOTHROID) 150 MCG tablet Take 150 mcg by mouth Daily at 6:00 am.     mirtazapine (REMERON) 45 MG tablet Take 45 mg by mouth at bedtime.     omeprazole (PRILOSEC) 40 MG capsule Take 1 capsule (40 mg total) by mouth 2 (two) times a day.     ondansetron (ZOFRAN-ODT) 8 MG disintegrating tablet Take 1 tablet (8 mg total) by mouth every 8 (eight) hours as needed for nausea.     polyethylene glycol (MIRALAX) 17 gram packet Take 1 packet (17 g total) by mouth daily as needed. While on narcotic pain medication to prevent constipation     senna-docusate (PERICOLACE) 8.6-50 mg tablet Take 1 tablet by mouth daily as needed for constipation.     simvastatin (ZOCOR) 40 MG tablet Take 40 mg by mouth at bedtime.     [DISCONTINUED] levoFLOXacin (LEVAQUIN) 500 MG tablet Take 1 tablet (500 mg total) by mouth Daily at 6:00 am for 5 days.     [DISCONTINUED] oxyCODONE (ROXICODONE) 5 MG immediate release tablet Take 1-2 tablets (5-10 mg total) by mouth every 3 (three) hours as needed.     No current facility-administered medications on file prior to visit.      History   Smoking Status     Never Smoker   Smokeless Tobacco     Never Used     Social History     Social History Narrative     Patient Care Team:  Riley Cuevas MD as PCP - General (Family Medicine)  ROS  Full 10 system review including constitutional, respiratory, cardiac, gi, urinary, rheumatologic, neurologic, reproductive, dermatologic psychiatric is  performed (via questionnaire) and is  "negative except fatigue, sleeping difficulties, right ear pain and swelling, anxiety      Objective  Physical Exam  Vitals:    12/07/17 0808   BP: 128/77   Pulse: 72   Resp: 20   Temp: 97  F (36.1  C)   TempSrc: Oral   SpO2: 98%   Weight: 181 lb (82.1 kg)   Height: 6' 3\" (1.905 m)     Right ear is undressed, there is a 1 cm ulcer with some purulent arterial draining and limited erythema.  Ear itself is mildly edematous.    Patient with very flat affect, tearful when talking.  Initially, did not make eye contact with me which is very unlike him.  Otherwise no acute distress, good color, looks thin    Diagnostics  None    Please note: Voice recognition software was used in this dictation.  It may therefore contain typographical errors.    "

## 2021-06-15 NOTE — PROGRESS NOTES
[facility] Hematology and Oncology Progress Note    Patient: Nixon Velasquez Jr.  MRN: [id number]  Date of Service:         Reason for Visit    Chief Complaint   Patient presents with     Malignant Hematology       Assessment and Plan      1. CLL: Received single agent Rituxan for this with his last dose being August 7, 2017.  He has been on observation since that time.  His labs yesterday do suggest that this may be occurring quite aggressively.  We are waiting for flow cytometry.  Will discuss with patient going forward if we need to do treatment for the CLL.    2.  Significant anemia: Patient is quite symptomatic with feeling very weak tired and pale.  He is quite short of breath with very minimal activity.  We will admit the patient to the hospital for the blood transfusion.  Unknown when we are actually going to get the blood here.  I did speak with blood bank and they still are doing genotyping to try to find the most compatible blood.  Go ahead and start some steroids we will do Solu-Medrol 60 mg twice a day.  This may help if he is having some hemolysis.    Discussed with the hospitalist and Dr. Pennington.      ECOG Performance   ECOG Performance Status: 3     Distress Assessment  Distress Assessment Score: 3 (how he is feeling)    Pain  Currently in Pain: No/denies      Problem List    1. CLL (chronic lymphocytic leukemia)     2. Leukocytosis     3. Anemia        ______________________________________________________________________________    History of Present Illness      Diagnosis:     1. Chronic lymphocytic leukemia initially diagnosed in 2/2008. 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   2. Thyroid cancer, status post thyroidectomy and radioactive iodine ablation.   3. Prostate cancer diagnosed in 1992.  4. Skin cancers, multiple.     Treatment:     He has been observed for CLL with no treatment until last week, 7/18 and was started on weekly Rituxan.   He is on Synthroid for his  thyroidectomy.     Interim History:      Nixon is here today to be assessed for a blood transfusion.  Patient was here yesterday to get a blood transfusion but he has multiple antibodies in his blood and they were unable to match it.  He is here now today to try to get a blood transfusion.  Is feeling quite weak.  He can barely stand.  He is in a wheelchair.  Dates that the symptoms have been going on for a couple weeks.  He denies any bleeding.  Any dark stools.    Pain Status  Currently in Pain: No/denies    Review of Systems    Constitutional  Constitutional (WDL): Exceptions to WDL  Fatigue: Fatigue not relieved by rest, limiting self care ADL  Neurosensory  Neurosensory (WDL): Exceptions to WDL  Ataxia: Moderate symptoms, limiting instrumental ADL (wheelchair)  Eye   Eye Disorder (WDL): All eye disorder elements are within defined limits  Ear  Ear Disorder (WDL): All ear disorder elements are within defined limits  Cardiovascular  Cardiovascular (WDL): All cardiovascular elements are within defined limits  Pulmonary  Respiratory (WDL): Exceptions to WDL  Dyspnea: Shortness of breath at rest, limiting self care ADL  Gastrointestinal  Gastrointestinal (WDL): Exceptions to WDL  Anorexia: Oral intake altered without significant weight loss or malnutrition, oral nutritional supplements indicated  Genitourinary  Genitourinary (WDL): All genitourinary elements are within defined limits  Lymphatic  Lymph (WDL): All lymph disorder elements are within defined limits  Musculoskeletal and Connective Tissue  Musculoskeletal and Connetive Tissue Disorders (WDL): Exceptions to WDL  Arthralgia: Moderate pain, limiting instrumental ADL  Muscle Weakness : Limiting self care ADL, disabling  Myalgia: Moderate pain, limiting instrumental ADL  Integumentary  Integumentary (WDL): All integumentary elements are within defined limits  Patient Coping  Patient Coping: Sadness  Distress Assessment  Distress Assessment Score: 3 (how he is  feeling)  Accompanied by  Accompanied by: Family Member (3 children)  Oral Chemo Adherence       Past History  Past Medical History:   Diagnosis Date     Cataract     bilateral     Foot fracture, right      History of transfusion      Hypertension      Inguinal hernia      Leukemia      PE (pulmonary embolism)      Pneumonia      Prostate cancer      Skin cancer      Thyroid cancer        PHYSICAL EXAM:  /56  Pulse 64  Temp 97.8  F (36.6  C) (Oral)   SpO2 100%  GENERAL: no acute distress. Cooperative in conversation. Here with 3 children. In wheelchair. Appears very fatigued and weak. Also is pale.   HEENT: pupils are equal, round and reactive. Oromucosa is clean and intact. No ulcerations or mucositis noted. No bleeding noted.  RESP: lungs are clear bilaterally per auscultation. Regular respiratory rate. No wheezes or rhonchi.  CV: Regular, rate and rhythm. Slight systolic murmur.  ABD: soft, nontender. Positive bowel sounds. No organomegaly.   MUSCULOSKELETAL: No lower extremity swelling.   NEURO: non focal. Alert and oriented x3.   PSYCH: within normal limits. No depression or anxiety.  SKIN: warm dry intact   LYMPH: mild bilateral cervical nodes, no supraclavicular or axillary lymphadenopathy        Lab Results    Recent Results (from the past 168 hour(s))   HM1 (CBC with Diff)   Result Value Ref Range    .2 (HH) 4.0 - 11.0 thou/uL    RBC 1.59 (L) 4.40 - 6.20 mill/uL    Hemoglobin 5.1 (LL) 14.0 - 18.0 g/dL    Hematocrit 16.9 (L) 40.0 - 54.0 %     (H) 80 - 100 fL    MCH 32.1 27.0 - 34.0 pg    MCHC 30.2 (L) 32.0 - 36.0 g/dL    RDW 15.2 (H) 11.0 - 14.5 %    Platelets 67 (L) 140 - 440 thou/uL    MPV 13.0 (H) 8.5 - 12.5 fL   Manual Differential   Result Value Ref Range    Total Neutrophils % 1 (L) 50 - 70 %    Lymphocytes % 99 (H) 20 - 40 %    Monocytes % 0 (L) 2 - 10 %    Eosinophils %  0 0 - 6 %    Basophils % 0 0 - 2 %    Total Neutrophils Absolute 1.7 (L) 2.0 - 7.7 thou/ul    Lymphocytes  Absolute 111.5 (H) 0.8 - 4.4 thou/uL    Monocytes Absolute 0.0 0.0 - 0.9 thou/uL    Eosinophils Absolute 0.0 0.0 - 0.4 thou/uL    Basophils Absolute 0.0 0.0 - 0.2 thou/uL    Smudge Cells Present (!) None Seen    Platelet Estimate Decreased (!) Normal    Ovalocytes 1+ (!) Negative   Type and Screen   Result Value Ref Range    ABORh A POS     Antibody Screen Positive (!) Negative   Flow cytometry   Result Value Ref Range    Case Report       Flow Cytometry Report                             Case: [id number]                                    Authorizing Provider:  Mushtaq Pennington MD        Collected:           01/18/2018 1100              Ordering Location:     [facility] and Received:            01/18/2018 1228                                     Hematology                                                                   Pathologist:           Honorio Torres MD                                                         Specimen:    Peripheral Blood                                                                           Diagnosis       PERIPHERAL BLOOD, FLOW CYTOMETRY IMMUNOPHENOTYPIC CHARACTERIZATION    - CD5(+) LAMBDA LIGHT CHAIN-RESTRICTED MONOCLONAL B-CELL POPULATION    MCSS    Comment       Flow cytometry identifies a CD5(+) lambda light chain-restricted monoclonal B-cell population that also expresses CD19, CD20, CD22, and CD23. The clinical history, scatter plot features, and immunophenotype favor CLL/SLL rather than mantle cell lymphoma; however, clinical correlation is required.    Phenotypic Data          CLL/Lymphoma Phenotyping Report     Phenotyping Description of Gated Cells      Source: Peripheral Blood  Case Reference/Related Case: [facility] - No related case      Antibody %    Dual         %   B-Related     Labeling    CD19  98       NA  CD20   96       67  CD22   96    JN01-sgf.   1  CD10   <1    WC65-jlp.   10  CD11c  37    Joan./Brock.*  0.1:1  FMC7   1    SB99-94h    37  CD23   94    CD19-10   <1    <1    PH99-VFD DR  94         FMC7-23   1        CD19-23   93        RI70-231   <1        WD20-277  <1        CD23-20   92        KV07-kbt.   NA        SS02-ode.  NA          Antibody %    Dual         %  T-Related     Labeling    CD7   2    CD3-4   1  CD5   70    CD3-8   1  CD3   2    CD2-5   2  CD2   2    CD4-8   <1  CD4   1    CD4/CD8* 1.0:1  CD8   1    CD56-38 1    CD56  1      CD25   1  CD57   NA  cTdt    NA      Antibody %    Dual         %  Myeloid/Monocytic/Misc.  Labeling      HLA-DR  94    DK71-157   <1 all  CD45   100  CD34   <1 all  CD13   1    <1  all   CD38   88  CD14   2      Cell count is approximately: 113.2 x 10*3/uL  Viability is: NA    *=ratio  NA=antibody not run  fazal.=kappa  gregory.=lambda      Charges CPT: 66421 (25 markers)  ICD-10: C85.90     Result Flag Malignant (!) Normal   Morphology,Smear Review (MORP)   Result Value Ref Range    Pathology, Smear Review See Separate Pathology Report (!) (none)    WBC 89.1 (HH) 4.0 - 11.0 thou/uL    RBC 1.43 (L) 4.40 - 6.20 mill/uL    Hemoglobin 4.6 (LL) 14.0 - 18.0 g/dL    Hematocrit 15.0 (L) 40.0 - 54.0 %     (H) 80 - 100 fL    MCH 32.2 27.0 - 34.0 pg    MCHC 30.7 (L) 32.0 - 36.0 g/dL    RDW 14.8 (H) 11.0 - 14.5 %    Platelets 57 (L) 140 - 440 thou/uL    MPV 12.9 (H) 8.5 - 12.5 fL   Reticulocytes   Result Value Ref Range    Retic Absolute Count 0.003 (L) 0.010 - 0.110 mill/uL   Lactate Dehydrogenase (LDH)   Result Value Ref Range    LD (LDH) 337 (H) 125 - 220 U/L   Direct Antiglobulin Test   Result Value Ref Range    LOC, IgG,-C3d POS (!) Negative   Comprehensive metabolic panel   Result Value Ref Range    Sodium 141 136 - 145 mmol/L    Potassium 4.1 3.5 - 5.0 mmol/L    Chloride 107 98 - 107 mmol/L    CO2 22 22 - 31 mmol/L    Anion Gap, Calculation 12 5 - 18 mmol/L    Glucose 126 (H) 70 - 125 mg/dL    BUN 35 (H) 8 - 28 mg/dL    Creatinine 1.07 0.70 - 1.30 mg/dL    GFR MDRD Af Amer >60 >60 mL/min/1.73m2     GFR MDRD Non Af Amer >60 >60 mL/min/1.73m2    Bilirubin, Total 1.0 0.0 - 1.0 mg/dL    Calcium 8.5 8.5 - 10.5 mg/dL    Protein, Total 5.7 (L) 6.0 - 8.0 g/dL    Albumin 3.8 3.5 - 5.0 g/dL    Alkaline Phosphatase 54 45 - 120 U/L    AST 12 0 - 40 U/L    ALT <6 0 - 45 U/L   Magnesium   Result Value Ref Range    Magnesium 2.2 1.8 - 2.6 mg/dL   Renal Function Profile   Result Value Ref Range    Albumin 3.8 3.5 - 5.0 g/dL    Calcium 8.5 8.5 - 10.5 mg/dL    Phosphorus 4.2 2.5 - 4.5 mg/dL    Glucose 126 (H) 70 - 125 mg/dL    BUN 35 (H) 8 - 28 mg/dL    Creatinine 1.07 0.70 - 1.30 mg/dL    Sodium 141 136 - 145 mmol/L    Potassium 4.1 3.5 - 5.0 mmol/L    Chloride 107 98 - 107 mmol/L    CO2 22 22 - 31 mmol/L    Anion Gap, Calculation 12 5 - 18 mmol/L    GFR MDRD Af Amer >60 >60 mL/min/1.73m2    GFR MDRD Non Af Amer >60 >60 mL/min/1.73m2   Manual Differential   Result Value Ref Range    Total Neutrophils % 4 (L) 50 - 70 %    Lymphocytes % 96 (H) 20 - 40 %    Monocytes % 0 (L) 2 - 10 %    Eosinophils %  0 0 - 6 %    Basophils % 0 0 - 2 %    Total Neutrophils Absolute 3.6 2.0 - 7.7 thou/ul    Lymphocytes Absolute 85.5 (H) 0.8 - 4.4 thou/uL    Monocytes Absolute 0.0 0.0 - 0.9 thou/uL    Eosinophils Absolute 0.0 0.0 - 0.4 thou/uL    Basophils Absolute 0.0 0.0 - 0.2 thou/uL    Platelet Estimate Decreased (!) Normal    Ovalocytes 1+ (!) Negative   Direct Antiglobulin Test (Pt <= 4 mos)   Result Value Ref Range    LOC IgG POS (!) Negative       Imaging    No results found.      Signed by: Vy Huerta, CNP

## 2021-06-15 NOTE — PROGRESS NOTES
2 units of blood will be given today in first floor infusion for a Hgb of 5.1. Consent obtained    Hien Gill RN 1/18/18

## 2021-06-15 NOTE — PROGRESS NOTES
[facility]ology [facility] Progress Note    Patient: Nixon Velasquez Jr.  MRN: [id number]  Date of Service:         Reason for Visit    Chief Complaint   Patient presents with     HE Cancer       Assessment and Plan      1. CLL: Received single agent Rituxan for this with his last dose being August 7, 2017.  He has been on observation since that time.  His labs recently are suggesting relapse.  Will discuss with patient going forward if we need to do treatment for the CLL. Continue to monitor counts closely. If we need treatment, we may use Rituxan with bendamustine or Imbruvica.     2.  Hemolytic anemia: prednisone went from 80 to 60mg daily last week. Slight drop in hemoglobin, but not much. Platelets have improved well. WBC continues to go up. We will continue taper and have pt take 50mg daily for one week. He will return at that point to recheck labs. We are still awaiting haptoglobin and LDH.       ECOG Performance   ECOG Performance Status: 2     Distress Assessment  Distress Assessment Score: No distress    Pain  Currently in Pain: No/denies      Problem List    1. CLL (chronic lymphoid leukemia) in relapse  HM1(CBC and Differential)   2. Other autoimmune hemolytic anemias  predniSONE (DELTASONE) 20 MG tablet    HM1(CBC and Differential)    Comprehensive Metabolic Panel    LD(LDH)    Haptoglobin      ______________________________________________________________________________    History of Present Illness      Diagnosis:     1. Chronic lymphocytic leukemia initially diagnosed in 2/2008. 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   2. Thyroid cancer, status post thyroidectomy and radioactive iodine ablation.   3. Prostate cancer diagnosed in 1992.  4. Skin cancers, multiple.     Treatment:  Started single agent Rituxan for his CLL on July 18, 2017.  This was initiated for worsening symptomatic anemia.  He received 4 weekly doses.  Completed on August 7, 2017.     He is on Synthroid for his  thyroidectomy.     Currently on his prednisone taper for a hemolytic anemia    Had recent excision for skin cancer in right ear. Was incompletely excised, but got severely infected, which resulted in hospitalization     Interim History:      Nixon is here today to be assessed for a blood transfusion.  Patient was here yesterday to get a blood transfusion but he has multiple antibodies in his blood and they were unable to match it.  He is here now today to try to get a blood transfusion.  Is feeling quite weak.  He can barely stand.  He is in a wheelchair.  Dates that the symptoms have been going on for a couple weeks.  He denies any bleeding.  Any dark stools.    Pain Status  Currently in Pain: No/denies    Review of Systems    Constitutional  Constitutional (WDL): Exceptions to WDL  Fatigue: Fatigue relieved by rest  Neurosensory  Neurosensory (WDL): Exceptions to WDL  Ataxia: Asymptomatic, clinical or diagnostic observations only, intervention not indicated  Eye   Eye Disorder (WDL): All eye disorder elements are within defined limits  Ear  Ear Disorder (WDL): All ear disorder elements are within defined limits  Cardiovascular  Cardiovascular (WDL): All cardiovascular elements are within defined limits  Pulmonary  Respiratory (WDL): Within Defined Limits  Gastrointestinal  Gastrointestinal (WDL): All gastrointestinal elements are within defined limits  Genitourinary  Genitourinary (WDL): All genitourinary elements are within defined limits  Lymphatic  Lymph (WDL): All lymph disorder elements are within defined limits  Musculoskeletal and Connective Tissue  Musculoskeletal and Connetive Tissue Disorders (WDL): Exceptions to WDL  Muscle Weakness : Symptomatic, perceived by patient but not evident on physical exam  Integumentary  Integumentary (WDL): Exceptions to WDL (skin cancer)  Patient Coping  Patient Coping: Accepting  Distress Assessment  Distress Assessment Score: No distress  Accompanied by  Accompanied by:  Family Member  Oral Chemo Adherence       Past History  Past Medical History:   Diagnosis Date     Cataract     bilateral     Foot fracture, right      History of transfusion      Hypertension      Inguinal hernia      Leukemia      PE (pulmonary embolism)      Pneumonia      Prostate cancer      Skin cancer      Thyroid cancer        PHYSICAL EXAM:  /62  Pulse 70  Temp 98.1  F (36.7  C) (Oral)   Wt 191 lb 3.2 oz (86.7 kg)  SpO2 98%  BMI 23.9 kg/m2  GENERAL: no acute distress. Cooperative in conversation. Here with son. Still a little fatigued.   HEENT: pupils are equal, round and reactive. Oromucosa is clean and intact. No ulcerations or mucositis noted. No bleeding noted.  RESP: lungs are clear bilaterally per auscultation. Regular respiratory rate. No wheezes or rhonchi.  CV: Regular, rate and rhythm. Slight systolic murmur.  ABD: soft, nontender. Positive bowel sounds. No organomegaly.   MUSCULOSKELETAL: No lower extremity swelling.   NEURO: non focal. Alert and oriented x3.   PSYCH: within normal limits. No depression or anxiety.  SKIN: warm dry intact   LYMPH: mild bilateral cervical nodes, no supraclavicular or axillary lymphadenopathy        Lab Results  Recent Results (from the past 24 hour(s))   Reticulocytes   Result Value Ref Range    Retic Absolute Count 0.033 0.010 - 0.110 mill/uL   HM1 (CBC with Diff)   Result Value Ref Range    .4 (HH) 4.0 - 11.0 thou/uL    RBC 2.89 (L) 4.40 - 6.20 mill/uL    Hemoglobin 8.4 (L) 14.0 - 18.0 g/dL    Hematocrit 29.4 (L) 40.0 - 54.0 %     (H) 80 - 100 fL    MCH 29.1 27.0 - 34.0 pg    MCHC 28.6 (L) 32.0 - 36.0 g/dL    RDW 17.3 (H) 11.0 - 14.5 %    Platelets 92 (L) 140 - 440 thou/uL    MPV 12.9 (H) 8.5 - 12.5 fL   Manual Differential   Result Value Ref Range    Total Neutrophils % 3 (L) 50 - 70 %    Lymphocytes % 97 (H) 20 - 40 %    Monocytes % 0 (L) 2 - 10 %    Eosinophils %  0 0 - 6 %    Basophils % 0 0 - 2 %    Total Neutrophils Absolute 5.0 2.0  - 7.7 thou/ul    Lymphocytes Absolute 162.4 (H) 0.8 - 4.4 thou/uL    Monocytes Absolute 0.0 0.0 - 0.9 thou/uL    Eosinophils Absolute 0.0 0.0 - 0.4 thou/uL    Basophils Absolute 0.0 0.0 - 0.2 thou/uL    Smudge Cells Present (!) None Seen    Platelet Estimate Decreased (!) Normal    Ovalocytes 1+ (!) Negative         Imaging    No results found.      Signed by: Vy Huerta, CNP

## 2021-06-15 NOTE — PROGRESS NOTES
[facility] Hematology and Oncology Progress Note    Patient: Nixon Velasquez Jr.  MRN: [id number]  Date of Service:  January 26, 2018      Assessment and Plan:    1. CLL: He finished Rituxan about 5 months ago.  CLL FISH panel still shows deletion of the long arm of chromosome 11.  He starting to have a relapse in terms of his white count.  At this point I do not think we absolutely need to start him on treatment.  He still recovering from his anemia and just prior to that he had a significant infection of the year which took a lot out of them.  It is possible that if his hemoglobin recovers to baseline without a relapse of his hemolysis, and his platelets today were they are now, that we could continue to observe him without treatment for his CLL.  When he needs treatment we can use either rituximab with bendamustine or ibrutinib.  Both of these treatment options were briefly reviewed with the patient today.  We will see him on a weekly basis for now.    2. Prophylaxis: He received a Pneumovax in mid 2016.    3.  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  He is responding to steroids.  Hemoglobin is increasing.  Clinically he is gradually feeling better.  Will start a steroid taper today.  He will begin 60 mg daily tomorrow the 27th.  Will consider titrating down by 10 mg a week starting next week.    ECOG Performance   ECOG Performance Status: 2    Distress Assessment  Distress Assessment Score: No distress    Pain  Currently in Pain: No/denies    Diagnosis:    1. Chronic lymphocytic leukemia initially diagnosed in 2/2008. 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   2. Thyroid cancer, status post thyroidectomy and radioactive iodine ablation.   3. Prostate cancer diagnosed in 1992.  4. Skin cancers, multiple.  5.  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    Treatment:    Started single agent Rituxan for his CLL on July 18, 2017.  This was initiated for worsening  "symptomatic anemia.  He received 4 weekly doses.  Completed on August 7, 2017.    He is on Synthroid for his thyroidectomy.    Currently on his prednisone taper for a hemolytic anemia.    Interim History:     Nixon is here today for a follow-up visit.  He was hospitalized last week for autoimmune hemolytic anemia.  He was treated with intravenous steroids switched to oral.  Feeling better since discharge from the hospital.  Denies bleeding.  Eating well.  No pain or shortness of breath.  He still feels \"a little wobbly\" on his feet.    Review of Systems:    Constitutional  Constitutional (WDL): Exceptions to WDL  Fatigue: Fatigue not relieved by rest - Limiting instrumental ADL  Neurosensory  Neurosensory (WDL): Exceptions to WDL  Ataxia: Moderate symptoms, limiting instrumental ADL (unsteady)  Cardiovascular  Cardiovascular (WDL): All cardiovascular elements are within defined limits  Pulmonary  Respiratory (WDL): Within Defined Limits  Gastrointestinal  Gastrointestinal (WDL): All gastrointestinal elements are within defined limits  Genitourinary  Genitourinary (WDL): All genitourinary elements are within defined limits  Integumentary  Integumentary (WDL): Exceptions to WDL (pigmentation changes to hands)  Patient Coping  Patient Coping: Accepting  Accompanied by  Accompanied by: Family Member    Past History:    Past Medical History:   Diagnosis Date     Cataract     bilateral     Foot fracture, right      History of transfusion      Hypertension      Inguinal hernia      Leukemia      PE (pulmonary embolism)      Pneumonia      Prostate cancer      Skin cancer      Thyroid cancer      Physical Exam:    Recent Vitals 1/26/2018   Height -   Weight 190 lbs 14 oz   BSA (m2) -   /67   Pulse 64   Temp 97.6   Temp src 1   SpO2 98   Some recent data might be hidden     General: patient appears stated age of 88 y.o.. Nontoxic and in no distress.   HEENT: Head: atraumatic, normocephalic. Sclerae anicteric.  Chest:  " Normal respiratory effort.    Cardiac:  No edema.  Regular rate and rhythm.  No appreciable murmur.    Abdomen: abdomen is soft, non-distended.   Extremities: normal tone and muscle bulk.   Skin: no lesions or rash. Warm and dry.   CNS: alert and oriented x3. Grossly non-focal.   Psychiatric: normal mood and affect.     Lab Results:    Recent Results (from the past 168 hour(s))   Morphology,Smear Review (MORP)   Result Value Ref Range    Pathology, Smear Review See Separate Pathology Report (!) (none)    WBC 89.1 (HH) 4.0 - 11.0 thou/uL    RBC 1.43 (L) 4.40 - 6.20 mill/uL    Hemoglobin 4.6 (LL) 14.0 - 18.0 g/dL    Hematocrit 15.0 (L) 40.0 - 54.0 %     (H) 80 - 100 fL    MCH 32.2 27.0 - 34.0 pg    MCHC 30.7 (L) 32.0 - 36.0 g/dL    RDW 14.8 (H) 11.0 - 14.5 %    Platelets 57 (L) 140 - 440 thou/uL    MPV 12.9 (H) 8.5 - 12.5 fL   Reticulocytes   Result Value Ref Range    Retic Absolute Count 0.003 (L) 0.010 - 0.110 mill/uL   Lactate Dehydrogenase (LDH)   Result Value Ref Range    LD (LDH) 337 (H) 125 - 220 U/L   Haptoglobin   Result Value Ref Range    Haptoglobin <8 (L) 33 - 171 mg/dL   Direct Antiglobulin Test   Result Value Ref Range    LOC, IgG,-C3d POS (!) Negative   Comprehensive metabolic panel   Result Value Ref Range    Sodium 141 136 - 145 mmol/L    Potassium 4.1 3.5 - 5.0 mmol/L    Chloride 107 98 - 107 mmol/L    CO2 22 22 - 31 mmol/L    Anion Gap, Calculation 12 5 - 18 mmol/L    Glucose 126 (H) 70 - 125 mg/dL    BUN 35 (H) 8 - 28 mg/dL    Creatinine 1.07 0.70 - 1.30 mg/dL    GFR MDRD Af Amer >60 >60 mL/min/1.73m2    GFR MDRD Non Af Amer >60 >60 mL/min/1.73m2    Bilirubin, Total 1.0 0.0 - 1.0 mg/dL    Calcium 8.5 8.5 - 10.5 mg/dL    Protein, Total 5.7 (L) 6.0 - 8.0 g/dL    Albumin 3.8 3.5 - 5.0 g/dL    Alkaline Phosphatase 54 45 - 120 U/L    AST 12 0 - 40 U/L    ALT <6 0 - 45 U/L   Magnesium   Result Value Ref Range    Magnesium 2.2 1.8 - 2.6 mg/dL   Renal Function Profile   Result Value Ref Range     Albumin 3.8 3.5 - 5.0 g/dL    Calcium 8.5 8.5 - 10.5 mg/dL    Phosphorus 4.2 2.5 - 4.5 mg/dL    Glucose 126 (H) 70 - 125 mg/dL    BUN 35 (H) 8 - 28 mg/dL    Creatinine 1.07 0.70 - 1.30 mg/dL    Sodium 141 136 - 145 mmol/L    Potassium 4.1 3.5 - 5.0 mmol/L    Chloride 107 98 - 107 mmol/L    CO2 22 22 - 31 mmol/L    Anion Gap, Calculation 12 5 - 18 mmol/L    GFR MDRD Af Amer >60 >60 mL/min/1.73m2    GFR MDRD Non Af Amer >60 >60 mL/min/1.73m2   Manual Differential   Result Value Ref Range    Total Neutrophils % 4 (L) 50 - 70 %    Lymphocytes % 96 (H) 20 - 40 %    Monocytes % 0 (L) 2 - 10 %    Eosinophils %  0 0 - 6 %    Basophils % 0 0 - 2 %    Total Neutrophils Absolute 3.6 2.0 - 7.7 thou/ul    Lymphocytes Absolute 85.5 (H) 0.8 - 4.4 thou/uL    Monocytes Absolute 0.0 0.0 - 0.9 thou/uL    Eosinophils Absolute 0.0 0.0 - 0.4 thou/uL    Basophils Absolute 0.0 0.0 - 0.2 thou/uL    Platelet Estimate Decreased (!) Normal    Ovalocytes 1+ (!) Negative   Direct Antiglobulin Test (Pt <= 4 mos)   Result Value Ref Range    LOC IgG POS (!) Negative   Peripheral Blood Smear, Path Review   Result Value Ref Range    Case Report       Peripheral Blood Morphology                       Case: [id number]                                   Authorizing Provider:  Vy Huerta,    Collected:           01/19/2018 1204                                     CNP                                                                          Ordering Location:     [facility]     Received:            01/19/2018 1320              Pathologist:           Kobe Franks MD                                                         Specimen:    Peripheral Blood                                                                           Final Diagnosis       PERIPHERAL BLOOD:     1) ATYPICAL LYMPHOCYTOSIS, CONSISTENT WITH REPORTED        HISTORY OF CHRONIC LYMPHOCYTIC LEUKEMIA      2) MACROCYTIC ANEMIA, SEVERE;        SPHEROCYTES PRESENT;         NO DIAGNOSTIC SCHISTOCYTES SEEN, BUT        SERUM HAPTOGLOBIN IS NOTED TO BE LOW     3) THROMBOCYTOPENIA      4) PLEASE SEE COMMENT    MCSS    Comment       Microscopic examination of blood is notable for red cells with moderate polychromasia and anisocytosis. Several macrocytic red blood cells are present. There is a marked increase in small atypical lymphocytes with coarsely granular chromatin and small but distinct nucleoli. Platelets are decreased in number.     This patient's atypical lymphocytosis is most likely due to a malignant lymphoproliferative disorder, namely CLL, consistent with the reported clinical history. Consider flow cytometric analysis of peripheral blood for further characterization.     Macrocytosis can be seen in liver disease, hypothyroidism, refractory anemias, vitamin B12 and folate deficiency, and drug/alcohol use, among other etiologies.  The patient has been found to have a red cell antigenic profile consistent with the presence of an autoantibody.  A separate analysis at [facility] is underway to exclude the presence of an alloantibody; the results will be reported separately by the [facility].  Spherocytes are present, suggestive of extravascular hemolysis.  No diagnostic schistocytes are seen, but serum haptoglobin is noted to be low, compatible with some degree of intravascular hemolysis.    Thrombocytopenia is a nonspecific finding and may be due to multiple etiologies that cause decreased platelet production, increased platelet destruction by immune and by non-immune processes, and abnormal platelet pooling. Please correlate with clinical findings.    Clinical Information Not provided     Charges       CPT:  88441   ICD-10:  D72.820, D53.9, D69.6, C91.10    Result Flag Malignant (!) Normal   Vitamin B12   Result Value Ref Range    Vitamin B-12 307 213 - 816 pg/mL   Folate, Serum   Result Value Ref Range    Folate 7.3 >=3.5 ng/mL   Crossmatch    Result Value Ref Range    Status Canceled     Component Red Blood Cells    CLL FISH panel; TP53 mutation status - Misc. Lab Test   Result Value Ref Range    Miscellanous Test Dept. Hematology Reference Test     Test Name CLL Panel (FISH): 11q, 12cen, 13q, 17p      Performing Lab       [facility]  [address] [place] [address]    Scan Result See Scanned Report    Crossmatch   Result Value Ref Range    Crossmatch LEAST INCOMPATIBLE     Blood Expiration Date [id number]     Unit Type A Pos     Unit Number [id number]     Status Released     Component Red Blood Cells     PRODUCT CODE O1290L50     Blood Type 6200     CODING SYSTEM [id number]    Magnesium   Result Value Ref Range    Magnesium 2.3 1.8 - 2.6 mg/dL   Renal Function Profile   Result Value Ref Range    Albumin 3.3 (L) 3.5 - 5.0 g/dL    Calcium 8.2 (L) 8.5 - 10.5 mg/dL    Phosphorus 3.8 2.5 - 4.5 mg/dL    Glucose 191 (H) 70 - 125 mg/dL    BUN 36 (H) 8 - 28 mg/dL    Creatinine 1.08 0.70 - 1.30 mg/dL    Sodium 139 136 - 145 mmol/L    Potassium 4.5 3.5 - 5.0 mmol/L    Chloride 110 (H) 98 - 107 mmol/L    CO2 21 (L) 22 - 31 mmol/L    Anion Gap, Calculation 8 5 - 18 mmol/L    GFR MDRD Af Amer >60 >60 mL/min/1.73m2    GFR MDRD Non Af Amer >60 >60 mL/min/1.73m2   HM1 (CBC with Diff)   Result Value Ref Range    WBC 85.2 (HH) 4.0 - 11.0 thou/uL    RBC 2.25 (L) 4.40 - 6.20 mill/uL    Hemoglobin 6.9 (LL) 14.0 - 18.0 g/dL    Hematocrit 21.5 (L) 40.0 - 54.0 %    MCV 96 80 - 100 fL    MCH 30.7 27.0 - 34.0 pg    MCHC 32.1 32.0 - 36.0 g/dL    RDW 16.8 (H) 11.0 - 14.5 %    Platelets 50 (L) 140 - 440 thou/uL    MPV 13.3 (H) 8.5 - 12.5 fL   Manual Differential   Result Value Ref Range    Total Neutrophils % 2 (L) 50 - 70 %    Lymphocytes % 97 (H) 20 - 40 %    Monocytes % 2 2 - 10 %    Eosinophils %  0 0 - 6 %    Basophils % 0 0 - 2 %    Total Neutrophils Absolute 1.3 (L) 2.0 - 7.7 thou/ul    Lymphocytes Absolute 82.2 (H) 0.8 - 4.4 thou/uL     Monocytes Absolute 1.7 (H) 0.0 - 0.9 thou/uL    Eosinophils Absolute 0.0 0.0 - 0.4 thou/uL    Basophils Absolute 0.0 0.0 - 0.2 thou/uL    Platelet Estimate Decreased (!) Normal    Ovalocytes 1+ (!) Negative   Crossmatch   Result Value Ref Range    Crossmatch LEAST INCOMPATIBLE     Blood Expiration Date [id number]     Unit Type A Pos     Unit Number [id number]     Status Transfused     Component Red Blood Cells     PRODUCT CODE F8468Q44     Issue Date and Time [id number]     Blood Type 6200     CODING SYSTEM [id number]    Crossmatch   Result Value Ref Range    Crossmatch LEAST INCOMPATIBLE     Blood Expiration Date [id number]     Unit Type A Neg     Unit Number [id number]     Status Transfused     Component Red Blood Cells     PRODUCT CODE M8724G82     Issue Date and Time [id number]     Blood Type 0600     CODING SYSTEM [id number]    Crossmatch   Result Value Ref Range    Crossmatch LEAST INCOMPATIBLE     Blood Expiration Date [id number]     Unit Type A Neg     Unit Number [id number]     Status Transfused     Component Red Blood Cells     PRODUCT CODE W5517P79     Issue Date and Time [id number]     Blood Type 0600     CODING SYSTEM [id number]    Magnesium   Result Value Ref Range    Magnesium 2.2 1.8 - 2.6 mg/dL   Renal Function Profile   Result Value Ref Range    Albumin 3.5 3.5 - 5.0 g/dL    Calcium 8.5 8.5 - 10.5 mg/dL    Phosphorus 4.6 (H) 2.5 - 4.5 mg/dL    Glucose 124 70 - 125 mg/dL    BUN 42 (H) 8 - 28 mg/dL    Creatinine 0.89 0.70 - 1.30 mg/dL    Sodium 139 136 - 145 mmol/L    Potassium 4.4 3.5 - 5.0 mmol/L    Chloride 107 98 - 107 mmol/L    CO2 24 22 - 31 mmol/L    Anion Gap, Calculation 8 5 - 18 mmol/L    GFR MDRD Af Amer >60 >60 mL/min/1.73m2    GFR MDRD Non Af Amer >60 >60 mL/min/1.73m2   Lactate Dehydrogenase (LDH)   Result Value Ref Range    LD (LDH) 354 (H) 125 - 220 U/L   Comprehensive Metabolic Panel   Result Value Ref Range    Sodium 139 136 - 145 mmol/L    Potassium 4.4 3.5  - 5.0 mmol/L    Chloride 107 98 - 107 mmol/L    CO2 24 22 - 31 mmol/L    Anion Gap, Calculation 8 5 - 18 mmol/L    Glucose 124 70 - 125 mg/dL    BUN 42 (H) 8 - 28 mg/dL    Creatinine 0.89 0.70 - 1.30 mg/dL    GFR MDRD Af Amer >60 >60 mL/min/1.73m2    GFR MDRD Non Af Amer >60 >60 mL/min/1.73m2    Bilirubin, Total 0.9 0.0 - 1.0 mg/dL    Calcium 8.5 8.5 - 10.5 mg/dL    Protein, Total 5.3 (L) 6.0 - 8.0 g/dL    Albumin 3.5 3.5 - 5.0 g/dL    Alkaline Phosphatase 51 45 - 120 U/L    AST 10 0 - 40 U/L    ALT <6 0 - 45 U/L   HM1 (CBC with Diff)   Result Value Ref Range    WBC 70.3 (HH) 4.0 - 11.0 thou/uL    RBC 2.12 (L) 4.40 - 6.20 mill/uL    Hemoglobin 6.5 (LL) 14.0 - 18.0 g/dL    Hematocrit 20.2 (L) 40.0 - 54.0 %    MCV 95 80 - 100 fL    MCH 30.7 27.0 - 34.0 pg    MCHC 32.2 32.0 - 36.0 g/dL    RDW 16.1 (H) 11.0 - 14.5 %    Platelets 42 (LL) 140 - 440 thou/uL    MPV 13.1 (H) 8.5 - 12.5 fL   Manual Differential   Result Value Ref Range    Total Neutrophils % 6 (L) 50 - 70 %    Lymphocytes % 94 (H) 20 - 40 %    Monocytes % 1 (L) 2 - 10 %    Eosinophils %  0 0 - 6 %    Basophils % 0 0 - 2 %    Total Neutrophils Absolute 3.9 2.0 - 7.7 thou/ul    Lymphocytes Absolute 66.1 (H) 0.8 - 4.4 thou/uL    Monocytes Absolute 0.4 0.0 - 0.9 thou/uL    Eosinophils Absolute 0.0 0.0 - 0.4 thou/uL    Basophils Absolute 0.0 0.0 - 0.2 thou/uL    Smudge Cells Present (!) None Seen    Platelet Estimate Decreased (!) Normal   POCT Glucose   Result Value Ref Range    Glucose,  mg/dL   Crossmatch   Result Value Ref Range    Crossmatch LEAST INCOMPATIBLE     Blood Expiration Date [id number]     Unit Type A Neg     Unit Number [id number]     Status Released     Component Red Blood Cells     PRODUCT CODE S7972F24     Blood Type 0600     CODING SYSTEM [id number]    Crossmatch   Result Value Ref Range    Crossmatch LEAST INCOMPATIBLE     Blood Expiration Date [id number]     Unit Type A Neg     Unit Number [id number]     Status Released      Component Red Blood Cells     PRODUCT CODE R0328I97     Blood Type 0600     CODING SYSTEM [id number]    HM2(CBC w/o Differential)   Result Value Ref Range    WBC 77.0 (HH) 4.0 - 11.0 thou/uL    RBC 2.04 (L) 4.40 - 6.20 mill/uL    Hemoglobin 6.2 (LL) 14.0 - 18.0 g/dL    Hematocrit 20.1 (L) 40.0 - 54.0 %    MCV 99 80 - 100 fL    MCH 29.9 27.0 - 34.0 pg    MCHC 30.3 (L) 32.0 - 36.0 g/dL    RDW 15.6 (H) 11.0 - 14.5 %    Platelets 41 (LL) 140 - 440 thou/uL    MPV 13.2 (H) 8.5 - 12.5 fL   Type and screen   Result Value Ref Range    ABORh A POS     Antibody Screen Positive (!) Negative   POCT Glucose   Result Value Ref Range    Glucose,  mg/dL   Urinalysis-UC if Indicated   Result Value Ref Range    Color, UA Yellow Colorless, Yellow, Straw, Light Yellow    Clarity, UA Clear Clear    Glucose,  mg/dL (!) Negative    Bilirubin, UA Negative Negative    Ketones, UA Negative Negative, 60 mg/dL    Specific Gravity, UA 1.019 1.001 - 1.030    Blood, UA Moderate (!) Negative    pH, UA 5.5 4.5 - 8.0    Protein, UA Negative Negative mg/dL    Urobilinogen, UA <2.0 E.U./dL <2.0 E.U./dL, 2.0 E.U./dL    Nitrite, UA Negative Negative    Leukocytes, UA Negative Negative    Bacteria, UA None Seen None Seen hpf    RBC, UA 0-2 None Seen, 0-2 hpf    WBC, UA 0-5 None Seen, 0-5 hpf    Squam Epithel, UA 25-50 (!) None Seen, 0-5 lpf    Mucus, UA Few (!) None Seen lpf   POCT Glucose   Result Value Ref Range    Glucose,  mg/dL   Crossmatch   Result Value Ref Range    Crossmatch LEAST INCOMPATIBLE     Blood Expiration Date [id number]     Unit Type A Neg     Unit Number [id number]     Status Transfused     Component Red Blood Cells     PRODUCT CODE Z4189L74     Issue Date and Time [id number]     Blood Type 0600     CODING SYSTEM [id number]    Basic Metabolic Panel   Result Value Ref Range    Sodium 139 136 - 145 mmol/L    Potassium 4.4 3.5 - 5.0 mmol/L    Chloride 107 98 - 107 mmol/L    CO2 27 22 - 31 mmol/L    Anion Gap,  Calculation 5 5 - 18 mmol/L    Glucose 120 70 - 125 mg/dL    Calcium 8.5 8.5 - 10.5 mg/dL    BUN 38 (H) 8 - 28 mg/dL    Creatinine 0.87 0.70 - 1.30 mg/dL    GFR MDRD Af Amer >60 >60 mL/min/1.73m2    GFR MDRD Non Af Amer >60 >60 mL/min/1.73m2   HM2(CBC w/o Differential)   Result Value Ref Range    WBC 83.6 (HH) 4.0 - 11.0 thou/uL    RBC 2.21 (L) 4.40 - 6.20 mill/uL    Hemoglobin 6.7 (LL) 14.0 - 18.0 g/dL    Hematocrit 21.4 (L) 40.0 - 54.0 %    MCV 97 80 - 100 fL    MCH 30.3 27.0 - 34.0 pg    MCHC 31.3 (L) 32.0 - 36.0 g/dL    RDW 16.1 (H) 11.0 - 14.5 %    Platelets 42 (LL) 140 - 440 thou/uL    MPV 13.8 (H) 8.5 - 12.5 fL   POCT Glucose   Result Value Ref Range    Glucose,  mg/dL   Crossmatch   Result Value Ref Range    Crossmatch LEAST INCOMPATIBLE     Blood Expiration Date [id number]     Unit Type A Neg     Unit Number [id number]     Status Transfused     Component Red Blood Cells     PRODUCT CODE V2511N95     Issue Date and Time [id number]     Blood Type 0600     CODING SYSTEM [id number]    HM1 (CBC with Diff)   Result Value Ref Range    .3 (HH) 4.0 - 11.0 thou/uL    RBC 3.01 (L) 4.40 - 6.20 mill/uL    Hemoglobin 8.8 (L) 14.0 - 18.0 g/dL    Hematocrit 29.4 (L) 40.0 - 54.0 %    MCV 98 80 - 100 fL    MCH 29.2 27.0 - 34.0 pg    MCHC 29.9 (L) 32.0 - 36.0 g/dL    RDW 15.8 (H) 11.0 - 14.5 %    Platelets 60 (L) 140 - 440 thou/uL    MPV 13.8 (H) 8.5 - 12.5 fL   Reticulocytes   Result Value Ref Range    Retic Absolute Count 0.007 (L) 0.010 - 0.110 mill/uL   Comprehensive Metabolic Panel   Result Value Ref Range    Sodium 143 136 - 145 mmol/L    Potassium 3.5 3.5 - 5.0 mmol/L    Chloride 108 (H) 98 - 107 mmol/L    CO2 26 22 - 31 mmol/L    Anion Gap, Calculation 9 5 - 18 mmol/L    Glucose 124 70 - 125 mg/dL    BUN 27 8 - 28 mg/dL    Creatinine 0.96 0.70 - 1.30 mg/dL    GFR MDRD Af Amer >60 >60 mL/min/1.73m2    GFR MDRD Non Af Amer >60 >60 mL/min/1.73m2    Bilirubin, Total 0.6 0.0 - 1.0 mg/dL    Calcium 8.3  (L) 8.5 - 10.5 mg/dL    Protein, Total 5.3 (L) 6.0 - 8.0 g/dL    Albumin 3.6 3.5 - 5.0 g/dL    Alkaline Phosphatase 52 45 - 120 U/L    AST 10 0 - 40 U/L    ALT 10 0 - 45 U/L     Imaging:    No results found.      Signed by: Mushtaq Pennington MD

## 2021-06-15 NOTE — PROGRESS NOTES
Arrived from 2nd floor with need to receive tranfusion today.  See LDA flow for info on this draw.  BB requested additional tubes for crossmatch and then was told by [facility] that blood would not be ready today.  Pt and family informed and tho disappointed, they are understanding of plan and info given.  Left via w/c accompanied by daughter at 1115.  Called [facility] now to see if any progress on crossmatch but they state [facility] is still working on it.  Family to call in am before coming to determine if blood is ready.

## 2021-06-15 NOTE — PROGRESS NOTES
[facility] Hematology and Oncology Progress Note    Patient: Nixon Velasquez Jr.  MRN: [id number]  Date of Service:  February 8, 2018      Assessment and Plan:    1.  CLL: Continues to have evidence of relapse.  His white count has stabilized.  Given that his hemoglobin is now recovering I think he needs to be started on systemic therapy for his CLL again.  Have entertained bendamustine and Rituxan or single agent ibrutinib.  Given his performance status, we will start with ibrutinib.  He was given written information on this drug today to take home and review.  We will put the prescription in next week after he has some blood and is feeling a little better.  I think we should still continue to see him weekly for now.    2.  Prophylaxis: He received a Pneumovax in mid 2016.    3.  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  I asked him to go down to 40 mg daily.  Hemoglobin is stable and using around 8.  He is more symptomatic so we will give him 1 unit of blood.  He has multiple antibodies.  Continue to taper the steroids by 10 mg weekly.    4.  Abdominal pain: Family is concerned about this.  Nixon states he might have some more occasional bloating.  The most recent CT showed significant dominant adenopathy.  I am therefore going to repeat scan to reevaluate the size of the nose and make sure there is no imminent bowel obstruction.    ECOG Performance   ECOG Performance Status: 2    Distress Assessment  Distress Assessment Score: No distress    Pain  Currently in Pain: No/denies    Diagnosis:    1. Chronic lymphocytic leukemia initially diagnosed in 2/2008. 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  Relapse in January 2018.  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  No p53 mutation noted.      2. Thyroid cancer, status post thyroidectomy and radioactive iodine ablation.   3. Prostate cancer diagnosed in 1992.  4. Skin cancers, multiple.    5.  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    Treatment:    Started single agent Rituxan for his CLL on July 18, 2017.  This was initiated for worsening symptomatic anemia.  He received 4 weekly doses.  Completed on August 7, 2017.    He is on Synthroid for his thyroidectomy.    Currently on his prednisone taper for a hemolytic anemia.    Interim History:     Nixon is here today for a follow-up visit.  He was here about a week ago.  He states that his energy overall is decreasing.  Becoming a little more pale.  Appetite is still good.  He is having looser bowel movements but still only 1 or 2 a day.  Denies any abdominal pain or cramping.  No fevers, chills, or night sweats.    Review of Systems:    Constitutional  Constitutional (WDL): Exceptions to WDL  Fatigue: Fatigue not relieved by rest, limiting self care ADL  Weight Gain: 5 - <10% from baseline (up 2lbs since last week)  Neurosensory  Neurosensory (WDL): Exceptions to WDL  Ataxia: Moderate symptoms, limiting instrumental ADL (very unsteady)  Cardiovascular  Cardiovascular (WDL): Exceptions to WDL  Edema: Yes  Edema Face: Localized facial edema (cheeks)  Pulmonary  Respiratory (WDL): Exceptions to WDL  Dyspnea: Shortness of breath at rest, limiting self care ADL  Gastrointestinal  Gastrointestinal (WDL): All gastrointestinal elements are within defined limits  Genitourinary  Genitourinary (WDL): All genitourinary elements are within defined limits  Integumentary  Integumentary (WDL): Exceptions to WDL (areas of skin cancer all over body; also has new reddened areas on upper arms)  Rash Maculo-Papular: Macules/papules covering <10% BSA with or without symptoms (e.g., pruritus, burning, tightness) (upper arms)  Patient Coping  Patient Coping: Sadness;Depression  Accompanied by  Accompanied by: Family Member (son)    Past History:    Past Medical History:   Diagnosis Date     Cataract     bilateral     Foot fracture, right      History of transfusion      Hypertension      Inguinal  hernia      Leukemia      PE (pulmonary embolism)      Pneumonia      Prostate cancer      Skin cancer      Thyroid cancer      Physical Exam:    Recent Vitals 2/8/2018   Height -   Weight 193 lbs 3 oz   BSA (m2) -   /69   Pulse 73   Temp 97.9   Temp src 1   SpO2 97   Some recent data might be hidden     General: patient appears stated age of 88 y.o.. Nontoxic and in no distress.  Appears frail.  HEENT: Head: atraumatic, normocephalic. Sclerae anicteric.  Chest:  Normal respiratory effort.    Cardiac:  No edema.  Regular rate and rhythm.  No appreciable murmur.    Abdomen: abdomen is soft, non-distended.  Nontender.  No palpable masses.  Sounds are normal.  Extremities: normal tone and muscle bulk.   Skin: Scattered ecchymoses.  Also some thickened areas of skin.  Warm and dry.   CNS: alert and oriented x3. Grossly non-focal.   Psychiatric: normal mood and affect.     Lab Results:    Recent Results (from the past 168 hour(s))   Comprehensive Metabolic Panel   Result Value Ref Range    Sodium 142 136 - 145 mmol/L    Potassium 4.4 3.5 - 5.0 mmol/L    Chloride 109 (H) 98 - 107 mmol/L    CO2 24 22 - 31 mmol/L    Anion Gap, Calculation 9 5 - 18 mmol/L    Glucose 231 (H) 70 - 125 mg/dL    BUN 18 8 - 28 mg/dL    Creatinine 0.93 0.70 - 1.30 mg/dL    GFR MDRD Af Amer >60 >60 mL/min/1.73m2    GFR MDRD Non Af Amer >60 >60 mL/min/1.73m2    Bilirubin, Total 0.5 0.0 - 1.0 mg/dL    Calcium 8.5 8.5 - 10.5 mg/dL    Protein, Total 5.9 (L) 6.0 - 8.0 g/dL    Albumin 3.9 3.5 - 5.0 g/dL    Alkaline Phosphatase 48 45 - 120 U/L    AST 12 0 - 40 U/L    ALT 12 0 - 45 U/L   HM1 (CBC with Diff)   Result Value Ref Range    .4 (HH) 4.0 - 11.0 thou/uL    RBC 2.70 (L) 4.40 - 6.20 mill/uL    Hemoglobin 8.1 (L) 14.0 - 18.0 g/dL    Hematocrit 28.4 (L) 40.0 - 54.0 %     (H) 80 - 100 fL    MCH 30.0 27.0 - 34.0 pg    MCHC 28.5 (L) 32.0 - 36.0 g/dL    RDW 21.5 (H) 11.0 - 14.5 %    Platelets 86 (L) 140 - 440 thou/uL    MPV 12.5 8.5 -  12.5 fL   Manual Differential   Result Value Ref Range    Total Neutrophils % 1 (L) 50 - 70 %    Lymphocytes % 99 (H) 20 - 40 %    Monocytes % 0 (L) 2 - 10 %    Eosinophils %  0 0 - 6 %    Basophils % 0 0 - 2 %    Total Neutrophils Absolute 1.2 (L) 2.0 - 7.7 thou/ul    Lymphocytes Absolute 122.2 (H) 0.8 - 4.4 thou/uL    Monocytes Absolute 0.0 0.0 - 0.9 thou/uL    Eosinophils Absolute 0.0 0.0 - 0.4 thou/uL    Basophils Absolute 0.0 0.0 - 0.2 thou/uL    Platelet Estimate Decreased (!) Normal     Imaging:    No results found.      Signed by: Mushtaq Pennington MD

## 2021-06-15 NOTE — PROGRESS NOTES
Assessment/ Plan  Problem List Items Addressed This Visit        High    CLL (chronic lymphoid leukemia) in relapse    Benign essential HTN     Hydrochlorothiazide stopped during hospitalization.  I believe this was because of severe anemia, maximization of perfusion and managing uric acid given CLL/hemolytic anemia.  Blood pressure is elevated but not severely so.  I am inclined to watch this for the next 2-3 months, have him complete his prednisone taper and see what blood pressure is before restarting medication.            Unprioritized    Other autoimmune hemolytic anemias     Per Dr. 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  Excision incomplete per patient.  It is recommended that he have reexcision.  Wonders if he needs to do this.  Discussed that this question is really about how quickly the cancer will grow and spread and how long the patient's life expectancy is.  Suggested that with CLL, the cancer would be expected to spread fairly rapidly given his compromised immune system.         Relevant Medications    triamcinolone (KENALOG) 0.1 % cream      Other Visit Diagnoses     Hospital discharge follow-up    -  Primary    Reviewed recent hospitalization below.  Patient quite happy with current condition.  Discussed his wife's hospice steak/dementia.  Following closely with hemato        Subjective  CC:  Chief Complaint   Patient presents with     Hospital Visit Follow Up     StRosie Jns, Anemia     HPI:  Hospital Follow-up  Date of admission: 1/19  Date of discharge: 1/23  Hospital: [facility]  Chief Diagnosis: Severe anemia, hemolytic superimposed onCLL  Narrative: Patient was admitted for weakness, severe anemia.  Hemoglobin 5.1 on admission.  Determined to be hemolytic anemia.  Placed on steroids and transfused intermittently, eventually  total of 5 units of PRBC sees transfused.  Followed by hematology.  They recommended further transfusion if hemoglobin less than 6 or unstable.  They have seen him as an outpatient on 1/26 in follow-up.  Also started on gout prophylaxis.  Hydrochlorothiazide was stopped.  Patient remains on Plavix for vascular disease and CLL.    Procedures During hospital stay not mentioned: None  Radiology studies: There were none.  Labs: As above, hemoglobin at its lowest was 4.6, white blood cell count was 89,000.  Platelets 57,000.  No bleeding.   Nixon Velasquez Jr.   Home Medication Instructions JOSÉ:    Printed on:01/29/18 0734   Medication Information                      allopurinol (ZYLOPRIM) 300 MG tablet  Take 1 tablet (300 mg total) by mouth daily with breakfast.             clopidogrel (PLAVIX) 75 mg tablet  Take 75 mg by mouth daily.             escitalopram oxalate (LEXAPRO) 5 MG tablet  Take 1 tablet (5 mg total) by mouth daily.             folic acid (FOLVITE) 1 MG tablet  Take 1 tablet (1 mg total) by mouth daily.             levothyroxine (SYNTHROID, LEVOTHROID) 150 MCG tablet  Take 150 mcg by mouth Daily at 6:00 am.             mirtazapine (REMERON) 45 MG tablet  Take 45 mg by mouth at bedtime.             omeprazole (PRILOSEC) 40 MG capsule  Take 1 capsule (40 mg total) by mouth 2 (two) times a day.             predniSONE (DELTASONE) 20 MG tablet  Take 4 tablets (80 mg total) by mouth daily with breakfast.             simvastatin (ZOCOR) 40 MG tablet  Take 40 mg by mouth at bedtime.               Follow-up recommended by discharging doctor: Follow-up with hematology as well as primary doctor.  Reviewed note from Dr. Pennington 1/26/18    [facility]:  Patient Active Problem List   Diagnosis     Restless Legs Syndrome     Corrosive Esophagitis     Hiatal Hernia     CLL (chronic lymphoid leukemia) in relapse     Hyperlipidemia     Cerebral atherosclerosis     Adenocarcinoma Of The Prostate Gland     Papillary Carcinoma Of  The Thyroid Gland     Hypothyroidism     Type 2 Diabetes Mellitus     Coronary Artery Disease     Cervical Radiculopathy     Iron Deficiency     Hypoxia     Advanced directives, counseling/discussion     Infection of ear lobe, right     Other elevated white blood cell (WBC) count     Right ear pain     Benign essential HTN     Anemia     Other autoimmune hemolytic anemias     Thrombocytopenia     Squamous cell carcinoma of skin     Current medications reviewed as follows:  Current Outpatient Prescriptions on File Prior to Visit   Medication Sig     allopurinol (ZYLOPRIM) 300 MG tablet Take 1 tablet (300 mg total) by mouth daily with breakfast.     clopidogrel (PLAVIX) 75 mg tablet Take 75 mg by mouth daily.     escitalopram oxalate (LEXAPRO) 5 MG tablet Take 1 tablet (5 mg total) by mouth daily.     folic acid (FOLVITE) 1 MG tablet Take 1 tablet (1 mg total) by mouth daily.     levothyroxine (SYNTHROID, LEVOTHROID) 150 MCG tablet Take 150 mcg by mouth Daily at 6:00 am.     mirtazapine (REMERON) 45 MG tablet Take 45 mg by mouth at bedtime.     omeprazole (PRILOSEC) 40 MG capsule Take 1 capsule (40 mg total) by mouth 2 (two) times a day.     predniSONE (DELTASONE) 20 MG tablet Take 4 tablets (80 mg total) by mouth daily with breakfast.     simvastatin (ZOCOR) 40 MG tablet Take 40 mg by mouth at bedtime.     No current facility-administered medications on file prior to visit.      History   Smoking Status     Never Smoker   Smokeless Tobacco     Never Used     Social History     Social History Narrative     Patient Care Team:  Riley Cuevas MD as PCP - General (Family Medicine)  ROS  Full 10 system review including constitutional, respiratory, cardiac, gi, urinary, rheumatologic, neurologic, reproductive, dermatologic psychiatric is  performed (via questionnaire) and is negative       Objective  Physical Exam  Vitals:    01/29/18 0930 01/29/18 0955   BP: 158/52 118/60   Pulse: 67    Resp: 20    SpO2: 99%   "  Weight: 192 lb (87.1 kg)    Height: 6' 3\" (1.905 m)      Body mass index is 24 kg/(m^2).  Patient is pale, no acute distress.  Multiple skin lesions, scaling and scabbing  Chest is clear to auscultation, cardiovascular exam regular and normal.  Abdomen soft nontender  Diagnostics:     None done today    Please note: Voice recognition software was used in this dictation.  It may therefore contain typographical errors.        "

## 2021-06-16 NOTE — PROGRESS NOTES
ICU NOTE    Ongoing abdominal pain.   Plan for abdomen CT scan with PO contrast.    Case was discussed with Hematology , plan for RBC transfusion.     Sarthak Pritchard  Pulmonary / Critical Care  3/12/2018   7:00 PM

## 2021-06-16 NOTE — PROGRESS NOTES
Pt c/o abdominal cramping and tenderness. Pt was manually disimpacted with good results of soft, brown stool. Prune juice was given per pt's request, and pt and family were instructed that Milk of Mag was ordered prn. Fleets enema was also given x1 with no results, but pt stated that his cramping had improved slightly. Will continue to monitor. Lilian William RN

## 2021-06-16 NOTE — PROGRESS NOTES
"  RESPIRATORY CARE NOTE     Patient Name: Nixon Velasquez Jr.  Today's Date: 3/10/2018      /78 (Patient Position: Semi-quach)  Pulse (!) 110  Temp 97.6  F (36.4  C) (Axillary)   Resp 28  Ht 6' 3\" (1.905 m)  Wt 200 lb 12.8 oz (91.1 kg)  SpO2 100%  BMI 25.1 kg/m2    Pt. used BiPAP (14/5, 14, 40%)  intermittently during this shift, BS diminished, RR 20-22, 4L oxymask sats 95%, RT following     TIFF MillerT    "

## 2021-06-16 NOTE — CONSULTS
Nixon Velasquez Jr. is a 88 y.o. male with multiple comorbidities including CLL for which he recently was started on Imbruvica,   and recent hemolytic anemia for which he has been on a tapering dose of prednisone admitted with sepsis on 3/9.  Apparently found to have cholecystitis with secondary E. coli bacteremia.  Asked to see him for rapid atrial fibrillation.            Assessment:      1.  Rapid atrial fibrillation, apparently new this admission with good rate control following IV amiodarone  2.  Cholecystitis with drain in place and apparent consideration for the possibility of surgical procedure  3.  Diminished level of consciousness with prior CVA  4.  History of coronary artery bypass grafting in 1996 the details of which are not available, by report located by CVA with good recovery  5.  E. coli bacteremia with sepsis       Plan:     1.  Agree with consideration for comfort care  2.  If no surgical procedure is planned can transition to oral amiodarone when he is able to take p.o.  3.  Would not try weaning amiodarone well unable to take oral meds  4.  Imbruvica has a high incidence of causing or precipitating atrial fibrillation       Subjective:      This unfortunate 88-year-old is unable to give a history.  History is obtained from the chart.  I do not have records here of when or where his bypass surgery occurred.  I am not able to find any current cardiology records.  His primary care doctor does not have documentation of atrial fibrillation previous in this patient.  Since he was admitted he has converted to rapid atrial fibrillation.  He was in sinus rhythm on admission.  His atrial fibrillation rate has responded quite well to IV amiodarone, however has gone when the amiodarone has been discontinued.  Currently the patient is awake but nonconversant      Review of Systems  13 point review of systems is reviewed as is unremarkable except as noted in the HPI.      Past Medical History:      As in  above problem list      Family History:        Family History   Problem Relation Age of Onset     Prostate cancer Father      Cancer Father      Alzheimer's disease Father      Parkinsonism Mother      Diabetes Brother      Alzheimer's disease Sister      No Medical Problems Daughter      No Medical Problems Daughter      Parkinsonism Son      No Medical Problems Son      No Medical Problems Son      No Medical Problems Son      Parkinsonism Son      No Medical Problems Son      No Medical Problems Son      No Medical Problems Son          Social History:       reports that he has never smoked. He has never used smokeless tobacco. He reports that he does not drink alcohol or use illicit drugs.        Meds:     Scheduled Meds:    folic acid (FOLVITE) IVPB  1 mg Intravenous DAILY     furosemide  20 mg Intravenous DAILY     hydrocortisone sod succ  50 mg Intravenous Q12H     insulin aspart (NovoLOG) injection   Subcutaneous TID with meals     insulin aspart (NovoLOG) injection   Subcutaneous QHS     [START ON 3/17/2018] levothyroxine  150 mcg Oral Daily 0600     metoprolol succinate  12.5 mg Oral Once     metoprolol succinate  25 mg Oral DAILY     OLANZapine  7.5 mg Oral QHS     pantoprazole (PROTONIX) IVPB 40 mg  40 mg Intravenous QAM AC    Or     omeprazole  20 mg Oral QAM AC     oxyCODONE intensol (ROXICODONE) conc solution 20 mg/mL  5 mg Sublingual QID     piperacillin-tazobactam  4.5 g Intravenous Q6H     potassium, sodium phosphates  1 packet Oral TID     senna-docusate  1 tablet Oral BID     sodium chloride  10-30 mL Intravenous Q8H FIXED TIMES     sodium chloride  3 mL Intravenous Line Care     tiZANidine  2 mg Oral TID     Continuous Infusions:    amiodarone 900 mg/500 ml standard 24 hour infusion 0.5 mg/min (03/16/18 1110)     dextrose 5% 75 mL/hr (03/16/18 1118)     norepinephrine IV infusion in NS Stopped (03/12/18 1000)     vasopressin Stopped (03/11/18 0345)        Objective:      Physical Exam  BP 94/56  "(Patient Position: Sitting)  Pulse (!) 112  Temp 98  F (36.7  C) (Oral)   Resp 19  Ht 6' 3\" (1.905 m)  Wt 194 lb 4.8 oz (88.1 kg)  SpO2 98%  BMI 24.29 kg/m2     General Appearance: White gentleman who does not respond to verbal stimuli but does respond to pain  HEENT:   Normocephalic. PERRLA, Sclera nonicteric.  Neck:     No jugular venous distention. Normal carotid upstrokes without bruit. No thyromegaly or lymphadenopathy is noted  Lungs:       Clear to anteriorly  Chest Wall:      No tenderness  Cardiovascular:     PMI is nondisplaced. Normal S1,S2.  Soft systolic murmur is noted.  No edema is present.  Abdomen:   Not examined drain in place   extremities:   Extremities without deformity. Normal peripheral perfusion.  Skin:    No rashes or lesions  Lymph nodes:  No lymphadenopathy  is noted.  Neurologic:   Alert.  As above grossly nonfocal examination.        Lab Review   Lab Results   Component Value Date     03/16/2018    K 3.9 03/16/2018     (H) 03/16/2018    CO2 29 03/16/2018    BUN 27 03/16/2018    CREATININE 0.82 03/16/2018    CALCIUM 7.1 (L) 03/16/2018     Lab Results   Component Value Date    WBC 13.2 (H) 03/16/2018    HGB 7.6 (L) 03/16/2018    HCT 25.1 (L) 03/16/2018     (H) 03/16/2018    PLT 41 (LL) 03/16/2018     Lab Results   Component Value Date    CHOL 135 02/02/2012    TRIG 117 02/02/2012    HDL 38 (L) 02/02/2012    LDLCALC 73 02/02/2012     Troponin I   Date Value Ref Range Status   03/09/2018 0.04 0.00 - 0.29 ng/mL Final   06/18/2013 <0.01 <0.30 ng/mL Final   03/26/2013 <0.01 <0.30 ng/mL Final     BNP   Date Value Ref Range Status   05/30/2016 96 (H) 0 - 93 pg/mL Final            Cardiac ultrasound:    Left Ventricle: Normal left ventricular size.The estimated left ventricular ejection fraction is 60%. This represents a normal ejection fraction. Mild hypertrophy noted. E/e' > 15, suggesting elevated LV filling pressures.  Left Atrium: Left atrial volume is moderately " increased.  Mitral Valve: There is moderate mitral annular calcification. Moderate mitral regurgitation.  Mild aortic stenosis. Mild aortic regurgitation  Normal right ventricular size and systolic function.  No pulmonary hypertension present.  Estimated central venous pressure equal to 13 mmHg.

## 2021-06-16 NOTE — CONSULTS
Critical Care Medicine Consultation             Date of Service: 3/9/2018    Reason for Consult: Sepsis    HPI: This is a 88 y.o. 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  Apparently patient had been in his usual state of health until yesterday when he complained of abdominal discomfort.  Had some nausea but denied any vomiting.  He was brought into the ER today and was found to have a temperature of 105.1.  His systolic blood pressure was in the 60s upon arrival and patient was given 2 L of normal saline with improvement in his blood pressure.  PICC line was placed.  His BP then dropped and he was started on Levophed.  Patient was then admitted to the ICU for further evaluation.  In the ICU, patient required increasing dosages of pressors.  He denies any abdominal pain or chest pain.  He denies any shortness of breath.    Past Medical History:   Diagnosis Date     Cataract     bilateral     Foot fracture, right      History of transfusion      Hypertension      Inguinal hernia      Leukemia      PE (pulmonary embolism)      Pneumonia      Prostate cancer      Skin cancer      Thyroid cancer      Past Surgical History:   Procedure Laterality Date     CATARACT EXTRACTION, BILATERAL       MS REANOMAL CORON ART PA ORIGIN BY GRAFT      Description: Anomalous Coronary Artery Graft;  Proc Date: 01/01/1996;     MS REMV PROSTATE,RETROPUB,RAD,LTD NODES      Description: Prostatectomy Retropubic Radical With Lymph Node Biopsy(S);  Recorded: 09/13/2007;     Allergies   Allergen Reactions     Dairy Aid [Lactase]      Dairy products-  severe shaking in legs     Benzonatate      Blood-Group Specific Substance      Warm autoantibodies present. Expect up to 24 hour delay in blood for transfusion. Draw 2 lavender and 1 red tube for type and screen orders   Notify Blood bank as soon as possible if transfusions are needed.     Ciprofloxacin      Ephedrine Sulfate      Erythromycin Base      Hyoscyamine Sulfate      Lactose      Methocarbamol      Terazosin      Warfarin      Prescriptions Prior to Admission   Medication Sig Dispense Refill Last Dose     allopurinol (ZYLOPRIM) 300 MG tablet Take 1 tablet (300 mg total) by mouth daily with breakfast. 30 tablet 5 3/8/2018 at am     clopidogrel (PLAVIX) 75 mg tablet Take 75 mg by mouth daily.   3/8/2018 at am     escitalopram oxalate (LEXAPRO) 5 MG tablet Take 1 tablet (5 mg total) by mouth daily. 30 tablet 6 3/8/2018 at am     folic acid (FOLVITE) 1 MG tablet Take 1 tablet (1 mg total) by mouth daily. 30 tablet 5 3/8/2018 at am     ibrutinib (IMBRUVICA) 140 mg cap capsule Take 3 capsules (420 mg total) by mouth daily. 90 capsule 0 3/7/2018     levothyroxine (SYNTHROID, LEVOTHROID) 150 MCG tablet Take 150 mcg by mouth Daily at 6:00 am.   3/8/2018 at am     mirtazapine (REMERON) 45 MG tablet Take 45 mg by mouth at bedtime.   3/8/2018 at pm     omeprazole (PRILOSEC) 40 MG capsule Take 1 capsule (40 mg total) by mouth 2 (two) times a day. 60 capsule 5 3/8/2018 at pm     ondansetron (ZOFRAN-ODT) 8 MG disintegrating tablet Take 8 mg by mouth every 8 (eight) hours as needed for nausea.   3/8/2018 at Unknown time     [START ON 3/13/2018] predniSONE (DELTASONE) 10 mg tablet Take 10 mg by mouth daily. Stop taking on 3/21/18        predniSONE (DELTASONE) 20 MG tablet Take 20 mg by mouth daily.   3/8/2018 at am     simvastatin (ZOCOR) 40 MG tablet Take 40 mg by mouth at bedtime.   3/8/2018 at pm     triamcinolone (KENALOG) 0.1 % cream Apply topically 2 (two) times a day. Apply to rash two times a day for 2-3 weeks.  Do not use longer than 3 weeks at a time   Unknown at Unknown time     Social History     Social History     Marital status:      Spouse name: N/A     Number of children: N/A     Years of education: N/A     Social History Main Topics     Smoking status: Never Smoker     Smokeless tobacco: Never Used     Alcohol use No     Drug use: No     Sexual activity: No     Other Topics Concern     None     Social History Narrative     Family History   Problem Relation Age of Onset     Prostate cancer Father      Cancer Father      Alzheimer's disease Father      Parkinsonism Mother      Diabetes Brother      Alzheimer's disease Sister      No Medical Problems Daughter      No Medical Problems Daughter      Parkinsonism Son      No Medical Problems Son      No Medical Problems Son      No Medical Problems Son      Parkinsonism Son      No Medical Problems Son      No Medical Problems Son      No Medical Problems Son      Review of Systems  10 point review of system negative except that stated in the HPI.      Vitals  BP  Min: 72/47  Max: 118/53  Temp  Min: 100.1  F (37.8  C)  Max: 105.1  F (40.6  C)  Pulse  Min: 74  Max: 106  Resp  Min: 24  Max: 45  SpO2  Min: 82 %  Max: 96 %    Intake/Output Summary (Last 24 hours) at 03/09/18 1102  Last data filed at 03/09/18 0438   Gross per 24 hour   Intake              350 ml   Output                0 ml   Net              350 ml     CVP:  [23 mmHg] 23 mmHg       Physical Exam:  Gen: Awake, follows simple commands  HEENT: Normocephalic/atraumatic. PERRL. Sclerae anicteric, conjunctivae non-injected. Mucous membranes. OP patent.  Neck: Supple. Trachea midline without shift.  Pulm: Decreased breath sounds at the right base  Chest: Equal chest rise, normal conformation.  CV: RRR, S1, S2, no murmurs, rubs, gallops. Peripheral pulses intact. Cap refill intact.   Abd: Soft, nontender, non-distended, bowel sounds present. No guarding, rebound tenderness or rigidity.  Ext: Overall warm and  perfused, no clubbing, cyanosis, or edema  Neuro: Follows simple commands  Skin: No obvious rash or petechiae. Warm, dry.    Diagnostic Data: Reviewed independently by me.    Results from last 7 days  Lab Units 03/09/18  0457 03/07/18  0853   LN-WHITE BLOOD CELL COUNT thou/uL 32.4* 70.3*   LN-HEMOGLOBIN g/dL 8.7* 9.7*   LN-HEMATOCRIT % 29.0* 31.8*   LN-PLATELET COUNT thou/uL 48* 82*   LN-MONOCYTES - REL (DIFF) % 0* 0*       Results from last 7 days  Lab Units 03/09/18  1016 03/09/18  0457 03/07/18  0853   LN-SODIUM mmol/L  --  142 141   LN-POTASSIUM mmol/L 3.7 3.8 3.4*   LN-CHLORIDE mmol/L  --  110* 106   LN-CO2 mmol/L  --  20* 28   LN-BLOOD UREA NITROGEN mg/dL  --  28 20   LN-CREATININE mg/dL  --  1.40* 0.88   LN-CALCIUM mg/dL  --  7.7* 8.5   LN-PROTEIN TOTAL g/dL  --  5.2* 5.5*   LN-BILIRUBIN TOTAL mg/dL  --  1.6* 0.9   LN-ALKALINE PHOSPHATASE U/L  --  39* 35*   LN-ALT (SGPT) U/L  --  11 <9   LN-AST (SGOT) U/L  --  15 8     Results from last 7 days  Lab Units 03/09/18  1016   LN-MAGNESIUM mg/dL 1.4*     Lab Results   Component Value Date    CALCIUM 7.7 (L) 03/09/2018    PHOS 2.9 03/09/2018        Results from last 7 days  Lab Units 03/09/18  0457   LN-INR  1.26*   LN-PARTIAL THROMBOPLASTIN TIME seconds 28     Lab Results   Component Value Date    HGBA1C 6.5 (H) 12/01/2017       Results from last 7 days  Lab Units 03/09/18  0457   LN-TROPONIN I ng/mL 0.04       Imaging:     Xr Chest 1 View Portable    Result Date: 3/9/2018  XR CHEST 1 VIEW PORTABLE 3/9/2018 5:19 AM INDICATION: Fever COMPARISON: CT 02/13/2018 FINDINGS: Poor ventilatory effort with increasing compressive and congestive changes. Probable pulmonary edema or congestive heart failure. Similar to perhaps slight increase in left pleural effusion. Stable peripheral consolidation right lower lobe. Groundglass opacity right upper lobe better seen on CT. Remainder stable. Follow-up recommended.    Xr Chest 1 View For Picc Placement Portable    Result Date:  3/9/2018  XR CHEST 1 VIEW FOR PICC LINE PLACEMENT PORTABLE 3/9/2018 7:35 AM INDICATION: verify catheter placement COMPARISON: 03/09/2018 FINDINGS: There is been interval placement of a right-sided PICC with the distal tip within the mid superior vena cava. Multiple median sternotomy wires are seen. Low lung volumes are again noted with diffuse areas of airspace consolidation, unchanged from the prior examination.. Small pleural effusions are probable bilaterally. There is no pneumothorax.       ECG: Sinus tach with PACs    ASSESSMENT:       This is a 88 y.o. 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    PLAN/RECOMMENDATIONS:    Neuro: Alert awake oriented ×3.  Patient has history of CVA with residual slurred speech and right-sided numbness.  However with his recent history of hemolytic anemia, renal failure, and fever, I am concerned about the possibility of TTP but he does not have purpura or mental status changes. We will continue to monitor.  - Monitor neuro exam. Avoid agents that might contribute to delirium or oversedation, such as benzodiazepines or anticholinergics, unless needed. Normalize the day/night cycle and allow rest at night as able.     Pulmonary: Right lower lobe consolidation on chest x-ray.  CT scan of the chest done on February 13, 2018 revealed bilateral pleural effusions.  He does have 1.5 cm groundglass opacity in the right upper lobe that has been stable since May 2016.  -Started on broad-spectrum antibiotics  -minimal oxygen  requirements    Cardiovascular: septic shock  -lactate of 7 on admission.  Repeat lactic acid in the ICU 2.7.  -started on levo and vasopressin  -IV hydrocortisone given that patient had been on a prednisone taper recently for hemolytic anemia  -ivc difficult to visualize but at times appeared plum    GI: recent CT abdomen showed mild lymphadenopathy.  - Prophylaxis with PPI    Renal: Acute renal failure likely secondary to ATN.  His baseline creatinine was 0.88 back on March 2018.  Creatinine on admission is elevated at 1.4.  -IV fluids  -Monitor BMP    ID: Source is likely secondary to right lower lobe pneumonia.  Patient was started on broad-spectrum antibiotics with vancomycin and Zosyn.  -Follow up cultures  -Check pro-calcitonin    Heme/Onc: History of CLL diagnosed in 2008 and now and relapse.  Patient was started recently on tyrosine kinase inhibitors.  He does have recent history of hemolytic anemia and was started on a prednisone taper in February.  - oncology consult     Endocrine: History of thyroid cancer status post thyroidectomy  - Insulin as needed to keep glucose <180. Avoid hypoglycemia.  - Replace levothyroxine -on Synthroid    Code Status: DNR.  A long discussion with family.  They noted the patient's CODE STATUS is DNR.  They are agreeable to pressors and antibiotics.  However no CPR or intubation.    ICU Prophylaxis/Checklist:  -VAP Prophylaxis: HOB 30 degrees, chlorhexidine rinses  -Stress Ulcer: PPI  -DVT:  mechanical with SCD. HOLDING HEPARIN WITH LOW PLATELET COUNT.  -Restraints required and necessary for continued patient cares: no  -Lines required and necessary for continued patient cares: picc line placed on 3/9  -Feeding: NPO  -Family updated at bedside Yes    This patient is critically ill and remains at risk for further deterioration, organ failure, and/or death.     Critical Care Time: 90 minutes, not including separately billable procedures    Ling Varela MD  Critical Care  Medicine

## 2021-06-16 NOTE — PROGRESS NOTES
"  Clinical Nutrition Therapy Assessment Note      Reason for Assessment:   Nixon Velasquez Jr. is a 88 y.o. male assessed by the RD for ICU.    Subjective:  Pt resting in bed.  He reports good appetite prior to admit.  He would like to eat, currently NPO.  Family report patient has worked with speech therapy in the past.  Spoke with MD, plan to check swallow tomorrow as pt is on pressors and critically ill.    Nutrition History:  Information from patient, family/caregiver and chart.  Diet prior to admission:  General diet.  Was on nectar liquids at last speech eval 3/2017.  Recent food/fluid intake: good per pt..   Food allergies or intolerances: dairy, includes butter, cheese.    Nutrition Prescription:   Diet: NPO  IV dextrose or Fluids:  insulin regular infusion 0.5 unit/mL    norepinephrine IV infusion in NS Last Rate: 16 mcg/min (03/09/18 1223)   nacl 0.9% Last Rate: 25 mL/hr (03/09/18 0917)   vasopressin Last Rate: 2.4 Units/hr (03/09/18 0917)       Current Nutrition Intake:  NPO    Anthropometrics:  Height: 6' 3\" (190.5 cm)  Admission weight: 196#  Weight: 195 lb 14.4 oz (88.9 kg)  BMI (Calculated): 24.5  BMI indication: 18.5-24.9 normal weight  Ideal body weight 196#+-10%  Weight History:    Wt Readings from Last 4 Encounters:   03/09/18 195 lb 14.4 oz (88.9 kg)   03/07/18 191 lb 7 oz (86.8 kg)   02/14/18 189 lb 3.2 oz (85.8 kg)   02/09/18 187 lb (84.8 kg)       Physical Findings:  Physical findings which could indicate malnutrition: not found     GI Status/Output:   Last BM recorded: n/a    Skin/Wound:   Lucas Scale Score: 14    Medications:  Medications reviewed.    Labs:  Glucose 179  Labs reviewed    Estimated Nutrition Needs:  Using current weight of 89 kg.    Energy Needs: 2944-1187 kcals daily per 25-30 kcal/kg   Protein Needs:  g daily, 1-1.5 g/kg.  Fluid Needs: 7765-3372 mls daily, 25-30 mls/kg    Malnutrition: Not noted    Nutrition Risk Level: moderate risk    Nutrition DX: inadequate " intake r/t critical illness evidenced by NPO    Goals:  Tolerate least restrictive diet.    Plan:  NPO today.  Plan swallow eval tomorrow.    Monitor:   Diet start    See Care Plan for Problems, Goals, and Interventions.

## 2021-06-16 NOTE — H&P
Admission History and Physical   Nixon Velasquez Jr.,  1929, MRN [id number]    [facility]  Lactic acidosis [E87.2]  Thrombocytopenia [D69.6]  Chronic anemia [D64.9]  FRANC (acute kidney injury) [N17.9]  Sepsis [A41.9]    PCP: Riley Cuevas MD   Code status:  DNR       Extended Emergency Contact Information  Primary Emergency Contact: Balbina Velasquez   [facility]  Home Phone: [phone]  Relation: Spouse  Secondary Emergency Contact: Cha Reyez   [facility]  Home Phone: [phone]  Relation: Child       Assessment and Plan   88 y.o. old male with PMH of CAD, status post CABG and postoperative CVA, HTN, CLL recently started on Imbruvica, h/o thyroid cancer, s/p thyroidectomy and radioactive iodine treatment, now on levothyroxine, prostate cancer in , s/p prostatectomy, multiple skin cancers who presenting with sepsis and septic shock    1.  Sepsis.  The source of infection is not entirely clear at this time.  Possible pneumonia given bilateral opacities on the chest x-ray.  Blood cultures already sent.  Check UA.  Continue broad-spectrum antibiotics, Zosyn and vancomycin.  Recheck lactic acid.   2.  Septic shock.  Currently maxed out on norepinephrine, will be starting vasopressin for MAP over 60  3.  CLL, recently started on Imbruvica.  Usually follows with Dr. Pennington.  Hematology/oncology consultation  4.  AIHA.  Diagnosed during previous admission in 2018.  On prednisone taper.  Continue folic acid and B12 supplements.  5.  CAD, status post CABG in  with postoperative CVA with mild residual slurred speech and right-sided numbness.  No recent angina.  I do not see any recent echocardiogram system - might be reasonable to obtain one. Continue statin and Plavix  6.  GERD.  Continue PPI  7.  Depression.  Continue citalopram, mirtazapine  8.  History of thyroid cancer, status post thyroidectomy and radioactive iodine treatment, iatrogenic  hypothyroidism now on levothyroxine.  9.  FRANC. Probably combination of prerenal and ATN in the setting of sepsis.   10. DNR/DNI.  Confirmed with family      DVT Prophylaxis: SCDs     Chief Complaint:  Fevers, rigors, general malaise     HPI:    Nixon Velasquez Jr. is a 88 y.o. old male with PMH of CAD, status post CABG 1996 and postoperative CVA with mild residual slurred speech and right-sided numbness, CLL on Imbruvica, h/o thyroid cancer, s/p thyroidectomy and radioactive iodine treatment, now on levothyroxine, prostate cancer in 1992, s/p prostatectomy, multiple skin cancers who presented for evaluation of fever, rigors and not feeling well.   History is provided by the patient, family, medical records  Patient has h/o CLL diagnosed in 2008 and has been on Rituximab until relapse in August 2017. He was started on Imbruvica on 2/27/18. He has been tolerating it well except some abdominal discomfort for which he has been taking Zofran. He has been on a prednisone taper fro hemolytic anemia. Patient started feeling poorly yesterday afternoon. He was having nausea but no emesis. He has been constipated, having some cough and some shortness of breath. Temperature was 99 yesterday morning but 104 by EMS yesterday afternoon.  On initial evaluation in the ER, T 105.1, tachycardia HR in 100s, tachypnea RR in 40s, hypoxia O2 Sats high 80s on room air. Labs are significant for WBC 32.4K (was 70.3K on 3/7), Hb 8.7, Plt 48. Lactic acid 7, creatinine 1.4, TBili 1.6, INR 1.26. Influenza screen negative. CXR demonstrates RLL consolidation.        Medical History  Past Medical History:   Diagnosis Date     Cataract      Foot fracture, right      History of transfusion      Hypertension      Inguinal hernia      Leukemia      PE (pulmonary embolism)      Pneumonia      Prostate cancer      Skin cancer      Thyroid cancer         Surgical History  He  has a past surgical history that includes pr remv prostate,retropub,rad,ltd  nodes; pr reanomal coron art pa origin by graft; and Cataract extraction, bilateral.       Social History  Reviewed, and he  reports that he has never smoked. He has never used smokeless tobacco. He reports that he does not drink alcohol or use illicit drugs.       Allergies  Allergies   Allergen Reactions     Dairy Aid [Lactase]      Dairy products- severe shaking in legs     Benzonatate      Blood-Group Specific Substance      Warm autoantibodies present. Expect up to 24 hour delay in blood for transfusion. Draw 2 lavender and 1 red tube for type and screen orders   Notify Blood bank as soon as possible if transfusions are needed.     Ciprofloxacin      Ephedrine Sulfate      Erythromycin Base      Hyoscyamine Sulfate      Lactose      Methocarbamol      Terazosin      Warfarin     Family History  Reviewed, and family history includes Alzheimer's disease in his father and sister; Cancer in his father; Diabetes in his brother; No Medical Problems in his daughter, daughter, son, son, son, son, son, and son; Parkinsonism in his mother, son, and son; Prostate cancer in his father.          Prior to Admission Medications   Prescriptions Prior to Admission   Medication Sig Dispense Refill Last Dose     allopurinol (ZYLOPRIM) 300 MG tablet Take 1 tablet (300 mg total) by mouth daily with breakfast. 30 tablet 5 3/8/2018 at am     clopidogrel (PLAVIX) 75 mg tablet Take 75 mg by mouth daily.   3/8/2018 at am     escitalopram oxalate (LEXAPRO) 5 MG tablet Take 1 tablet (5 mg total) by mouth daily. 30 tablet 6 3/8/2018 at am     folic acid (FOLVITE) 1 MG tablet Take 1 tablet (1 mg total) by mouth daily. 30 tablet 5 3/8/2018 at am     ibrutinib (IMBRUVICA) 140 mg cap capsule Take 3 capsules (420 mg total) by mouth daily. 90 capsule 0 3/7/2018     levothyroxine (SYNTHROID, LEVOTHROID) 150 MCG tablet Take 150 mcg by mouth Daily at 6:00 am.   3/8/2018 at am     mirtazapine (REMERON) 45 MG tablet Take 45 mg by mouth at bedtime.    "3/8/2018 at pm     omeprazole (PRILOSEC) 40 MG capsule Take 1 capsule (40 mg total) by mouth 2 (two) times a day. 60 capsule 5 3/8/2018 at pm     ondansetron (ZOFRAN-ODT) 8 MG disintegrating tablet Take 8 mg by mouth every 8 (eight) hours as needed for nausea.   3/8/2018 at Unknown time     [START ON 3/13/2018] predniSONE (DELTASONE) 10 mg tablet Take 10 mg by mouth daily. Stop taking on 3/21/18        predniSONE (DELTASONE) 20 MG tablet Take 20 mg by mouth daily.   3/8/2018 at am     simvastatin (ZOCOR) 40 MG tablet Take 40 mg by mouth at bedtime.   3/8/2018 at pm     triamcinolone (KENALOG) 0.1 % cream APPLY TO RASH TWO TIMES A DAY FOR 2-3 WEEKS. DO NOT USE LONGER THAN 3 WEEKS AT A TIME 454 g 1 Unknown at Unknown time          Review of Systems:  A 12 point comprehensive review of systems was negative except as noted. Physical Exam:  Temp:  [100.1  F (37.8  C)-105.1  F (40.6  C)] 100.1  F (37.8  C)  Heart Rate:  [] 82  Resp:  [25-45] 25  BP: ()/(40-55) 85/50    BP (!) 85/50  Pulse 82  Temp 100.1  F (37.8  C) (Oral)   Resp 25  Ht 6' 3\" (1.905 m)  Wt 195 lb 14.4 oz (88.9 kg)  SpO2 92%  BMI 24.49 kg/m2    General Appearance:    No acute distress, drowsy but wakes up and answers appropriately   Head:    Normocephalic, without obvious abnormality, atraumatic   Eyes:    PERRL, conjunctiva/corneas clear, EOM's intact,both eyes    Throat:   Lips, mucosa, and tongue normal;    Neck:   Supple, symmetrical, trachea midline, no adenopathy;        thyroid:  No enlargement/tenderness/nodules   Lungs:     Clear to auscultation bilaterally   Chest wall:    No tenderness or deformity   Heart:    Regular rate and rhythm, S1 and S2 normal, no murmur, no rubs, no JVD, no edema   Abdomen:     Soft, non-tender, bowel sounds active all four quadrants,     no masses, no hepatosplenomegaly   Musculoskeletal:   Extremities are warm and non-tender, atraumatic, no joint swelling or tenderness   Pulses:   2+ and symmetric " all extremities   Skin:  Multiple skin lesions, especially on the upper extremities   Neurologic:  Drowsy but wakes up easily and answers appropriately, no obvious neurologic deficits        Pertinent Labs  Lab Results: personally reviewed.     Results from last 7 days  Lab Units 03/09/18 0457 03/07/18  0853   LN-SODIUM mmol/L 142 141   LN-POTASSIUM mmol/L 3.8 3.4*   LN-CHLORIDE mmol/L 110* 106   LN-CO2 mmol/L 20* 28   LN-BLOOD UREA NITROGEN mg/dL 28 20   LN-CREATININE mg/dL 1.40* 0.88   LN-CALCIUM mg/dL 7.7* 8.5   LN-ALBUMIN g/dL 3.2* 3.7   LN-PROTEIN TOTAL g/dL 5.2* 5.5*   LN-BILIRUBIN TOTAL mg/dL 1.6* 0.9   LN-ALKALINE PHOSPHATASE U/L 39* 35*   LN-ALT (SGPT) U/L 11 <9   LN-AST (SGOT) U/L 15 8       Results from last 7 days  Lab Units 03/09/18 0457 03/07/18  0853   LN-WHITE BLOOD CELL COUNT thou/uL 32.4* 70.3*   LN-HEMOGLOBIN g/dL 8.7* 9.7*   LN-HEMATOCRIT % 29.0* 31.8*   LN-PLATELET COUNT thou/uL 48* 82*   LN-MONOCYTES - REL (DIFF) % 0* 0*       Results from last 7 days  Lab Units 03/09/18  0457   LN-TROPONIN I ng/mL 0.04       MOST RECENT A1c, Iron, TIBC, Coags, TFTs  Lab Results   Component Value Date    HGBA1C 6.5 (H) 12/01/2017    INR 1.26 (H) 03/09/2018    PTT 28 03/09/2018     No results found for: IRON, TRANSFERRIN  Lab Results   Component Value Date    TSH 1.20 11/06/2017    FREET4 1.4 04/11/2016         Pertinent Radiology  Radiology Results: Personally reviewed image/s  XR CHEST 1 VIEW PORTABLE  3/9/2018 5:19 AM  FINDINGS: Poor ventilatory effort with increasing compressive and congestive changes. Probable pulmonary edema or congestive heart failure. Similar to perhaps slight increase in left pleural effusion. Stable peripheral consolidation right lower lobe. Groundglass opacity right upper lobe better seen on CT. Remainder stable. Follow-up recommended.    Advanced Care Planning  Treatment and Discharge Planning discussed with the patient/family and ICU team  Anticipated Length of Stay in midnights  (including a midnight in the Emergency Department after triage if applicable): >2 midnight for evaluation and treatment of sepsis     Total time spent: 70 min      Darcy Rubalcava MD  Internal Medicine Hospitalist  3/9/2018

## 2021-06-16 NOTE — PROGRESS NOTES
[facility] Hematology and Oncology Inpatient Progress Note    Patient: Nixon Velasquez Jr.  MRN: [id number]  Date of Service: 3/11/2018        Reason for Visit    1.  Chronic lymphocytic leukemia  2.  Recent history of autoimmune hemolytic anemia  3.  Sepsis  4.  Cholecystitis    Assessment and Plan    1.  CLL: He was recently started on ibrutinib.  He started this medication on February 27.  Nine days prior to admission.  Not likely to have any side effects from this medication in such a short period of time.  This has been on hold.  We will restart once he is discharged from the hospital.    2.  Cytopenias: Likely secondary to sepsis and acute bone marrow suppression and bleeding from his drain site.  He received 2 units of blood today.  3 units of platelets over the past 2 days.  He was on a steroid taper for his AIHA.  He is now on stress dose steroids.  Reticulocytes have been elevated suggesting an appropriate marrow response to the anemia.  Transfuse as needed.    3.  Sepsis: Continues on antibiotics.  Clinically improving.  Weaning pressors.    History of Present Illness    Mr. Nixon Velasquez Jr. is a pleasant 88-year-old gentleman with long-standing history of CLL.  Recently had an episode of autoimmune hemolytic anemia.  He has been on a steroid taper.  He started ibrutinib about 2 weeks ago.  He is now admitted with sepsis, gram negatives.  Likely due to cholecystitis.  He has had a drain placed.  Feeling sleepy this afternoon.  Denies any pain or shortness of breath.    Review of Systems    As above in the history.     Review of Systems otherwise Negative for:  General: chills, fever or night sweats  Psychological: anxiety or depression  Ophthalmic: blurry vision, double vision or loss of vision, vision change  ENT: epistaxis, oral lesions, hearing changes  Hematological and Lymphatic: bleeding, bruising, jaundice, swollen lymph nodes  Endocrine: hot flashes, unexpected weight changes  Respiratory:  cough, hemoptysis, orthopnea or shortness of breath/[place]  Cardiovascular: chest pain, edema, palpitations or PND  Gastrointestinal: abdominal pain, blood in stools, change in bowel habits, constipation, diarrhea or nausea/vomiting  Genito-Urinary: change in urinary stream, incontinence, frequency/urgency  Musculoskeletal: joint pain, stiffness, swelling, muscle pain  Neurological: dizziness, headaches, numbness/tingling  Dermatological: lumps and rash    ECOG performance status is 2    Physical Exam    Recent Vitals 3/11/2018   Height -   Weight -   BSA (m2) -   BP -   Pulse -   Temp 97.6   Temp src 4   SpO2 -   Some recent data might be hidden     General: patient appears stated age of 88 y.o.. Nontoxic and in no distress.   HEENT: Head: atraumatic, normocephalic. Sclerae anicteric.  Chest:  Normal respiratory effort  Cardiac:  No edema.   Abdomen: abdomen is soft, non-distended  Extremities: normal tone and muscle bulk.  Skin: no lesions or rash. Warm and dry. Scattered ecchymoses on arms  CNS: alert and oriented. Grossly non-focal.   Psychiatric: normal mood and affect.     Lab Results      Results from last 7 days  Lab Units 03/11/18  1231 03/11/18  0535 03/11/18  0021 03/10/18  1756 03/10/18  0531 03/09/18  0457   LN-WHITE BLOOD CELL COUNT thou/uL 15.3* 29.9* 53.4* 40.6* 36.9* 32.4*   LN-HEMOGLOBIN g/dL 6.8* 7.0* 7.4* 7.2* 8.6* 8.7*   LN-HEMATOCRIT % 20.7* 22.1* 24.3* 24.1* 28.2* 29.0*   LN-PLATELET COUNT thou/uL 43* 51* 88* 59* 36* 48*   LN-TOTAL NEUTROPHILS-REL(DIFF) %  --   --   --  35* 28* 1*   LN-LYMPHOCYTES - REL (DIFF) %  --   --   --  64* 70* 99*   LN-MONOCYTES - REL (DIFF) %  --   --   --  2 2 0*       Results from last 7 days  Lab Units 03/11/18  0535 03/10/18  1955 03/10/18  0531  03/09/18  0457 03/07/18  0853   LN-SODIUM mmol/L 146* 142 141  --  142 141   LN-POTASSIUM mmol/L 4.0 5.0 4.7  < > 3.8 3.4*   LN-CHLORIDE mmol/L 115* 115* 114*  --  110* 106   LN-CO2 mmol/L 23 17* 18*  --  20* 28   LN-BLOOD  UREA NITROGEN mg/dL 48* 48* 45*  --  28 20   LN-CREATININE mg/dL 1.31* 1.55* 1.63*  --  1.40* 0.88   LN-CALCIUM mg/dL 7.5* 6.9* 6.8*  --  7.7* 8.5   LN-ALBUMIN g/dL 2.4*  --  2.8*  --  3.2* 3.7   LN-PROTEIN TOTAL g/dL 4.7*  --   --   --  5.2* 5.5*   LN-BILIRUBIN TOTAL mg/dL 1.2*  --   --   --  1.6* 0.9   LN-ALKALINE PHOSPHATASE U/L 39*  --   --   --  39* 35*   LN-ALT (SGPT) U/L 24  --   --   --  11 <9   LN-AST (SGOT) U/L 15  --   --   --  15 8   < > = values in this interval not displayed.    Imaging:    CT CHEST, ABDOMEN, AND PELVIS  3/10/2018 1:01 PM      INDICATION: Sepsis septic shock with no source  TECHNIQUE: CT chest, abdomen, and pelvis. Dose reduction techniques were used.   IV CONTRAST: None  COMPARISON: 02/13/2018     FINDINGS:   CHEST: There are small bilateral pleural effusions with atelectasis in both lungs. Groundglass nodule in the right upper lobe as not well visualized due to some motion artifact but measures approximately 18 mm in greatest dimension on series 3, image 28,   likely unchanged from prior exam. Mediastinal lymph nodes are in the upper range of normal in size and not significantly changed, likely being reactive. Calcified mediastinal and right hilar lymph nodes are consistent with old granulomatous disease. A   calcified nodule is seen in the right lower lobe in a region of atelectasis. Calcified atherosclerotic plaque is seen in the thoracic aorta, great vessels, and coronary arteries. 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 Mildly prominent left   axillary lymph nodes are unchanged. There is a right PICC line with tip in the low superior vena cava.      ABDOMEN: There appears to be gallbladder wall thickening and adjacent edema suggesting cholecystitis. The liver, spleen, adrenal glands, and kidneys appear normal on these noncontrast images. There is some fatty replacement of the pancreas. The abdominal   aorta is normal in caliber with very densely calcified  atherosclerotic plaque. 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 Retroperitoneal lymph nodes appear slightly decreased in size compared to the prior   exam. The largest, to the left of the aorta on series 2, image 120 measures 7 x 13 mm and previously measured 10 x 17 mm. Other lymph nodes are similarly decreased in size. The spleen is normal in size, decreased compared to the prior exam.     PELVIS: Sigmoid diverticula without evidence of diverticulitis. There appears to be fecal impaction in the rectum. Surgical clips are seen in the pelvis bilaterally with changes of prostatectomy. Mora catheter is seen in the bladder. There is a right   inguinal hernia containing fat and fluid.     MUSCULOSKELETAL: Negative.     CONCLUSION:  1.  Exam is mildly compromised by some respiratory motion. Even given this, gallbladder appears abnormal with edematous wall and mild stranding in the adjacent fat. Further evaluation with ultrasound is recommended as the findings are suspicious for   cholecystitis.     2.  Small bilateral pleural effusions with atelectasis in both lungs.     3.  Mediastinal, and retroperitoneal lymph nodes are in the upper range of normal in size and decreased compared to the prior exam. The spleen is also decreased in size compared to the prior exam.     4.  Moderate to severe calcified atherosclerotic plaque in the visualized arteries with small right common iliac artery aneurysm.     5.  Colonic diverticulosis without evidence of diverticulitis.     6.  Right inguinal hernia containing fat and fluid.     7.  Changes of prostatectomy and pelvic lymph node dissection.     8.  Groundglass nodule in the right upper lobe is grossly stable though not well evaluated due to motion      Signed by: Mushtaq Pennington MD

## 2021-06-16 NOTE — PROGRESS NOTES
[facility] Hematology and Oncology Inpatient Progress Note    Patient: Nixon Velasquez Jr.  MRN: [id number]  Date of Service: 3/12/2018        Reason for Visit    Follow-up of CLL.    Assessment and Plan    ECOG Performance Status: 4.    Mr. Nixon Velasquez Jr. is a 88 y.o. gentleman with relapsed CLL and a history of autoimmune hemolytic anemia who was admitted to the hospital on 3/9/18.  He has E. coli sepsis.  Talk to have originated from a calculus cholecystitis.  He is in the ICU.  He was started on Ibrutinib for the CLL which he started taking on 3/26/18.  This has been held.    1.  At this point I think we need to continue to hold Ibrutinib.  Even though it is usually a well-tolerated medication, he can have some immunosuppressive effect.  It can also occasionally cause bleeding problems through an unknown mechanism.  Once he recovers from the sepsis and gets better certainly we can restart the medication.    2.  Currently he is on stress doses of steroids and hydrocortisone 50 mg IV every 8 hours.  For the time being I think we should continue with the same.    3.  At this point it is difficult to make out what is causing the anemia.  Reticulocyte count is elevated.  He may have somewhat of autoimmune hemolytic anemia still going on.  He may also have hemolysis due to the sepsis.  He may also have blood loss.  He did have some clots from the site of the cholecystostomy.  We are also drawing blood for labs etc.  At this point I think we have no option but to support him with blood transfusions.  Recommend trying to keep his hemoglobin above 7.  If there is any suspicion of bleeding try to keep platelets above 50,000.  Even if there is no suspicion of bleeding, I would recommend trying to keep his platelet count above 20,000 because of the sepsis.  Afternoon hemoglobin has come back at 6.7.  Recommend ordering transfusion of 1 unit of RBCs.  It may take some time to obtain a matched unit of blood for him  because of the multiple antibodies.  --I am starting him on folic acid 1 mg IV daily.  Once he is able to eat completely this can be changed over to folic acid 1 mg p.o. daily.    4.  He complains of severe abdominal cramps after having the edema.  Not much output even after manual disimpaction by the nurse.  Certainly he may just be having abdominal cramps as the call in place to evacuate.  However I think we do need to be concerned about the possibility of other problems like gut ischemia.  Recommend imaging the abdomen with a CT scan with oral contrast.    5.  Explained to the patient and his family that his immunity was likely suppressed because of the CLL and possibly with the treatments that he needed for CLL.  On one hand the CLL was causing an altered immune response which caused the autoimmune hemolytic anemia.  On the other hand the CLL likely interfered with an appropriate immune response to infections.  Right now the main focus will have to be on treating the infection and get him out of the acute illness.  Once he recovers we can certainly restart the appropriate treatment for CLL.  Their questions were answered.    Discussed with the patient's nurse.  Discussed with Dr. Lucio of the ICU.  Discussed with Dr. Rubalcava.    Time spend >35 minutes. More than 50 % in counseling and coordination of care.      Pain Status  Pain Score (Initial OR Reassessment): 2  ______________________________________________________________________________    History of Present Illness    Mr. Nixon Velasquez  was seen in the hospital room.  He is in the ICU.  There are several family members by his bedside.  He is very weak.  However he is able to respond to me.  Apparently he was confused earlier but he responds appropriately to me today.  His main concern is about crampy abdominal pain.  Apparently he had an enema earlier today.  After that he had only some liquid output.  His nurse has already tried manual  disimpaction.    He has been taking orally.  No evident bleeding seen.  Breathing is okay.    Review of Systems    The patient is in too much pain to do a formal review of systems.    Physical Exam    Recent Vitals 3/12/2018   Height -   Weight -   BSA (m2) -   BP 98/52   Pulse 126   Temp -   Temp src -   SpO2 98   Some recent data might be hidden       GENERAL: He is laying in bed.  He looks chronically ill.  He is in pain with the abdominal spasms.  Looks very weak.    HEAD: Atraumatic and normocephalic.  He has male pattern baldness.    EYES: LÓPEZ, EOMI.  Pale.  No icterus.    Oral cavity: no mucosal lesion or tonsillar enlargement.    NECK: supple. JVP normal.  No thyroid enlargement.    LYMPH NODES: No palpable, cervical, axillary or inguinal lymphadenopathy.    CHEST: clear to auscultation bilaterally.  Symmetrical breath movements bilaterally.    CVS: S1 and S2 are heard. Regular rate and rhythm.  No murmur or gallop or rub heard.    ABDOMEN: Diffusely tender and mildly distended.  No palpable hepatomegaly or splenomegaly.  No other mass palpable.      EXTREMITIES: Warm.  No peripheral edema.    SKIN: no rash.  Multiple fading bruises.       Lab Results      Results from last 7 days  Lab Units 03/12/18  1737 03/12/18  0402 03/11/18  1231 03/11/18  0535  03/10/18  1756 03/10/18  0531 03/09/18  0457   LN-WHITE BLOOD CELL COUNT thou/uL  --  21.6* 15.3* 29.9*  < > 40.6* 36.9* 32.4*   LN-HEMOGLOBIN g/dL 6.7* 7.0* 6.8* 7.0*  < > 7.2* 8.6* 8.7*   LN-HEMATOCRIT %  --  21.6* 20.7* 22.1*  < > 24.1* 28.2* 29.0*   LN-PLATELET COUNT thou/uL  --  34* 43* 51*  < > 59* 36* 48*   LN-TOTAL NEUTROPHILS-REL(DIFF) %  --   --   --   --   --  35* 28* 1*   LN-LYMPHOCYTES - REL (DIFF) %  --   --   --   --   --  64* 70* 99*   LN-MONOCYTES - REL (DIFF) %  --   --   --   --   --  2 2 0*   < > = values in this interval not displayed.    Results from last 7 days  Lab Units 03/12/18  0402 03/11/18  0535 03/10/18  1955 03/10/18  0531   03/09/18  0457   LN-SODIUM mmol/L 149* 146* 142 141  --  142   LN-POTASSIUM mmol/L 4.0 4.0 5.0 4.7  < > 3.8   LN-CHLORIDE mmol/L 115* 115* 115* 114*  --  110*   LN-CO2 mmol/L 27 23 17* 18*  --  20*   LN-BLOOD UREA NITROGEN mg/dL 53* 48* 48* 45*  --  28   LN-CREATININE mg/dL 1.25 1.31* 1.55* 1.63*  --  1.40*   LN-CALCIUM mg/dL 7.7* 7.5* 6.9* 6.8*  --  7.7*   LN-ALBUMIN g/dL 2.5* 2.4*  --  2.8*  --  3.2*   LN-PROTEIN TOTAL g/dL 4.6* 4.7*  --   --   --  5.2*   LN-BILIRUBIN TOTAL mg/dL 1.3* 1.2*  --   --   --  1.6*   LN-ALKALINE PHOSPHATASE U/L 39* 39*  --   --   --  39*   LN-ALT (SGPT) U/L 18 24  --   --   --  11   LN-AST (SGOT) U/L 9 15  --   --   --  15   < > = values in this interval not displayed.    Imaging    Ct Chest Abdomen Pelvis Without Oral Without Iv Contrast    Result Date: 3/10/2018  CT CHEST, ABDOMEN, AND PELVIS 3/10/2018 1:01 PM      INDICATION: Sepsis septic shock with no source TECHNIQUE: CT chest, abdomen, and pelvis. Dose reduction techniques were used. IV CONTRAST: None COMPARISON: 02/13/2018 FINDINGS: CHEST: There are small bilateral pleural effusions with atelectasis in both lungs. Groundglass nodule in the right upper lobe as not well visualized due to some motion artifact but measures approximately 18 mm in greatest dimension on series 3, image 28,  likely unchanged from prior exam. Mediastinal lymph nodes are in the upper range of normal in size and not significantly changed, likely being reactive. Calcified mediastinal and right hilar lymph nodes are consistent with old granulomatous disease. A calcified nodule is seen in the right lower lobe in a region of atelectasis. Calcified atherosclerotic plaque is seen in the thoracic aorta, great vessels, and coronary arteries. 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 Mildly prominent left axillary lymph nodes are unchanged. There is a right PICC line with tip in the low superior vena cava.  ABDOMEN: There appears to be gallbladder wall  thickening and adjacent edema suggesting cholecystitis. The liver, spleen, adrenal glands, and kidneys appear normal on these noncontrast images. There is some fatty replacement of the pancreas. The abdominal  aorta is normal in caliber with very densely calcified atherosclerotic plaque. 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 Retroperitoneal lymph nodes appear slightly decreased in size compared to the prior exam. The largest, to the left of the aorta on series 2, image 120 measures 7 x 13 mm and previously measured 10 x 17 mm. Other lymph nodes are similarly decreased in size. The spleen is normal in size, decreased compared to the prior exam. PELVIS: Sigmoid diverticula without evidence of diverticulitis. There appears to be fecal impaction in the rectum. Surgical clips are seen in the pelvis bilaterally with changes of prostatectomy. Mora catheter is seen in the bladder. There is a right inguinal hernia containing fat and fluid. MUSCULOSKELETAL: Negative.     CONCLUSION: 1.  Exam is mildly compromised by some respiratory motion. Even given this, gallbladder appears abnormal with edematous wall and mild stranding in the adjacent fat. Further evaluation with ultrasound is recommended as the findings are suspicious for cholecystitis. 2.  Small bilateral pleural effusions with atelectasis in both lungs. 3.  Mediastinal, and retroperitoneal lymph nodes are in the upper range of normal in size and decreased compared to the prior exam. The spleen is also decreased in size compared to the prior exam. 4.  Moderate to severe calcified atherosclerotic plaque in the visualized arteries with small right common iliac artery aneurysm. 5.  Colonic diverticulosis without evidence of diverticulitis. 6.  Right inguinal hernia containing fat and fluid. 7.  Changes of prostatectomy and pelvic lymph node dissection. 8.  Groundglass nodule in the right upper lobe is grossly stable  though not well evaluated due to motion. NOTE: ABNORMAL REPORT THE DICTATION ABOVE DESCRIBES AN ABNORMALITY FOR WHICH FOLLOW-UP IS NEEDED.     Xr Chest 1 View Portable    Result Date: 3/12/2018  XR CHEST 1 VIEW PORTABLE 3/12/2018 5:19 AM INDICATION: septic shock, respiratory failure, pneumonia, volume overload. COMPARISON: 03/11/2018 FINDINGS: Considerable increase in central congestion and diffuse interstitial alveolar infiltrate throughout both hemithoraces. Similar small effusions. Remainder stable.    Xr Chest 1 View Portable    Result Date: 3/11/2018  XR CHEST 1 VIEW PORTABLE 3/11/2018 6:09 AM INDICATION: Respiratory failure follow-up infiltrates COMPARISON: 03/10/2018 FINDINGS: Significant improvement in congestion and pulmonary edema pattern on prior. Prominent infiltrates persist in the lower lobes and retrocardiac area. Similar effusions. Remainder stable.    Xr Chest 1 View Portable    Result Date: 3/10/2018  XR CHEST 1 VIEW PORTABLE 3/10/2018 5:28 AM INDICATION: dyspnea, hypoxia, pneumonia. COMPARISON: 03/09/2018 FINDINGS: Worsening central congestion and diffuse interstitial alveolar infiltrates along with increasing bilateral effusions consistent with progression of pulmonary edema or congestive heart failure. Superimposed pneumonia certainly possible. Remainder stable.    Xr Chest 1 View Portable    Result Date: 3/9/2018  XR CHEST 1 VIEW PORTABLE 3/9/2018 5:19 AM INDICATION: Fever COMPARISON: CT 02/13/2018 FINDINGS: Poor ventilatory effort with increasing compressive and congestive changes. Probable pulmonary edema or congestive heart failure. Similar to perhaps slight increase in left pleural effusion. Stable peripheral consolidation right lower lobe. Groundglass opacity right upper lobe better seen on CT. Remainder stable. Follow-up recommended.    Ct Head Without Contrast    Result Date: 3/11/2018  [facility] CT HEAD WO CONTRAST 3/11/2018 5:14 PM INDICATION: Stroke mental status change  TECHNIQUE: Without IV contrast. Dose reduction techniques were used. CONTRAST: None COMPARISON:  CT head 06/18/2013 FINDINGS: Unchanged encephalomalacia in the left posterior frontoparietal lobe. Interval progression of mild diffuse parenchymal volume loss with ex vacuo ventricular dilatation. No acute intracranial hemorrhage. No CT evidence of acute infarct. No calvarial fracture. The visualized orbits, paranasal sinuses and mastoid air cells are free of significant disease.     CONCLUSION: 1. No acute intracranial abnormality. Findings discussed with Dr. Mahoney on 03/11/2018 at 1712    Ir Biliary Catheter Change    Result Date: 3/11/2018  [facility] PROCEDURE: CHOLECYSTOSTOMY TUBE CHECK AND EXCHANGE. ATTENDING: Errol Britt MD. INDICATION: 88-year-old male with cholecystitis status post cholecystostomy placement yesterday. Initially there were bloody outputs and now there is difficulty flushing the catheter. MODERATE SEDATION: None. ANTIBIOTICS: None. ADDITIONAL MEDICATIONS: None. FLUOROSCOPIC TIME: 1.9 minutes. AIR KERMA:  109 mGy. CONTRAST: 10 mL Omnipaque into the gallbladder lumen. COMPLICATIONS: No immediate complications. PROCEDURE:  A  image was obtained. Contrast was instilled through the existing cholecystostomy and images were obtained. Over wire exchange was made for a new 10 Citizen of the [place] locking loop nephrostomy. The loop was formed within the gallbladder lumen and the cavity was irrigated. There was attached to gravity drainage. FINDINGS: The  image shows the existing cholecystostomy in appropriate position. An injection of contrast reveals the cholecystostomy lies within the gallbladder lumen. There is a large filling defect encompassing nearly the entire gallbladder which likely represents hemorrhagic clot. Following exchange the new drainage catheter appears in appropriate position. Some of the clot has been successfully aspirated.     The cholecystostomy lies within the gallbladder  lumen however nearly the entire lumen is filled with a blood clot. PLAN: The catheter was exchanged over a wire and the gallbladder was irrigated gently. Would recommend flushing the catheter twice daily with sterile saline hopes of improving drainage. ____________________________________________________________________ CPT codes for physician reference only: 16146     Xr Swallow Study W Speech    Result Date: 3/12/2018  XR SWALLOW STUDY W SPEECH 3/12/2018 1:30 PM INDICATION: Dysphagia. TECHNIQUE: Routine. COMPARISON: None. FINDINGS: FLUOROSCOPIC TIME: 1.3 minutes NUMBER OF IMAGES: 6 Swallow study with Speech Pathology using multiple barium thicknesses. Puree and honey consistency both demonstrated significant piriform sinus stasis. [place] consistency demonstrated stasis and kickback aspiration which resulted in only a minimal throat clear. Thin liquid demonstrated delay in swallow reflex with direct, silent aspiration.     Xr Chest 1 View For Picc Placement Portable    Result Date: 3/9/2018  XR CHEST 1 VIEW FOR PICC LINE PLACEMENT PORTABLE 3/9/2018 7:35 AM INDICATION: verify catheter placement COMPARISON: 03/09/2018 FINDINGS: There is been interval placement of a right-sided PICC with the distal tip within the mid superior vena cava. Multiple median sternotomy wires are seen. Low lung volumes are again noted with diffuse areas of airspace consolidation, unchanged from the prior examination.. Small pleural effusions are probable bilaterally. There is no pneumothorax.    Ir Cholecystostomy    Result Date: 3/10/2018  [facility] PROCEDURE: PERCUTANEOUS CHOLECYSTOSTOMY PLACEMENT. ATTENDING: Errol Britt MD. INDICATION: 88-year-old male with cholecystitis and impending biliary sepsis. Urgent cholecystostomy placement is requested. The patient has low platelets secondary to chemotherapy and has platelets infusing. CONSENT: The risks, benefits and alternatives of cholecystostomy placement were discussed with the  patient, his family and his power of  [place]rin[place] in detail. All questions were answered. Informed consent was given to proceed with the procedure. MODERATE SEDATION: None. ANTIBIOTICS: None. ADDITIONAL MEDICATIONS: Fentanyl 50 mcg IV. FLUOROSCOPIC TIME: 0.8 minutes. AIR KERMA:  38 mGy. CONTRAST: 5 mL Omnipaque into the gallbladder lumen. COMPLICATIONS: No immediate complications. PROCEDURE:  Using real-time sonographic guidance an AccuStick set was utilized to gain percutaneous access into the gallbladder lumen. Endoluminal positioning was confirmed with an injection of contrast. Over wire exchange was then made for a 10 [place] locking loop cholecystostomy. The loop was formed within the gallbladder lumen and it was attached to gravity drainage. FINDINGS: Ultrasound demonstrates a mild to moderately distended gallbladder with an abnormally thickened wall. The cholecystogram shows access into the gallbladder lumen. Filling defects are seen near the gallbladder fundus. The proximal cystic duct appears to be patent. The completion image shows the cholecystostomy in the gallbladder lumen.     Successful percutaneous cholecystostomy placement, as detailed above. A specimen of tar-like bile was sent for diagnostic analysis. ____________________________________________________________________ CPT codes for physician reference only: 51117     Us Abdomen Limited    Result Date: 3/10/2018  US ABDOMEN LIMITED 3/10/2018 3:03 PM INDICATION: acute choly COMPARISON: CTA chest abdomen pelvis dated 03/10/2018. FINDINGS: GALLBLADDER: There is thickening of the gallbladder wall with fluid within the gallbladder wall suggesting edematous gallbladder. No intraluminal stones are identified the wall is thickened to 1.5 cm BILE DUCTS: No bile duct dilation. The common bile duct measures 7 mm. LIVER: Normal where seen. RIGHT KIDNEY: 10.4 x 4.9 x 5.1 cm without hydronephrosis or mass. PANCREAS: Obscured by bowel gas and not well visualized.  No ascites in the right upper quadrant.     CONCLUSION: 1.  Abnormal appearance of the gallbladder with marked thickening of the gallbladder wall and fluid within the gallbladder are consistent with an edematous gallbladder no gallstones or biliary dilatation are seen. Findings are suspicious for possible acalculous cholecystitis. NOTE: ABNORMAL REPORT THE DICTATION ABOVE DESCRIBES AN ABNORMALITY FOR WHICH FOLLOW-UP IS NEEDED.     Ct Chest Abdomen Pelvis Without Oral With Iv Contrast    Result Date: 2/14/2018  CT CHEST, ABDOMEN, AND PELVIS WO ORAL W IV CONTRAST 2/13/2018 2:07 PM      INDICATION: CLL, dyspnea, abdominal pain. TECHNIQUE: CT chest, abdomen, and pelvis. Dose reduction techniques were used. IV CONTRAST: Iohexol (Omni) 100 mL. COMPARISON: CT chest 5/30/2016, CT abdomen and pelvis 3/3/2017. FINDINGS: CHEST: 1.5 cm ground-glass nodule in the right upper lobe on image 33, not appreciably changed. Calcified granuloma in the right lower lobe. Small bilateral pleural effusions are new, with compressive atelectasis in the lower lobes. Some presumed scarring in the lateral right lung base is unchanged. A few calcified pleural plaques present. Prior CABG. There are several mildly prominent bilateral axillary and mediastinal lymph nodes, all of which are under 1 cm in short axis. Large hiatal hernia.  ABDOMEN: Mild splenomegaly. The spleen measures 14 cm in craniocaudal length compared to previous 15.6 cm. A small benign cyst and hemangioma in the liver. Otherwise normal liver, pancreas, and adrenal glands. Small renal cysts. Mild nonspecific gallbladder wall edema. The gallbladder is decompressed. Mildly enlarged retroperitoneal, mesenteric, and chris hepatic lymph nodes. The lymph nodes have significantly decreased in size when compared to 3/30/2017. For example, a left periaortic lymph node on image 286 measures 13 mm in short axis, previously 22 mm. PELVIS: Prostatectomy. Sigmoid colonic diverticulosis. Right  inguinal hernia containing fat and fluid. There is mild external iliac and inguinal lymphadenopathy. MUSCULOSKELETAL: Negative.     CONCLUSION: 1.  Mild lymphadenopathy in the chest, abdomen, and pelvis as well as splenomegaly have all improved when compared to 3/3/2017. 2.  New small bilateral pleural effusions. 3.  Stable 1.5 cm ground-glass nodule in the right upper lobe compared to 5/30/2016.      Signed by: Audrey Vergara MD

## 2021-06-16 NOTE — PROGRESS NOTES
Progress Note        BRIEF HOSPITAL COURSE :        88M with history of CLL diagnosed back in 2008 and now with relapse, on tyrosine kinase inhibitor, hemolytic anemia on steroids, CAD status post CABG, CVA with residual right-sided numbness, thyroid cancer status post thyroidectomy, prostate CA status post prostatectomy.  Mr. Clinton presented with septic shock from acute cholecystitis and associated e.coli bacteremia.  Treated with PERC cholecystostomy tube, IV zosyn, stress dose steroids and pressors.  Hospital course complicated by Afib with RVR requiring IV amiodarone; replacement of cholecystostomy tube 03/11 due to obstruction from blood clot.  Repeat cholangiogram with a persistent filling defect thought secondary to clot.         SUBJECTIVE :     No sob  No fever    PLAN :     - continue current cares    - not yet medically ready for discharge.          Barriers to Discharge : placement   Anticipated discharge : in am  Disposition : tcu      TIME SPENT : Total time spent > 35 minutes and > 50% time spent on counseling patient about plan of care and coordination of care.                  ASSESSMENT :        1. Cholecystitis with e.coli bacteremia -  Zosyn (day 11)  transition to omnicef+flagyl for another 10days (may need to extend further depending on clinical course).  Re-evaluate for cholecystectomy in 4-6 weeks.   Cholecystostomy tube with flushes per IR.      2. Afib, sepsis associated - now sinus kathy on amiodarone.  Toprol-xl d/miguelito due to sinus kathy.  Possible anticoagulation in future depending on surgical plan.       3. adrenal insufficiency - was on stress dose steroids for septic shock, continue to wean.  Had been on pred taper prior to admission for hemolytic anemia.      4. CLL, chronic thrombocytopenia, hx hemolytic anemia - ibrutinib on hold, recently on prednisone taper (reduce to prednisone 20qday x 2 days then 10mg qday x 1 week)     5. acute blood loss anemia - hb stable.      6. hyperglycemia - SSI, should improve with steroid taper    7. R arm edema - not much.  Not TTP or erythematous.  Elevate.  US if worsens.               Diet :  Regular, mechanical soft  , IV fluids :     , IV Meds :    , Antibiotics     QTc :  DVT Prophylaxis : SCD's  Code Status : DNR/DNI  Admit status :  inpatient            Objective :            Review of Systems   A 12 point comprehensive review of systems was negative except as noted.        Physical Exam    HEENT : no distended veins, no lymphadenopathy, thyroid is normal  LUNGS : b/l air entry present, no significant crackles/wheezing.  ABDOMEN : soft, non-tender, non-distended, BS present.  HEART :  Regular rate & rhythm, S1 & S2 normal, no murmur, clicks/rubs, no ankle edema  NEURO : conscious, oriented, responds to commands, no obvious focal deficit.  MUSCULOSKELETAL / EXTREMITIES :  no calf tenderness.  SKIN : no rashes  BACK : wnl        Pertinent Labs   Lab Results: personally reviewed.   Lab Results   Component Value Date     03/21/2018    K 3.6 03/21/2018     03/21/2018    CO2 29 03/21/2018    BUN 19 03/21/2018    CREATININE 0.71 03/21/2018    CALCIUM 7.5 (L) 03/21/2018           Pertinent Radiology   Radiology Results: Personally reviewed image/s  EKG Results: not done              DR. LUPE BAUGH  [facility]

## 2021-06-16 NOTE — PROGRESS NOTES
Thank your for the referral, we will continue to follow this patient for post acute placement.      Patient will need a prior-authorization from his insurance for this level of care, which I will pursue.  If patient continues to make gains, tolerate and participate in daily therapies, and family is on board for patient coming home with them prior to gall bladder surgery, he is appropriate for the acute inpatient rehab level of care.     Determination of admission is based upon the patient's need for an intensive interdisciplinary approach to rehabilitation, their ability to progress, their ability to tolerate intensive therapies, their need for daily physician management, their need for twenty four hour rehab nursing assistance, and their ability and willingness to participate in such a program.    Sri Deal, PT  [facility] Liaison  [facility]   [phone] (office)

## 2021-06-16 NOTE — PROGRESS NOTES
Pulmonary/Critical Care Medicine update  Patient with rising CVP numbers (not the best waveform, via PICC line).  But given the general upward trend, it is concerning for worsening volume overload.  TTE demonstrates EF 60% but moderate MR.   Norepinephrine was quadruple concentrated due to high volume receiving.    Increased respiratory distress this morning.  CXR with worsening bilateral infiltrates and pulmonary edema.   BiPAP initiated.  Awaiting morning labs.      Otherwise, limited urine output overnight.  However, vasopressor needs have tapered down significantly.  Lasix 20 mg IV x 1 now.  Check albumin level.      Also with generalized body pains.  Fentanyl IV PRN provided with minimal relief.  Awaiting morning labs before longer-acting agents like dialudid/morphine are requested, particularly with reduced urinary output.     Ruby Josue MD, MPH  Pulmonary & Critical Care Medicine  Pager: [phone]  3/10/2018  5:42 AM

## 2021-06-16 NOTE — PROGRESS NOTES
Palliative Care Progress Note      Progress Note - Palliative Care  Nixon Velasquez Jr.,  1929, MRN [id number]    Admitting Dx: Lactic acidosis [E87.2]  Thrombocytopenia [D69.6]  Chronic anemia [D64.9]  FRANC (acute kidney injury) [N17.9]  Sepsis [A41.9]    PCP: Riley Cuevsa MD, [phone]   Code status:  DNR       Extended Emergency Contact Information  Primary Emergency Contact: Balbina Velasquez   [facility]  Home Phone: [phone]  Relation: Spouse  Secondary Emergency Contact: Cha Reyez   [facility]  Home Phone: [phone]  Relation: Child       Impression and Recommendations:          1. Generalized weakness-secondary to complex medical condition, mostly in bed   Improving  - Reposition for comfort  -PT, OT     2.   Abdominal spasms secondary to cholelithiasis  improving  -Zanaflex  - Sublingal Roxicodone for  Pain   -Per Dr. Stroud, plan is to have Rehab then  Cholecystectomy in May      3.  Insomnia  - Melatonin for sleep     4. Palliative Care  (see GOC discussion below)  DNR No Tube Feeding  Family wants to have patient DC to [facility], I asked Lacie Tay to F/U with family   Family called me today, I explained RN CM would work with them for  admission but I did speak to Yuri.  Patient does not qualify as he does not have a enough needs for daily MD visits  Family looking into TCU     5.  Altered Mental Status   improving  - Avoid benzos, anticholinergics  - Melatonin, trazodone PRN sleep  - Nursing orders to include:  - Allow restful sleep at night (cluster cares)  - Maintain normal day/night cycle by keeping light on during day and off at night    Discussed with Dr. Swan and RNCM   Palliative Care will continue to follow for symptom management and ongoing goals of care discussion.                    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  Mr. Clinton presented with septic shock from acute cholecystitis and associated e.coli bacteremia.  Treated with PERC cholecystostomy tube, IV zosyn, stress dose steroids and pressors.  Hospital course complicated by Afib with RVR requiring IV amiodarone; replacement of cholecystostomy tube 03/11 due to obstruction from blood clot.  Repeat cholangiogram with a persistent filling defect thought secondary to clot.        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    Mixed hyperlipidemia    Hypothyroidism    Coronary Artery Disease    Other autoimmune hemolytic anemias    Thrombocytopenia    Septic shock    Neutropenia associated with infection    Gram-negative bacteremia    Metabolic acidosis with respiratory acidosis    Hemorrhage    Atrial fibrillation with rapid ventricular response    Lactic acidosis    Hypocalcemia    Acute respiratory failure with hypoxia and hypercapnia    Bacteremia due to Gram-negative bacteria    Acute pulmonary edema    Abdominal pain, unspecified abdominal location    Fecal impaction    Cholecystitis    Interim Hx  More alert, now is SR, intermittent bradycardia.    Draining from cira tube, brown liquid   He does have a trend towards sinus bradycardia.  Metoprolol discontinued and will need to observe    Current symptoms: NAD   some dysarthic speech,  Alert and oriented x3  Family Present: son Jean-Claude and his sister Namrata Hernandez       GOALS OF CARE:  Decision Making Capacity  Patient Does have decision-making capacity; patient does   demonstrate understanding of relevant information about condition, the available test and treatment options, use reason to make a decision about these, and communicate choice about these. (MINISTERIO 306, 4, p. 420-4).    Discussion:   Family  called me as they wanted update re: discharge status.  Their goal is [facility] but they realize that he will need to go to a TCU         Review of Systems:  Review Of Systems  DEMENTIA: N  DELIRIUM:  N  COMA: N  FAST SCORE: NA    ESAS Score:   Mild 1-3  Mod 4-7  Max 8-10     Pain: N  Depression:  N  Nausea:  N  Anorexia:  N  Drowsiness (feels sleepy): N  Fatigue (lack of energy):  Mild  Constipation:  N  Dyspnea:  N  Agitation: N  Anxiety:   N  Palliative Performance Status 40-50 %. Physical Exam:  Temp:  [97.8  F (36.6  C)-98.5  F (36.9  C)] 98.3  F (36.8  C)  Heart Rate:  [50-64] 64  Resp:  [18-20] 20  BP: (101-137)/(50-64) 128/64    General appearance: alert, cooperative, fatigued, NAD  Head: Normocephalic, without obvious abnormality, atraumatic  Eyes: negative findings: lids and lashes normal, conjunctivae and sclerae normal   Nose: no discharge  Throat: lips, mucosa, and tongue normal; teeth and gums normal  Neck: Symmetrical, trachea midline  Lungs: clear to auscultation bilaterally and decreased in bases  Heart: kathy  regular rate and rhythm, S1, S2 normal,  murmur, no click, rub or gallop  Abdomen: soft, +BS in all 4 quadrants.  Extremities: extremities normal, atraumatic, no cyanosis, no edema  Pulses: 2+ and symmetric  Neurologic: Grossly normal   Normal affect: increased in intensity and redirectable, thought content exhibits logical connections  Skin: Skin color, texture and turgor normal. No rashes or lesions , No venous stasis or varicosities noted           LAB:    Results from last 7 days  Lab Units 03/20/18  0451 03/19/18  0527 03/18/18  0508   LN-WHITE BLOOD CELL COUNT thou/uL 11.5* 11.4* 12.5*   LN-HEMOGLOBIN g/dL 9.8* 8.8* 8.7*   LN-HEMATOCRIT % 32.0* 28.8* 27.9*   LN-PLATELET COUNT thou/uL 80* 65* 52*          Results from last 7 days  Lab Units 03/20/18  0451 03/19/18  0527 03/18/18  0508  03/15/18  0404 03/14/18  0603   LN-SODIUM mmol/L 143 142 142  < > 153* 155*   LN-POTASSIUM mmol/L 3.8 3.6 3.8   < > 3.1* 3.8   LN-CHLORIDE mmol/L 106 105 107  < > 116* 120*   LN-CO2 mmol/L 30 31 30  < > 30 27   LN-BLOOD UREA NITROGEN mg/dL 21 23 20  < > 33* 38*   LN-CREATININE mg/dL 0.77 0.76 0.77  < > 0.85 0.94   LN-CALCIUM mg/dL 7.4* 7.1* 7.0*  < > 7.6* 7.6*   LN-ALBUMIN g/dL  --   --  2.5*  --  2.5* 2.5*   LN-PROTEIN TOTAL g/dL  --   --  4.4*  --  4.5*  --    LN-BILIRUBIN TOTAL mg/dL  --   --  1.2*  --  1.0  --    LN-ALKALINE PHOSPHATASE U/L  --   --  37*  --  40*  --    LN-ALT (SGPT) U/L  --   --  <9  --  <9  --    LN-AST (SGOT) U/L  --   --  9  --  7  --    < > = values in this interval not displayed.    Results from last 7 days  Lab Units 03/16/18  1140   LN-INR  1.21*       Lab Results   Component Value Date/Time    ALBUMIN 2.5 (L) 03/18/2018 05:08 AM     Wt Readings from Last 3 Encounters:   03/20/18 (!) 227 lb (103 kg)   03/07/18 191 lb 7 oz (86.8 kg)   02/14/18 189 lb 3.2 oz (85.8 kg)           Denia Gordon  MSN, NP-C, APRN, CPM, CRRN  Discussed with MD KAYLYNN and RN    Office #:[phone] (Admin will page me if needed)      Billing Information:      E/M level: 35  minutes,>50% of time during encounter was spent with patient and family on counseling, and/or care coordination, as documented above.    Prolonged Service time 27760 (does not include E/M time or ACP time) beyond 35 minutes was 31 minutes from  11 :00  to   1131, which included discussion  pain/symptom management, met with KAYLYNN, RN,   family.  prognosis, disease projectory.   FCC in patients room

## 2021-06-16 NOTE — PROGRESS NOTES
"Respiratory Care Note    Placed on BIPAP 14,14/5 40 for SOB . BS diminished bilaterally .Pt seems tolerating the BIPAP well. Will continue to monitor and assess.  /61  Pulse (!) 128  Temp 98  F (36.7  C) (Axillary)   Resp 19  Ht 6' 3\" (1.905 m)  Wt 200 lb 12.8 oz (91.1 kg)  SpO2 96%  BMI 25.1 kg/m2    Said I Duale      .  "

## 2021-06-16 NOTE — PROGRESS NOTES
"Speech Language/Pathology  Speech Therapy Daily Progress Note    Nursing reports patient continues to cough. At some point since recommendations made based on most recent video swallow study, diet was changed from thin liquids to nectar-thick liquids.   Patient presents as alert and cooperative during this session.  An  was not applicable. Several sons present. They report he inconsistently follows cues to cough following bite/sip.    Objective  Patient observed with 1 oz NDD1 textures, 1 oz nectar-thick liquid, and 1 oz thin liquids. Patient coughed on ~75% of bites/sips. At times patient instead produced a strong throat clear or simply stated \"cough, cough\".     Assessment  Extensive conversation with patient and three of patient's sons re: current status, recommendations and plan. At least one son indicated thicker liquids \"go better\" than thin liquids. General sense was that patient (and family) were more comfortable with nectar-thick liquids. However, given noted silent aspiration, this is not based on reliable response. Concern that patient also was more lethargic during most recent video swallow study and has been generally more alert now.     Plan/Recommendations  Change liquids back to thin, but patient may be given nectar-thick liquids as well if requested. It was also questioned whether patient is more productive in clearing aspirated material that is thin vs nectar-thick. Agreed to consider repeat video swallow study when he is more alert, as well as consider ability to clear nectar-thick vs thin liquids.     The ST Care Plan has been reviewed and current plan remains appropriate.    60 dysphagia minutes     Deng Rdz MA, CCC-SLP      "

## 2021-06-16 NOTE — PROGRESS NOTES
Pulmonary/Critical Care Medicine, update #2:    Since yesterday evening, patient had reduced output from cholecystostomy tube.  In addition, large amount of clot formed both in bag and found to have clot within the catheter.  Ultimately flushed with sterile saline 10 mL x 2 using sterile gloves, and drain circuit was changed.  However, limited output with this.  IR staff aware, possibly related to contracted gallbladder already given earlier drainage.      Otherwise, Levophed infusion needs are decreasing.  Hgb 7.0, plts 51K this morning.  CXR obtained, overall improvement but still with primarily right sided opacities/edema and L pleural effusion.     Plan:    Labs reviewed.    Additional Calcium 1 gm IV x 2 overnight received.    Transfuse 1 unit pRBC and 1 unit platelets this morning.    Lasix 20 mg IV x 1 with transfusions.      IR team will come to assess percutaneous drain.  I am concerned that a lot of the drainage has been related to bleeding, and thus may not reflect a truly contracted gallbladder.  Will determine evaluation plans with IR team.     Rbuy Josue MD, MPH  Pulmonary & Critical Care Medicine  Pager: [phone]  3/11/2018  6:17 AM

## 2021-06-16 NOTE — DISCHARGE SUMMARY
[facility] MEDICINE  DISCHARGE SUMMARY     Primary Care Physician:  Riley Cuevas MD  Admission Date: 3/9/2018    Discharge Provider: Rlsunnystefano Brantley  Discharge Date: 3/22/2018    Code Status: DNR    Diet:   Discharge Diet Order     None              Activity:   Discharge Activity Order     None          Condition at Discharge: Good    REASON FOR ADMISSION (See Admission Note for Details)   Admission Diagnoses: septic shock secondary to acute cholecystitis with E.coli bacteremia/gram negative bacteremia    Problem list from this hospital stay:   Septic shock  E.coli sepsis  FRANC  Acute blood loss anemia  Lactic acidosis    SIGNIFICANT FINDINGS (Imaging, labs):   Anemia, creatinine elevation, marked leukocytosis on presentation. BCx positive for ecoli resistant to quinolones and intermediate for augmentin.    PENDING LABS      Order Current Status    Rh Genotyping - Misc. Lab Test In process           PROCEDURES (this hospitalization only)    Percutaneous cholecystostomy placement    RECOMMENDATION FOR F/U VISIT   Rehab provider after transfer  Afib clinic  Hematology/oncology follow up  General surgery in 4-6 weeks for possible cholecystectomy, possibly sooner for biliary drain removal.    DISPOSITION (home, home care, TCU...)   Disposition: acute rehab      SUMMARY OF HOSPITAL COURSE:      Nixon Velasquez Jr. is a 88 y.o. male with a PMHx significant for CLL (dx 2008) with relapse and on TKI, hemolytic anemia on prednisone, CAD s/p CABG, CVA with residual right hemibody numbness, papillary thyroid carcinoma s/p thyroidectomy, prostate cancer s/p prostatectomy who presented with acute cholecystitis and septic shock from E.coli bacteremia (resistant to quinolones). He initially required vasopressors. He underwent percutaneous cholecystostomy placement. It had to be replaced on 3/11/18 due to clotting. He also was treated with IV piperacillin-tazobactam for 11 days with transition to PO metronidazole and  cefdinir for 10 more days. Per general surgery, he will have cholecystectomy in 4-6 weeks with possible drain removal sooner. He developed Afib with RVR requiring IV amiodarone in the setting of sepsis with transition to PO per cardiology. Plan is to taper down to 200 mg daily with follow up at Afib clinic after discharge. Anticoagulation for Afib to be discussed after discharge and stabilization of his anemia on chemotherapy. Otherwise will resume his home clopidogrel on discharge due to significant CAD hx. He was transfused with 3U pRBC, 3U platelets and 1U of FFP over a span of a few days due to concern for blood loss from his biliary drain in the setting of pancytopenia from his active chemotherapy. His cytopenia stabilized (last transfusion 3/16). Plan for repeat CBC in one week and oncology follow up. Chemotherapy to be continued on discharge and held 3 days prior to cholecystectomy. Mirtazapine and escitalopram were held on admission due to prolonged QTc and were not continued on discharge. Ondansetron was discontinued as well.    Discharge Medications with Med changes:        Medication List      START taking these medications          amiodarone 200 MG tablet   Commonly known as:  PACERONE   Take 1 tablet twice daily until 3/31, then transition to 1 tablet once daily until further instruction by cardiology       cefdinir 300 MG capsule   Commonly known as:  OMNICEF   Take 1 capsule (300 mg total) by mouth 2 times a day at 6:00 am and 4:00 pm for 10 days.       metroNIDAZOLE 500 MG tablet   Commonly known as:  FLAGYL   Take 1 tablet (500 mg total) by mouth 3 (three) times a day for 8 days.       oxyCODONE 20 mg/mL concentrated solution   Commonly known as:  ROXICODONE   Take 0.25-0.5 mL (5-10 mg total) by mouth every 4 (four) hours as needed for pain (moderate to severe pain).       tiZANidine 2 MG tablet   Commonly known as:  ZANAFLEX   Take 1 tablet (2 mg total) by mouth every 8 (eight) hours as needed  (muscle spasms).         CHANGE how you take these medications          ibrutinib 140 mg Cap capsule   Commonly known as:  IMBRUVICA   Take 3 capsules (420 mg total) by mouth daily. Start per oncology   What changed:  additional instructions       predniSONE 10 mg tablet   Commonly known as:  DELTASONE   Take 1 tablet (10 mg total) by mouth daily for 7 days. Stop taking on 3/21/18   What changed:  Another medication with the same name was removed. Continue taking this medication, and follow the directions you see here.         CONTINUE taking these medications          allopurinol 300 MG tablet   Commonly known as:  ZYLOPRIM   Take 1 tablet (300 mg total) by mouth daily with breakfast.       clopidogrel 75 mg tablet   Commonly known as:  PLAVIX   Take 75 mg by mouth daily.       folic acid 1 MG tablet   Commonly known as:  FOLVITE   Take 1 tablet (1 mg total) by mouth daily.       levothyroxine 150 MCG tablet   Commonly known as:  SYNTHROID, LEVOTHROID   Take 150 mcg by mouth Daily at 6:00 am.       omeprazole 40 MG capsule   Commonly known as:  PriLOSEC   Take 1 capsule (40 mg total) by mouth 2 (two) times a day.       simvastatin 40 MG tablet   Commonly known as:  ZOCOR   Take 40 mg by mouth at bedtime.         STOP taking these medications          escitalopram oxalate 5 MG tablet   Commonly known as:  LEXAPRO       mirtazapine 45 MG tablet   Commonly known as:  REMERON       ondansetron 8 MG disintegrating tablet   Commonly known as:  ZOFRAN-ODT       triamcinolone 0.1 % cream   Commonly known as:  KENALOG               Rationale for medication changes:    Stopped mirtazapine, escitalopram on admission and not continued on discharge due to prolonged QTc. zofran discontinued. Prednisone 10 mg daily for 7 days.    Consult/s:  cardiology, pulmonary/intensive care, hematology/oncology and general surgery    Discharge Instructions:  Additional discharge instructions: none    Discharge Instruction Orders (720h ago  through future)    Start        03/22/18 0000  Discharge Condition:  Improving         03/22/18 0000  Discharge Summary:  Enclosed         03/22/18 0000  Admission H&P Valid:  Yes         03/22/18 0000  Patient Aware of Diagnosis: Yes         03/22/18 0000  Free of Communicable Disease:   Yes         03/22/18 0000  Rehab Potential:  Good         03/22/18 0000  Discharge Potential:  Length of Stay < 30 Days         03/22/18 0000  Level of Care:  Skilled         03/22/18 0000  Activity:  As Tolerated         03/22/18 0000  Physical Therapy Eval and Treat         03/22/18 0000  Occupational Therapy Eval and Treat         03/22/18 0000  Other Treatment Orders - Free Text     Comments:  Change cholecystostomy dressing daily. Empty drain every 8 hours.       03/22/18 0000  Follow Up Appointments:     Comments:  Rehab provider after transfer  General surgery in 4-6 weeks for possible cholecystectomy  Afib clinic in 2 weeks.  Hematology/oncology in 2-3 weeks       03/22/18 0000  Future Lab Orders at [facility]     Comments:  Labs include: CBC in one week               Examination     Vital Signs in last 24 hours:  Temp:  [97.5  F (36.4  C)-98.1  F (36.7  C)] 98.1  F (36.7  C)  Heart Rate:  [53-81] 54  Resp:  [16-22] 18  BP: (113-147)/(58-68) 134/61  SpO2:  [93 %-100 %] 100 %    General: Alert, oriented, no acute distress  HEENT: PERRL, EOMI, no scleral icterus, no conjunctival erythema  Neck: Supple, trachea midline, no lymphadenopathy  Chest: Clear to auscultation bilaterally, no wheezes, no crackles, no rhonchi  Cardiac: RRR, S1/S2 present, 2/6 THOMAS heard best at RUSB, no gallops, rubs, normal JVD  Abdomen: RUQ TTP around the biliary drain, nondistended, soft, bowel sounds present  Neuro: Sensation intact, strength 5/5 in both upper and lower extremities, cranial nerves intact  MSK: No lower extremity edema, no tenderness  Skin: No rashes or concerning lesions      Please see EMR for more detailed significant labs, imaging,  consultant notes etc.  Total time spent on discharge: 32 minutes    Sydnie Brantley MD    CC: Riley Cuevas MD

## 2021-06-16 NOTE — PROGRESS NOTES
"Spiritual Care Note    Spiritual Assessment:  made a visit with patient this morning; family not present at time of visit but  aware they are in the hospital. Patient seems tired as he talks about feeling pretty awful today and how he was feeling prior to coming into the hospital. Patient shares that he is comforted by the 's voice and thanked there for coming to, \"Just talk to me.\" Patient reflected on his [employer]ism [employer]an and belonging to his [place] community for 40 plus years. Patient seems to value his family and prayer as continuous resources for support.  notes no concerns.      Care Provided:  offered listening, words of support, comfort, and shared prayer.     Plan of Care: Spiritual care will continue to follow as part of patient's care team.    DUYEN Cruz, [facility]    "

## 2021-06-16 NOTE — PROGRESS NOTES
"  Clinical Nutrition Therapy Follow-Up Note      Pt's son joined in care rounds.  Said pt ate 3.5 applesauces and an apple juice this morning.  Intensivist spoke briefly with son about nutrition and need for protein, and asked for nutritional supplement.   Pt continues to c/o abdominal pain and when taking narcotics gets too lethargic to eat.     Nutrition History:  Food allergies or intolerances: dairy, includes butter, cheese.  Spoke with 2 sons about nutritional supplement, lactose-free, they were OK with starting chocolate Boost Plus with meals.    Nutrition Prescription:   Diet: Mech soft with thin liquids.  D5 at 100 ml/hr.    Current Nutrition Intake:  Minimal to no intake.  Refused breakfast this morning, although son reported intake of 3.5 applesauces and an apple juice.    Anthropometrics:  Height: 6' 3\" (190.5 cm)  Admission weight: 196#  Weight: 204 lb (92.5 kg) with 1-2+ edema    Physical Findings:  Physical findings which could indicate malnutrition: not found     GI Status/Output:   Last BM recorded: 3/13    Skin/Wound:   Lucas Scale Score: 15    Medications:  Medications reviewed.    Labs:  Glucose 252 with D5 and eating CHO with no protein or fat  Labs reviewed.  Phosphorus 2.0, on replacement; Na 153, K 3.1- on replacement; BUN 33, creat 0.85    Estimated Nutrition Needs:  Using current weight of 89 kg.    Energy Needs: 1777-5526 kcals daily per 25-30 kcal/kg   Protein Needs:  g daily, 1-1.5 g/kg.  Fluid Needs: 6986-3852 mls daily, 25-30 mls/kg    Malnutrition: Not noted; <50% estimated energy intake for >5 days in context of acute illness with edema which could be masking weight loss-lack 1 other risk factor.    Nutrition Risk Level: moderate risk    Nutrition DX: inadequate intake r/t critical illness evidenced by refusing meals or eating very little.    Goals:  Tolerate least restrictive diet-continue    Intervention:    Spoke with sons about nutritional supplement.  Pt did not " "participate in conversation.  Discussed that Boost Plus is lactose free.  Also discussed that given pt should not have straws, Boost should be poured into a cup and not left in juice box with straw.  In order for pt to receive supplement, I had to add \"lactose restricted\" to diet order and remove dairy allergy, which also restricts casein and whey.  Also reviewed room service process with sons.  Added chocolate Boost Plus TID with meals.    Monitor:   Weight, po intake, supplement acceptance.    See Care Plan for Problems, Goals, and Interventions.        "

## 2021-06-16 NOTE — PROGRESS NOTES
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     Mixed hyperlipidemia     Cerebral atherosclerosis     Adenocarcinoma Of The Prostate Gland     Papillary Carcinoma Of The Thyroid Gland     Hypothyroidism     Type 2 Diabetes Mellitus     Coronary Artery Disease     Cervical Radiculopathy     Iron Deficiency     Hypoxia     Advanced directives, counseling/discussion     Infection of ear lobe, right     Right ear pain     Benign essential HTN     Anemia     Other autoimmune hemolytic anemias     Thrombocytopenia     Squamous cell carcinoma of skin     Sepsis     Septic shock     Neutropenia associated with infection     Gram-negative bacteremia     Metabolic acidosis with respiratory acidosis     Hemorrhage     Atrial fibrillation with rapid ventricular response     Lactic acidosis     Hypocalcemia     Acute respiratory failure with hypoxia and hypercapnia     Bacteremia due to Gram-negative bacteria       Onset date: 3/11/18  Reason for evaluation: Concern for aspiration based on yesterday's(3/11/18) Bedside Swallow Study and hx dysphagia. Patient was referred for a Video Swallow Study on 3/13/2017 due to concerns of dysphagia.  He participated in an Upper GI exam on 2/24/17 and was found to have a large hiatal hernia.  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 is a 88 y.o. y.o.  on whom I have been asked to consult for evaluation and treatment of a possible stroke. Patient is in the ICU s/p percutaneous cholecystostomy drain placement. Onset " of symptoms was sudden, beginning today. Per RN current symptoms include right extremity weakness and change in mental status change. Symptoms are currently unchanged.  Prior stroke history: yes: embolic left MCA after CABAG. The patient is not on chronic anticoagulation. Associated symptoms: none.   Current Diet: NPO  Baseline Diet: Regular textures and thin liquids. He stated that he was supposed to be on nectar thick liquids, but had not been following that recommendation.       Patient presents as alert, cooperative and confused during this evaluation.   An  was not applicable    Patient was given puree, honey, nectar and thin.    Oral Phase:    Dentition/Oral hygiene: functional    Bolus prep and oral control was not impaired.     Anterior-Posterior transit was not impaired.    Premature spillage did not occur with all textures trialed.    Tongue base retraction was not impaired.    Oral stasis did not occur with all textures trialed.    Pharyngeal Phase:    Aspiration occurred with nectar thick and thin. Patient had throat clear and no response with aspiration. Aspiration was mostly from pyriform residue being squeezed up into airway, placing him at risk for aspiration for all textures d/t pyriform residue was seen with all consistencies.    Laryngeal penetration occurred with nectar thick and thin.     Due to poor cognitive status(waxing and waning), swallow strategies were not trialed under fluoroscopy.    Swallow response was timely with all textures trialed. Pourover past the epiglottis occurred with large bolus of thin. Patient has an audible swallow and uses multiple swallows independently.    Epiglottic movement was complete, in a posterior-inferior pattern  and past horizontal inconsistently across texture trials.    Pharyngeal stasis occurred with all textures trialed in vallecula and pyriforms. Patient independently uses multiple swallows which helps clear residue.    Pharyngeal  constriction was mildly impaired.    Hyolaryngeal elevation was reduced. Hyolaryngeal excursion was reduced.    Cricopharyngeal function was not impaired. Cervical esophageal function was not observed .    Assessment:    Patient demonstrated no oral and moderate pharyngeal dysphagia.    Patient is at moderate aspiration risk with all intake.    Rehab potential is guarded  based on medical status and behavior.    Patient is not a good candidate to complete therapeutic exercises and/or independently learn compensatory strategies to improve pharyngeal phase of swallow d/t waxing and waning cognition.       Recommendations:    Plan: recommend diet of NDD3 and thin liquids due to tube feeding not an option. Easier to cough thinner consistencies compared to thicker consistencies.    Strategies: Pt is not strong enough to feed self and needs to be monitored. Patient will be cued to cough after swallowing. Small bites/sips.    Speech therapy 4-6 times per week    Referrals: N/A    20 dysphagia minutes        Nhung Carlson MS, CCC-SLP

## 2021-06-16 NOTE — PROGRESS NOTES
PULMONARY / CRITICAL CARE PROGRESS NOTE    Date / Time of Admission:  3/9/2018  4:34 AM    Assessment:   1. Acute respiratory failure   2. Pulmonary edema  3. Septic shock due to acute cholecystitis with E coli bacteremia   4. Acute cholecystitis s/p percutaneous cholecystostomy tube placement  5.  3/10/2018  6. Acute kidney injury  7. Atrial fibrillation with RVR  8. Hx Ischemic cardiomyopathy s/p CABG  9. Moderate MR, mild AS  10. 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 DM  12. Chronic thrombocytopenia  13. Hx CVA  14. Hx Prostate cancer s/p prostatectomy  15. Hx thyroid cancer s/p thyroidectomy    Advance Directives:  DNR    Plan:   1. Titrate FiO2  2. Pulmonary toiletting  3. Heplock IV fluids  4. Titrate pressors, keep MAP > 65  5. Continue amiodarone drip  6. Continue stress dose steroids  7. Start gently diuresis   8. Blood culture (+) E coli, follow up sensitivity panel  9. Narrow Abx to zosyn  10. NPO  11. Speech therapy evaluation  12. PPI for GI prophylaxis  13. Glucose level monitoring  14. DVT prophylaxis SCDs    Please contact me if you have any questions.  Total critical care time, not including separately billable procedure time: 45 minutes    Sarthak Pritchard  Pulmonary / Critical Care  3/12/2018   1:59 PM    ICU DAILY CHECKLIST                           Can patient transfer out of MICU? no    FAST HUG:    Feeding:  Feeding: No.  Patient is receiving NPO    Mora:Yes  Analgesia/Sedation:Yes morphine  Thromboembolic prophylaxis: yes; Mode:  SCDs  HOB>30:  Yes  Stress Ulcer Protocol Active: yes; Mode: PPI  Glycemic Control: Any glucose > 180 no; Mode of Insulin Therapy: Sliding Scale Insulin    INTUBATED:  Can patient have daily waking:  not applicable  Can patient have spontaneous breathing trial:  not applicable    Restraints? no    PHYSICAL THERAPY AND MOBILITY:  Can patient have PT and mobility trial: yes  Activity: PT    Subjective:   HPI:  Nixon WHIPPLE  Eva Grier is a 88 y.o. 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   Patient was admitted with diagnosis of septic shock. Chest / Abdomen / pelvis CT scan with suspicion for cholecystitis. RUQ US showed acalculous cholecystitis. Positive blood Cx with Gram Negative rods.     Events overnight  - S/p percutaneous cholecystostomy tube placement  - Clogged drain, IR did gallbladder lavage  - Off NE since AM  - Increase O2 requirements, CXR showed pulmonary edema.     Allergies: Dairy aid [lactase]; Benzonatate; Blood-group specific substance; Ciprofloxacin; Dairy; Ephedrine sulfate; Erythromycin base; Hyoscyamine sulfate; Lactose; Methocarbamol; Terazosin; and Warfarin     MEDS:  Scheduled Meds:    furosemide  20 mg Intravenous DAILY     hydrocortisone sod succ  50 mg Intravenous Q8H     insulin aspart (NovoLOG) injection   Subcutaneous Q4H FIXED TIMES     pantoprazole (PROTONIX) IVPB 40 mg  40 mg Intravenous QAM AC    Or     omeprazole  20 mg Oral QAM AC    Or     omeprazole  20 mg Enteral Tube QAM AC     piperacillin-tazobactam  4.5 g Intravenous Q6H     senna-docusate  1 tablet Oral BID    Or     senna (SENOKOT) syrup  8.8 mg Enteral Tube BID     sodium chloride  10-30 mL Intravenous Q8H FIXED TIMES     sodium chloride  3 mL Intravenous Line Care     Continuous Infusions:    amiodarone 900 mg/500 ml standard 24 hour infusion 0.5 mg/min (03/11/18 2345)     norepinephrine IV infusion in NS 2 mcg/min (03/12/18 0900)     nacl 0.9%       vasopressin Stopped (03/11/18 0345)     PRN Meds:.acetaminophen, acetaminophen, bacitracin, benzocaine-menthol, bisacodyl, dextrose 50 % (D50W), fentaNYL pf, glucagon (human recombinant), HYDROmorphone, labetalol, magnesium hydroxide, naloxone **OR** naloxone,  "ondansetron **OR** ondansetron, polyvinyl alcohol, sodium chloride, sodium chloride, sodium chloride    Objective:   VITALS:  /58  Pulse (!) 118  Temp 98.7  F (37.1  C)  Resp 13  Ht 6' 3\" (1.905 m)  Wt 203 lb 11.2 oz (92.4 kg)  SpO2 99%  BMI 25.46 kg/m2  EXAM:   Gen: awake, alert, moderate respiratory distress  HEENT: pale conjunctiva, moist mucosa, Mallampati III/IV  Neck: no thyromegaly, masses or JVD  Lungs: ronchi both HT  CV: irregular, systolic murmur 2/6, no gallops appreciated  Abdomen: soft, RUQ drain, tenderness on deep palpation,BS decrease in frequency  Ext: no edema  Neuro: awake , alert, weakness right side     I&O:    Intake/Output Summary (Last 24 hours) at 03/12/18 1334  Last data filed at 03/12/18 1000   Gross per 24 hour   Intake          1674.55 ml   Output             1070 ml   Net           604.55 ml       Data Review:    Results from last 7 days  Lab Units 03/12/18  0402   LN-WHITE BLOOD CELL COUNT thou/uL 21.6*   LN-HEMOGLOBIN g/dL 7.0*   LN-HEMATOCRIT % 21.6*   LN-PLATELET COUNT thou/uL 34*       Results from last 7 days  Lab Units 03/12/18  0402   LN-SODIUM mmol/L 149*   LN-POTASSIUM mmol/L 4.0   LN-CHLORIDE mmol/L 115*   LN-CO2 mmol/L 27   LN-BLOOD UREA NITROGEN mg/dL 53*   LN-CREATININE mg/dL 1.25   LN-CALCIUM mg/dL 7.7*     XR CHEST 1 VIEW PORTABLE 3/12/2018 5:19 AM  INDICATION: septic shock, respiratory failure, pneumonia, volume overload.  COMPARISON: 03/11/2018   FINDINGS: Considerable increase in central congestion and diffuse interstitial alveolar infiltrate throughout both hemithoraces. Similar small effusions. Remainder stable.    Echocardiogram 3/9/2018    Left Ventricle: Normal left ventricular size.The estimated left ventricular ejection fraction is 60%. This represents a normal ejection fraction. Mild hypertrophy noted. E/e' > 15, suggesting elevated LV filling pressures.    Left Atrium: Left atrial volume is moderately increased.    Mitral Valve: There is " moderate mitral annular calcification. Moderate mitral regurgitation.    Mild aortic stenosis. Mild aortic regurgitation    Normal right ventricular size and systolic function.    No pulmonary hypertension present.    Estimated central venous pressure equal to 13 mmHg.  By:  Sarthak Pritchard, 3/12/2018, 1:34 PM    Primary Care Physician:  Riley Cuevas MD

## 2021-06-16 NOTE — PROGRESS NOTES
PULMONARY / CRITICAL CARE PROGRESS NOTE    Date / Time of Admission:  3/9/2018  4:34 AM    Assessment:   1. Acute respiratory failure   2. Resolving pulmonary edema  3. Sepsis due to acute cholecystitis with E coli bacteremia   4. Acute cholecystitis s/p percutaneous cholecystostomy tube placement 3/10/2018  5. Abdominal pain  F/u abdomen/pelvis CT scan showed interval cholecystectomy tube placement. There is a small amount of perihepatic hemorrhage and slight amount of hyperdense ascites consistent with mild intraperitoneal blood. No localized hematoma. Fecal impaction.   Bowel regimen.   6. Resolving acute kidney injury  7. Hypernatremia  8. Anemia s/p RBC transfusion  9. Chronic thrombocytopenia  10. Paroxysmal atrial fibrillation   11. Hx Ischemic cardiomyopathy s/p CABG  12. Moderate MR, mild AS  13. Dysphagia, diet was modify per speech therapy recommendations  14. 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 DM  16. Hx CVA  17. Hx Prostate cancer s/p prostatectomy  18. Hx thyroid cancer s/p thyroidectomy    Advance Directives:  DNR    Plan:   1. Titrate FiO2  2. Pulmonary toiletting  3. Increase rate of D5W  4. Continue diuresis   5. Metoprolol IV as needed, keep HR < 100  6. Continue to titrate down stress dose steroids (pt is on chronic prednisone 10 mg daily)   7. Continue zosyn  8. Monitor bile drain output.   9. Resume dysphagia diet when improvement of mental status  10. PPI for GI prophylaxis  11. Glucose level monitoring  12. DVT prophylaxis SCDs  13. PT, OT evaluation   14. Consideration for palliative care consult. Due to good report between Oncology and pt and pt's family, will discuss with oncology service the possibility of involving palliative care service to further discuss goals of care.     Please contact me if you have any questions.  Total critical care time, not including separately billable procedure time: 45 minutes    Sarthak Lucio  Macho  Pulmonary / Critical Care  3/14/2018   9:54 AM    ICU DAILY CHECKLIST                           Can patient transfer out of MICU? no    FAST HUG:    Feeding:  Feeding: No.  Patient is receiving NPO    Mora:Yes  Analgesia/Sedation: no.   Thromboembolic prophylaxis: yes; Mode:  SCDs  HOB>30:  Yes  Stress Ulcer Protocol Active: yes; Mode: PPI  Glycemic Control: Any glucose > 180 no; Mode of Insulin Therapy: Sliding Scale Insulin    INTUBATED:  Can patient have daily waking:  not applicable  Can patient have spontaneous breathing trial:  not applicable    Restraints? no    PHYSICAL THERAPY AND MOBILITY:  Can patient have PT and mobility trial: yes  Activity: PT    Subjective:   HPI:  Nixon Velasquez Jr. is a 88 y.o. 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   Patient was admitted with diagnosis of septic shock. Chest / Abdomen / pelvis CT scan with suspicion for cholecystitis. RUQ US showed acalculous cholecystitis. Positive blood Cx with Gram Negative rods. Later identified as E coli. S/p cholecystostomy tube placement.     Events overnight  - Off BiPAP  - Hemodynamically stable  - S/p 1 unit of RBC  - Intermittent colic abdominal pain, adequate bowel movements.   - Started on hydromorphone IV as needed  - Fluctuating mental status    Allergies: Dairy aid [lactase]; Benzonatate; Blood-group specific substance; Ciprofloxacin; Dairy; Ephedrine sulfate; Erythromycin base; Hyoscyamine sulfate; Lactose; Methocarbamol; Terazosin; and Warfarin     MEDS:  Scheduled Meds:    folic acid (FOLVITE) IVPB  1 mg Intravenous DAILY     furosemide  20 mg Intravenous DAILY     hydrocortisone sod succ  50 mg Intravenous Q12H     insulin aspart (NovoLOG) injection   Subcutaneous TID with meals      "insulin aspart (NovoLOG) injection   Subcutaneous QHS     metoprolol succinate  12.5 mg Oral DAILY     OLANZapine  7.5 mg Oral QHS     pantoprazole (PROTONIX) IVPB 40 mg  40 mg Intravenous QAM AC    Or     omeprazole  20 mg Oral QAM AC     piperacillin-tazobactam  4.5 g Intravenous Q6H     potassium chloride  20 mEq Intravenous Once     senna-docusate  1 tablet Oral BID     sodium chloride  10-30 mL Intravenous Q8H FIXED TIMES     sodium chloride  3 mL Intravenous Line Care     Continuous Infusions:    dextrose 5% 50 mL/hr (03/13/18 1128)     norepinephrine IV infusion in NS Stopped (03/12/18 1000)     vasopressin Stopped (03/11/18 0345)     PRN Meds:.acetaminophen, acetaminophen, aluminum-magnesium hydroxide-simethicone, bacitracin, benzocaine-menthol, bisacodyl, dextrose 50 % (D50W), fentaNYL pf, glucagon (human recombinant), HYDROmorphone, labetalol, magnesium hydroxide, naloxone **OR** naloxone, ondansetron **OR** ondansetron, polyvinyl alcohol, sodium chloride, sodium chloride, sodium chloride, sodium phosphates 133 mL    Objective:   VITALS:  /60  Pulse (!) 120  Temp 98.1  F (36.7  C) (Axillary)   Resp 20  Ht 6' 3\" (1.905 m)  Wt 205 lb 7 oz (93.2 kg)  SpO2 98%  BMI 25.68 kg/m2  EXAM:   Gen: sleepy , arousable  HEENT: pale conjunctiva, moist mucosa  Neck: no thyromegaly, masses or JVD  Lungs: ronchi both HT  CV: irregular, tachycardic, systolic murmur 2/6, no gallops appreciated  Abdomen: soft, RUQ drain, mild tenderness on deep palpation, BS decrease in frequency  Ext: no edema  Neuro:sleepy arousable,     I&O:      Intake/Output Summary (Last 24 hours) at 03/14/18 0954  Last data filed at 03/14/18 0739   Gross per 24 hour   Intake           769.67 ml   Output             1715 ml   Net          -945.33 ml       Data Review:    Results from last 7 days  Lab Units 03/14/18  0603   LN-WHITE BLOOD CELL COUNT thou/uL 14.3*   LN-HEMOGLOBIN g/dL 8.0*   LN-HEMATOCRIT % 26.3*   LN-PLATELET COUNT thou/uL " 30*       Results from last 7 days  Lab Units 03/14/18  0603   LN-SODIUM mmol/L 155*   LN-POTASSIUM mmol/L 3.8   LN-CHLORIDE mmol/L 120*   LN-CO2 mmol/L 27   LN-BLOOD UREA NITROGEN mg/dL 38*   LN-CREATININE mg/dL 0.94   LN-CALCIUM mg/dL 7.6*     XR CHEST 1 VIEW PORTABLE 3/12/2018 5:19 AM  INDICATION: septic shock, respiratory failure, pneumonia, volume overload.  COMPARISON: 03/11/2018   FINDINGS: Considerable increase in central congestion and diffuse interstitial alveolar infiltrate throughout both hemithoraces. Similar small effusions. Remainder stable.    CT ABDOMEN WO ORAL WO IV CONTRAST 3/12/2018 8:19 PM  INDICATION: Cholecystitis / cholangitis worsening abdominal pain , post cholecystostomy tube placement, dropping H/H  COMPARISON: 03/10/2018  FINDINGS:  LUNG BASES: Minimal change in the significant dependent infiltrate/atelectasis as well as small bilateral pleural effusions. Large hiatal hernia present, unchanged.  ABDOMEN: Interval placement of cholecystostomy tube with dense residual contrast within the gallbladder. There appears to be a small layer of high density hemorrhage about the liver capsule.] Minimal hyperdense ascites present suggesting slight   hemoperitoneum, there is no localized hematoma. Liver otherwise appears normal. Pancreas, spleen, adrenals and kidneys are negative. Mild ascites within both right and left gutters. Right inguinal hernia contains fluid with a slight increase in the hematocrit level. Mora catheter in place, previous prostatectomy. Sigmoid diverticulosis.  MUSCULOSKELETAL: Negative.  CONCLUSION:  1.  Interval cholecystectomy tube placement. There is a small amount of perihepatic hemorrhage and slight amount of hyperdense ascites consistent with mild intraperitoneal blood. No localized hematoma.    Echocardiogram 3/9/2018    Left Ventricle: Normal left ventricular size.The estimated left ventricular ejection fraction is 60%. This represents a normal ejection fraction. Mild  hypertrophy noted. E/e' > 15, suggesting elevated LV filling pressures.    Left Atrium: Left atrial volume is moderately increased.    Mitral Valve: There is moderate mitral annular calcification. Moderate mitral regurgitation.    Mild aortic stenosis. Mild aortic regurgitation    Normal right ventricular size and systolic function.    No pulmonary hypertension present.    Estimated central venous pressure equal to 13 mmHg.    XR SWALLOW STUDY W SPEECH 3/12/2018 1:30 PM  INDICATION: Dysphagia.  TECHNIQUE: Routine.  COMPARISON: None.  FINDINGS:   FLUOROSCOPIC TIME: 1.3 minutes  NUMBER OF IMAGES: 6  Swallow study with Speech Pathology using multiple barium thicknesses.   Puree and honey consistency both demonstrated significant piriform sinus stasis.  [place] consistency demonstrated stasis and kickback aspiration which resulted in only a minimal throat clear.  Thin liquid demonstrated delay in swallow reflex with direct, silent aspiration.    By:  Sarthak Pritchard, 3/14/2018, 9:54 PM    Primary Care Physician:  Riley Cuevas MD

## 2021-06-16 NOTE — PROGRESS NOTES
ICU NOTE    Patient reports colic type abdominal pain.   Hemodynamically stable, off pressors. Mild tachycardic.   Abdomen in soft, mild tenderness on deep palpation.   Follow up hemoglobin level was 6.7, early was 7.0.  Reviewing abdomen CT scan, in addition to cholecystitis, fecal impaction was noted.     PLAN    1. Follow up hemoglobin level  2. Bowel regimen oral   3. Fleet enema, manual disimpaction if needed.     Sarthak Pritchard  Pulmonary / Critical Care  3/12/2018   6:08 PM

## 2021-06-16 NOTE — CONSULTS
PALLIATIVE CARE CONSULT NOTE    PATIENT NAME: Nixon Velasquez Jr.  MRN #: [id number]  DATE OF ADMISSION: 3/9/2018   DATE OF ENCOUNTER: 3/15/2018   REQUESTING PHYSICIAN: Ange  PRIMARY CARE PROVIDER: Riley Cuevas MD  CONSULTANT: Denia Gordon CNP  VISIT #: [id number]   Code status:  DNR       Extended Emergency Contact Information  Primary Emergency Contact: Balbina Velasquez   [facility]  Home Phone: [phone]  Relation: Spouse  Secondary Emergency Contact: Cha Reyez   [facility]  Home Phone: [phone]  Relation: Child       Impressions and Recommendations:       1. Generalized weakness-secondary to complex medical condition, mostly in bed  - Reposition for comfort  -PT, OT    2.   Abdominal spasms secondary to cholelithiasis  improving  -Zanaflex  -Ordered Sublingal Roxicodone for  Pain as he became too somnolent with  IV Dilaudid  -Dr. Stroud to see for evaluation for cholecystectomy     3.  Insomnia  -Ordered Melatonin for sleep    4. Palliative Care  (see GOC discussion below)  DNR No Tube Feeding  Family wants to see if patient can improve his strength and nutritional status  Discussed with SW and MD, recommend  [place] evaluate     5. Bowel status if on scheduled Opioids:  Not ON scheduled Opioids  Senna S one cap po BID     6.   Altered Mental Status   improving  - Avoid benzos, anticholinergics  - Melatonin, trazodone PRN sleep  - Nursing orders to include:  - Allow restful sleep at night (cluster cares)  - Maintain normal day/night cycle by keeping light on during day and off at night    Discussed with Consulting Provider, Dr. Church   Palliative Care will continue to follow for symptom management and ongoing goals of care discussion.             Chief Complaint Sepsis     HPI      Summary:  Obtained from H&P,Chart review, and patient and/or family  Nixon Velasquez is an 88 y.o. old male with PMH of CAD, status post CABG and postoperative CVA, HTN, Long standing hx of  CLL recently started on Imbruvica, h/o thyroid cancer, s/p thyroidectomy and radioactive iodine treatment, now on levothyroxine, prostate cancer in 1992, s/p prostatectomy, multiple skin cancers who presented with E. coli bacteremia, sepsis and septic shock secondary to acalculous cholecystitis.  He had percutaneous cholecystostomy tube placed on 3/10 with subsequent bleeding and clotting.    Catheter was exchanged on 3/11, some of the clot aspirated  Admitted with sepsis, gram negatives.  Likely due to cholecystitis.  He has had a drain placed.    Recent Inpatient Hospitalizations:  11/30 and 1/19/08    Interim Hx Since Admission date:  Acute acalculous cholecystitis, s/p percutaneous cholecystostomy on 3/10 with subsequent bleeding   Moderate pharyngeal dysphagia and moderate aspiration risk with all intake.    Since patient does not want feeding tubes started on NDD 3 and thin liquids which is thought to be the least risk for aspiration.  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  This point would recommend continuing with all CLL  Therapies    Reason for Palliative Care referral:  Consideration for palliative care consult. Due to good report between Oncology and pt and pt's family, will discuss with oncology service the possibility of involving palliative care service to further discuss goals of care.     Pertinent Social Hx/Baseline status  Pt from home where his wife is currently on hospice with family staying there to help as needed. Pt is independent at baseline  Patient has 8 children, 8 sons and 2 daughters        Pertinent labs:   EF    60  %,    456  Qtc          Results from last 7 days  Lab Units 03/15/18  0404 03/14/18  1331 03/14/18  0603 03/13/18  0543   LN-WHITE BLOOD CELL COUNT thou/uL 11.7*  --  14.3* 14.8*   LN-HEMOGLOBIN g/dL 7.9* 8.0* 8.0* 7.4*   LN-HEMATOCRIT % 25.9*  --  26.3* 23.4*   LN-PLATELET COUNT thou/uL 37*  --  30* 23*           Results from last 7 days  Lab Units 03/15/18  0404 03/14/18  0603 03/14/18  0010 03/13/18  0543   LN-SODIUM mmol/L 153* 155*  --  153*   LN-POTASSIUM mmol/L 3.1* 3.8 3.1* 3.0*   LN-CHLORIDE mmol/L 116* 120*  --  118*   LN-CO2 mmol/L 30 27  --  27   LN-BLOOD UREA NITROGEN mg/dL 33* 38*  --  46*   LN-CREATININE mg/dL 0.85 0.94  --  0.96     GFR  Lab Results   Component Value Date/Time    ALBUMIN 2.5 (L) 03/15/2018 04:04 AM      Wt Readings from Last 3 Encounters:   03/15/18 204 lb (92.5 kg)   03/07/18 191 lb 7 oz (86.8 kg)   02/14/18 189 lb 3.2 oz (85.8 kg)         Current Symptoms:    When asked about patients  health conditions and symptoms,  Patient endorses intermittent abdominal pain and spasms.  Denies  Nausea, vomiting or shortness of breath and denies constipation or diarrhea.      Decision Making Capacity  Patient Does  have decision-making capacity; patient does  demonstrate understanding of relevant information about condition, the available test and treatment options, use reason to make a decision about these, and communicate choice about these. (MINISTERIO 306, 4, p. 420-4).       ADVANCED CARE PLANS/HEALTHCARE DIRECTIVES: 12/12/08  Documented Health care agent:  Son Riley      Patient and family  understanding of current medical condition:   Patient does have understanding of his GB, CLL and current condition  Son states he is aware that  Patient can have drain tube in for 6-8 weeks.   Son states that his abd spasms have improved but the are worried about his lethargy after IV dilaudid    GOALS OF CARE and Discussion of current status:   I introduced the role and philosophy of palliative care. We reviewed current conditions, medications and treatments currently receiving:    Eight of the 9 children here for FCC. Wife here too.   Family indicated that they are hopeful that patient can have Gallbladder surgery and resume CLL Rx. I explained that The surgeon needs to evaluate him to see if he would be a  candidate for  surgery or if he would need to regain his nutritional status and functional status.  THey are also very interested in [place] for Rehab            Goals/hopes: The HCD indicates that he wants all appropriate treatments recommended by   But would not want prolonged treatment if he had no brain activity.  .  We discussed POSLT form and has one at home the refridgerator.        Code status: Code status: DNR  No TF.        Spiritual History:  Are you a spiritual person? Yes  Holiness allan tradition [employer]  Do you have a Mandaeism or community providing you with spiritual support? No           Medical History  Active Ambulatory (Non-Hospital) Problems    Diagnosis     Squamous cell carcinoma of skin     Anemia     Benign essential HTN     Right ear pain     Infection of ear lobe, right     Advanced directives, counseling/discussion     Hypoxia     Restless Legs Syndrome     Corrosive Esophagitis     Hiatal Hernia     Cerebral atherosclerosis     Adenocarcinoma Of The Prostate Gland     Papillary Carcinoma Of The Thyroid Gland     Type 2 Diabetes Mellitus     Cervical Radiculopathy     Iron Deficiency     Past Medical History:   Diagnosis Date     Cataract      Foot fracture, right      History of transfusion      Hypertension      Inguinal hernia      Leukemia      PE (pulmonary embolism)      Pneumonia      Prostate cancer      Skin cancer      Thyroid cancer     Surgical History  He  has a past surgical history that includes pr remv prostate,retropub,rad,ltd nodes; pr reanomal coron art pa origin by graft; and Cataract extraction, bilateral.   Social History  Reviewed, and he  reports that he has never smoked. He has never used smokeless tobacco. He reports that he does not drink alcohol or use illicit drugs.   Allergies  Allergies   Allergen Reactions     Dairy Aid [Lactase]      Dairy products- severe shaking in legs     Benzonatate      Blood-Group Specific Substance      Warm autoantibodies  present. Expect up to 24 hour delay in blood for transfusion. Draw 2 lavender and 1 red tube for type and screen orders   Notify Blood bank as soon as possible if transfusions are needed.     Ciprofloxacin      Dairy      Severe shaking in legs.     Ephedrine Sulfate      Erythromycin Base      Hyoscyamine Sulfate      Lactose      Methocarbamol      Terazosin      Warfarin     Family History  Reviewed, and family history includes Alzheimer's disease in his father and sister; Cancer in his father; Diabetes in his brother; No Medical Problems in his daughter, daughter, son, son, son, son, son, and son; Parkinsonism in his mother, son, and son; Prostate cancer in his father.   Psychosocial Needs  Social History     Social History Narrative     Additional psychosocial needs reviewed per nursing assessment.     Medications & Allergies   Medications:     Current Facility-Administered Medications:      acetaminophen suppository 650 mg (TYLENOL), 650 mg, Rectal, Q4H PRN, Denia Chapman CNP, 650 mg at 03/14/18 0928     acetaminophen tablet 650 mg (TYLENOL), 650 mg, Oral, Q4H PRN, Ling Varela MD, 650 mg at 03/09/18 2215     aluminum-magnesium hydroxide-simethicone 200-200-20 mg/5 mL suspension 30 mL (MAALOX ADVANCED), 30 mL, Oral, Q4H PRN, Arlin Galindo MD     bacitracin ointment packet 1 packet, 1 packet, Topical, Once PRN, Kenny Ramirez MD     benzocaine-menthol lozenge 1 lozenge (CEPACOL), 1 lozenge, Oral, Q1H PRN, Ling Varela MD     bisacodyl suppository 10 mg (DULCOLAX), 10 mg, Rectal, Daily PRN, Ling Varela MD, 10 mg at 03/12/18 0135     dextrose 5%, 100 mL/hr, Intravenous, Continuous, Sarthak Pritchard MD, Last Rate: 100 mL/hr at 03/14/18 0958, 100 mL/hr at 03/14/18 0958     dextrose 50 % (D50W) syringe 20-50 mL, 20-50 mL, Intravenous, PRN, Ling Varela MD, 12.5 mL at 03/11/18 0839     fentaNYL pf injection 12.5-25 mcg (SUBLIMAZE), 12.5-25 mcg, Intravenous, Q3H PRN, Ruby Rolon  MD Joselo, 25 mcg at 03/13/18 1847     folic acid 1 mg in sodium chloride 0.9% 50 mL (FOLVITE), 1 mg, Intravenous, DAILY, Audrey Vergara MD, Last Rate: 100 mL/hr at 03/15/18 0830, 1 mg at 03/15/18 0830     furosemide injection 20 mg (LASIX), 20 mg, Intravenous, DAILY, Sarthak Pritchard MD, 20 mg at 03/15/18 0814     glucagon (human recombinant) injection 1 mg, 1 mg, Subcutaneous, PRN, Ling Varela MD     hydrocortisone sod succ (PF) 100 mg/2 mL injection 50 mg, 50 mg, Intravenous, Q12H, Sarthak Pritchard MD, 50 mg at 03/15/18 0815     HYDROmorphone injection 0.2-0.4 mg (DILAUDID), 0.2-0.4 mg, Intravenous, Q3H PRN, Ruby Josue MD, 0.3 mg at 03/15/18 0813     insulin aspart U-100 injection (NovoLOG), , Subcutaneous, TID with meals, Sarthak Pritchard MD, 6 Units at 03/15/18 0843     insulin aspart U-100 injection (NovoLOG), , Subcutaneous, QHS, Sarthak Pritchard MD, 2 Units at 03/14/18 2216     labetalol injection 5-10 mg (NORMODYNE,TRANDATE), 5-10 mg, Intravenous, Q4H PRN, Shawna Cassidy DO, 5 mg at 03/14/18 0927     magnesium hydroxide suspension 30 mL (MILK OF MAG), 30 mL, Oral, Daily PRN, Ling Varela MD     metoprolol succinate 24 hr tablet 12.5 mg (TOPROL-XL), 12.5 mg, Oral, DAILY, Sarthak Pritchard MD, 12.5 mg at 03/15/18 0828     naloxone injection 0.2-0.4 mg (NARCAN), 0.2-0.4 mg, Intravenous, PRN **OR** naloxone injection 0.2-0.4 mg (NARCAN), 0.2-0.4 mg, Intramuscular, PRN, Ruby Josue MD     norepinephrine 16 mg/250 ml in NS (0.064 mg/ml), 2-30 mcg/min, Intravenous, Continuous, Ruby Josue MD, Stopped at 03/12/18 1000     OLANZapine tablet 7.5 mg (zyPREXA), 7.5 mg, Oral, QHS, Arlin Galindo MD, 7.5 mg at 03/14/18 1841     pantoprazole 40 mg in NaCl 0.9 % 100 mL (MINI-BAG Plus) (PROTONIX), 40 mg, [facility]venous, [facility], Last Rate: 400 mL/hr at 03/14/18 0809, 40 mg at 03/14/18 0809 **OR** omeprazole capsule 20 mg (PriLOSEC),  20 mg, Oral, QAM AC, 20 mg at 03/15/18 0829 **OR** [DISCONTINUED] omeprazole suspension 20 mg (PriLOSEC), 20 mg, Enteral Tube, QAM AC, Ling Varela MD     ondansetron injection 4 mg (ZOFRAN), 4 mg, Intravenous, Q4H PRN, 4 mg at 03/10/18 1722 **OR** ondansetron tablet 8 mg (ZOFRAN), 8 mg, Oral, Q8H PRN, Ling Varela MD     piperacillin-tazobactam (ZOSYN) 4.5 grams, 4.5 g, Intravenous, Q6H, Ling Varela MD, 4.5 g at 03/15/18 0600     polyvinyl alcohol 1.4 % ophthalmic solution 1-2 drop (LIQUIFILM TEARS), 1-2 drop, Both Eyes, Q1H PRN, Ling Varela MD     potassium chloride 10 mEq in 50 mL, 10 mEq, Intravenous, Q1H, José Miguel June MD, Last Rate: 50 mL/hr at 03/15/18 0815, 10 mEq at 03/15/18 0815     potassium, sodium phosphates 280-160-250 mg packet 1 packet (PHOS-NAK), 1 packet, Oral, TID, José Miguel June MD, 1 packet at 03/15/18 0838     senna-docusate 8.6-50 mg tablet 1 tablet (PERICOLACE), 1 tablet, Oral, BID, 1 tablet at 03/15/18 0828 **OR** [DISCONTINUED] sennosides syrup 8.8 mg (for SENOKOT), 8.8 mg, Enteral Tube, BID, Ling Varela MD, 8.8 mg at 03/13/18 0817     sodium chloride flush 10-20 mL (NS), 10-20 mL, Intravenous, PRN, Kenny Ramirez MD     sodium chloride flush 10-30 mL (NS), 10-30 mL, Intravenous, PRN, Kenny Ramirez MD     sodium chloride flush 10-30 mL (NS), 10-30 mL, Intravenous, Q8H FIXED TIMES, Kenny Ramirez MD, 20 mL at 03/15/18 0601     sodium chloride flush 20 mL (NS), 20 mL, Intravenous, PRN, Kenny Ramirez MD     Insert peripheral IV, , , Once **AND** Saline lock IV, , , Once **AND** sodium chloride flush 3 mL (NS), 3 mL, Intravenous, Line Care, Kenny Ramirez MD, 10 mL at 03/15/18 0829     sodium phosphates 133 mL rectal enema 1 enema (for FLEET), 1 enema, Rectal, Daily PRN, Sarthak Pritchard MD, 1 enema at 03/13/18 1203     vasopressin standard infusion 20 units in D5W 100 mL, 2.4 Units/hr, Intravenous, Continuous, Ling Varela MD, Stopped at  03/11/18 0345    Allergies/intolerances:    Allergies   Allergen Reactions     Dairy Aid [Lactase]      Dairy products- severe shaking in legs     Benzonatate      Blood-Group Specific Substance      Warm autoantibodies present. Expect up to 24 hour delay in blood for transfusion. Draw 2 lavender and 1 red tube for type and screen orders   Notify Blood bank as soon as possible if transfusions are needed.     Ciprofloxacin      Dairy      Severe shaking in legs.     Ephedrine Sulfate      Erythromycin Base      Hyoscyamine Sulfate      Lactose      Methocarbamol      Terazosin      Warfarin             Review of Systems:  Obtained from chart review and patient and or family    DEMENTIA: N  DELIRIUM: N  COMA: N   FAST SCORE: NA    ESAS Score:   Mild 1-3  Mod 4-7  Max 8-10   Cannot participate in ROS  Depression:  Mild  Nausea:  N  Anorexia:  Mild   Drowsiness (feels sleepy): Mild  Fatigue (lack of energy):  Mild  Constipation:  Mild  Dyspnea:  N  Agitation:  N  Anxiety:  N  Pain:  3-9 /10 Spasms  Palliative Performance Status 40-50 %.        PPS: 40%- 1. Mainly in bed; 2. Unable to do most activity, extensive disease; 3. Mainly assistance; 4. Normal or reduced; 5. Full or drowsy +/- confusion    PPS: 50%- 1. Mainly sit/lie; 2. Unable to do any work, extensive disease; 3. Considerable assistance required; 4. Normal or reduced; 5. Full or confusion           Physical Exam:  Temp:  [98  F (36.7  C)-98.6  F (37  C)] 98  F (36.7  C)  Heart Rate:  [] 119  Resp:  [14-26] 23  BP: ()/(52-81) 113/59        General appearance: alert, cooperative, fatigued and NAD  Head: Normocephalic, without obvious abnormality, atraumatic  Eyes: negative findings: lids and lashes normal, conjunctivae and sclerae normal   Nose: no discharge  Throat: lips, mucosa, and tongue normal; teeth and gums normal  Neck: no JVD and symmetrical, trachea midline  Lungs: clear  to auscultation bilaterally, decreased in bases  Heart: HR tachy  regular rate and rhythm, S1, S2 normal, no murmur, click, rub or gallop  Abdomen: soft, dressing present, tube in place, positive BS.  Extremities: extremities normal, atraumatic, no cyanosis, no edema, No venous stasis or varicosities noted. Pulses: 2+ and symmetric  Neurologic: Grossly normal, Mental status: alertness: alert, orientation: time, person, place, city, affect: mood-congruent   Skin: Skin color pale, texture and turgor normal. No rashes or lesions      Other Pertinent Labs  Lab Results:      Results from last 7 days  Lab Units 03/15/18  0404 03/14/18  0603 03/13/18  0543 03/12/18  0402 03/11/18  0535   LN-CALCIUM mg/dL 7.6* 7.6* 7.5* 7.7* 7.5*   LN-ALBUMIN g/dL 2.5* 2.5*  --  2.5* 2.4*   LN-PROTEIN TOTAL g/dL 4.5*  --   --  4.6* 4.7*   LN-BILIRUBIN TOTAL mg/dL 1.0  --   --  1.3* 1.2*   LN-ALKALINE PHOSPHATASE U/L 40*  --   --  39* 39*   LN-ALT (SGPT) U/L <9  --   --  18 24   LN-AST (SGOT) U/L 7  --   --  9 15        Results from last 7 days  Lab Units 03/10/18  2031 03/09/18  0457   LN-INR  2.03* 1.26*   LN-PARTIAL THROMBOPLASTIN TIME seconds  --  28           Pertinent Radiology  Ct Chest Abdomen Pelvis Without Oral Without Iv Contrast    Result Date: 3/10/2018  CT CHEST, ABDOMEN, AND PELVIS 3/10/2018 1:01 PM      INDICATION: Sepsis septic shock with no source TECHNIQUE: CT chest, abdomen, and pelvis. Dose reduction techniques were used. IV CONTRAST: None COMPARISON: 02/13/2018 FINDINGS: CHEST: There are small bilateral pleural effusions with atelectasis in both lungs. Groundglass nodule in the right upper lobe as not well visualized due to some motion artifact but measures approximately 18 mm in greatest dimension on series 3, image 28,  likely unchanged from prior exam. Mediastinal lymph nodes are in the upper range of normal in size and not significantly changed, likely being reactive. Calcified mediastinal and right hilar lymph nodes are consistent with old granulomatous disease. A calcified  nodule is seen in the right lower lobe in a region of atelectasis. Calcified atherosclerotic plaque is seen in the thoracic aorta, great vessels, and coronary arteries. 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 Mildly prominent left axillary lymph nodes are unchanged. There is a right PICC line with tip in the low superior vena cava.  ABDOMEN: There appears to be gallbladder wall thickening and adjacent edema suggesting cholecystitis. The liver, spleen, adrenal glands, and kidneys appear normal on these noncontrast images. There is some fatty replacement of the pancreas. The abdominal  aorta is normal in caliber with very densely calcified atherosclerotic plaque. 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 Retroperitoneal lymph nodes appear slightly decreased in size compared to the prior exam. The largest, to the left of the aorta on series 2, image 120 measures 7 x 13 mm and previously measured 10 x 17 mm. Other lymph nodes are similarly decreased in size. The spleen is normal in size, decreased compared to the prior exam. PELVIS: Sigmoid diverticula without evidence of diverticulitis. There appears to be fecal impaction in the rectum. Surgical clips are seen in the pelvis bilaterally with changes of prostatectomy. Mora catheter is seen in the bladder. There is a right inguinal hernia containing fat and fluid. MUSCULOSKELETAL: Negative.     CONCLUSION: 1.  Exam is mildly compromised by some respiratory motion. Even given this, gallbladder appears abnormal with edematous wall and mild stranding in the adjacent fat. Further evaluation with ultrasound is recommended as the findings are suspicious for cholecystitis. 2.  Small bilateral pleural effusions with atelectasis in both lungs. 3.  Mediastinal, and retroperitoneal lymph nodes are in the upper range of normal in size and decreased compared to the prior exam. The spleen is also decreased in size compared to  the prior exam. 4.  Moderate to severe calcified atherosclerotic plaque in the visualized arteries with small right common iliac artery aneurysm. 5.  Colonic diverticulosis without evidence of diverticulitis. 6.  Right inguinal hernia containing fat and fluid. 7.  Changes of prostatectomy and pelvic lymph node dissection. 8.  Groundglass nodule in the right upper lobe is grossly stable though not well evaluated due to motion. NOTE: ABNORMAL REPORT THE DICTATION ABOVE DESCRIBES AN ABNORMALITY FOR WHICH FOLLOW-UP IS NEEDED.     Xr Chest 1 View Portable    Result Date: 3/12/2018  XR CHEST 1 VIEW PORTABLE 3/12/2018 5:19 AM INDICATION: septic shock, respiratory failure, pneumonia, volume overload. COMPARISON: 03/11/2018 FINDINGS: Considerable increase in central congestion and diffuse interstitial alveolar infiltrate throughout both hemithoraces. Similar small effusions. Remainder stable.    Xr Chest 1 View Portable    Result Date: 3/11/2018  XR CHEST 1 VIEW PORTABLE 3/11/2018 6:09 AM INDICATION: Respiratory failure follow-up infiltrates COMPARISON: 03/10/2018 FINDINGS: Significant improvement in congestion and pulmonary edema pattern on prior. Prominent infiltrates persist in the lower lobes and retrocardiac area. Similar effusions. Remainder stable.    Xr Chest 1 View Portable    Result Date: 3/10/2018  XR CHEST 1 VIEW PORTABLE 3/10/2018 5:28 AM INDICATION: dyspnea, hypoxia, pneumonia. COMPARISON: 03/09/2018 FINDINGS: Worsening central congestion and diffuse interstitial alveolar infiltrates along with increasing bilateral effusions consistent with progression of pulmonary edema or congestive heart failure. Superimposed pneumonia certainly possible. Remainder stable.    Xr Chest 1 View Portable    Result Date: 3/9/2018  XR CHEST 1 VIEW PORTABLE 3/9/2018 5:19 AM INDICATION: Fever COMPARISON: CT 02/13/2018 FINDINGS: Poor ventilatory effort with increasing compressive and congestive changes. Probable pulmonary edema or  congestive heart failure. Similar to perhaps slight increase in left pleural effusion. Stable peripheral consolidation right lower lobe. Groundglass opacity right upper lobe better seen on CT. Remainder stable. Follow-up recommended.    Ct Head Without Contrast    Result Date: 3/11/2018  [facility] CT HEAD WO CONTRAST 3/11/2018 5:14 PM INDICATION: Stroke mental status change TECHNIQUE: Without IV contrast. Dose reduction techniques were used. CONTRAST: None COMPARISON:  CT head 06/18/2013 FINDINGS: Unchanged encephalomalacia in the left posterior frontoparietal lobe. Interval progression of mild diffuse parenchymal volume loss with ex vacuo ventricular dilatation. No acute intracranial hemorrhage. No CT evidence of acute infarct. No calvarial fracture. The visualized orbits, paranasal sinuses and mastoid air cells are free of significant disease.     CONCLUSION: 1. No acute intracranial abnormality. Findings discussed with Dr. Mahoney on 03/11/2018 at 1712    Ir Biliary Catheter Change    Result Date: 3/11/2018  [facility] PROCEDURE: CHOLECYSTOSTOMY TUBE CHECK AND EXCHANGE. ATTENDING: Errol Britt MD. INDICATION: 88-year-old male with cholecystitis status post cholecystostomy placement yesterday. Initially there were bloody outputs and now there is difficulty flushing the catheter. MODERATE SEDATION: None. ANTIBIOTICS: None. ADDITIONAL MEDICATIONS: None. FLUOROSCOPIC TIME: 1.9 minutes. AIR KERMA:  109 mGy. CONTRAST: 10 mL Omnipaque into the gallbladder lumen. COMPLICATIONS: No immediate complications. PROCEDURE:  A  image was obtained. Contrast was instilled through the existing cholecystostomy and images were obtained. Over wire exchange was made for a new 10 Icelandic locking loop nephrostomy. The loop was formed within the gallbladder lumen and the cavity was irrigated. There was attached to gravity drainage. FINDINGS: The  image shows the existing cholecystostomy in appropriate  position. An injection of contrast reveals the cholecystostomy lies within the gallbladder lumen. There is a large filling defect encompassing nearly the entire gallbladder which likely represents hemorrhagic clot. Following exchange the new drainage catheter appears in appropriate position. Some of the clot has been successfully aspirated.     The cholecystostomy lies within the gallbladder lumen however nearly the entire lumen is filled with a blood clot. PLAN: The catheter was exchanged over a wire and the gallbladder was irrigated gently. Would recommend flushing the catheter twice daily with sterile saline hopes of improving drainage. ____________________________________________________________________ CPT codes for physician reference only: 05977     Xr Swallow Study W Speech    Result Date: 3/12/2018  XR SWALLOW STUDY W SPEECH 3/12/2018 1:30 PM INDICATION: Dysphagia. TECHNIQUE: Routine. COMPARISON: None. FINDINGS: FLUOROSCOPIC TIME: 1.3 minutes NUMBER OF IMAGES: 6 Swallow study with Speech Pathology using multiple barium thicknesses. Puree and honey consistency both demonstrated significant piriform sinus stasis. [place] consistency demonstrated stasis and kickback aspiration which resulted in only a minimal throat clear. Thin liquid demonstrated delay in swallow reflex with direct, silent aspiration.     Xr Chest 1 View For Picc Placement Portable    Result Date: 3/9/2018  XR CHEST 1 VIEW FOR PICC LINE PLACEMENT PORTABLE 3/9/2018 7:35 AM INDICATION: verify catheter placement COMPARISON: 03/09/2018 FINDINGS: There is been interval placement of a right-sided PICC with the distal tip within the mid superior vena cava. Multiple median sternotomy wires are seen. Low lung volumes are again noted with diffuse areas of airspace consolidation, unchanged from the prior examination.. Small pleural effusions are probable bilaterally. There is no pneumothorax.    Ct Abdomen Without Oral Without  Iv Contrast    Result  Date: 3/13/2018  CT ABDOMEN WO ORAL WO IV CONTRAST 3/12/2018 8:19 PM     INDICATION: Cholecystitis / cholangitis worsening abdominal pain , post cholecystostomy tube placement, dropping H/H TECHNIQUE: CT abdomen. Multiplanar reformation images (MPR). Dose reduction techniques were used. IV CONTRAST: None COMPARISON: 03/10/2018 FINDINGS: LUNG BASES: Minimal change in the significant dependent infiltrate/atelectasis as well as small bilateral pleural effusions. Large hiatal hernia present, unchanged. ABDOMEN: Interval placement of cholecystostomy tube with dense residual contrast within the gallbladder. There appears to be a small layer of high density hemorrhage about the liver capsule.] Minimal hyperdense ascites present suggesting slight hemoperitoneum, there is no localized hematoma. Liver otherwise appears normal. Pancreas, spleen, adrenals and kidneys are negative. Mild ascites within both right and left gutters. Right inguinal hernia contains fluid with a slight increase in the hematocrit level. Mora catheter in place, previous prostatectomy. Sigmoid diverticulosis. MUSCULOSKELETAL: Negative.     CONCLUSION: 1.  Interval cholecystectomy tube placement. There is a small amount of perihepatic hemorrhage and slight amount of hyperdense ascites consistent with mild intraperitoneal blood. No localized hematoma.    Ir Cholecystostomy    Result Date: 3/10/2018  [facility] PROCEDURE: PERCUTANEOUS CHOLECYSTOSTOMY PLACEMENT. ATTENDING: Errol Britt MD. INDICATION: 88-year-old male with cholecystitis and impending biliary sepsis. Urgent cholecystostomy placement is requested. The patient has low platelets secondary to chemotherapy and has platelets infusing. CONSENT: The risks, benefits and alternatives of cholecystostomy placement were discussed with the patient, his family and his power of  [place] in detail. All questions were answered. Informed consent was given to proceed with the procedure. MODERATE  SEDATION: None. ANTIBIOTICS: None. ADDITIONAL MEDICATIONS: Fentanyl 50 mcg IV. FLUOROSCOPIC TIME: 0.8 minutes. AIR KERMA:  38 mGy. CONTRAST: 5 mL Omnipaque into the gallbladder lumen. COMPLICATIONS: No immediate complications. PROCEDURE:  Using real-time sonographic guidance an AccuStick set was utilized to gain percutaneous access into the gallbladder lumen. Endoluminal positioning was confirmed with an injection of contrast. Over wire exchange was then made for a 10 [place] locking loop cholecystostomy. The loop was formed within the gallbladder lumen and it was attached to gravity drainage. FINDINGS: Ultrasound demonstrates a mild to moderately distended gallbladder with an abnormally thickened wall. The cholecystogram shows access into the gallbladder lumen. Filling defects are seen near the gallbladder fundus. The proximal cystic duct appears to be patent. The completion image shows the cholecystostomy in the gallbladder lumen.     Successful percutaneous cholecystostomy placement, as detailed above. A specimen of tar-like bile was sent for diagnostic analysis. ____________________________________________________________________ CPT codes for physician reference only: 37116     Us Abdomen Limited    Result Date: 3/10/2018  US ABDOMEN LIMITED 3/10/2018 3:03 PM INDICATION: acute choly COMPARISON: CTA chest abdomen pelvis dated 03/10/2018. FINDINGS: GALLBLADDER: There is thickening of the gallbladder wall with fluid within the gallbladder wall suggesting edematous gallbladder. No intraluminal stones are identified the wall is thickened to 1.5 cm BILE DUCTS: No bile duct dilation. The common bile duct measures 7 mm. LIVER: Normal where seen. RIGHT KIDNEY: 10.4 x 4.9 x 5.1 cm without hydronephrosis or mass. PANCREAS: Obscured by bowel gas and not well visualized. No ascites in the right upper quadrant.     CONCLUSION: 1.  Abnormal appearance of the gallbladder with marked thickening of the gallbladder wall and fluid  within the gallbladder are consistent with an edematous gallbladder no gallstones or biliary dilatation are seen. Findings are suspicious for possible acalculous cholecystitis. NOTE: ABNORMAL REPORT THE DICTATION ABOVE DESCRIBES AN ABNORMALITY FOR WHICH FOLLOW-UP IS NEEDED.     Ct Chest Abdomen Pelvis Without Oral With Iv Contrast    Result Date: 2/14/2018  CT CHEST, ABDOMEN, AND PELVIS WO ORAL W IV CONTRAST 2/13/2018 2:07 PM      INDICATION: CLL, dyspnea, abdominal pain. TECHNIQUE: CT chest, abdomen, and pelvis. Dose reduction techniques were used. IV CONTRAST: Iohexol (Omni) 100 mL. COMPARISON: CT chest 5/30/2016, CT abdomen and pelvis 3/3/2017. FINDINGS: CHEST: 1.5 cm ground-glass nodule in the right upper lobe on image 33, not appreciably changed. Calcified granuloma in the right lower lobe. Small bilateral pleural effusions are new, with compressive atelectasis in the lower lobes. Some presumed scarring in the lateral right lung base is unchanged. A few calcified pleural plaques present. Prior CABG. There are several mildly prominent bilateral axillary and mediastinal lymph nodes, all of which are under 1 cm in short axis. Large hiatal hernia.  ABDOMEN: Mild splenomegaly. The spleen measures 14 cm in craniocaudal length compared to previous 15.6 cm. A small benign cyst and hemangioma in the liver. Otherwise normal liver, pancreas, and adrenal glands. Small renal cysts. Mild nonspecific gallbladder wall edema. The gallbladder is decompressed. Mildly enlarged retroperitoneal, mesenteric, and chris hepatic lymph nodes. The lymph nodes have significantly decreased in size when compared to 3/30/2017. For example, a left periaortic lymph node on image 286 measures 13 mm in short axis, previously 22 mm. PELVIS: Prostatectomy. Sigmoid colonic diverticulosis. Right inguinal hernia containing fat and fluid. There is mild external iliac and inguinal lymphadenopathy. MUSCULOSKELETAL: Negative.     CONCLUSION: 1.  Mild  lymphadenopathy in the chest, abdomen, and pelvis as well as splenomegaly have all improved when compared to 3/3/2017. 2.  New small bilateral pleural effusions. 3.  Stable 1.5 cm ground-glass nodule in the right upper lobe compared to 5/30/2016.            Denia Gordon  MSN, NP-C, APRN, CPM, CRRN  Discussed with MD KAYLYNN and RN    Office #:[phone] (Admin will page me if needed and I will return call)      Billing Information:      E/M level: 110 minutes,>50% of time during encounter was spent with patient and family on counseling, and/or care coordination, as documented above.    215Prolonged Service time 58270 (does not include E/M time or ACP time) beyond 110 minutes was 60  minutes from   130   to   215 which included discussion about pain/symptom management,  assessing lab and/or radiology results, discussing plan of care with other providers  met with KAYLYNN, RN,  Hospice RN, family.     [facility] in patients room      Advance Care planning time  35534 (16 min-44 min).    Time for ACP 20 minutes in addition to above E/M   .  Discussed code status, life prolonging interventions and healthcare directive

## 2021-06-16 NOTE — PROGRESS NOTES
Progress Note    Assessment/Plan  Patient comfortable and was sleeping today.    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    Mixed hyperlipidemia    Hypothyroidism    Coronary Artery Disease    Other autoimmune hemolytic anemias    Thrombocytopenia    Septic shock    Neutropenia associated with infection    Gram-negative bacteremia    Metabolic acidosis with respiratory acidosis    Hemorrhage    Atrial fibrillation with rapid ventricular response    Lactic acidosis    Hypocalcemia    Acute respiratory failure with hypoxia and hypercapnia    Bacteremia due to Gram-negative bacteria    Acute pulmonary edema    Abdominal pain, unspecified abdominal location    Fecal impaction    Cholecystitis    Encounter for family conference with patient present      Subjective  Above  Objective    Vital signs in last 24 hours  Temp:  [97.5  F (36.4  C)-97.9  F (36.6  C)] 97.8  F (36.6  C)  Heart Rate:  [53-81] 57  Resp:  [16-22] 16  BP: (113-147)/(58-68) 133/68  Weight:   206 lb 12.8 oz (93.8 kg)    Intake/Output last 3 shifts  I/O last 3 completed shifts:  In: 600 [P.O.:600]  Out: 45 [Emesis/NG output:45]  Intake/Output this shift:  I/O this shift:  In: 250 [P.O.:250]  Out: -       Physical Exam  No change good bile drainage    Pertinent Labs   Lab Results   Component Value Date    WBC 9.3 03/22/2018    HGB 9.9 (L) 03/22/2018    HCT 32.9 (L) 03/22/2018     (H) 03/22/2018     (L) 03/22/2018             Pertinent Radiology     No results found.        Ramy Mcguire

## 2021-06-16 NOTE — PROGRESS NOTES
Cardiology Progress Note    Assessment:  1.  Atrial fibrillation with rapid ventricular response.  Patient on amiodarone and transitioned to oral amiodarone.  Anticoagulation decisions pending plans as it relates to cholecystitis and possible surgical intervention.  Patient is status post cholecystostomy tube.  Patient does have a tendency toward sinus bradycardia.  Metoprolol has been held.  Rates in the 40s and 50s.  He currently is on amiodarone 200 mg p.o. twice daily and will need to monitor.  Will discontinue metoprolol.    2.  Dyspnea, volume overload.  Plans to discontinue IV Lasix after this morning's dose as outlined in Dr. Swan's note.  Will need to monitor I's and O's and weights closely.  Urine output yesterday of 2 L but +2.4 L this admission.  Echocardiogram with normal left ventricular systolic function.  BUN and creatinine are stable.  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  Continue to monitor rhythm closely on telemetry.  2.  Continue to monitor volume status  3.  Await plans as it relates to gallbladder.    Subjective:   Nixon Velasquez Jr. is seen in follow-up today.  First visit for this examiner.  Chart records are reviewed.  Cardiology following secondary to atrial fibrillation with increased ventricular response on amiodarone.  Patient switched oral  amiodarone as outlined in Dr. Oseguera's notes.    Reports no specific cardiac complaints.  He denies chest pain, reports that breathing is comfortable.  He is reported some cramping of his lower extremities.    Objective:   Vital signs in last 24 hours:  Temp:  [97.6  F (36.4  C)-97.8  F (36.6  C)] 97.8  F (36.6  C)  Heart Rate:  [48-64] 53  Resp:  [16-20] 20  BP: (100-140)/(47-72) 115/60  Weight:   210 lb 14.4 oz (95.7 kg)     Physical Exam:   Neck: No JVD appreciated.   Lungs: Diminished breath sounds at the bases.   COR: RRR, bradycardic.  2/6 systolic murmur   Abd: nondistended, BS present   Extrem: No edema    Current Facility-Administered Medications   Medication Dose Route Frequency Provider Last Rate Last Dose     acetaminophen suppository 650 mg (TYLENOL)  650 mg Rectal Q4H PRN Denia Chapman, CNP   650 mg at 03/14/18 0928     acetaminophen tablet 650 mg (TYLENOL)  650 mg Oral Q4H PRN Ling Varela MD   650 mg at 03/09/18 2215     aluminum-magnesium hydroxide-simethicone 200-200-20 mg/5 mL suspension 30 mL (MAALOX ADVANCED)  30 mL Oral Q4H PRN Arlin Galindo MD         amiodarone tablet 200 mg (PACERONE)  200 mg Oral BID Jerry Oseguera MD   200 mg at 03/19/18 0854     bacitracin ointment packet 1 packet  1 packet Topical Once PRN Kenny Ramirez MD         benzocaine-menthol lozenge 1 lozenge (CEPACOL)  1 lozenge Oral Q1H PRN Ling Varela MD         bisacodyl suppository 10 mg (DULCOLAX)  10 mg Rectal Daily PRN Ling Varela MD   10 mg at 03/12/18 0135     dextrose 50 % (D50W) syringe 20-50 mL  20-50 mL Intravenous PRN Ling Varela MD   12.5 mL at 03/11/18 0839     folic acid tablet 1 mg (FOLVITE)  1 mg Oral QHS Davidlópez Pritchard MD   1 mg at 03/18/18 2129     glucagon (human recombinant) injection 1 mg  1 mg Subcutaneous PRN Ling Varela MD         HYDROmorphone injection 0.2-0.4 mg (DILAUDID)  0.2-0.4 mg Intravenous Q3H PRN Ruby Josue MD   0.3 mg at 03/15/18 0857      insulin aspart U-100 injection (NovoLOG)   Subcutaneous TID with meals Sarthak Pritchard MD   6 Units at 03/18/18 1757     insulin aspart U-100 injection (NovoLOG)   Subcutaneous QHS Sarthak Pritchard MD   2 Units at 03/18/18 2128     labetalol injection 5-10 mg (NORMODYNE,TRANDATE)  5-10 mg Intravenous Q4H PRN Shawna Cassidy DO   5 mg at 03/14/18 0927     levothyroxine tablet 150 mcg (SYNTHROID, LEVOTHROID)  150 mcg Oral Daily 0600 Bo Franks MD   150 mcg at 03/19/18 0551     magnesium hydroxide suspension 30 mL (MILK OF MAG)  30 mL Oral Daily PRN Ling Varela MD         melatonin tablet 3 mg  3 mg Oral Bedtime PRN Denia Gordon, CNP   3 mg at 03/15/18 1959     metoprolol succinate 24 hr tablet 25 mg (TOPROL-XL)  25 mg Oral DAILY Valeria Swan MD   25 mg at 03/16/18 0919     naloxone injection 0.2-0.4 mg (NARCAN)  0.2-0.4 mg Intravenous PRN Ruby Josue MD        Or     naloxone injection 0.2-0.4 mg (NARCAN)  0.2-0.4 mg Intramuscular PRN Ruby Josue MD         OLANZapine tablet 7.5 mg (zyPREXA)  7.5 mg Oral QHS Arlin Galindo MD   7.5 mg at 03/18/18 2129     omeprazole capsule 20 mg (PriLOSEC)  20 mg Oral QAM AC Ling Varela MD   20 mg at 03/19/18 0854     ondansetron injection 4 mg (ZOFRAN)  4 mg Intravenous Q4H PRN Ling Varela MD   4 mg at 03/10/18 1722    Or     ondansetron tablet 8 mg (ZOFRAN)  8 mg Oral Q8H PRN Ling Varela MD         oxyCODONE 5 mg/0.25 mL concentrated solution 5 mg (ROXICODONE)  5 mg Sublingual Q3H PRN Denia Gordon, CNP   5 mg at 03/19/18 0429     oxyCODONE 5 mg/0.25 mL concentrated solution 5 mg (ROXICODONE)  5 mg Sublingual QID Deniadalton Gordon, FLEX   5 mg at 03/19/18 0854     piperacillin-tazobactam (ZOSYN) 4.5 grams  4.5 g Intravenous Q6H Ling Varela MD   4.5 g at 03/19/18 0855     polyvinyl alcohol 1.4 % ophthalmic solution 1-2 drop (LIQUIFILM TEARS)  1-2 drop Both Eyes Q1H PRN Ling Varela MD          potassium chloride 10 mEq in 50 mL  10 mEq Intravenous Q1H Shawna Ange, DO 50 mL/hr at 18 1014 10 mEq at 18 1014     potassium, sodium phosphates 280-160-250 mg packet 1 packet (PHOS-NAK)  1 packet Oral TID Shawna Ange, DO   1 packet at 18 1015     [START ON 3/20/2018] predniSONE tablet 20 mg (DELTASONE)  20 mg Oral Daily with brkfst Valeria Swan MD         senna-docusate 8.6-50 mg tablet 1 tablet (PERICOLACE)  1 tablet Oral BID Ling Varela MD   1 tablet at 18 0854     sodium chloride flush 10-20 mL (NS)  10-20 mL Intravenous PRN Kenny Ramirez MD         sodium chloride flush 10-30 mL (NS)  10-30 mL Intravenous PRN Kenny Ramirez MD         sodium chloride flush 10-30 mL (NS)  10-30 mL Intravenous Q8H FIXED TIMES Kenny Ramirez MD   30 mL at 18 0552     sodium chloride flush 20 mL (NS)  20 mL Intravenous PRN Kenny Ramirez MD         sodium chloride flush 3 mL (NS)  3 mL Intravenous Line Care Kenny Ramirez MD   3 mL at 18 0921     sodium phosphates 133 mL rectal enema 1 enema (for FLEET)  1 enema Rectal Daily PRN Sarthak Pritchard MD   1 enema at 18 1203     tiZANidine tablet 2 mg (ZANAFLEX)  2 mg Oral TID Denia Gordon, FLEX   2 mg at 18 0854       Cardiographics:    EC-MAR-2018 05:02:42 HE JOES/JOHNS/SALENA/NOAM  Sinus tachycardia with Premature atrial complexes  ST & T wave abnormality, consider inferior ischemia  ST & T wave abnormality, consider anterolateral ischemia  Abnormal ECG  When compared with ECG of 2017  Premature atrial complexes , new  St-t changes new  Confirmed by ALEXANDR JONES, LES LOC:TOMASZ [id number]) on 3/9/2018 2:21:50 PM  25mm/s 10mm/mV 150Hz 8.0 SP2 12SL 239 MANDO: 1  Referred by: Confirmed By: LES LOC:TOMASZ STRANGE MD  Echocardiogram:   Patient Information      Patient Name MRN Sex              Age     EvaNixon . [id number] Male 1929 (88 y.o.)       Indications      Congestive heart failure        Summary        Left Ventricle: Normal left ventricular size.The estimated left ventricular ejection fraction is 60%. This represents a normal ejection fraction. Mild hypertrophy noted. E/e' > 15, suggesting elevated LV filling pressures.    Left Atrium: Left atrial volume is moderately increased.    Mitral Valve: There is moderate mitral annular calcification. Moderate mitral regurgitation.    Mild aortic stenosis. Mild aortic regurgitation    Normal right ventricular size and systolic function.    No pulmonary hypertension present.    Estimated central venous pressure equal to 13 mmHg.             Telemetry: Sinus bradycardia.  No recurrence of atrial fibrillation    Imaging:   Chest X-Ray:   Study Result      XR CHEST 1 VIEW PORTABLE  3/18/2018 10:36 AM     INDICATION: dyspnea, basilar crackles  COMPARISON: 03/12/2018     FINDINGS: Right-sided PICC line. Sternotomy. There are opacities in both lung bases that likely represent a combination of pleural fluid and pulmonary infiltrates. Mild pulmonary vascular congestion. Hiatal hernia.         Lab Results:   Sodium   Date Value Ref Range Status   03/19/2018 142 136 - 145 mmol/L Final   03/18/2018 142 136 - 145 mmol/L Final   03/17/2018 143 136 - 145 mmol/L Final     Potassium   Date Value Ref Range Status   03/19/2018 3.6 3.5 - 5.0 mmol/L Final   03/18/2018 3.8 3.5 - 5.0 mmol/L Final   03/17/2018 3.6 3.5 - 5.0 mmol/L Final     Chloride   Date Value Ref Range Status   03/19/2018 105 98 - 107 mmol/L Final   03/18/2018 107 98 - 107 mmol/L Final   03/17/2018 107 98 - 107 mmol/L Final     CO2   Date Value Ref Range Status   03/19/2018 31 22 - 31 mmol/L Final   03/18/2018 30 22 - 31 mmol/L Final   03/17/2018 28 22 - 31 mmol/L Final     BUN   Date Value Ref Range Status   03/19/2018 23 8 - 28 mg/dL Final   03/18/2018 20 8 - 28 mg/dL Final   03/17/2018 23 8 - 28 mg/dL Final     Creatinine   Date Value Ref Range Status   03/19/2018 0.76 0.70 - 1.30 mg/dL Final   03/18/2018  0.77 0.70 - 1.30 mg/dL Final   03/17/2018 0.81 0.70 - 1.30 mg/dL Final     Calcium   Date Value Ref Range Status   03/19/2018 7.1 (L) 8.5 - 10.5 mg/dL Final   03/18/2018 7.0 (L) 8.5 - 10.5 mg/dL Final   03/17/2018 7.1 (L) 8.5 - 10.5 mg/dL Final     WBC   Date Value Ref Range Status   03/19/2018 11.4 (H) 4.0 - 11.0 thou/uL Final   03/18/2018 12.5 (H) 4.0 - 11.0 thou/uL Final   03/17/2018 19.2 (H) 4.0 - 11.0 thou/uL Final     Hemoglobin   Date Value Ref Range Status   03/19/2018 8.8 (L) 14.0 - 18.0 g/dL Final   03/18/2018 8.7 (L) 14.0 - 18.0 g/dL Final   03/17/2018 8.6 (L) 14.0 - 18.0 g/dL Final     Hematocrit   Date Value Ref Range Status   03/19/2018 28.8 (L) 40.0 - 54.0 % Final   03/18/2018 27.9 (L) 40.0 - 54.0 % Final   03/17/2018 25.6 (L) 40.0 - 54.0 % Final     MCV   Date Value Ref Range Status   03/19/2018 103 (H) 80 - 100 fL Final   03/18/2018 103 (H) 80 - 100 fL Final   03/17/2018 106 (H) 80 - 100 fL Final     Platelets   Date Value Ref Range Status   03/19/2018 65 (L) 140 - 440 thou/uL Final   03/18/2018 52 (L) 140 - 440 thou/uL Final   03/17/2018 59 (L) 140 - 440 thou/uL Final     CK-MB   Date Value Ref Range Status   03/26/2013 <1 <8 ng/mL Final     Troponin I   Date Value Ref Range Status   03/09/2018 0.04 0.00 - 0.29 ng/mL Final   06/18/2013 <0.01 <0.30 ng/mL Final   03/26/2013 <0.01 <0.30 ng/mL Final     BNP   Date Value Ref Range Status   05/30/2016 96 (H) 0 - 93 pg/mL Final     INR   Date Value Ref Range Status   03/16/2018 1.21 (H) 0.90 - 1.10 Final   03/10/2018 2.03 (H) 0.90 - 1.10 Final   03/09/2018 1.26 (H) 0.90 - 1.10 Final

## 2021-06-16 NOTE — PROGRESS NOTES
Referral Specialist Note: Thank you Lacie for contacting me on this patient. Unfortunately Mr. Velasquez does not meet the medical complexity for a 20 day stay at [facility]. We will not continue to follow. Please re-refer if situation changes and we can be of help.    Albaro Dumas RT  Referral Specialist   [phone]

## 2021-06-16 NOTE — PROGRESS NOTES
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    Mixed hyperlipidemia    Hypothyroidism    Coronary Artery Disease    Other autoimmune hemolytic anemias    Thrombocytopenia    Septic shock    Neutropenia associated with infection    Gram-negative bacteremia    Metabolic acidosis with respiratory acidosis    Hemorrhage    Atrial fibrillation with rapid ventricular response    Lactic acidosis    Hypocalcemia    Acute respiratory failure with hypoxia and hypercapnia    Bacteremia due to Gram-negative bacteria    Acute pulmonary edema    Abdominal pain, unspecified abdominal location    Fecal impaction    Cholecystitis    Encounter for family conference with patient present      Subjective  As above  Objective    Vital signs in last 24 hours  Temp:  [98.1  F (36.7  C)-98.5  F (36.9  C)] 98.3  F (36.8  C)  Heart Rate:  [50-64] 58  Resp:  [18-20] 18  BP: (101-137)/(50-71) 122/71  Weight:   (!) 227 lb (103 kg)    Intake/Output last 3 shifts  I/O last 3 completed shifts:  In: 170 [P.O.:170]  Out: 1975 [Urine:1825; Emesis/NG output:150]  Intake/Output this shift:         Physical Exam  No change    Pertinent Labs   Lab Results   Component Value Date    WBC 11.5 (H) 03/20/2018    HGB 9.8 (L) 03/20/2018    HCT 32.0 (L) 03/20/2018     (H) 03/20/2018    PLT 80 (L) 03/20/2018             Pertinent Radiology     No results found.        Ramy Mcguire

## 2021-06-16 NOTE — PROGRESS NOTES
Pulmonary/Critical Care Medicine updates  Patient underwent percutaneous drain placement with IR for suspected acalculous cholecystitis on afternoon of 3/10.  Shortly after, developed pronounced hypotension requiring escalation of products.  In addition, having serosanguinous output from drain.  CBC obtained, plts 59K and additional 1 unit platelet ordered.    Since that time, patient has also been experiencing atrial fibrillation with RVR.      Currently, believe management of hypotension related to combination of septic shock and hemorrhagic shock.    Updates as follows:    Encourage non-invasive ventilation compliance.    Requested staff to restart vasopressin therapy.     Amiodarone 150 mg IV bolus followed by infusion x 24 hr.     1 unit pRBC for hemoglobin 7.2.  Suspect ongoing drop.    Labs requested: BMP, iCa, INR, lactic acid, VBG.    Labs reviewed.     1 unit FFP for INR > 2.    Mixed metabolic and respiratory acidosis.  Metabolic acidosis combination of lactic acidosis, renal failure, and hyperchloremia.    Calcium gluconate 2 gm IV x 1.     Sodium bicarbonate 50 mEq IV x 1 followed by infusion at 75 mL/hour.      Dilaudid IV PRN available for pain control.     Anticipate initiation of insulin infusion for glucose management.    Repeat CBC, iCa at midnight.    Family updated regarding above care plan and appreciated the updates.  Will need to monitor vasopressors and volume status closely.  If vasopressor needs decrease, will trial additional diuretic therapy.    Critical care time, in addition to that documented by Dr. Cassidy: 45 minutes.     Ruby Josue MD, MPH  Pulmonary & Critical Care Medicine  Pager: [phone]  3/10/2018  10:02 PM

## 2021-06-16 NOTE — PROGRESS NOTES
"[facility] - Progress Note  3/20/2018    CHART CHECK    O:  /64 (Patient Position: Semi-quach)  Pulse (!) 54  Temp 98.3  F (36.8  C) (Oral)   Resp 20  Ht 6' 3\" (1.905 m)  Wt (!) 227 lb (103 kg)  SpO2 97%  BMI 28.37 kg/m2    Imaging:  US abd limited 3/17/18:  FINDINGS:  GALLBLADDER: Cholecystostomy tube in place. Gallbladder poorly visualized.  BILE DUCTS: No bile duct dilation. The common bile duct measures 5 mm.  LIVER: Normal hepatic echogenicity. No perihepatic fluid collections.  RIGHT KIDNEY: No hydronephrosis.  PANCREAS: Not imaged in detail on this limited study. No incidental abnormalities.     No ascites in the right upper quadrant.     IMPRESSION:   CONCLUSION:  1.  Cholecystostomy tube within the gallbladder. Gallbladder poorly visualized. Right upper quadrant ultrasound otherwise negative. No focal fluid collections.    Cholangiogram/cira tube placement 3/16/18:  FINDINGS:   CHOLECYSTOGRAM: The cholecystostomy tube is in satisfactory position. There is no extravasation or leakage of contrast. Large filling defect within the gallbladder, appearance consistent with clot. Late filling of the cystic duct.     IMPRESSION:   Large filling defect within the gallbladder, appearance consistent with clot    Labs:  Lab Results   Component Value Date    WBC 11.5 (H) 03/20/2018    HGB 9.8 (L) 03/20/2018    HCT 32.0 (L) 03/20/2018     (H) 03/20/2018    PLT 80 (L) 03/20/2018     Lab Results   Component Value Date    ALT <9 03/18/2018    AST 9 03/18/2018    ALKPHOS 37 (L) 03/18/2018    BILITOT 1.2 (H) 03/18/2018         Drain OP (in mL):  3/16 60   3/17 10   3/18 120   3/19 220     A:  87 yo male with cholecystitis with E coli bacteremia s/p cholecystostomy tube placement 3/16/18. Cira tube appears to have good OP.    P:  -continue present cira tube cares (flushing cira tube BID with 10cc NS - to help with tube patency).  -cira tube to be maintained for a minimum of 6 weeks unless " cholecystectomy preformed prior  -appears plan will be for to have rehab then cholecystectomy with Dr. Mcguire in 4-6 weeks  -cholangiogram follow up in 2-4 weeks. [facility] will contact patient to arrange for this follow up.  -cira tube D/C instructions entered into AVS.    Emilia Maurer PA-C  Interventional Radiology

## 2021-06-16 NOTE — PROGRESS NOTES
Brief History:     88 y.o. 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   Patient was admitted with diagnosis of septic shock. Chest / Abdomen / pelvis CT scan with suspicion for cholecystitis. RUQ US showed acalculous cholecystitis. Positive blood Cx with Gram Negative rods. Later identified as E coli. S/p cholecystostomy tube placement 3/10, clogged drain with blood clots, new cholecystostomty was placed. Patient off   pressors.  Overnight patient was in A. 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    Mixed hyperlipidemia    Hypothyroidism    Coronary Artery Disease    Other autoimmune hemolytic anemias    Thrombocytopenia    Septic shock    Neutropenia associated with infection    Gram-negative bacteremia    Metabolic acidosis with respiratory acidosis    Hemorrhage    Atrial fibrillation with rapid ventricular response    Lactic acidosis    Hypocalcemia    Acute respiratory failure with hypoxia and hypercapnia    Bacteremia due to Gram-negative bacteria    Acute pulmonary edema    Abdominal pain, unspecified abdominal location    Fecal impaction    Cholecystitis    #Sepsis due to acute cholecystitis with E. coli bacteremia status post cholecystostomy tube placement patient off pressure so far culture negative tube were placed on 3/10/2018.  Surgery following.  Patient had a replacement of cholecystostomy tube on 3/11/2018 due to obstruction secondary to blood clot follow-up cholangiogram showed large filling defect in gallbladder.  Patient is on IV  hydrocortisone possibly will need to wean off and resume oral prednisone home dose once able to take orally     #Metabolic encephalopathy improved  from sepsis    #Possible acute blood loss anemia: Surgery planning to transfuse PRBC today      #A. 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 will give IV levothyroxine    #GI prophylaxis with PPI    Diet: per surgery             Subjective:    No chest pain, No SOB, No nausea or vomiting No abdominal pain.    Objective:  194 lb 4.8 oz (88.1 kg)  Temp:  [97.5  F (36.4  C)-98.4  F (36.9  C)] 98  F (36.7  C)  Heart Rate:  [101-147] 112  Resp:  [17-23] 19  BP: ()/(53-82) 94/56     I/O last 3 completed shifts:  In: 2107 [I.V.:1757; IV Piggyback:350]  Out: 1275 [Urine:1245; Emesis/NG output:30]    General: awake, resting on the chair  CVS: S1 S2 present, regular  Lungs: CTA b/l , No wheeze, no Crackles   Abd: Soft, no tenderness        Results:  Lab Results personally reviewed.      Results from last 7 days  Lab Units 03/16/18  0501 03/15/18  2344 03/15/18  1441 03/15/18  0404 03/14/18  0603  03/12/18  0402 03/11/18  0535   LN-SODIUM mmol/L 145  --   --  153* 155*  < > 149* 146*   LN-POTASSIUM mmol/L 3.9 4.1 3.5 3.1* 3.8  < > 4.0 4.0   LN-CHLORIDE mmol/L 111*  --   --  116* 120*  < > 115* 115*   LN-CO2 mmol/L 29  --   --  30 27  < > 27 23   LN-BLOOD UREA NITROGEN mg/dL 27  --   --  33* 38*  < > 53* 48*   LN-CREATININE mg/dL 0.82  --   --  0.85 0.94  <  > 1.25 1.31*   LN-CALCIUM mg/dL 7.1*  --   --  7.6* 7.6*  < > 7.7* 7.5*   LN-ALBUMIN g/dL  --   --   --  2.5* 2.5*  --  2.5* 2.4*   LN-PROTEIN TOTAL g/dL  --   --   --  4.5*  --   --  4.6* 4.7*   LN-BILIRUBIN TOTAL mg/dL  --   --   --  1.0  --   --  1.3* 1.2*   LN-ALKALINE PHOSPHATASE U/L  --   --   --  40*  --   --  39* 39*   LN-ALT (SGPT) U/L  --   --   --  <9  --   --  18 24   LN-AST (SGOT) U/L  --   --   --  7  --   --  9 15   < > = values in this interval not displayed.    Results from last 7 days  Lab Units 03/16/18  0501 03/15/18  0404 03/14/18  1331 03/14/18  0603   LN-WHITE BLOOD CELL COUNT thou/uL 13.2* 11.7*  --  14.3*   LN-HEMOGLOBIN g/dL 7.6* 7.9* 8.0* 8.0*   LN-HEMATOCRIT % 25.1* 25.9*  --  26.3*   LN-PLATELET COUNT thou/uL 41* 37*  --  30*          Results from last 7 days  Lab Units 03/10/18  2031   LN-INR  2.03*          Results from last 7 days  Lab Units 03/16/18  0501 03/15/18  0404 03/14/18  0603   LN-MAGNESIUM mg/dL 1.9 2.2 2.2       Results from last 7 days  Lab Units 03/16/18  0501 03/15/18  0404 03/14/18  0603   LN-PHOSPHORUS mg/dL 2.0* 2.0* 3.3                Lab Results   Component Value Date    HGBA1C 6.5 (H) 12/01/2017     Lab Results   Component Value Date    TSH 1.20 11/06/2017    Y6HJUXH 11.5 09/23/2009     Ir Cholangiogram Exisiting Access    Result Date: 3/16/2018  [facility] CHOLECYSTOGRAM 3/16/2018 8:54 AM ATTENDING: David Franco MD. INDICATION: evaluation of cholecystostomy tube, patency and rule out cholelithaisis, in preparation for surgery MODERATE SEDATION: None. ANTIBIOTICS: None. ADDITIONAL MEDICATIONS: None. FLUOROSCOPIC TIME: 2.4 mins. DOSE: Air Kerma: 176 mGy. CONTRAST: 50 mL Omnipaque intrabiliary/intraenteric. COMPLICATIONS: No immediate complications. PROCEDURE: Contrast was injected through the existing drainage catheter and images obtained. The patient tolerated the procedure well without immediate complications. FINDINGS: CHOLECYSTOGRAM: The  cholecystostomy tube is in satisfactory position. There is no extravasation or leakage of contrast. Large filling defect within the gallbladder, appearance consistent with clot. Late filling of the cystic duct.     Large filling defect within the gallbladder, appearance consistent with clot. CPT: 35143 The CPT codes are for physician reference only.      Ct Chest Abdomen Pelvis Without Oral Without Iv Contrast    Result Date: 3/10/2018  CT CHEST, ABDOMEN, AND PELVIS 3/10/2018 1:01 PM      INDICATION: Sepsis septic shock with no source TECHNIQUE: CT chest, abdomen, and pelvis. Dose reduction techniques were used. IV CONTRAST: None COMPARISON: 02/13/2018 FINDINGS: CHEST: There are small bilateral pleural effusions with atelectasis in both lungs. Groundglass nodule in the right upper lobe as not well visualized due to some motion artifact but measures approximately 18 mm in greatest dimension on series 3, image 28,  likely unchanged from prior exam. Mediastinal lymph nodes are in the upper range of normal in size and not significantly changed, likely being reactive. Calcified mediastinal and right hilar lymph nodes are consistent with old granulomatous disease. A calcified nodule is seen in the right lower lobe in a region of atelectasis. Calcified atherosclerotic plaque is seen in the thoracic aorta, great vessels, and coronary arteries. 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 Mildly prominent left axillary lymph nodes are unchanged. There is a right PICC line with tip in the low superior vena cava.  ABDOMEN: There appears to be gallbladder wall thickening and adjacent edema suggesting cholecystitis. The liver, spleen, adrenal glands, and kidneys appear normal on these noncontrast images. There is some fatty replacement of the pancreas. The abdominal  aorta is normal in caliber with very densely calcified atherosclerotic plaque. 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 Retroperitoneal lymph nodes appear slightly decreased in size compared to the prior exam. The largest, to the left of the aorta on series 2, image 120 measures 7 x 13 mm and previously measured 10 x 17 mm. Other lymph nodes are similarly decreased in size. The spleen is normal in size, decreased compared to the prior exam. PELVIS: Sigmoid diverticula without evidence of diverticulitis. There appears to be fecal impaction in the rectum. Surgical clips are seen in the pelvis bilaterally with changes of prostatectomy. Mora catheter is seen in the bladder. There is a right inguinal hernia containing fat and fluid. MUSCULOSKELETAL: Negative.     CONCLUSION: 1.  Exam is mildly compromised by some respiratory motion. Even given this, gallbladder appears abnormal with edematous wall and mild stranding in the adjacent fat. Further evaluation with ultrasound is recommended as the findings are suspicious for cholecystitis. 2.  Small bilateral pleural effusions with atelectasis in both lungs. 3.  Mediastinal, and retroperitoneal lymph nodes are in the upper range of normal in size and decreased compared to the prior exam. The spleen is also decreased in size compared to the prior exam. 4.  Moderate to severe calcified atherosclerotic plaque in the visualized arteries with small right common iliac artery aneurysm. 5.  Colonic diverticulosis without evidence of diverticulitis. 6.  Right inguinal hernia containing fat and fluid. 7.  Changes of prostatectomy and pelvic lymph node dissection. 8.  Groundglass nodule in the right upper lobe is grossly stable though not well evaluated due to motion. NOTE: ABNORMAL REPORT THE DICTATION ABOVE DESCRIBES AN ABNORMALITY FOR WHICH FOLLOW-UP IS NEEDED.     Xr Chest 1 View Portable    Result Date: 3/12/2018  XR CHEST 1 VIEW PORTABLE 3/12/2018 5:19 AM INDICATION: septic shock, respiratory failure, pneumonia, volume overload. COMPARISON: 03/11/2018 FINDINGS: Considerable increase in  central congestion and diffuse interstitial alveolar infiltrate throughout both hemithoraces. Similar small effusions. Remainder stable.    Xr Chest 1 View Portable    Result Date: 3/11/2018  XR CHEST 1 VIEW PORTABLE 3/11/2018 6:09 AM INDICATION: Respiratory failure follow-up infiltrates COMPARISON: 03/10/2018 FINDINGS: Significant improvement in congestion and pulmonary edema pattern on prior. Prominent infiltrates persist in the lower lobes and retrocardiac area. Similar effusions. Remainder stable.    Xr Chest 1 View Portable    Result Date: 3/10/2018  XR CHEST 1 VIEW PORTABLE 3/10/2018 5:28 AM INDICATION: dyspnea, hypoxia, pneumonia. COMPARISON: 03/09/2018 FINDINGS: Worsening central congestion and diffuse interstitial alveolar infiltrates along with increasing bilateral effusions consistent with progression of pulmonary edema or congestive heart failure. Superimposed pneumonia certainly possible. Remainder stable.    Xr Chest 1 View Portable    Result Date: 3/9/2018  XR CHEST 1 VIEW PORTABLE 3/9/2018 5:19 AM INDICATION: Fever COMPARISON: CT 02/13/2018 FINDINGS: Poor ventilatory effort with increasing compressive and congestive changes. Probable pulmonary edema or congestive heart failure. Similar to perhaps slight increase in left pleural effusion. Stable peripheral consolidation right lower lobe. Groundglass opacity right upper lobe better seen on CT. Remainder stable. Follow-up recommended.    Ct Head Without Contrast    Result Date: 3/11/2018  [facility] CT HEAD WO CONTRAST 3/11/2018 5:14 PM INDICATION: Stroke mental status change TECHNIQUE: Without IV contrast. Dose reduction techniques were used. CONTRAST: None COMPARISON:  CT head 06/18/2013 FINDINGS: Unchanged encephalomalacia in the left posterior frontoparietal lobe. Interval progression of mild diffuse parenchymal volume loss with ex vacuo ventricular dilatation. No acute intracranial hemorrhage. No CT evidence of acute infarct. No  calvarial fracture. The visualized orbits, paranasal sinuses and mastoid air cells are free of significant disease.     CONCLUSION: 1. No acute intracranial abnormality. Findings discussed with Dr. Mahoney on 03/11/2018 at 1712    Ir Cholangiogram Exisiting Access    Result Date: 3/16/2018  [facility] CHOLECYSTOGRAM 3/16/2018 8:54 AM ATTENDING: David Franco MD. INDICATION: evaluation of cholecystostomy tube, patency and rule out cholelithaisis, in preparation for surgery MODERATE SEDATION: None. ANTIBIOTICS: None. ADDITIONAL MEDICATIONS: None. FLUOROSCOPIC TIME: 2.4 mins. DOSE: Air Kerma: 176 mGy. CONTRAST: 50 mL Omnipaque intrabiliary/intraenteric. COMPLICATIONS: No immediate complications. PROCEDURE: Contrast was injected through the existing drainage catheter and images obtained. The patient tolerated the procedure well without immediate complications. FINDINGS: CHOLECYSTOGRAM: The cholecystostomy tube is in satisfactory position. There is no extravasation or leakage of contrast. Large filling defect within the gallbladder, appearance consistent with clot. Late filling of the cystic duct.     Large filling defect within the gallbladder, appearance consistent with clot. CPT: 85716 The CPT codes are for physician reference only.    Ir Biliary Catheter Change    Result Date: 3/11/2018  [facility] PROCEDURE: CHOLECYSTOSTOMY TUBE CHECK AND EXCHANGE. ATTENDING: Errol Britt MD. INDICATION: 88-year-old male with cholecystitis status post cholecystostomy placement yesterday. Initially there were bloody outputs and now there is difficulty flushing the catheter. MODERATE SEDATION: None. ANTIBIOTICS: None. ADDITIONAL MEDICATIONS: None. FLUOROSCOPIC TIME: 1.9 minutes. AIR KERMA:  109 mGy. CONTRAST: 10 mL Omnipaque into the gallbladder lumen. COMPLICATIONS: No immediate complications. PROCEDURE:  A  image was obtained. Contrast was instilled through the existing cholecystostomy and images were  obtained. Over wire exchange was made for a new 10 [place]ugu locking loop nephrostomy. The loop was formed within the gallbladder lumen and the cavity was irrigated. There was attached to gravity drainage. FINDINGS: The  image shows the existing cholecystostomy in appropriate position. An injection of contrast reveals the cholecystostomy lies within the gallbladder lumen. There is a large filling defect encompassing nearly the entire gallbladder which likely represents hemorrhagic clot. Following exchange the new drainage catheter appears in appropriate position. Some of the clot has been successfully aspirated.     The cholecystostomy lies within the gallbladder lumen however nearly the entire lumen is filled with a blood clot. PLAN: The catheter was exchanged over a wire and the gallbladder was irrigated gently. Would recommend flushing the catheter twice daily with sterile saline hopes of improving drainage. ____________________________________________________________________ CPT codes for physician reference only: 19957     Xr Swallow Study W Speech    Result Date: 3/12/2018  XR SWALLOW STUDY W SPEECH 3/12/2018 1:30 PM INDICATION: Dysphagia. TECHNIQUE: Routine. COMPARISON: None. FINDINGS: FLUOROSCOPIC TIME: 1.3 minutes NUMBER OF IMAGES: 6 Swallow study with Speech Pathology using multiple barium thicknesses. Puree and honey consistency both demonstrated significant piriform sinus stasis. [place] consistency demonstrated stasis and kickback aspiration which resulted in only a minimal throat clear. Thin liquid demonstrated delay in swallow reflex with direct, silent aspiration.     Xr Chest 1 View For Picc Placement Portable    Result Date: 3/9/2018  XR CHEST 1 VIEW FOR PICC LINE PLACEMENT PORTABLE 3/9/2018 7:35 AM INDICATION: verify catheter placement COMPARISON: 03/09/2018 FINDINGS: There is been interval placement of a right-sided PICC with the distal tip within the mid superior vena cava. Multiple median  sternotomy wires are seen. Low lung volumes are again noted with diffuse areas of airspace consolidation, unchanged from the prior examination.. Small pleural effusions are probable bilaterally. There is no pneumothorax.    Ct Abdomen Without Oral Without  Iv Contrast    Result Date: 3/13/2018  CT ABDOMEN WO ORAL WO IV CONTRAST 3/12/2018 8:19 PM     INDICATION: Cholecystitis / cholangitis worsening abdominal pain , post cholecystostomy tube placement, dropping H/H TECHNIQUE: CT abdomen. Multiplanar reformation images (MPR). Dose reduction techniques were used. IV CONTRAST: None COMPARISON: 03/10/2018 FINDINGS: LUNG BASES: Minimal change in the significant dependent infiltrate/atelectasis as well as small bilateral pleural effusions. Large hiatal hernia present, unchanged. ABDOMEN: Interval placement of cholecystostomy tube with dense residual contrast within the gallbladder. There appears to be a small layer of high density hemorrhage about the liver capsule.] Minimal hyperdense ascites present suggesting slight hemoperitoneum, there is no localized hematoma. Liver otherwise appears normal. Pancreas, spleen, adrenals and kidneys are negative. Mild ascites within both right and left gutters. Right inguinal hernia contains fluid with a slight increase in the hematocrit level. Mora catheter in place, previous prostatectomy. Sigmoid diverticulosis. MUSCULOSKELETAL: Negative.     CONCLUSION: 1.  Interval cholecystectomy tube placement. There is a small amount of perihepatic hemorrhage and slight amount of hyperdense ascites consistent with mild intraperitoneal blood. No localized hematoma.    Ir Cholecystostomy    Result Date: 3/10/2018  [facility] PROCEDURE: PERCUTANEOUS CHOLECYSTOSTOMY PLACEMENT. ATTENDING: Errol Britt MD. INDICATION: 88-year-old male with cholecystitis and impending biliary sepsis. Urgent cholecystostomy placement is requested. The patient has low platelets secondary to chemotherapy and  has platelets infusing. CONSENT: The risks, benefits and alternatives of cholecystostomy placement were discussed with the patient, his family and his power of  Srin[place] in detail. All questions were answered. Informed consent was given to proceed with the procedure. MODERATE SEDATION: None. ANTIBIOTICS: None. ADDITIONAL MEDICATIONS: Fentanyl 50 mcg IV. FLUOROSCOPIC TIME: 0.8 minutes. AIR KERMA:  38 mGy. CONTRAST: 5 mL Omnipaque into the gallbladder lumen. COMPLICATIONS: No immediate complications. PROCEDURE:  Using real-time sonographic guidance an AccuStick set was utilized to gain percutaneous access into the gallbladder lumen. Endoluminal positioning was confirmed with an injection of contrast. Over wire exchange was then made for a 10 [place]ambian locking loop cholecystostomy. The loop was formed within the gallbladder lumen and it was attached to gravity drainage. FINDINGS: Ultrasound demonstrates a mild to moderately distended gallbladder with an abnormally thickened wall. The cholecystogram shows access into the gallbladder lumen. Filling defects are seen near the gallbladder fundus. The proximal cystic duct appears to be patent. The completion image shows the cholecystostomy in the gallbladder lumen.     Successful percutaneous cholecystostomy placement, as detailed above. A specimen of tar-like bile was sent for diagnostic analysis. ____________________________________________________________________ CPT codes for physician reference only: 12091     Us Abdomen Limited    Result Date: 3/10/2018  US ABDOMEN LIMITED 3/10/2018 3:03 PM INDICATION: acute choly COMPARISON: CTA chest abdomen pelvis dated 03/10/2018. FINDINGS: GALLBLADDER: There is thickening of the gallbladder wall with fluid within the gallbladder wall suggesting edematous gallbladder. No intraluminal stones are identified the wall is thickened to 1.5 cm BILE DUCTS: No bile duct dilation. The common bile duct measures 7 mm. LIVER: Normal where seen.  RIGHT KIDNEY: 10.4 x 4.9 x 5.1 cm without hydronephrosis or mass. PANCREAS: Obscured by bowel gas and not well visualized. No ascites in the right upper quadrant.     CONCLUSION: 1.  Abnormal appearance of the gallbladder with marked thickening of the gallbladder wall and fluid within the gallbladder are consistent with an edematous gallbladder no gallstones or biliary dilatation are seen. Findings are suspicious for possible acalculous cholecystitis. NOTE: ABNORMAL REPORT THE DICTATION ABOVE DESCRIBES AN ABNORMALITY FOR WHICH FOLLOW-UP IS NEEDED.       Medications  Scheduled Meds:    folic acid (FOLVITE) IVPB  1 mg Intravenous DAILY     furosemide  20 mg Intravenous DAILY     hydrocortisone sod succ  50 mg Intravenous Q12H     insulin aspart (NovoLOG) injection   Subcutaneous TID with meals     insulin aspart (NovoLOG) injection   Subcutaneous QHS     metoprolol succinate  12.5 mg Oral Once     metoprolol succinate  25 mg Oral DAILY     OLANZapine  7.5 mg Oral QHS     pantoprazole (PROTONIX) IVPB 40 mg  40 mg Intravenous QAM AC    Or     omeprazole  20 mg Oral QAM AC     oxyCODONE intensol (ROXICODONE) conc solution 20 mg/mL  5 mg Sublingual QID     piperacillin-tazobactam  4.5 g Intravenous Q6H     potassium, sodium phosphates  1 packet Oral TID     senna-docusate  1 tablet Oral BID     sodium chloride  10-30 mL Intravenous Q8H FIXED TIMES     sodium chloride  3 mL Intravenous Line Care     tiZANidine  2 mg Oral TID     Continuous Infusions:    amiodarone 900 mg/500 ml standard 24 hour infusion 0.5 mg/min (03/16/18 1110)     dextrose 5% 75 mL/hr (03/16/18 1118)     norepinephrine IV infusion in NS Stopped (03/12/18 1000)     vasopressin Stopped (03/11/18 0345)     PRN Meds:.acetaminophen, acetaminophen, aluminum-magnesium hydroxide-simethicone, bacitracin, benzocaine-menthol, bisacodyl, dextrose 50 % (D50W), fentaNYL pf, glucagon (human recombinant), HYDROmorphone, labetalol, magnesium hydroxide, melatonin,  naloxone **OR** naloxone, ondansetron **OR** ondansetron, oxyCODONE intensol (ROXICODONE) conc solution 20 mg/mL, polyvinyl alcohol, sodium chloride, sodium chloride, sodium chloride, sodium phosphates 133 mL                1.Barriers to D/C:  2. Anticipated D/C: few days   3. Disposition:      Plan discussed with patient and RN.  Total time spent today greater than 35 minutes  with greater than 50% of time spent in counseling and coordination of care.  Discussed plan with surgery      This note was dictated using voice recognition software. Any grammatical or context distortions are unintentional and inherent to the software.

## 2021-06-16 NOTE — PROGRESS NOTES
Hospitalist Progress Note    Overnight Events/Subjective:    Breathing improved.  No complaints this AM.   No Abd pain, CP, SOB.  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  Mr. Clintno presented with septic shock from acute cholecystitis and associated e.coli bacteremia.  Treated with PERC cholecystostomy tube, IV zosyn, stress dose steroids and pressors.  Hospital course complicated by Afib with RVR requiring IV amiodarone; replacement of cholecystostomy tube 03/11 due to obstruction from blood clot.  Repeat cholangiogram with a persistent filling defect thought secondary to clot.      #Cholecystitis with e.coli bacteremia - Zosyn (abx plan depending on surgical plan).  Cholecystostomy tube with flushes per IR.  Possible surgery this next week pending Dr. Mcguire's discussion with family.  #Afib, sepsis associated - now sinus kathy on amiodarone.  Hold parameters on Toprol-xl (hasn't been given 3 days now).  Possible anticoagulation in near future, timing depends on surgical plan.     #adrenal insufficiency - was on stress dose steroids for septic shock, continue to wean.  Had been on pred taper prior to admission for hemolytic anemia.    #Dyspnea - improved with diuresis yesterday.  D/c IV lasix after this morning's dose  #CLL, chronic thrombocytopenia, hx hemolytic anemia - ibrutinib on hold, recently on prednisone taper (reduce to prednisone 20qday)  #acute blood loss anemia - hb stable.  No need for transfusion today.  #hyperglycemia - SSI, should improve with steroid taper    Disposition: TBD  Estimated LOS: multiday    Objective    Vital signs in last 24 hours  Temp:  [97.6  F (36.4  C)-97.8  F (36.6  C)] 97.7  F (36.5  C)  Heart Rate:   [48-64] 50  Resp:  [16-20] 18  BP: (100-140)/(47-72) 135/61 91% O2 Device: None (Room air) O2 Flow Rate (L/min): 2 L/min  Weight:   210 lb 14.4 oz (95.7 kg) Weight change: -2 lb 1.6 oz (-0.953 kg)    Intake/Output last 3 shifts  I/O last 3 completed shifts:  In: 240 [P.O.:240]  Out: 2320 [Urine:2200; Emesis/NG output:120]  Body mass index is 26.36 kg/(m^2).    Physical Exam  General:  Alert, cooperative,  Appears stated age, frail  Neurologic:  fluent speech,   HEENT:  Anicteric, MMM  CV: RRR no MRG, normal S1 and S2, edema improved  Lungs:  Normal effort, few L basilar crackles otherwise clear.    Abd: soft, NT, normoactive BS, no masses or organomegaly.   Jeni tube with bilous drainage  Skin: no rashes noted.  Central Lines and Tubes: rao with yellow urine    Labs: Labs reviewed and pertinent findings discussed in A/P  Medications: medication list reviewed.  Pertinent changes discussed in A/P  Telemetry: reviewed and notable for sinus kathy 50s    Care discussed and coordinated with family and nurse    Valeria Swan MD  Internal Medicine Hospitalist  3/19/2018 10:08 AM

## 2021-06-16 NOTE — PROGRESS NOTES
Went in to reassess patient and he could not lift his right arm to gravity at all; previously could lift and hold 5 seconds.  When asked to smile, there was a slight facial droop to the right lip.  When asked orientation questions, patient only mumbled incomprehensible sounds or yes/no answers.  When asked if at home, he said yes.  Earlier, patient was oriented to self, place, month and year.  Called stroke code and paged Dr. Nixon to see patient.  CT head ordered.  Stroke code canceled as patient not candidate for TPA per MD.  Marsha Lucas

## 2021-06-16 NOTE — PROGRESS NOTES
[facility] Hematology and Oncology Progress Note    Patient: Nixon Velasquez Jr.  MRN: [id number]  Date of Service:  February 14, 2018      Assessment and Plan:    1.  CLL: His white blood cell count this week is lower than it was the past few weeks.  Continues to have significant anemia and thrombocytopenia.  I think he needs to be started on treatment for his CLL again.  I am recommending ibrutinib.  The rationale for this choice is discussed at previous visit.  Again reviewed with him some of the more common side effects of the drug which include, but are not limited to, fatigue, rash, change in bowel habits and nausea as well as the left frequent risks of atrial fibrillation bleeding.  He agrees to proceed with this treatment.  An order was placed to his pharmacy.  We will see him back in clinic 1 week after he starts.  I asked him to give us a call if he thinks he is developing any side effects of the interim.  Questions were answered.  We also repeated by imaging.  It shows only mild adenopathy and splenomegaly which have actually improved compared to March 2017.    2.  Prophylaxis: He received a Pneumovax in mid 2016.    3.  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  We will go down to 30 today for a week.  Plan to taper off.  We will give him transfusions to keep him above 8.  He felt better after his transfusion last week.  Does not need a transfusion today.  He has multiple antibodies.  Continue to taper the steroids by 10 mg weekly.    4.  Abdominal pain: Imaging was repeated does not show any adenopathy or other cause for the abdominal pain.  I think it might be from the steroids.  We will continue to see how things go with the steroid taper.  He is not having any diarrhea, melena, or bright red blood per rectum.      5.  Multiple skin cancers: I looked in his right ear and do not see evidence of recurrence lesion.  We will have him follow-up with his dermatologist  in the near future.  Hopefully he can be managed with less aggressive therapies.    ECOG Performance   ECOG Performance Status: 2    Distress Assessment  Distress Assessment Score: 5    Pain  Currently in Pain: No/denies    Diagnosis:    1. Chronic lymphocytic leukemia initially diagnosed in 2/2008. 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  Relapse in January 2018.  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  No p53 mutation noted.      2. Thyroid cancer, status post thyroidectomy and radioactive iodine ablation.   3. Prostate cancer diagnosed in 1992.  4. Skin cancers, multiple.    5.  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    Treatment:    Started single agent Rituxan for his CLL on July 18, 2017.  This was initiated for worsening symptomatic anemia.  He received 4 weekly doses.  Completed on August 7, 2017.    He is on Synthroid for his thyroidectomy.    Currently on a prednisone taper for a hemolytic anemia.    Interim History:     Nixon is here today for a follow-up visit.  He is being seen weekly.  He felt a lot better after his blood transfusion last week.  States he is not doing more at home.  Still has some intermittent abdominal pain which is across the lower abdomen.  No change in his bowel habits.  No nausea vomiting.  No fevers or chills.  No bleeding problems.    Review of Systems:    Constitutional  Constitutional (WDL): Exceptions to WDL  Fatigue: Fatigue not relieved by rest - Limiting instrumental ADL  Neurosensory  Neurosensory (WDL): Exceptions to WDL  Ataxia: Asymptomatic, clinical or diagnostic observations only, intervention not indicated  Cardiovascular  Cardiovascular (WDL): All cardiovascular elements are within defined limits  Pulmonary  Respiratory (WDL): Exceptions to WDL  Dyspnea: Shortness of breath with minimal exertion, limiting instrumental ADL  Gastrointestinal  Gastrointestinal (WDL): Exceptions to WDL  Dysphagia: Symptomatic, able to eat regular  diet  Genitourinary  Genitourinary (WDL): Exceptions to WDL (incontinence)  Integumentary  Integumentary (WDL): Exceptions to WDL (lots of bruising, pre cancerous lesions)  Patient Coping  Patient Coping: Accepting  Accompanied by  Accompanied by: Family Member (son)    Past History:    Past Medical History:   Diagnosis Date     Cataract     bilateral     Foot fracture, right      History of transfusion      Hypertension      Inguinal hernia      Leukemia      PE (pulmonary embolism)      Pneumonia      Prostate cancer      Skin cancer      Thyroid cancer      Physical Exam:    Recent Vitals 2/14/2018   Weight 189 lbs 3 oz   /63   Pulse 74   Temp 98.8   Temp src 1   SpO2 92   Some recent data might be hidden     General: patient appears stated age of 88 y.o.. Nontoxic and in no distress.   HEENT: Head: atraumatic, normocephalic. Sclerae anicteric.  Chest:  Normal respiratory effort.    Cardiac:  No edema.    Abdomen: abdomen is soft, non-distended.    Extremities: normal tone and muscle bulk.   Skin: Scattered ecchymoses.  Also some thickened areas of skin, especially on the right forehead.  CNS: alert and oriented x3. Grossly non-focal.   Psychiatric: normal mood and affect.     Lab Results:    Recent Results (from the past 168 hour(s))   Comprehensive Metabolic Panel   Result Value Ref Range    Sodium 142 136 - 145 mmol/L    Potassium 4.4 3.5 - 5.0 mmol/L    Chloride 109 (H) 98 - 107 mmol/L    CO2 24 22 - 31 mmol/L    Anion Gap, Calculation 9 5 - 18 mmol/L    Glucose 231 (H) 70 - 125 mg/dL    BUN 18 8 - 28 mg/dL    Creatinine 0.93 0.70 - 1.30 mg/dL    GFR MDRD Af Amer >60 >60 mL/min/1.73m2    GFR MDRD Non Af Amer >60 >60 mL/min/1.73m2    Bilirubin, Total 0.5 0.0 - 1.0 mg/dL    Calcium 8.5 8.5 - 10.5 mg/dL    Protein, Total 5.9 (L) 6.0 - 8.0 g/dL    Albumin 3.9 3.5 - 5.0 g/dL    Alkaline Phosphatase 48 45 - 120 U/L    AST 12 0 - 40 U/L    ALT 12 0 - 45 U/L   LD(LDH)   Result Value Ref Range    LD (LDH) 213  125 - 220 U/L   Haptoglobin   Result Value Ref Range    Haptoglobin <8 (L) 33 - 171 mg/dL   HM1 (CBC with Diff)   Result Value Ref Range    .4 (HH) 4.0 - 11.0 thou/uL    RBC 2.70 (L) 4.40 - 6.20 mill/uL    Hemoglobin 8.1 (L) 14.0 - 18.0 g/dL    Hematocrit 28.4 (L) 40.0 - 54.0 %     (H) 80 - 100 fL    MCH 30.0 27.0 - 34.0 pg    MCHC 28.5 (L) 32.0 - 36.0 g/dL    RDW 21.5 (H) 11.0 - 14.5 %    Platelets 86 (L) 140 - 440 thou/uL    MPV 12.5 8.5 - 12.5 fL   Manual Differential   Result Value Ref Range    Total Neutrophils % 1 (L) 50 - 70 %    Lymphocytes % 99 (H) 20 - 40 %    Monocytes % 0 (L) 2 - 10 %    Eosinophils %  0 0 - 6 %    Basophils % 0 0 - 2 %    Total Neutrophils Absolute 1.2 (L) 2.0 - 7.7 thou/ul    Lymphocytes Absolute 122.2 (H) 0.8 - 4.4 thou/uL    Monocytes Absolute 0.0 0.0 - 0.9 thou/uL    Eosinophils Absolute 0.0 0.0 - 0.4 thou/uL    Basophils Absolute 0.0 0.0 - 0.2 thou/uL    Platelet Estimate Decreased (!) Normal   Reference Lab Workup   Result Value Ref Range    Antibody ID See scanned report    Type and screen   Result Value Ref Range    ABORh A POS     Antibody Screen Positive (!) Negative   Crossmatch   Result Value Ref Range    Crossmatch LEAST INCOMPATIBLE     Blood Expiration Date [id number]     Unit Type A Neg     Unit Number [id number]     Status Transfused     Component Red Blood Cells     PRODUCT CODE [id number]     Issue Date and Time [id number]     Blood Type 0600     CODING SYSTEM [id number]    Reticulocytes   Result Value Ref Range    Retic Absolute Count 0.140 (H) 0.010 - 0.110 mill/uL   Lactate Dehydrogenase (LDH)   Result Value Ref Range    LD (LDH) 201 125 - 220 U/L   HM1 (CBC with Diff)   Result Value Ref Range    WBC 89.5 (HH) 4.0 - 11.0 thou/uL    RBC 2.84 (L) 4.40 - 6.20 mill/uL    Hemoglobin 8.8 (L) 14.0 - 18.0 g/dL    Hematocrit 29.5 (L) 40.0 - 54.0 %     (H) 80 - 100 fL    MCH 31.0 27.0 - 34.0 pg    MCHC 29.8 (L) 32.0 - 36.0 g/dL    RDW 23.0 (H) 11.0  - 14.5 %    Platelets 76 (L) 140 - 440 thou/uL    MPV 11.7 8.5 - 12.5 fL   Manual Differential   Result Value Ref Range    Total Neutrophils % 6 (L) 50 - 70 %    Lymphocytes % 93 (H) 20 - 40 %    Monocytes % 1 (L) 2 - 10 %    Eosinophils %  0 0 - 6 %    Basophils % 0 0 - 2 %    Total Neutrophils Absolute 5.4 2.0 - 7.7 thou/ul    Lymphocytes Absolute 83.2 (H) 0.8 - 4.4 thou/uL    Monocytes Absolute 0.9 0.0 - 0.9 thou/uL    Eosinophils Absolute 0.0 0.0 - 0.4 thou/uL    Basophils Absolute 0.0 0.0 - 0.2 thou/uL    Smudge Cells Present (!) None Seen    Platelet Estimate Decreased (!) Normal    Ovalocytes 1+ (!) Negative     Imaging:    Ct Chest Abdomen Pelvis Without Oral With Iv Contrast    Result Date: 2/14/2018  CT CHEST, ABDOMEN, AND PELVIS WO ORAL W IV CONTRAST 2/13/2018 2:07 PM      INDICATION: CLL, dyspnea, abdominal pain. TECHNIQUE: CT chest, abdomen, and pelvis. Dose reduction techniques were used. IV CONTRAST: Iohexol (Omni) 100 mL. COMPARISON: CT chest 5/30/2016, CT abdomen and pelvis 3/3/2017. FINDINGS: CHEST: 1.5 cm ground-glass nodule in the right upper lobe on image 33, not appreciably changed. Calcified granuloma in the right lower lobe. Small bilateral pleural effusions are new, with compressive atelectasis in the lower lobes. Some presumed scarring in the lateral right lung base is unchanged. A few calcified pleural plaques present. Prior CABG. There are several mildly prominent bilateral axillary and mediastinal lymph nodes, all of which are under 1 cm in short axis. Large hiatal hernia.  ABDOMEN: Mild splenomegaly. The spleen measures 14 cm in craniocaudal length compared to previous 15.6 cm. A small benign cyst and hemangioma in the liver. Otherwise normal liver, pancreas, and adrenal glands. Small renal cysts. Mild nonspecific gallbladder wall edema. The gallbladder is decompressed. Mildly enlarged retroperitoneal, mesenteric, and chris hepatic lymph nodes. The lymph nodes have significantly  decreased in size when compared to 3/30/2017. For example, a left periaortic lymph node on image 286 measures 13 mm in short axis, previously 22 mm. PELVIS: Prostatectomy. Sigmoid colonic diverticulosis. Right inguinal hernia containing fat and fluid. There is mild external iliac and inguinal lymphadenopathy. MUSCULOSKELETAL: Negative.     CONCLUSION: 1.  Mild lymphadenopathy in the chest, abdomen, and pelvis as well as splenomegaly have all improved when compared to 3/3/2017. 2.  New small bilateral pleural effusions. 3.  Stable 1.5 cm ground-glass nodule in the right upper lobe compared to 5/30/2016.      Signed by: Mushtaq Pennington MD

## 2021-06-16 NOTE — PROGRESS NOTES
Patient on continuous BIPAP through entire shift, with the exception of one 45 minute break. Patient SpO2 off BIPAP on RA is 93%. While on BIPAP SpO2 is 100%.  BIPAP setting are per doctor's order. Patient  Is lethargic, but will still answer questions appropriately.RT will continue to monitor and follow.

## 2021-06-16 NOTE — PROGRESS NOTES
Speech Language/Pathology  Speech Therapy Daily Progress Note    Patient presents as lethargic during this session.  An  was not applicable. 2 family member present.     Objective  Patient mostly kept eyes closed during visit. Did not talk to therapist. Daughter present and reports he is exhausted and having pain in stomach so has not been eating. She stated she tried to feed him applesauce and small sips of liquid this morning and he just held it in his mouth. They plan to try and feed him again if he gets up and/or becomes more alert. They are aware of discussion with speech yesterday regarding recommendations for coughing and throat clearing after every swallow.     Assessment  Patient too lethargic to eat this AM.     Plan/Recommendations  Speech to follow-up with patient and family regarding recommendations. Will reassess as needed based on patient's condition.     The ST Care Plan has been reviewed and current plan remains appropriate.    8 volume minutes     Sanam Sawyer MA, CCC-SLP

## 2021-06-16 NOTE — PROGRESS NOTES
Spiritual Care Note    Spiritual Assessment:   stepped out of walking rounds this morning to make a follow up visit with patient and family. Patient sitting up in bed when  arrives; two of his sons are present. Family seems worried as they talk about seeing a change in their father today and it not being a positive change. Family reflected on patient's wife and her struggles at this current time. There does appear to be a great deal on the plate of this family at this time;  validated this for family. Family trying to remain hopeful for their father.  does appear to have the support he needs at this time;  notes no concerns.      Care Provided:  Family denies needs at this time and thanked the  for coming.     Plan of Care: Spiritual care will continue to follow as part of patient's care team.    DUYEN Cruz, BCC

## 2021-06-16 NOTE — PROGRESS NOTES
Progress Note    Assessment/Plan  We will transfuse patient over the next day or 2 and this is reviewed with the patient and his sons.  Patient was tachycardic and the will have additional control of his heart rate undertaken.  This has been done to this point.  INR is re-repeated and this is close to normal today.  Patient is hyponatremic at this time as well.  Examination reveals the patient's biliary drain out to be draining bile.  Interventional study this a.m. is reviewed with the patient and the kids indicated that he may only have blood in his gallbladder and this was reviewed with Dr. Franco from radiology.  The patient does have a drainage of bile currently though.  Will potentially undergo surgical intervention early next week unless his drain continues to function very well then we would continue the present care with antibiotic coverage and the drain is noted.  This is reviewed with the patient and multiple sons and Dr. Franks    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    Mixed hyperlipidemia    Hypothyroidism    Coronary Artery Disease    Other autoimmune hemolytic anemias    Thrombocytopenia    Septic shock    Neutropenia associated with infection    Gram-negative bacteremia    Metabolic acidosis with respiratory acidosis    Hemorrhage    Atrial fibrillation with rapid ventricular response    Lactic acidosis    Hypocalcemia    Acute respiratory failure with hypoxia and hypercapnia    Bacteremia due to Gram-negative bacteria    Acute pulmonary edema    Abdominal pain, unspecified abdominal location    Fecal impaction    Cholecystitis      Subjective  Reasonably comfortable except for a drainage site on the right side  Objective    Vital signs in last 24 hours  Temp:  [97.5  F (36.4  C)-98.2  F (36.8  C)] 98  F (36.7  C)  Heart Rate:  [101-140] 112  Resp:  [17-23] 19  BP: ()/(53-82) 94/56  Weight:   194 lb 4.8 oz (88.1 kg)    Intake/Output last  3 shifts  I/O last 3 completed shifts:  In: 2107 [I.V.:1757; IV Piggyback:350]  Out: 1275 [Urine:1245; Emesis/NG output:30]  Intake/Output this shift:  I/O this shift:  In: 600 [I.V.:500; IV Piggyback:100]  Out: 50 [Emesis/NG output:50]      Physical Exam  Abdomen benign except tender where the drain is    Pertinent Labs   Lab Results   Component Value Date    WBC 13.2 (H) 03/16/2018    HGB 7.6 (L) 03/16/2018    HCT 25.1 (L) 03/16/2018     (H) 03/16/2018    PLT 41 (LL) 03/16/2018             Pertinent Radiology     Ir Cholangiogram Exisiting Access    Result Date: 3/16/2018  [facility] CHOLECYSTOGRAM 3/16/2018 8:54 AM ATTENDING: David Franco MD. INDICATION: evaluation of cholecystostomy tube, patency and rule out cholelithaisis, in preparation for surgery MODERATE SEDATION: None. ANTIBIOTICS: None. ADDITIONAL MEDICATIONS: None. FLUOROSCOPIC TIME: 2.4 mins. DOSE: Air Kerma: 176 mGy. CONTRAST: 50 mL Omnipaque intrabiliary/intraenteric. COMPLICATIONS: No immediate complications. PROCEDURE: Contrast was injected through the existing drainage catheter and images obtained. The patient tolerated the procedure well without immediate complications. FINDINGS: CHOLECYSTOGRAM: The cholecystostomy tube is in satisfactory position. There is no extravasation or leakage of contrast. Large filling defect within the gallbladder, appearance consistent with clot. Late filling of the cystic duct.     Large filling defect within the gallbladder, appearance consistent with clot. CPT: 04805 The CPT codes are for physician reference only.          Ramy Mcguire

## 2021-06-16 NOTE — PROGRESS NOTES
"  Clinical Nutrition Therapy Follow-Up Note      Subjective:  Pt c/o cramping, family at bedside.  RN called.  Reviewed swallow study results.      Nutrition History:  Information from patient, family/caregiver and chart.  Diet prior to admission:  General diet.  Was on nectar liquids at last speech eval 3/2017.  Recent food/fluid intake: good per pt..   Food allergies or intolerances: dairy, includes butter, cheese.    Nutrition Prescription:   Diet: Mech soft with thin liquids.  NS per IV 25 ml/hr.    Current Nutrition Intake:  Pt was NPO until this afternoon.    Anthropometrics:  Height: 6' 3\" (190.5 cm)  Admission weight: 196#  Weight: 203 lb 11.2 oz (92.4 kg)    Physical Findings:  Physical findings which could indicate malnutrition: not found     GI Status/Output:   Last BM recorded: n/a    Skin/Wound:   Lucas Scale Score: 15    Medications:  Medications reviewed.    Labs:  Glucose 179  Labs reviewed    Estimated Nutrition Needs:  Using current weight of 89 kg.    Energy Needs: 8682-8158 kcals daily per 25-30 kcal/kg   Protein Needs:  g daily, 1-1.5 g/kg.  Fluid Needs: 1111-8435 mls daily, 25-30 mls/kg    Malnutrition: Not noted    Nutrition Risk Level: moderate risk    Nutrition DX: inadequate intake r/t critical illness evidenced by NPO    Goals:  Tolerate least restrictive diet.    Plan:  Speech following for diet texture. Pt is at aspiration risk but pt not wanting TF.    Continue diet as ordered.    Monitor:   Po intake    See Care Plan for Problems, Goals, and Interventions.        "

## 2021-06-16 NOTE — PROGRESS NOTES
Speech Language/Pathology  Speech Therapy Daily Progress Note    Deferred treatment today d/t unable to address swallow goals as patient is NPO.     The ST Care Plan has been reviewed and current plan remains appropriate.        Nhung Carlson MS, CCC-SLP

## 2021-06-16 NOTE — PROGRESS NOTES
Brief icu note    Blood cultures positive for gram pos bacilli. Pt is on vanco and zoysn. Will repeat cultures to ensure resolution. He will need CT chest/abdomen/pelvis once more stable.    Ling Varela MD  3/9/2018

## 2021-06-16 NOTE — PROGRESS NOTES
Speech Language/Pathology  Speech Therapy Daily Progress Note    Patient presents as alert and cooperative during this session.  An  was not applicable.    Objective  Two sons in room.  Re-introduced myself as I have not seen this patient in awhile.  I explained our care plan and that we were working on compensatory strategy of coughing after each small bite or sip. One son stated that they have been cueing him to cough and that someone is always with him when he is eating. The other son reports that the patient will stimulate a strong, productive cough at times. I re-explained results of VFSS that was completed last week and stated that I would've recommended NPO with TF, but TF was not an option. Therefore the goal was to cue patient to cough, since a cough was not elicited with aspiration(silent aspirator). They verbalized understanding and Nixon verbalized limited understanding. Nixon stated that he has been dealing with swallowing issues since 1996.  Nixon also stated that he did not want swallow therapy at this time as his gallbladder is his primary concern. The plan is to go to TCU to get stronger and then to come back to get gall bladder removed. I verbalized understanding of his wishes and stated that it is important that he follow through with swallow strategies to make sure respiratory status does not become compromised. Son's and patient verbalized understanding. I also stated that I had requested repeat VFSS to see if current strategies are working and that he is clearing airway with cough. Patient and son declined VFSS at this time. They stated that if he needs one after gall bladder surgery or if he starts to decline, then they would like ST to re-evaluate swallow.  I verbalized understanding and stated that I would be documenting our conversation.  They agreed.    Pt did agree to practicing small sip with cough strategy. He was given cup and he was taking moderate sips and needed max cues to  cough after each swallow.  Strategies were typed up and printed. I taped it up on white board per son's and pt permission, just in case family not in room.    Assessment  Family has good understanding of swallow strategies.     Plan/Recommendations  Patient would like to hold off on ST until gall bladder is removed.     The ST Care Plan has been reviewed and recommend discontinue ST, per patient/family request.    25 dysphagia minutes     Nhung Carlson MS, CCC-SLP

## 2021-06-16 NOTE — PROGRESS NOTES
Progress Note    Assessment/Plan  Cholecystitis, status post cholecystostomy tube.  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    Mixed hyperlipidemia    Hypothyroidism    Coronary Artery Disease    Other autoimmune hemolytic anemias    Thrombocytopenia    Septic shock    Neutropenia associated with infection    Gram-negative bacteremia    Metabolic acidosis with respiratory acidosis    Hemorrhage    Atrial fibrillation with rapid ventricular response    Lactic acidosis    Hypocalcemia    Acute respiratory failure with hypoxia and hypercapnia    Bacteremia due to Gram-negative bacteria    Acute pulmonary edema    Abdominal pain, unspecified abdominal location    Fecal impaction    Cholecystitis      Subjective  Comfortable, denies pain  Objective    Vital signs in last 24 hours  Temp:  [97.5  F (36.4  C)-98.3  F (36.8  C)] 98.3  F (36.8  C)  Heart Rate:  [] 56  Resp:  [19-24] 24  BP: ()/(53-64) 132/61  Weight:   194 lb 4.8 oz (88.1 kg)    Intake/Output last 3 shifts  I/O last 3 completed shifts:  In: 1647.1 [P.O.:200; I.V.:1277.1; IV Piggyback:170]  Out: 1250 [Urine:1190; Emesis/NG output:60]  Intake/Output this shift:         Physical Exam  Cholecystostomy tube in place, patient awake and conversant    Pertinent Labs   Lab Results   Component Value Date    WBC 19.2 (H) 03/17/2018    HGB 7.8 (L) 03/17/2018    HCT 25.6 (L) 03/17/2018     (H) 03/17/2018    PLT 59 (L) 03/17/2018             Pertinent Radiology     Ir Cholangiogram Exisiting Access    Result Date: 3/16/2018  [facility] CHOLECYSTOGRAM 3/16/2018 8:54 AM ATTENDING: David Franco MD. INDICATION: evaluation of cholecystostomy tube, patency and rule out cholelithaisis, in preparation for surgery MODERATE SEDATION: None. ANTIBIOTICS: None. ADDITIONAL MEDICATIONS: None. FLUOROSCOPIC TIME: 2.4 mins. DOSE: Air Kerma: 176 mGy. CONTRAST: 50 mL  Omnipaque intrabiliary/intraenteric. COMPLICATIONS: No immediate complications. PROCEDURE: Contrast was injected through the existing drainage catheter and images obtained. The patient tolerated the procedure well without immediate complications. FINDINGS: CHOLECYSTOGRAM: The cholecystostomy tube is in satisfactory position. There is no extravasation or leakage of contrast. Large filling defect within the gallbladder, appearance consistent with clot. Late filling of the cystic duct.     Large filling defect within the gallbladder, appearance consistent with clot. CPT: 44479 The CPT codes are for physician reference only.          Porter Troncoso

## 2021-06-16 NOTE — PROGRESS NOTES
"Hospitalist Progress Note    Assessment/Plan  88 y.o. old male with PMH of CAD, status post CABG and postoperative CVA, HTN, CLL recently started on Imbruvica, h/o thyroid cancer, s/p thyroidectomy and radioactive iodine treatment, now on levothyroxine, prostate cancer in 1992, s/p prostatectomy, multiple skin cancers who presented with sepsis and septic shock     1.  Gram negative bacilli bacteremia, sepsis.  3 sets of blood cultures from 3/9 show GNB.  Urine Cx from 3/9 was negative but it was obtained after Zosyn and Vancomycin x 1 were given.  CT abdomen is suggestive of cholecystitis which might be the source of infection. Consider abdominal US and IR evaluation.  Continue ZOsyn, consider stopping Vanco.   2.  Septic shock.  Resolving. Off vasopressin, titrating down levophed. On stress dose steroids.   3.  FRANC. Probably combination of prerenal and ATN in the setting of sepsis. Creatinine is trending up and UO was low overnight but improved after IV Lasix. Continue to monitor closely.  4.  CLL with recent relapse,  recently started on Imbruvica.  Usually follows with Dr. Pennington.   5.  AIHA.  Diagnosed during previous admission in January 2018.   6. Severe neutropenia. Improved after starting Granix.   7.  CAD, status post CABG in 1996 with postoperative CVA with mild residual slurred speech and right-sided numbness.  No recent angina.  Normal LV systolic function. Continue statin and Plavix    Barriers to Discharge : multiple as above   Anticipated Discharge :multiple day stay   Disposition :TBD      Subjective  Patient reports having had a \"rough night\" due to generalized pain. He feels better today. Has some abdominal discomfort. Denies shortness of breath. Off BiPAP. Went into AFib with RVR last night,  UO better after IV Lasix       Objective    Vital signs in last 24 hours  Temp:  [97.1  F (36.2  C)-99.8  F (37.7  C)] 97.6  F (36.4  C)  Heart Rate:  [] 110  Resp:  [9-50] 28  BP: ()/(45-80) " 111/78  FiO2 (%):  [40 %] 40 % 100% O2 Device: OxyMask O2 Flow Rate (L/min): 4 L/min  Weight:   200 lb 12.8 oz (91.1 kg) Weight change: -6 lb 1.6 oz (-2.767 kg)    Intake/Output last 3 shifts  I/O last 3 completed shifts:  In: 2594 [I.V.:1404; IV Piggyback:1190]  Out: 720 [Urine:720]  Intake/Output this shift:  I/O this shift:  In: -   Out: 965 [Urine:965]    Physical Exam:  GENERAL: No acute distress, sitting on the edge of the bed   HEENT: Moist mucous membranes  CARDIAC: Irregular, tachycardia,  S1 & S2 normal.  No gallops or murmurs. No edema  LUNGS: Crackles over bases bilaterally   ABDOMEN: Soft, non-tender, bowel sounds present all quadrants  : Mora catheter with leilani color urine   SKIN: Multiple bruises over arms   NEURO: Awake and alert, answers appropriately      Pertinent Labs   Lab Results: personally reviewed.     Results from last 7 days  Lab Units 03/10/18  0531 03/09/18  1016 03/09/18  0457 03/07/18  0853   LN-SODIUM mmol/L 141  --  142 141   LN-POTASSIUM mmol/L 4.7 3.7 3.8 3.4*   LN-CHLORIDE mmol/L 114*  --  110* 106   LN-CO2 mmol/L 18*  --  20* 28   LN-BLOOD UREA NITROGEN mg/dL 45*  --  28 20   LN-CREATININE mg/dL 1.63*  --  1.40* 0.88   LN-CALCIUM mg/dL 6.8*  --  7.7* 8.5   LN-ALBUMIN g/dL 2.8*  --  3.2* 3.7   LN-PROTEIN TOTAL g/dL  --   --  5.2* 5.5*   LN-BILIRUBIN TOTAL mg/dL  --   --  1.6* 0.9   LN-ALKALINE PHOSPHATASE U/L  --   --  39* 35*   LN-ALT (SGPT) U/L  --   --  11 <9   LN-AST (SGOT) U/L  --   --  15 8   LN-MAGNESIUM mg/dL 2.5 1.4*  --   --        Results from last 7 days  Lab Units 03/10/18  0531 03/09/18  0457 03/07/18  0853   LN-WHITE BLOOD CELL COUNT thou/uL 36.9* 32.4* 70.3*   LN-HEMOGLOBIN g/dL 8.6* 8.7* 9.7*   LN-HEMATOCRIT % 28.2* 29.0* 31.8*   LN-PLATELET COUNT thou/uL 36* 48* 82*   LN-MONOCYTES - REL (DIFF) % 2 0* 0*       Results from last 7 days  Lab Units 03/09/18 0457   LN-TROPONIN I ng/mL 0.04       Medications  Scheduled Meds:    hydrocortisone sod succ  100 mg  Intravenous Q8H     insulin aspart (NovoLOG) injection   Subcutaneous Q4H FIXED TIMES     pantoprazole (PROTONIX) IVPB 40 mg  40 mg Intravenous QAM AC    Or     omeprazole  20 mg Oral QAM AC    Or     omeprazole  20 mg Enteral Tube QAM AC     piperacillin-tazobactam  4.5 g Intravenous Q6H     senna-docusate  1 tablet Oral BID    Or     senna (SENOKOT) syrup  8.8 mg Enteral Tube BID     sodium chloride  10-30 mL Intravenous Q8H FIXED TIMES     sodium chloride  3 mL Intravenous Line Care     tbo-filgrastim (NEUPOGEN) injection  480 mcg Subcutaneous DAILY     vancomycin  1.5 g Intravenous Q24H     Continuous Infusions:    insulin regular infusion 0.5 unit/mL       norepinephrine IV infusion in NS 6 mcg/min (03/10/18 0900)     nacl 0.9% 25 mL/hr (03/10/18 0023)     vasopressin Stopped (03/10/18 0930)     PRN Meds:.acetaminophen, bacitracin, benzocaine-menthol, bisacodyl, dextrose 50 % (D50W), fentaNYL pf, glucagon (human recombinant), insulin regular infusion 0.5 unit/mL, labetalol, magnesium hydroxide, naloxone **OR** naloxone, ondansetron **OR** ondansetron, polyvinyl alcohol, sodium chloride, sodium chloride, sodium chloride    Pertinent Radiology   Radiology Results: Personally reviewed image/s  CT CHEST, ABDOMEN, AND PELVIS  3/10/2018 1:01 PM  1.  Exam is mildly compromised by some respiratory motion. Even given this, gallbladder appears abnormal with edematous wall and mild stranding in the adjacent fat. Further evaluation with ultrasound is recommended as the findings are suspicious for   cholecystitis.  2.  Small bilateral pleural effusions with atelectasis in both lungs.  3.  Mediastinal, and retroperitoneal lymph nodes are in the upper range of normal in size and decreased compared to the prior exam. The spleen is also decreased in size compared to the prior exam.  4.  Moderate to severe calcified atherosclerotic plaque in the visualized arteries with small right common iliac artery aneurysm.  5.  Colonic  diverticulosis without evidence of diverticulitis.  6.  Right inguinal hernia containing fat and fluid.  7.  Changes of prostatectomy and pelvic lymph node dissection.  8.  Groundglass nodule in the right upper lobe is grossly stable though not well evaluated due to motion.    TTE    Left Ventricle: Normal left ventricular size.The estimated left ventricular ejection fraction is 60%. This represents a normal ejection fraction. Mild hypertrophy noted. E/e' > 15, suggesting elevated LV filling pressures.    Left Atrium: Left atrial volume is moderately increased.    Mitral Valve: There is moderate mitral annular calcification. Moderate mitral regurgitation.    Mild aortic stenosis. Mild aortic regurgitation    Normal right ventricular size and systolic function.    No pulmonary hypertension present.    Estimated central venous pressure equal to 13 mmHg.    Advanced Care Planning:  Treatment/Discharge Planning discussed with the patient, family, ICU team    Total time with this patient is 35 minutes with greater than 50% of time spent in coordination of care.      Darcy Rubalcava MD  Internal Medicine Hospitalist  3/10/2018

## 2021-06-16 NOTE — PROGRESS NOTES
PULMONARY / CRITICAL CARE PROGRESS NOTE    Date / Time of Admission:  3/9/2018  4:34 AM    Assessment:   1. Dyspnea  2. Sepsis due to acute cholecystitis with E coli bacteremia   S/p cholecystostomy tube placement. Off pressors.   Negative follow up blood cultures.   3. Acute cholecystitis s/p percutaneous cholecystostomy tube placement 3/10/2018  Surgery consult to assess patient for cholecystectomy   4. S/p replacement of cholecystostomy tube placement on 3/11/2018 due to obstruction secondary to blood clot  F/u cholangiogram showed large filling defect in GB likely clot, late filling of cystic duct. Catheter is draining and working. Continue flushes per IR recommendation.   5. Abdominal pain  F/u abdomen/pelvis CT scan showed interval cholecystostomy tube placement. There is a small amount of perihepatic hemorrhage and slight amount of hyperdense ascites consistent with mild intraperitoneal blood. No localized hematoma. Fecal impaction. Stable H/H.    F/u cholangiogram showed no extravasation of leaking of contrast.  6. Resolved acute kidney injury  7. Resolving hypernatremia  8. Anemia s/p RBC transfusion  9. Chronic thrombocytopenia  10. Resolved atrial fibrillation with RVR  On amiodarone drip. On metoprolol XL oral.   Cardiology service following, recommending to change IV to oral amiodarone.   11. Hx Ischemic cardiomyopathy s/p CABG  12. Moderate MR, mild AS  13. Dysphagia, diet was modify per speech therapy recommendations  Due to persistent cough with thick fluid, Speech therapy will be contacted for re-evaluation  14. 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 DM  16. Hx CVA  17. Hx Prostate cancer s/p prostatectomy  18. Hx thyroid cancer s/p thyroidectomy    Advance Directives:  DNR    Plan:   1. Titrate FiO2  2. Pulmonary toiletting  3. Decrease rate of D5W  4. Continue diuresis   5. On oral metoprolol  6. Currently on amiodarone drip, plan to start oral  amiodarone  7. Cardiology service following  8. Continue to titrate down stress dose steroids (pt is on chronic prednisone 10 mg daily)   9. Continue zosyn  10. Follow up Blood cultures  11. Follow up surgery service recommendations   12. Diet per speech therapy recommendation  13. If patient is not meeting minimal calorie requirements, consideration for NJ tube placement and start tube feedings.   14. PPI for GI prophylaxis  15. Glucose level monitoring  16. DVT prophylaxis SCDs  17. PT, OT evaluation   18. Palliative care following     ICU team will sign off.   Please contact me if you have any questions.    Sarthak Pritchard  Pulmonary / Critical Care  3/17/2018   9:41 AM    Subjective:   HPI:  Nixon Velasquez Jr. is a 88 y.o. 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   Patient was admitted with diagnosis of septic shock. Chest / Abdomen / pelvis CT scan with suspicion for cholecystitis. RUQ US showed acalculous cholecystitis. Positive blood Cx with Gram Negative rods. Later identified as E coli. S/p cholecystostomy tube placement 3/10, clogged drain with blood clots, new cholecystostomty was placed. Patient was liberated from pressors.     Events overnight  - A fib rate control, on amiodarone drip  - Denies abdominal pain.   - Improvement of mental status, afebrile, decrease appetite.   - Cough with thick fluids    Allergies: Dairy aid [lactase]; Benzonatate; Blood-group specific substance; Ciprofloxacin; Ephedrine sulfate; Erythromycin base; Hyoscyamine sulfate; Lactose; Methocarbamol; Terazosin; and Warfarin     MEDS:  Scheduled Meds:    folic acid (FOLVITE) IVPB  1 mg Intravenous DAILY     furosemide  20 mg Intravenous DAILY     hydrocortisone sod succ  50 mg Intravenous  "Q12H     insulin aspart (NovoLOG) injection   Subcutaneous TID with meals     insulin aspart (NovoLOG) injection   Subcutaneous QHS     levothyroxine  150 mcg Oral Daily 0600     magnesium oxide  400 mg Oral TID     metoprolol succinate  12.5 mg Oral Once     metoprolol succinate  25 mg Oral DAILY     OLANZapine  7.5 mg Oral QHS     pantoprazole (PROTONIX) IVPB 40 mg  40 mg Intravenous QAM AC    Or     omeprazole  20 mg Oral QAM AC     oxyCODONE intensol (ROXICODONE) conc solution 20 mg/mL  5 mg Sublingual QID     piperacillin-tazobactam  4.5 g Intravenous Q6H     senna-docusate  1 tablet Oral BID     sodium chloride  10-30 mL Intravenous Q8H FIXED TIMES     sodium chloride  3 mL Intravenous Line Care     tiZANidine  2 mg Oral TID     Continuous Infusions:    amiodarone 900 mg/500 ml standard 24 hour infusion 0.5 mg/min (03/16/18 1725)     dextrose 5% 75 mL/hr (03/17/18 0637)     norepinephrine IV infusion in NS Stopped (03/12/18 1000)     vasopressin Stopped (03/11/18 0345)     PRN Meds:.acetaminophen, acetaminophen, aluminum-magnesium hydroxide-simethicone, bacitracin, benzocaine-menthol, bisacodyl, dextrose 50 % (D50W), fentaNYL pf, glucagon (human recombinant), HYDROmorphone, labetalol, magnesium hydroxide, melatonin, naloxone **OR** naloxone, ondansetron **OR** ondansetron, oxyCODONE intensol (ROXICODONE) conc solution 20 mg/mL, polyvinyl alcohol, sodium chloride, sodium chloride, sodium chloride, sodium phosphates 133 mL    Objective:   VITALS:  /61  Pulse (!) 53  Temp 97.8  F (36.6  C) (Oral)   Resp 24  Ht 6' 3\" (1.905 m)  Wt 194 lb 4.8 oz (88.1 kg)  SpO2 96%  BMI 24.29 kg/m2  EXAM:   Gen: awake, alert, mild distress secondary to generalized weakness  HEENT: pale conjunctiva, moist mucosa  Neck: no thyromegaly, masses or JVD  Lungs: ronchi both HT  CV: irregular, systolic murmur 2/6, no gallops appreciated  Abdomen: soft, RUQ drain, no further bleeding around drain, ecchymosis right flank, BS " decrease in frequency  Ext: no edema  Neuro:awake, alert, decrease strength upper and lower extremities     I&O:      Intake/Output Summary (Last 24 hours) at 03/17/18 0941  Last data filed at 03/17/18 0629   Gross per 24 hour   Intake           1547.1 ml   Output             1250 ml   Net            297.1 ml       Data Review:    Results from last 7 days  Lab Units 03/17/18  0401   LN-WHITE BLOOD CELL COUNT thou/uL 19.2*   LN-HEMOGLOBIN g/dL 7.8*   LN-HEMATOCRIT % 25.6*   LN-PLATELET COUNT thou/uL 59*       Results from last 7 days  Lab Units 03/17/18  0401   LN-SODIUM mmol/L 143   LN-POTASSIUM mmol/L 3.6   LN-CHLORIDE mmol/L 107   LN-CO2 mmol/L 28   LN-BLOOD UREA NITROGEN mg/dL 23   LN-CREATININE mg/dL 0.81   LN-CALCIUM mg/dL 7.1*     XR CHEST 1 VIEW PORTABLE 3/12/2018 5:19 AM  INDICATION: septic shock, respiratory failure, pneumonia, volume overload.  COMPARISON: 03/11/2018   FINDINGS: Considerable increase in central congestion and diffuse interstitial alveolar infiltrate throughout both hemithoraces. Similar small effusions. Remainder stable.    CT ABDOMEN WO ORAL WO IV CONTRAST 3/12/2018 8:19 PM  INDICATION: Cholecystitis / cholangitis worsening abdominal pain , post cholecystostomy tube placement, dropping H/H  COMPARISON: 03/10/2018  FINDINGS:  LUNG BASES: Minimal change in the significant dependent infiltrate/atelectasis as well as small bilateral pleural effusions. Large hiatal hernia present, unchanged.  ABDOMEN: Interval placement of cholecystostomy tube with dense residual contrast within the gallbladder. There appears to be a small layer of high density hemorrhage about the liver capsule.] Minimal hyperdense ascites present suggesting slight   hemoperitoneum, there is no localized hematoma. Liver otherwise appears normal. Pancreas, spleen, adrenals and kidneys are negative. Mild ascites within both right and left gutters. Right inguinal hernia contains fluid with a slight increase in the hematocrit  level. Mora catheter in place, previous prostatectomy. Sigmoid diverticulosis.  MUSCULOSKELETAL: Negative.  CONCLUSION:  1.  Interval cholecystectomy tube placement. There is a small amount of perihepatic hemorrhage and slight amount of hyperdense ascites consistent with mild intraperitoneal blood. No localized hematoma.    Echocardiogram 3/9/2018    Left Ventricle: Normal left ventricular size.The estimated left ventricular ejection fraction is 60%. This represents a normal ejection fraction. Mild hypertrophy noted. E/e' > 15, suggesting elevated LV filling pressures.    Left Atrium: Left atrial volume is moderately increased.    Mitral Valve: There is moderate mitral annular calcification. Moderate mitral regurgitation.    Mild aortic stenosis. Mild aortic regurgitation    Normal right ventricular size and systolic function.    No pulmonary hypertension present.    Estimated central venous pressure equal to 13 mmHg.    XR SWALLOW STUDY W SPEECH 3/12/2018 1:30 PM  INDICATION: Dysphagia.  TECHNIQUE: Routine.  COMPARISON: None.  FINDINGS:   FLUOROSCOPIC TIME: 1.3 minutes  NUMBER OF IMAGES: 6  Swallow study with Speech Pathology using multiple barium thicknesses.   Puree and honey consistency both demonstrated significant piriform sinus stasis.  [place] consistency demonstrated stasis and kickback aspiration which resulted in only a minimal throat clear.  Thin liquid demonstrated delay in swallow reflex with direct, silent aspiration.    CHOLECYSTOGRAM  3/16/2018 8:54 AM  INDICATION: evaluation of cholecystostomy tube, patency and rule out cholelithaisis, in preparation for surgery  MODERATE SEDATION: None.  ANTIBIOTICS: None.  ADDITIONAL MEDICATIONS: None.  FLUOROSCOPIC TIME: 2.4 mins.  DOSE: Air Kerma: 176 mGy.  COMPLICATIONS: No immediate complications.  FINDINGS:   CHOLECYSTOGRAM: The cholecystostomy tube is in satisfactory position. There is no extravasation or leakage of contrast. Large filling defect within the  gallbladder, appearance consistent with clot. Late filling of the cystic duct.  IMPRESSION:   Large filling defect within the gallbladder, appearance consistent with clot.     By:  Sarthak Pritchard, 3/17/2018, 9:42 AM    Primary Care Physician:  Riley Cuevas MD

## 2021-06-16 NOTE — CONSULTS
[facility] Hematology and Oncology Inpatient Consult Note    Patient: Nixon Velasquez Jr.  MRN: [id number]  Date of Service: 3/9/2018      Reason for Visit    I was consulted by  regarding hemolytic anemia, relapsed CLL    Assessment/Plan     #.  Relapsed CLL  #.  Autoimmune hemolytic anemia  #.  Severe neutropenia  #.  Worsening thrombocytopenia  #.  Severe sepsis with gram-positive bacilli by Gram stain in blood culture  #.  Acute kidney injury     Reviewed his hemogram and chemistry.  His fairly acute change in anemia, thrombocytopenia and neutropenia are likely secondary to severe sepsis.  Will start him on GCSF due to severe neutropenia and severe sepsis with hemodynamic instability.  Agree with continuing broad-spectrum antibiotic at this point.  Source of infection is not clearly identified.  I suspect acute kidney injury is likely secondary to severe sepsis.  Although TTP is not excluded, it appears less likely.       Plan:  -G-CSF daily until ANC greater than 1000 for 2 days or so.  -Continue broad-spectrum antibiotics.  Will follow blood cultures.  - Agree with continue getting stress dose hydrocortisone at this point.  -Will request peripheral blood morphology.  -Hold ibrutinib  -We will follow.     ______________________________________________________________________________    History    Mr. Nixon Velasquez Jr. is a very pleasant 88-year-old gentleman with relapsed CLL, autoimmune hemolytic anemia who is currently in critical care unit with severe sepsis.  He reported that he certainly feeling sick yesterday with chills and nausea without vomiting.  He denies cough, shortness of breath, chest pain, or diarrhea.  Upon admission, his temperature was noted to be 105.1, hypotension, tachycardia, tachypnea, elevated lactic acid, and acute kidney injury.  He was requiring 2 vasopressors earlier today and now he is on no therapy.    He recently started on ibrutinib on 2/26/2018. He reported that he  has some nausea and abdominal discomfort after starting ibrutinib, otherwise he tolerated fairly well.  He has been also tapering prednisone 10 mg weekly for his autoimmune hemolytic anemia and he was on 20 mg once daily prior to admission.  He had a follow-up 2 days ago and his labs were fairly good.  He was noted to have neutropenia with ANC of 300 from 3.2, hemoglobin 8.7 from 9.7, platelet 48 from 80 2 days ago.  Noted elevated pro-calcitonin.  Chest x-ray upon admission showed stable peripheral consolidation of the right lower lobe and groundglass opacity in the right upper lobe which are fairly unchanged from previous CT scan a month ago.  Urinalysis was unremarkable.  1 of the blood cultures was positive for gram-positive bacilli by Gram stain.    He currently denies any discomfort.  He is in sitting position in his bed.  He has some bruises from recent fall.  Denies any pain.  He is comversating very well with his family as well.    Review of Systems    Apart from describing in history, the remainder of comprehensive ROS was negative.      Past History    Past Medical History:   Diagnosis Date     Cataract     bilateral     Foot fracture, right      History of transfusion      Hypertension      Inguinal hernia      Leukemia      PE (pulmonary embolism)      Pneumonia      Prostate cancer      Skin cancer      Thyroid cancer     Family History   Problem Relation Age of Onset     Prostate cancer Father      Cancer Father      Alzheimer's disease Father      Parkinsonism Mother      Diabetes Brother      Alzheimer's disease Sister      No Medical Problems Daughter      No Medical Problems Daughter      Parkinsonism Son      No Medical Problems Son      No Medical Problems Son      No Medical Problems Son      Parkinsonism Son      No Medical Problems Son      No Medical Problems Son      No Medical Problems Son       Past Surgical History:   Procedure Laterality Date     CATARACT EXTRACTION, BILATERAL       MA  REANOMAL CORON ART PA ORIGIN BY GRAFT      Description: Anomalous Coronary Artery Graft;  Proc Date: 01/01/1996;     NC REMV PROSTATE,RETROPUB,RAD,LTD NODES      Description: Prostatectomy Retropubic Radical With Lymph Node Biopsy(S);  Recorded: 09/13/2007;    Social History     Social History     Marital status:      Spouse name: N/A     Number of children: N/A     Years of education: N/A     Occupational History     Not on file.     Social History Main Topics     Smoking status: Never Smoker     Smokeless tobacco: Never Used     Alcohol use No     Drug use: No     Sexual activity: No     Other Topics Concern     Not on file     Social History Narrative        Allergies    Allergies   Allergen Reactions     Dairy Aid [Lactase]      Dairy products- severe shaking in legs     Benzonatate      Blood-Group Specific Substance      Warm autoantibodies present. Expect up to 24 hour delay in blood for transfusion. Draw 2 lavender and 1 red tube for type and screen orders   Notify Blood bank as soon as possible if transfusions are needed.     Ciprofloxacin      Dairy      Severe shaking in legs.     Ephedrine Sulfate      Erythromycin Base      Hyoscyamine Sulfate      Lactose      Methocarbamol      Terazosin      Warfarin         Physical Exam    Recent Vitals 3/9/2018   Height -   Weight -   BSA (m2) -   BP 86/51   Pulse 73   Temp 99.8   Temp src 1   SpO2 94   Some recent data might be hidden     General: alert, awake, not in acute distress  HEENT: Head: Normal, normocephalic, atraumatic.  Eye: Normal external eye, conjunctiva, lids cornea, ROSENDO.  Ears: Non-tender.  Nose: Normal external nose, mucus membranes and septum.  Pharynx: Normal buccal mucosa. Normal pharynx.  Neck / Thyroid: Supple, no masses, nodes, nodules or enlargement.  Lymphatics: No abnormally enlarged lymph nodes.  Chest: Normal chest wall and respirations. Clear to auscultation.  Heart: S1 S2 RRR, no murmur.   Abdomen: abdomen is soft without  significant tenderness, masses, organomegaly or guarding  Extremities: normal strength, tone, and muscle mass  Skin: normal. no rash or abnormalities  CNS: non focal.  Except slurred speech      Lab Results    Results for orders placed or performed during the hospital encounter of 03/09/18   Comprehensive Metabolic Panel   Result Value Ref Range    Sodium 142 136 - 145 mmol/L    Potassium 3.8 3.5 - 5.0 mmol/L    Chloride 110 (H) 98 - 107 mmol/L    CO2 20 (L) 22 - 31 mmol/L    Anion Gap, Calculation 12 5 - 18 mmol/L    Glucose 179 (H) 70 - 125 mg/dL    BUN 28 8 - 28 mg/dL    Creatinine 1.40 (H) 0.70 - 1.30 mg/dL    GFR MDRD Af Amer 58 (L) >60 mL/min/1.73m2    GFR MDRD Non Af Amer 48 (L) >60 mL/min/1.73m2    Bilirubin, Total 1.6 (H) 0.0 - 1.0 mg/dL    Calcium 7.7 (L) 8.5 - 10.5 mg/dL    Protein, Total 5.2 (L) 6.0 - 8.0 g/dL    Albumin 3.2 (L) 3.5 - 5.0 g/dL    Alkaline Phosphatase 39 (L) 45 - 120 U/L    AST 15 0 - 40 U/L    ALT 11 0 - 45 U/L     Results for orders placed or performed during the hospital encounter of 03/09/18   HM1 (CBC with Diff)   Result Value Ref Range    WBC 32.4 (HH) 4.0 - 11.0 thou/uL    RBC 2.53 (L) 4.40 - 6.20 mill/uL    Hemoglobin 8.7 (L) 14.0 - 18.0 g/dL    Hematocrit 29.0 (L) 40.0 - 54.0 %     (H) 80 - 100 fL    MCH 34.4 (H) 27.0 - 34.0 pg    MCHC 30.0 (L) 32.0 - 36.0 g/dL    RDW 23.9 (H) 11.0 - 14.5 %    Platelets 48 (LL) 140 - 440 thou/uL    MPV 12.5 8.5 - 12.5 fL        Imaging Results    Xr Chest 1 View Portable    Result Date: 3/9/2018  XR CHEST 1 VIEW PORTABLE 3/9/2018 5:19 AM INDICATION: Fever COMPARISON: CT 02/13/2018 FINDINGS: Poor ventilatory effort with increasing compressive and congestive changes. Probable pulmonary edema or congestive heart failure. Similar to perhaps slight increase in left pleural effusion. Stable peripheral consolidation right lower lobe. Groundglass opacity right upper lobe better seen on CT. Remainder stable. Follow-up recommended.    Xr Chest 1  View For Picc Placement Portable    Result Date: 3/9/2018  XR CHEST 1 VIEW FOR PICC LINE PLACEMENT PORTABLE 3/9/2018 7:35 AM INDICATION: verify catheter placement COMPARISON: 03/09/2018 FINDINGS: There is been interval placement of a right-sided PICC with the distal tip within the mid superior vena cava. Multiple median sternotomy wires are seen. Low lung volumes are again noted with diffuse areas of airspace consolidation, unchanged from the prior examination.. Small pleural effusions are probable bilaterally. There is no pneumothorax.    Ct Chest Abdomen Pelvis Without Oral With Iv Contrast    Result Date: 2/14/2018  CT CHEST, ABDOMEN, AND PELVIS WO ORAL W IV CONTRAST 2/13/2018 2:07 PM      INDICATION: CLL, dyspnea, abdominal pain. TECHNIQUE: CT chest, abdomen, and pelvis. Dose reduction techniques were used. IV CONTRAST: Iohexol (Omni) 100 mL. COMPARISON: CT chest 5/30/2016, CT abdomen and pelvis 3/3/2017. FINDINGS: CHEST: 1.5 cm ground-glass nodule in the right upper lobe on image 33, not appreciably changed. Calcified granuloma in the right lower lobe. Small bilateral pleural effusions are new, with compressive atelectasis in the lower lobes. Some presumed scarring in the lateral right lung base is unchanged. A few calcified pleural plaques present. Prior CABG. There are several mildly prominent bilateral axillary and mediastinal lymph nodes, all of which are under 1 cm in short axis. Large hiatal hernia.  ABDOMEN: Mild splenomegaly. The spleen measures 14 cm in craniocaudal length compared to previous 15.6 cm. A small benign cyst and hemangioma in the liver. Otherwise normal liver, pancreas, and adrenal glands. Small renal cysts. Mild nonspecific gallbladder wall edema. The gallbladder is decompressed. Mildly enlarged retroperitoneal, mesenteric, and chris hepatic lymph nodes. The lymph nodes have significantly decreased in size when compared to 3/30/2017. For example, a left periaortic lymph node on image 286  measures 13 mm in short axis, previously 22 mm. PELVIS: Prostatectomy. Sigmoid colonic diverticulosis. Right inguinal hernia containing fat and fluid. There is mild external iliac and inguinal lymphadenopathy. MUSCULOSKELETAL: Negative.     CONCLUSION: 1.  Mild lymphadenopathy in the chest, abdomen, and pelvis as well as splenomegaly have all improved when compared to 3/3/2017. 2.  New small bilateral pleural effusions. 3.  Stable 1.5 cm ground-glass nodule in the right upper lobe compared to 5/30/2016.        Signed by: Nelia Milner MD

## 2021-06-16 NOTE — PROGRESS NOTES
HR is 130s to 149 and BP 76/50.  400ml reynold blood from abdomen drain. Patient pale and feels nauseated. Notified Dr. Nixon.  CBC to be drawn.  Orders in for 1 unit platelets to be transfused.  Marsha Lucas

## 2021-06-16 NOTE — PROGRESS NOTES
Speech Language/Pathology  Speech Therapy Daily Progress Note    Patient presents as cooperative during this session. Alertness fluctuated, easily awoke to voice but fell back asleep quickly. Patient and family report patient had poor sleep last night.   An  was not applicable.    Objective  Swallow: Current plan remains in place with patient consuming mechanical soft with thin liquids per patient and family preference despite risks of aspiration. They report no questions or concerns with current plan or any questions regarding strategies. Educated on plan to complete VFSS when patient fully alert and cooperative. Improved potential today but patient and family decline due to fatigue. Updated nursing with plan.     Assessment  Will f/u later today or early tomorrow regarding possibility of repeat VFSS to further assess strategies and safest diet textures.    Plan/Recommendations  Repeat VFSS when appropriate. May be done at d/c pending d/c plan.     The  Care Plan has been reviewed and current plan remains appropriate.    12 dysphagia minutes     Funmilayo Gutierrez MA, CCC-SLP

## 2021-06-16 NOTE — PROGRESS NOTES
Speech Language/Pathology  Bedside Swallow Evaluation    Problem:  Patient Active Problem List   Diagnosis     Restless Legs Syndrome     Corrosive Esophagitis     Hiatal Hernia     CLL (chronic lymphoid leukemia) in relapse     Mixed hyperlipidemia     Cerebral atherosclerosis     Adenocarcinoma Of The Prostate Gland     Papillary Carcinoma Of The Thyroid Gland     Hypothyroidism     Type 2 Diabetes Mellitus     Coronary Artery Disease     Cervical Radiculopathy     Iron Deficiency     Hypoxia     Advanced directives, counseling/discussion     Infection of ear lobe, right     Right ear pain     Benign essential HTN     Anemia     Other autoimmune hemolytic anemias     Thrombocytopenia     Squamous cell carcinoma of skin     Sepsis     Septic shock     Neutropenia associated with infection     Gram-negative bacteremia     Metabolic acidosis with respiratory acidosis     Hemorrhage     Atrial fibrillation with rapid ventricular response     Lactic acidosis     Hypocalcemia     Acute respiratory failure with hypoxia and hypercapnia       Onset date: 3/9/18  Reason for evaluation: assess aspiration risk  Pertinent History: Pt has a history of dysphagia and had a VFSS on 3/13/17, it was recommended that he have a diet of regular and nectar thick liquids. Pt did not follow recommendations at home.   Current Diet: NPO  Baseline Diet: regular and thin    Patient presents as awake during this session.   An  was not applicable    Patient was given puree.    Dentition/Oral hygiene: [facility]    Oral motor function was moderately impaired.     Bolus prep and oral control was moderate to severely impaired . Pt did not initiate swallow for several minutes and most of bolus was removed from his mouth with a swab.     Anterior-Posterior transit was not observed .    Oral stasis occurred with puree.    Pharyngeal Phase:    Puree: Patient trialed 1 bite and presented with no s/s of aspiration. Initiation of swallow appeared  severely delayed. Hyolaryngeal movement appeared intact .       Assessment:    Patient presents with no signs and symptoms of aspiration with puree    Patient demonstrated severe oral and no pharyngeal dysphagia.    Rehab potential is good based on prior level of function, evaluation results and motivation and cooperation.    Recommendations:    Plan: NPO d/t severe oral holding and delay of swallow initiation. Suspect improvement with increased alertness.    Speech therapy 4-6 times per week    Referrals: video swallow study when participation improves.    35 dysphagia minutes       Radha Dia MA, CCC-SLP

## 2021-06-16 NOTE — PROGRESS NOTES
"[facility] - Progress Note  3/12/2018    CHART CHECK    O:  /58  Pulse (!) 118  Temp 98.7  F (37.1  C)  Resp 13  Ht 6' 3\" (1.905 m)  Wt 203 lb 11.2 oz (92.4 kg)  SpO2 99%  BMI 25.46 kg/m2      Imaging:  Cholangiogram 3/11/18:  FINDINGS:  The  image shows the existing cholecystostomy in appropriate position. An injection of contrast reveals the cholecystostomy lies within the gallbladder lumen. There is a large filling defect encompassing nearly the entire gallbladder which likely   represents hemorrhagic clot. Following exchange the new drainage catheter appears in appropriate position. Some of the clot has been successfully aspirated.     IMPRESSION:   The cholecystostomy lies within the gallbladder lumen however nearly the entire lumen is filled with a blood clot.     PLAN:  The catheter was exchanged over a wire and the gallbladder was irrigated gently. Would recommend flushing the catheter twice daily with sterile saline hopes of improving drainage.    Labs:  Lab Results   Component Value Date    WBC 21.6 (H) 03/12/2018    HGB 7.0 (L) 03/12/2018    HCT 21.6 (L) 03/12/2018     (H) 03/12/2018    PLT 34 (LL) 03/12/2018       Drain OP (in mL):  3/10 600   3/11 45     A:  87 yo male with PMH of CAD s/p CABG and postoperative CVA, HTN, CLL (recently started on Imbruvica), h/o thyroid cancer s/p thyroidectomy and radioactive iodine treatment, now on levothyroxine, prostate cancer in 1992 s/p prostatectomy, and multiple skin cancers who presented with E Coli sepsis and septic shock with suscpected source being acute acalculous cholecystitis s/p cholecystostomy tube placement 3/10/18.  Cira tube check 3/11/18 demonstrated gallbladder to be filled with clot, recommend continued BID flushing of cira tube with 10cc NS.  Gallbladder fluid culture: No growth to date.    P:  -continue recommended 10cc NS BID flushing of cira tube. Continue other cira tube cares.  -D/C cira tube " instructions entered into navigator.  -follow up cholangiogram in 2-4 weeks.  S[facility] will contact patient to arrange for follow up (either as IP or OP depending on timing)  -cira tube needs to be maintained for a min of 6 weeks unless cholecystectomy preformed prior.      Emilia Maurer PAROGERS  Interventional Radiology

## 2021-06-16 NOTE — PROGRESS NOTES
ICU Update Note    Pt with gram neg bacteremia  Gallbladder with edema and stranding    Too high risk for surgery  I have asked IR place perc drain    Will transfuse PLT prior to procedure due to thrombocytopenia at 36    Discussed with family    Shawna Cassidy DO  Pulmonary and Critical Care Attending  pgr [phone]

## 2021-06-16 NOTE — PROGRESS NOTES
Cardiology Progress Note    Assessment:  1.  Atrial fibrillation with increased ventricular response earlier in the hospitalization.  Patient treated with amiodarone and now maintaining sinus rhythm.  Metoprolol discontinued secondary to bradycardic tendency.  Further monitoring decisions regarding possible anticoagulation depending on surgical plans.  TSH within normal limits March 18, 2017.  Liver function tests within normal limits.  Will need to consider pulmonary function tests the patient remains on amiodarone long-term.  We will plan to have him follow-up in atrial fibrillation clinic.  Likely change to 200 mg of amiodarone at 2 weeks.    2.  Dyspnea/volume overload.  Patient was on IV Lasix which was discontinued.  Net urine output this admission 338 cc with net urine output yesterday of 1400 cc.  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    Mixed hyperlipidemia    Hypothyroidism    Coronary Artery Disease    Other autoimmune hemolytic anemias    Thrombocytopenia    Septic shock    Neutropenia associated with infection    Gram-negative bacteremia    Metabolic acidosis with respiratory acidosis    Hemorrhage    Atrial fibrillation with rapid ventricular response    Lactic acidosis    Hypocalcemia    Acute respiratory failure with hypoxia and hypercapnia    Bacteremia due to Gram-negative bacteria    Acute pulmonary edema    Abdominal pain, unspecified abdominal location    Fecal impaction    Cholecystitis    Encounter for family conference with patient present      Plan:  1.  Check EKG in sinus rhythm today  2.  Patient will require follow-up in the atrial fibrillation clinic upon discharge.    Subjective:   Nixon Velasquez Jr. is seen in follow-up today.  Chart record is reviewed.  History of CLL.  Patient admitted with septic shock related to acute cholecystitis secondary to E. coli bacteremia.  He is status post perc cholecystostomy tube.  Hospital course  complicated by atrial fibrillation with increased ventricular response.  He has been placed on amiodarone and currently maintaining sinus rhythm.    He reports feeling well from a cardiac standpoint.  He denies chest pain or shortness of breath.    Objective:   Vital signs in last 24 hours:  Temp:  [97.4  F (36.3  C)-98.7  F (37.1  C)] 98.1  F (36.7  C)  Heart Rate:  [54-58] 55  Resp:  [16-19] 18  BP: (118-143)/(58-71) 143/62  Weight:   (!) 229 lb 1.6 oz (103.9 kg)     Physical Exam:*    Neck: No JVD in the seated position.   Lungs: clear to auscultation, mildly diminished at the bases.   COR: RRR, soft systolic murmur   Abd: nondistended, BS present, Jeni tube in place.   Extrem: Mild edema at the ankles bilaterally.    Current Facility-Administered Medications   Medication Dose Route Frequency Provider Last Rate Last Dose     acetaminophen suppository 650 mg (TYLENOL)  650 mg Rectal Q4H PRN Denia Chapman CNP   650 mg at 03/14/18 0928     acetaminophen tablet 650 mg (TYLENOL)  650 mg Oral Q4H PRN Ling Varela MD   650 mg at 03/09/18 2215     aluminum-magnesium hydroxide-simethicone 200-200-20 mg/5 mL suspension 30 mL (MAALOX ADVANCED)  30 mL Oral Q4H PRN Arlin Galindo MD         amiodarone tablet 200 mg (PACERONE)  200 mg Oral BID Jerry Oseguera MD   200 mg at 03/21/18 0843     bacitracin ointment packet 1 packet  1 packet Topical Once PRN Kenny Ramirez MD         benzocaine-menthol lozenge 1 lozenge (CEPACOL)  1 lozenge Oral Q1H PRN Ling Varela MD         bisacodyl suppository 10 mg (DULCOLAX)  10 mg Rectal Daily PRN Ling Varela MD   10 mg at 03/12/18 0135     cefdinir capsule 300 mg (OMNICEF)  300 mg Oral BID 06-16 Valeria Swan MD   300 mg at 03/21/18 0621     dextrose 50 % (D50W) syringe 20-50 mL  20-50 mL Intravenous PRN Ling Varela MD   12.5 mL at 03/11/18 0839     folic acid tablet 1 mg (FOLVITE)  1 mg Oral QHS Sarthak Pritchard MD   1 mg at 03/20/18 1134     glucagon  (human recombinant) injection 1 mg  1 mg Subcutaneous PRN Ling Varela MD         HYDROmorphone injection 0.2-0.4 mg (DILAUDID)  0.2-0.4 mg Intravenous Q3H PRN Ruby Josue MD   0.3 mg at 03/15/18 0813     insulin aspart U-100 injection (NovoLOG)   Subcutaneous TID with meals Sarthak Pritchard MD   6 Units at 03/20/18 1711     insulin aspart U-100 injection (NovoLOG)   Subcutaneous QHS Sarthak Pritchard MD   2 Units at 03/18/18 2128     labetalol injection 5-10 mg (NORMODYNE,TRANDATE)  5-10 mg Intravenous Q4H PRN Shawna Cassidy DO   5 mg at 03/14/18 0927     levothyroxine tablet 150 mcg (SYNTHROID, LEVOTHROID)  150 mcg Oral Daily 0600 Bo Franks MD   150 mcg at 03/21/18 0621     magnesium hydroxide suspension 30 mL (MILK OF MAG)  30 mL Oral Daily PRN Ling Varela MD         melatonin tablet 3 mg  3 mg Oral Bedtime PRN Denia Gordon, CNP   3 mg at 03/20/18 0157     naloxone injection 0.2-0.4 mg (NARCAN)  0.2-0.4 mg Intravenous PRN Ruby Josue MD        Or     naloxone injection 0.2-0.4 mg (NARCAN)  0.2-0.4 mg Intramuscular PRN Ruby Josue MD         OLANZapine tablet 7.5 mg (zyPREXA)  7.5 mg Oral QHS Arlin Galindo MD   7.5 mg at 03/20/18 2113     omeprazole capsule 20 mg (PriLOSEC)  20 mg Oral QAM AC Ling Varela MD   20 mg at 03/21/18 0843     ondansetron injection 4 mg (ZOFRAN)  4 mg Intravenous Q4H PRN Ling Varela MD   4 mg at 03/10/18 1722    Or     ondansetron tablet 8 mg (ZOFRAN)  8 mg Oral Q8H PRN Ling Varela MD         oxyCODONE 5 mg/0.25 mL concentrated solution 5 mg (ROXICODONE)  5 mg Sublingual Q3H PRN Denia Gordon CNP   5 mg at 03/20/18 1033     oxyCODONE 5 mg/0.25 mL concentrated solution 5 mg (ROXICODONE)  5 mg Sublingual QID Denia Gordon CNP   5 mg at 03/21/18 0854     polyvinyl alcohol 1.4 % ophthalmic solution 1-2 drop (LIQUIFILM TEARS)  1-2 drop Both Eyes Q1H PRN Ling Varela MD         predniSONE tablet  20 mg (DELTASONE)  20 mg Oral Daily with brkfst Valeria Swan MD   20 mg at 18 0843     senna-docusate 8.6-50 mg tablet 1 tablet (PERICOLACE)  1 tablet Oral BID Ling Varela MD   1 tablet at 18 0844     sodium chloride flush 10-20 mL (NS)  10-20 mL Intravenous PRN Kenny Ramirez MD         sodium chloride flush 10-30 mL (NS)  10-30 mL Intravenous PRN Kenny Ramirez MD         sodium chloride flush 10-30 mL (NS)  10-30 mL Intravenous Q8H FIXED TIMES Kenny Ramirez MD   20 mL at 18 0631     sodium chloride flush 20 mL (NS)  20 mL Intravenous PRN Kenny Ramirez MD         sodium chloride flush 3 mL (NS)  3 mL Intravenous Line Care Kenny Ramirez MD   3 mL at 18 0900     sodium phosphates 133 mL rectal enema 1 enema (for FLEET)  1 enema Rectal Daily PRN Sarthak Pritchard MD   1 enema at 18 1203     tiZANidine tablet 2 mg (ZANAFLEX)  2 mg Oral TID Denia Gordon, FLEX   2 mg at 18 0844       Cardiographics:    EC-MAR-2018 05:02:42 HE JOES/JOHNS/SALENA/NOAM  Sinus tachycardia with Premature atrial complexes  ST & T wave abnormality, consider inferior ischemia  ST & T wave abnormality, consider anterolateral ischemia  Abnormal ECG  When compared with ECG of 2017  Premature atrial complexes , new  St-t changes new  Confirmed by ALEXANDR JNOES, JORGE LOC:JN ([id number]) on 3/9/2018 2:21:50 PM  Echocardiogram:   Indications      Congestive heart failure       Summary        Left Ventricle: Normal left ventricular size.The estimated left ventricular ejection fraction is 60%. This represents a normal ejection fraction. Mild hypertrophy noted. E/e' > 15, suggesting elevated LV filling pressures.    Left Atrium: Left atrial volume is moderately increased.    Mitral Valve: There is moderate mitral annular calcification. Moderate mitral regurgitation.    Mild aortic stenosis. Mild aortic regurgitation    Normal right ventricular size and systolic function.    No pulmonary  hypertension present.    Estimated central venous pressure equal to 13 mmHg.             Telemetry: Sinus rhythm, sinus bradycardia    Imaging:   Study Result      CT ABDOMEN WO ORAL WO IV CONTRAST  3/12/2018 8:19 PM      INDICATION: Cholecystitis / cholangitis worsening abdominal pain , post cholecystostomy tube placement, dropping H/H  TECHNIQUE: CT abdomen. Multiplanar reformation images (MPR). Dose reduction techniques were used.   IV CONTRAST: None  COMPARISON: 03/10/2018     FINDINGS:  LUNG BASES: Minimal change in the significant dependent infiltrate/atelectasis as well as small bilateral pleural effusions. Large hiatal hernia present, unchanged.     ABDOMEN: Interval placement of cholecystostomy tube with dense residual contrast within the gallbladder. There appears to be a small layer of high density hemorrhage about the liver capsule.] Minimal hyperdense ascites present suggesting slight   hemoperitoneum, there is no localized hematoma.  Liver otherwise appears normal. Pancreas, spleen, adrenals and kidneys are negative. Mild ascites within both right and left gutters. Right inguinal hernia contains fluid with a slight increase in the hematocrit level. Mora catheter in place, previous   prostatectomy. Sigmoid diverticulosis.     MUSCULOSKELETAL: Negative.     IMPRESSION:   CONCLUSION:  1.  Interval cholecystectomy tube placement. There is a small amount of perihepatic hemorrhage and slight amount of hyperdense ascites consistent with mild intraperitoneal blood. No localized hematoma.             Lab Results:   Sodium   Date Value Ref Range Status   03/21/2018 140 136 - 145 mmol/L Final   03/20/2018 143 136 - 145 mmol/L Final   03/19/2018 142 136 - 145 mmol/L Final     Potassium   Date Value Ref Range Status   03/21/2018 3.6 3.5 - 5.0 mmol/L Final   03/20/2018 3.8 3.5 - 5.0 mmol/L Final   03/19/2018 3.6 3.5 - 5.0 mmol/L Final     Chloride   Date Value Ref Range Status   03/21/2018 104 98 - 107 mmol/L Final    03/20/2018 106 98 - 107 mmol/L Final   03/19/2018 105 98 - 107 mmol/L Final     CO2   Date Value Ref Range Status   03/21/2018 29 22 - 31 mmol/L Final   03/20/2018 30 22 - 31 mmol/L Final   03/19/2018 31 22 - 31 mmol/L Final     BUN   Date Value Ref Range Status   03/21/2018 19 8 - 28 mg/dL Final   03/20/2018 21 8 - 28 mg/dL Final   03/19/2018 23 8 - 28 mg/dL Final     Creatinine   Date Value Ref Range Status   03/21/2018 0.71 0.70 - 1.30 mg/dL Final   03/20/2018 0.77 0.70 - 1.30 mg/dL Final   03/19/2018 0.76 0.70 - 1.30 mg/dL Final     Calcium   Date Value Ref Range Status   03/21/2018 7.5 (L) 8.5 - 10.5 mg/dL Final   03/20/2018 7.4 (L) 8.5 - 10.5 mg/dL Final   03/19/2018 7.1 (L) 8.5 - 10.5 mg/dL Final     WBC   Date Value Ref Range Status   03/21/2018 9.1 4.0 - 11.0 thou/uL Final   03/20/2018 11.5 (H) 4.0 - 11.0 thou/uL Final   03/19/2018 11.4 (H) 4.0 - 11.0 thou/uL Final     Hemoglobin   Date Value Ref Range Status   03/21/2018 9.2 (L) 14.0 - 18.0 g/dL Final   03/20/2018 9.8 (L) 14.0 - 18.0 g/dL Final   03/19/2018 8.8 (L) 14.0 - 18.0 g/dL Final     Hematocrit   Date Value Ref Range Status   03/21/2018 29.6 (L) 40.0 - 54.0 % Final   03/20/2018 32.0 (L) 40.0 - 54.0 % Final   03/19/2018 28.8 (L) 40.0 - 54.0 % Final     MCV   Date Value Ref Range Status   03/21/2018 104 (H) 80 - 100 fL Final   03/20/2018 104 (H) 80 - 100 fL Final   03/19/2018 103 (H) 80 - 100 fL Final     Platelets   Date Value Ref Range Status   03/21/2018 88 (L) 140 - 440 thou/uL Final   03/20/2018 80 (L) 140 - 440 thou/uL Final   03/19/2018 65 (L) 140 - 440 thou/uL Final     CK-MB   Date Value Ref Range Status   03/26/2013 <1 <8 ng/mL Final     Troponin I   Date Value Ref Range Status   03/09/2018 0.04 0.00 - 0.29 ng/mL Final   06/18/2013 <0.01 <0.30 ng/mL Final   03/26/2013 <0.01 <0.30 ng/mL Final     BNP   Date Value Ref Range Status   05/30/2016 96 (H) 0 - 93 pg/mL Final     INR   Date Value Ref Range Status   03/16/2018 1.21 (H) 0.90 - 1.10  Final   03/10/2018 2.03 (H) 0.90 - 1.10 Final   03/09/2018 1.26 (H) 0.90 - 1.10 Final

## 2021-06-16 NOTE — PROGRESS NOTES
Urine output 805ml on day shift but has decreased in the last 3-4 hours with an average of about 45-50ml output every 2 hours.  Notified Dr. Dubon.    Marsha Lucas RN

## 2021-06-16 NOTE — PROGRESS NOTES
Hospitalist Progress Note    Assessment/Plan  88 y.o. old male with PMH of CAD, status post CABG and postoperative CVA, HTN, CLL recently started on Imbruvica, h/o thyroid cancer, s/p thyroidectomy and radioactive iodine treatment, now on levothyroxine, prostate cancer in 1992, s/p prostatectomy, multiple skin cancers who presented with sepsis and septic shock     1. E.coli bacteremia, sepsis.  E.coli is identified in 2 out of 3 sets of blood cultures from 3/9.  Suspected source is acute acalculous cholecystitis. Urine Cx from 3/9 and 3/10 negative but it was obtained after Zosyn and Vancomycin x 1 were given.  Consider stopping Vancomycin.   2.  Acute acalculous cholecystitis, s/p percutaneous cholecystostomy on 3/10 with subsequent bleeding and clotting in the tube/bag. Low cholecystostomy output since last night, dropping Hb and abdominal pain - possible ongoing bleeding/clotting within a gallbladder. Await IR evaluation.   3.  Septic/hemorrhagic shock.  Resolving. Currently off pressors. Titrating hydrocortisone down.   4.  SHAR. Probably combination of prerenal and ATN in the setting of sepsis.  Creatinine slightly better today. Volume is still up but diuresis is limited due to hypotension.   5. AFib with RVR. Ventricular rates are better controlled now on IV amiodarone.   6. Acute blood loss anemia. Hb is 6.8 after 2 Units PRBCs, possibly some of it is dilutional. Monitor for now.   7. Acute respiratory failure with hypoxia. Secondary to fluid overload. NIPPV prn.   8. Metabolic acidosis. Secondary to lactic acidosis, Shar, hyperchloremia. Improved with bicarbonate infusion.     Barriers to Discharge : multiple as above   Anticipated Discharge :multiple day stay   Disposition :TBD      Subjective  Drowsy but uncomfortable due to abdominal pain. Was having serosanguinous output from the percutaneous drain last night, then large clot formed in the catheter and the bag. Catheter was flushed, however only limited  output since last night. Still waiting for IR to evaluate. Patient received total of 2 units PRBCs, 3 units of platelets and 1 unit FFP since yesterday.     Objective    Vital signs in last 24 hours  Temp:  [96.3  F (35.7  C)-98.6  F (37  C)] 96.7  F (35.9  C)  Heart Rate:  [] 116  Resp:  [13-32] 13  BP: ()/(45-72) 87/52  FiO2 (%):  [40 %] 40 % 100% O2 Device: Bi-Pap O2 Flow Rate (L/min): 3 L/min  Weight:   202 lb (91.6 kg) Weight change: 4 lb 14.4 oz (2.223 kg)    Intake/Output last 3 shifts  I/O last 3 completed shifts:  In: 3385.2 [I.V.:1759.2; Blood:497; Other:309; IV Piggyback:820]  Out: 3140 [Urine:2540; Emesis/NG output:600]  Intake/Output this shift:  I/O this shift:  In: 1156.6 [I.V.:550.6; Blood:606]  Out: 560 [Urine:560]    Physical Exam:  GENERAL: No acute distress, laying in bed, BiPAP is on   CARDIAC: Irregular, mild tachycardia,  S1 & S2 normal.  No gallops or murmurs. Trace UE edema  LUNGS: Clear anteriorly   ABDOMEN: Soft, non-tender, bowel sounds present all quadrants  : Mora catheter with leilani color urine   SKIN: Multiple bruises over arms   NEURO: Drowsy but wakes up easily and answers appropriately      Pertinent Labs   Lab Results: personally reviewed.     Results from last 7 days  Lab Units 03/11/18  0535 03/10/18  1955 03/10/18  0531  03/09/18  0457 03/07/18  0853   LN-SODIUM mmol/L 146* 142 141  --  142 141   LN-POTASSIUM mmol/L 4.0 5.0 4.7  < > 3.8 3.4*   LN-CHLORIDE mmol/L 115* 115* 114*  --  110* 106   LN-CO2 mmol/L 23 17* 18*  --  20* 28   LN-BLOOD UREA NITROGEN mg/dL 48* 48* 45*  --  28 20   LN-CREATININE mg/dL 1.31* 1.55* 1.63*  --  1.40* 0.88   LN-CALCIUM mg/dL 7.5* 6.9* 6.8*  --  7.7* 8.5   LN-ALBUMIN g/dL 2.4*  --  2.8*  --  3.2* 3.7   LN-PROTEIN TOTAL g/dL 4.7*  --   --   --  5.2* 5.5*   LN-BILIRUBIN TOTAL mg/dL 1.2*  --   --   --  1.6* 0.9   LN-ALKALINE PHOSPHATASE U/L 39*  --   --   --  39* 35*   LN-ALT (SGPT) U/L 24  --   --   --  11 <9   LN-AST (SGOT) U/L 15  --    --   --  15 8   LN-MAGNESIUM mg/dL 2.4 2.2 2.5  < >  --   --    < > = values in this interval not displayed.    Results from last 7 days  Lab Units 03/11/18  1231 03/11/18  0535 03/11/18  0021 03/10/18  1756 03/10/18  0531 03/09/18  0457   LN-WHITE BLOOD CELL COUNT thou/uL 15.3* 29.9* 53.4* 40.6* 36.9* 32.4*   LN-HEMOGLOBIN g/dL 6.8* 7.0* 7.4* 7.2* 8.6* 8.7*   LN-HEMATOCRIT % 20.7* 22.1* 24.3* 24.1* 28.2* 29.0*   LN-PLATELET COUNT thou/uL 43* 51* 88* 59* 36* 48*   LN-MONOCYTES - REL (DIFF) %  --   --   --  2 2 0*       Results from last 7 days  Lab Units 03/09/18  0457   LN-TROPONIN I ng/mL 0.04       Medications  Scheduled Meds:    hydrocortisone sod succ  50 mg Intravenous Q8H     insulin aspart (NovoLOG) injection   Subcutaneous Q4H FIXED TIMES     pantoprazole (PROTONIX) IVPB 40 mg  40 mg Intravenous QAM AC    Or     omeprazole  20 mg Oral QAM AC    Or     omeprazole  20 mg Enteral Tube QAM AC     piperacillin-tazobactam  4.5 g Intravenous Q6H     senna-docusate  1 tablet Oral BID    Or     senna (SENOKOT) syrup  8.8 mg Enteral Tube BID     sodium chloride  10-30 mL Intravenous Q8H FIXED TIMES     sodium chloride  3 mL Intravenous Line Care     vancomycin  1.5 g Intravenous Q24H     Continuous Infusions:    amiodarone 900 mg/500 ml standard 24 hour infusion 1 mg/min (03/10/18 2034)     norepinephrine IV infusion in NS Stopped (03/11/18 0707)     sodium bicarbonate IV infusion in D5W 75 mL/hr at 03/10/18 2133     vasopressin Stopped (03/11/18 0345)     PRN Meds:.acetaminophen, bacitracin, benzocaine-menthol, bisacodyl, dextrose 50 % (D50W), fentaNYL pf, glucagon (human recombinant), HYDROmorphone, labetalol, magnesium hydroxide, naloxone **OR** naloxone, ondansetron **OR** ondansetron, polyvinyl alcohol, sodium chloride 0.9%, sodium chloride 0.9%, sodium chloride, sodium chloride, sodium chloride    Pertinent Radiology   Radiology Results: Personally reviewed image/s  CT CHEST, ABDOMEN, AND PELVIS  3/10/2018  1:01 PM  1.  Exam is mildly compromised by some respiratory motion. Even given this, gallbladder appears abnormal with edematous wall and mild stranding in the adjacent fat. Further evaluation with ultrasound is recommended as the findings are suspicious for   cholecystitis.  2.  Small bilateral pleural effusions with atelectasis in both lungs.  3.  Mediastinal, and retroperitoneal lymph nodes are in the upper range of normal in size and decreased compared to the prior exam. The spleen is also decreased in size compared to the prior exam.  4.  Moderate to severe calcified atherosclerotic plaque in the visualized arteries with small right common iliac artery aneurysm.  5.  Colonic diverticulosis without evidence of diverticulitis.  6.  Right inguinal hernia containing fat and fluid.  7.  Changes of prostatectomy and pelvic lymph node dissection.  8.  Groundglass nodule in the right upper lobe is grossly stable though not well evaluated due to motion.    TTE    Left Ventricle: Normal left ventricular size.The estimated left ventricular ejection fraction is 60%. This represents a normal ejection fraction. Mild hypertrophy noted. E/e' > 15, suggesting elevated LV filling pressures.    Left Atrium: Left atrial volume is moderately increased.    Mitral Valve: There is moderate mitral annular calcification. Moderate mitral regurgitation.    Mild aortic stenosis. Mild aortic regurgitation    Normal right ventricular size and systolic function.    No pulmonary hypertension present.    Estimated central venous pressure equal to 13 mmHg.    Advanced Care Planning:  Treatment/Discharge Planning discussed with the patient, family, ICU team    Total time with this patient is 35 minutes with greater than 50% of time spent in coordination of care.      Darcy Rubalcava MD  Internal Medicine Hospitalist  3/11/2018

## 2021-06-16 NOTE — PROGRESS NOTES
Palliative Care Chart check    Daughter states that she would prefer patient would discharge to [facility] for Rehab as she used to work there.  She states [facility] won't take him due to chemotherapy issues.  She wants to talk to Nancy SCHAFER. Nancy is off, I updated  and he will follow up.    Palliative Care signing off. Please reconsult if needed.    Thank you    Denia Gordon, CNP   Palliative Care    No charges

## 2021-06-16 NOTE — PROCEDURES
[facility] Interventional Radiology Post Procedure Note    Procedure: Cholangiogram  MD: Joan  Findings: Large filling defect in GB. Clot?. Late filling of cystic duct.  Complications: None    David Franco MD

## 2021-06-16 NOTE — PROGRESS NOTES
Hospitalist Progress Note    Overnight Events/Subjective:    Appears modestly dyspnic which is worse than baseline.  No abdominal pain, chest pain, passing flatus, tolerating PO.  toprol-xl held for mild sinus bradycardia the last 2 days.    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  Mr. Clinton presented with septic shock from acute cholecystitis and associated e.coli bacteremia.  Treated with PERC cholecystostomy tube, IV zosyn, stress dose steroids and pressors.  Hospital course complicated by Afib with RVR requiring IV amiodarone; replacement cholecystostomy tube 03/11 due to obstruction from blood clot.  Repeat cholangiogram with a persistent filling defect thought secondary to clot.      #Cholecystitis with e.coli bacteremia - Zosyn (abx plan depending on surgical plan).  Cholecystostomy tube with flushes per IR.  Possible surgery next week depending on clinical progress.  #Afib, sepsis associated - rates well controlled on amiodarone.   Possible anticoagulation in near future, timing depends on surgical plan.  Hold parameters on   #adrenal insufficiency - on steroids for septic shock, continue to wean.  Had been on pred taper prior to admission for hemolytic anemia.    #Dyspnea - stop IVF.  CXR.  Lasix, increase as needed.  #CLL, chronic thrombocytopenia, hx hemolytic anemia - ibrutinib on hold, recently on prednisone taper  #acute blood loss anemia - hb stable.  No need for transfusion today.    Disposition: TBD  Estimated LOS: multiday    Objective    Vital signs in last 24 hours  Temp:  [97.4  F (36.3  C)-98.1  F (36.7  C)] 97.7  F (36.5  C)  Heart Rate:  [46-53] 53  Resp:  [16-20] 20  BP: ()/(48-70) 137/70 96% O2 Device: None (Room air) O2 Flow Rate  (L/min): 2 L/min  Weight:   213 lb (96.6 kg) Weight change:     Intake/Output last 3 shifts  I/O last 3 completed shifts:  In: 2206 [P.O.:480; I.V.:1218; Blood:353; IV Piggyback:155]  Out: 1185 [Urine:1175; Emesis/NG output:10]  Body mass index is 26.62 kg/(m^2).    Physical Exam  General:  Alert, cooperative,  Appears stated age, frail  Neurologic:  fluent speech,   HEENT:  Anicteric, MMM  CV: RRR no MRG, normal S1 and S2, some ankle edema and thigh edema   Lungs: modest dyspnea, diminished at bases with few L crackles  Abd: soft, NT, normoactive BS, no masses or organomegaly.   Jeni tube with bilous drainage  Skin: no rashes noted.  Central Lines and Tubes: rao with yellow urine    Labs: Labs reviewed and pertinent findings discussed in A/P  Medications: medication list reviewed.  Pertinent changes discussed in A/P  Telemetry: reviewed and notable for sinus kathy 50s    Care discussed and coordinated with family and nurse    Valeria Swan MD  Internal Medicine Hospitalist  3/18/2018 10:08 AM

## 2021-06-16 NOTE — PROGRESS NOTES
Patient did not use BIPAP last night. Machine still in the room on stand by. Patient slept peacefully through the night on oxymask. RT will continue to follow and monitor

## 2021-06-16 NOTE — PROGRESS NOTES
"Patient transferred down to 4108. While turning patient RN noticed hematoma next to cholecystostomy tube as well as dressing soaked in serosanguinous fluid. Hematoma marked with marker and dressing changed.  ICU nurse called who gave RN report and RN described above to ICU RN . ICU RN said patient did not have this before he left the floor. Asked ICU nurse to come down and look. ICU nurse came down and verified that patient did not have this hematoma but a small amount of bruising by tube site. IR MD called who said to order an US to assess for bleeding \"but if there is bleeding it's not due to the tube and there's nothing much I can do about it.\" Will continue to monitor hematoma.    Mariaelena Farias    "

## 2021-06-16 NOTE — PROGRESS NOTES
Hospitalist Progress Note    Overnight Events/Subjective:    A little dyspnea today.  Otherwise no complaint.  No CP, abdominal pain, diarrhea.     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  Mr. Clinton presented with septic shock from acute cholecystitis and associated e.coli bacteremia.  Treated with PERC cholecystostomy tube, IV zosyn, stress dose steroids and pressors.  Hospital course complicated by Afib with RVR requiring IV amiodarone; replacement of cholecystostomy tube 03/11 due to obstruction from blood clot.  Repeat cholangiogram with a persistent filling defect thought secondary to clot.      #Cholecystitis with e.coli bacteremia -  Zosyn (day 11)  transition to omnicef+flagyl for another 10days (may need to extend further depending on clinical course).  Re-evaluate for cholecystectomy in 4-6 weeks.   Cholecystostomy tube with flushes per IR.      #Afib, sepsis associated - now sinus kathy on amiodarone.  Toprol-xl d/miguelito due to sinus kathy.  Possible anticoagulation in future depending on surgical plan.       #adrenal insufficiency - was on stress dose steroids for septic shock, continue to wean.  Had been on pred taper prior to admission for hemolytic anemia.      #Dyspnea - improved with diuresis yesterday.  Reports some dyspnea today and still with LE edema.  Give another dose of lasix today.    #CLL, chronic thrombocytopenia, hx hemolytic anemia - ibrutinib on hold, recently on prednisone taper (reduce to prednisone 20qday x 2 days then 10mg qday x 1 week)    #acute blood loss anemia - hb stable.     #hyperglycemia - SSI, should improve with steroid taper    #R arm edema - not much.  Not TTP or erythematous.  Elevate.  US if worsens.    Disposition:  TBD  Estimated LOS: multiday    Objective    Vital signs in last 24 hours  Temp:  [98.1  F (36.7  C)-98.5  F (36.9  C)] 98.3  F (36.8  C)  Heart Rate:  [50-64] 58  Resp:  [18-20] 18  BP: (101-137)/(50-71) 122/71 93% O2 Device: None (Room air) O2 Flow Rate (L/min): 2 L/min  Weight:   (!) 227 lb (103 kg) Weight change:     Intake/Output last 3 shifts  I/O last 3 completed shifts:  In: 170 [P.O.:170]  Out: 1975 [Urine:1825; Emesis/NG output:150]  Body mass index is 28.37 kg/(m^2).    Physical Exam  General:  Alert, cooperative,  Appears stated age, frail  Neurologic:  Cooperative, grossly non focal   HEENT:  Anicteric, MMM  CV: RRR no MRG, normal S1 and S2, 1+ ankle edema.    Lungs:  Normal effort, CTAB  Abd: soft, NT, normoactive BS, no masses or organomegaly.   Jeni tube with bilous drainage  Skin: no rashes noted.  Ecchymosis hip, abdomen, flank  Dependent edema R arm below PICC site.  No pain.  Central Lines and Tubes: rao with yellow urine    Labs: Labs reviewed and pertinent findings discussed in A/P  Medications: medication list reviewed.  Pertinent changes discussed in A/P    Care discussed and coordinated with family and nurse.  >35min, > 50% C+C.  D/w daughter and palliative care.    Valeria Swan MD  Internal Medicine Hospitalist  3/20/2018 10:08 AM

## 2021-06-16 NOTE — PROGRESS NOTES
Palliative Care Progress Note      Progress Note - Palliative Care  Nixon Velasquez Jr.,  1929, MRN [id number]    Admitting Dx: Lactic acidosis [E87.2]  Thrombocytopenia [D69.6]  Chronic anemia [D64.9]  FRANC (acute kidney injury) [N17.9]  Sepsis [A41.9]    PCP: Riley Cuevas MD, [phone]   Code status:  DNR       Extended Emergency Contact Information  Primary Emergency Contact: Balbina Velasquez   [facility]  Home Phone: [phone]  Relation: Spouse  Secondary Emergency Contact: Cha Reyez   [facility]  Home Phone: [phone]  Relation: Child       Impression and Recommendations:          1. Generalized weakness-secondary to complex medical condition, mostly in bed   Improving  - Reposition for comfort  -PT, OT     2.   Abdominal spasms secondary to cholelithiasis  improving  -Zanaflex  - Sublingal Roxicodone for  Pain   -Per Dr. Stroud, plan is to have Rehab then  cholecystectomy      3.  Insomnia  - Melatonin for sleep     4. Palliative Care  (see [facility] discussion below)  DNR No Tube Feeding  Family wants to have patient DC to [place], I asked Lacie Tay to F/U with family      5.  Altered Mental Status   improving  - Avoid benzos, anticholinergics  - Melatonin, trazodone PRN sleep  - Nursing orders to include:  - Allow restful sleep at night (cluster cares)  - Maintain normal day/night cycle by keeping light on during day and off at night\   Palliative Care will continue to follow for symptom management and ongoing goals of care discussion.                    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  Mr. Clinton presented with septic shock from acute cholecystitis and associated e.coli  bacteremia.  Treated with PERC cholecystostomy tube, IV zosyn, stress dose steroids and pressors.  Hospital course complicated by Afib with RVR requiring IV amiodarone; replacement of cholecystostomy tube 03/11 due to obstruction from blood clot.  Repeat cholangiogram with a persistent filling defect thought secondary to clot.        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    Mixed hyperlipidemia    Hypothyroidism    Coronary Artery Disease    Other autoimmune hemolytic anemias    Thrombocytopenia    Septic shock    Neutropenia associated with infection    Gram-negative bacteremia    Metabolic acidosis with respiratory acidosis    Hemorrhage    Atrial fibrillation with rapid ventricular response    Lactic acidosis    Hypocalcemia    Acute respiratory failure with hypoxia and hypercapnia    Bacteremia due to Gram-negative bacteria    Acute pulmonary edema    Abdominal pain, unspecified abdominal location    Fecal impaction    Cholecystitis    Interim Hx  Breathing improved.  No Abd pain, CP, SOB.    SLP Deferred treatment today d/t unable to address swallow goals as patient is NPO.   Plan is DC to  or TCU then return for cira surgery    Current symptoms:  Sitting in chair, denies pain, some dysarthic speech,  Alert and oriented x3  Family Present: son Kurt and his wife    GOALS OF CARE:  Decision Making Capacity  Patient Does have decision-making capacity; patient does   demonstrate understanding of relevant information about condition, the available test and treatment options, use reason to make a decision about these, and communicate choice about these. (MINISTERIO 306, 4, p. 420-4).    Discussion:   I had patient sign the POLST that was sent to my email that was unsigned.  Patient in agreement with rehab then surgery.        Review of Systems:  Review Of Systems  DEMENTIA: N  DELIRIUM:  N  COMA: N  FAST SCORE: NA    ESAS Score:   Mild 1-3  Mod 4-7  Max 8-10   Cannot  participate in ROS  Pain: N  Depression:  N  Nausea:  N  Anorexia:  N  Drowsiness (feels sleepy): N  Fatigue (lack of energy):  Mild  Constipation:  N  Dyspnea:  N  Agitation: N  Anxiety:   N  Palliative Performance Status 50 %. Physical Exam:  Temp:  [97.7  F (36.5  C)-97.8  F (36.6  C)] 97.8  F (36.6  C)  Heart Rate:  [48-56] 53  Resp:  [16-20] 20  BP: (100-137)/(47-61) 115/60    General appearance: alert, cooperative, fatigued, NAD  Head: Normocephalic, without obvious abnormality, atraumatic  Eyes: negative findings: lids and lashes normal, conjunctivae and sclerae normal   Nose: no discharge  Throat: lips, mucosa, and tongue normal; teeth and gums normal  Neck: Symmetrical, trachea midline  Lungs: clear to auscultation bilaterally and decreased in bases  Heart: kathy  regular rate and rhythm, S1, S2 normal,  murmur, no click, rub or gallop  Abdomen: soft, +BS in all 4 quadrants.  Extremities: extremities normal, atraumatic, no cyanosis, no edema  Pulses: 2+ and symmetric  Neurologic: Grossly normal   Normal affect: increased in intensity and redirectable, thought content exhibits logical connections  Skin: Skin color, texture and turgor normal. No rashes or lesions , No venous stasis or varicosities noted           LAB:    Results from last 7 days  Lab Units 03/19/18  0527 03/18/18  0508 03/17/18  1717 03/17/18  0401   LN-WHITE BLOOD CELL COUNT thou/uL 11.4* 12.5*  --  19.2*   LN-HEMOGLOBIN g/dL 8.8* 8.7* 8.6* 7.8*   LN-HEMATOCRIT % 28.8* 27.9*  --  25.6*   LN-PLATELET COUNT thou/uL 65* 52*  --  59*          Results from last 7 days  Lab Units 03/19/18  0527 03/18/18  0508 03/17/18  1826 03/17/18  0401  03/15/18  0404 03/14/18  0603   LN-SODIUM mmol/L 142 142  --  143  < > 153* 155*   LN-POTASSIUM mmol/L 3.6 3.8 3.6 3.6  < > 3.1* 3.8   LN-CHLORIDE mmol/L 105 107  --  107  < > 116* 120*   LN-CO2 mmol/L 31 30  --  28  < > 30 27   LN-BLOOD UREA NITROGEN mg/dL 23 20  --  23  < > 33* 38*   LN-CREATININE mg/dL 0.76  0.77  --  0.81  < > 0.85 0.94   LN-CALCIUM mg/dL 7.1* 7.0*  --  7.1*  < > 7.6* 7.6*   LN-ALBUMIN g/dL  --  2.5*  --   --   --  2.5* 2.5*   LN-PROTEIN TOTAL g/dL  --  4.4*  --   --   --  4.5*  --    LN-BILIRUBIN TOTAL mg/dL  --  1.2*  --   --   --  1.0  --    LN-ALKALINE PHOSPHATASE U/L  --  37*  --   --   --  40*  --    LN-ALT (SGPT) U/L  --  <9  --   --   --  <9  --    LN-AST (SGOT) U/L  --  9  --   --   --  7  --    < > = values in this interval not displayed.    Results from last 7 days  Lab Units 03/16/18  1140   LN-INR  1.21*       Lab Results   Component Value Date/Time    ALBUMIN 2.5 (L) 03/18/2018 05:08 AM     Wt Readings from Last 3 Encounters:   03/19/18 210 lb 14.4 oz (95.7 kg)   03/07/18 191 lb 7 oz (86.8 kg)   02/14/18 189 lb 3.2 oz (85.8 kg)           Denia Gordon  MSN, NP-C, APRN, CPM, CRRN  Discussed with MD KAYLYNN and RN    Office #:[phone] (Admin will page me if needed)      Billing Information:      E/M level: 35  minutes,>50% of time during encounter was spent with patient and family on counseling, and/or care coordination, as documented above.    Prolonged Service time 37082 (does not include E/M time or ACP time) beyond 35 minutes was 31 minutes from  2 :00  to   231, which included discussion  pain/symptom management, met with KAYLYNN, RN,   family.  prognosis, disease projectory.   FCC in patients room    Advance Care planning time  73378 (16 min-44 min).    Time for ACP 20 minutes in addition to above E/M  .  Discussed code status, life prolonging interventions and healthcare directive

## 2021-06-16 NOTE — PROGRESS NOTES
Palliative Care    Reviewed chart.  Will follow peripherally for symptom management and ongoing GOC  Discussion     Denia Gordon  Palliative Care

## 2021-06-16 NOTE — PROGRESS NOTES
PULMONARY / CRITICAL CARE PROGRESS NOTE    Date / Time of Admission:  3/9/2018  4:34 AM    Assessment:   1. Respiratory failure   2. Sepsis due to acute cholecystitis with E coli bacteremia   S/p cholecystostomy tube placement. Off pressors.   Negative follow up blood cultures.   3. Acute cholecystitis s/p percutaneous cholecystostomy tube placement 3/10/2018  Surgery consult to assess patient for cholecystectomy   4. S/p replacement of cholecystostomy tube placement on 3/11/2018 due to obstruction secondary to blood clot  F/u cholangiogram showed large filling defect in GB likely clot, late filling of cystic duct.   5. Abdominal pain  F/u abdomen/pelvis CT scan showed interval cholecystostomy tube placement. There is a small amount of perihepatic hemorrhage and slight amount of hyperdense ascites consistent with mild intraperitoneal blood. No localized hematoma. Fecal impaction. Stable H/H.    F/u cholangiogram showed no extravasation of leaking of contrast.  6. Resolved acute kidney injury  7. Resolving hypernatremia  8. Anemia   9. Chronic thrombocytopenia  10. Atrial fibrillation with RVR  On amiodarone drip  11. Hx Ischemic cardiomyopathy s/p CABG  12. Moderate MR, mild AS  13. Dysphagia, diet was modify per speech therapy recommendations  14. 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 DM  16. Hx CVA  17. Hx Prostate cancer s/p prostatectomy  18. Hx thyroid cancer s/p thyroidectomy    Advance Directives:  DNR    Plan:   1. Titrate FiO2  2. Pulmonary toiletting  3. Decrease rate of D5W  4. Continue diuresis   5. Continue amiodarone drip  6. Increase dose of oral metoprolol  7. Continue to titrate down stress dose steroids (pt is on chronic prednisone 10 mg daily)   8. Continue zosyn  9. Follow up Blood cultures  10. Follow up surgery service recommendations   11. Diet per speech therapy recommendation  12. If patient is not meeting minimal calory requirements, consideration for  NH tube placement and tube feedings.   13. PPI for GI prophylaxis  14. Glucose level monitoring  15. DVT prophylaxis SCDs  16. PT, OT evaluation   17. Palliative care following   18. Case was discuss with , patient will benefit from [place] evaluation due to significant co morbidities and deconditioning    Please contact me if you have any questions.    Sarthak Pritchard  Pulmonary / Critical Care  3/16/2018   11:00 AM    Subjective:   HPI:  Nixon Velasquez Jr. is a 88 y.o. 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   Patient was admitted with diagnosis of septic shock. Chest / Abdomen / pelvis CT scan with suspicion for cholecystitis. RUQ US showed acalculous cholecystitis. Positive blood Cx with Gram Negative rods. Later identified as E coli. S/p cholecystostomy tube placement 3/10, clogged drain with blood clots, new cholecystostomty was placed. Patient was liberated from pressors.     Events overnight  - A fib with RVR , started on amiodarone drip  - Less frequent intermittent colic abdominal pain  - F/u cholecystogram showed large filling defect within the gallbladder, appearance consistent with clot.   - Improvement of mental status, afebrile, decrease appetite    Allergies: Dairy aid [lactase]; Benzonatate; Blood-group specific substance; Ciprofloxacin; Ephedrine sulfate; Erythromycin base; Hyoscyamine sulfate; Lactose; Methocarbamol; Terazosin; and Warfarin     MEDS:  Scheduled Meds:    folic acid (FOLVITE) IVPB  1 mg Intravenous DAILY     furosemide  20 mg Intravenous DAILY     hydrocortisone sod succ  50 mg Intravenous Q12H     insulin aspart (NovoLOG) injection   Subcutaneous TID with meals     insulin aspart (NovoLOG) injection   Subcutaneous QHS      "metoprolol succinate  12.5 mg Oral Once     metoprolol succinate  25 mg Oral DAILY     OLANZapine  7.5 mg Oral QHS     pantoprazole (PROTONIX) IVPB 40 mg  40 mg Intravenous QAM AC    Or     omeprazole  20 mg Oral QAM AC     oxyCODONE intensol (ROXICODONE) conc solution 20 mg/mL  5 mg Sublingual QID     piperacillin-tazobactam  4.5 g Intravenous Q6H     potassium, sodium phosphates  1 packet Oral TID     senna-docusate  1 tablet Oral BID     sodium chloride  10-30 mL Intravenous Q8H FIXED TIMES     sodium chloride  3 mL Intravenous Line Care     tiZANidine  2 mg Oral TID     Continuous Infusions:    amiodarone 900 mg/500 ml standard 24 hour infusion 1 mg/min (03/15/18 1847)     dextrose 5% 100 mL/hr (03/14/18 0927)     norepinephrine IV infusion in NS Stopped (03/12/18 1000)     vasopressin Stopped (03/11/18 0345)     PRN Meds:.acetaminophen, acetaminophen, aluminum-magnesium hydroxide-simethicone, bacitracin, benzocaine-menthol, bisacodyl, dextrose 50 % (D50W), fentaNYL pf, glucagon (human recombinant), HYDROmorphone, labetalol, magnesium hydroxide, melatonin, naloxone **OR** naloxone, ondansetron **OR** ondansetron, oxyCODONE intensol (ROXICODONE) conc solution 20 mg/mL, polyvinyl alcohol, sodium chloride, sodium chloride, sodium chloride, sodium phosphates 133 mL    Objective:   VITALS:  /82 (Patient Position: Lying)  Pulse (!) 111  Temp 97.8  F (36.6  C) (Oral)   Resp 19  Ht 6' 3\" (1.905 m)  Wt 194 lb 4.8 oz (88.1 kg)  SpO2 100%  BMI 24.29 kg/m2  EXAM:   Gen: awake, alert, mild distress secondary to generalized weakness  HEENT: pale conjunctiva, moist mucosa  Neck: no thyromegaly, masses or JVD  Lungs: ronchi both HT  CV: irregular, tachycardic, systolic murmur 2/6, no gallops appreciated  Abdomen: soft, RUQ drain, no further bleeding around drain, ecchymosis right flank, BS decrease in frequency  Ext: no edema  Neuro:awake, alert, decrease strength upper and lower extremities     I&O:  "     Intake/Output Summary (Last 24 hours) at 03/16/18 1101  Last data filed at 03/16/18 0600   Gross per 24 hour   Intake             2107 ml   Output              750 ml   Net             1357 ml       Data Review:    Results from last 7 days  Lab Units 03/16/18  0501   LN-WHITE BLOOD CELL COUNT thou/uL 13.2*   LN-HEMOGLOBIN g/dL 7.6*   LN-HEMATOCRIT % 25.1*   LN-PLATELET COUNT thou/uL 41*       Results from last 7 days  Lab Units 03/16/18  0501   LN-SODIUM mmol/L 145   LN-POTASSIUM mmol/L 3.9   LN-CHLORIDE mmol/L 111*   LN-CO2 mmol/L 29   LN-BLOOD UREA NITROGEN mg/dL 27   LN-CREATININE mg/dL 0.82   LN-CALCIUM mg/dL 7.1*     XR CHEST 1 VIEW PORTABLE 3/12/2018 5:19 AM  INDICATION: septic shock, respiratory failure, pneumonia, volume overload.  COMPARISON: 03/11/2018   FINDINGS: Considerable increase in central congestion and diffuse interstitial alveolar infiltrate throughout both hemithoraces. Similar small effusions. Remainder stable.    CT ABDOMEN WO ORAL WO IV CONTRAST 3/12/2018 8:19 PM  INDICATION: Cholecystitis / cholangitis worsening abdominal pain , post cholecystostomy tube placement, dropping H/H  COMPARISON: 03/10/2018  FINDINGS:  LUNG BASES: Minimal change in the significant dependent infiltrate/atelectasis as well as small bilateral pleural effusions. Large hiatal hernia present, unchanged.  ABDOMEN: Interval placement of cholecystostomy tube with dense residual contrast within the gallbladder. There appears to be a small layer of high density hemorrhage about the liver capsule.] Minimal hyperdense ascites present suggesting slight   hemoperitoneum, there is no localized hematoma. Liver otherwise appears normal. Pancreas, spleen, adrenals and kidneys are negative. Mild ascites within both right and left gutters. Right inguinal hernia contains fluid with a slight increase in the hematocrit level. Mora catheter in place, previous prostatectomy. Sigmoid diverticulosis.  MUSCULOSKELETAL:  Negative.  CONCLUSION:  1.  Interval cholecystectomy tube placement. There is a small amount of perihepatic hemorrhage and slight amount of hyperdense ascites consistent with mild intraperitoneal blood. No localized hematoma.    Echocardiogram 3/9/2018    Left Ventricle: Normal left ventricular size.The estimated left ventricular ejection fraction is 60%. This represents a normal ejection fraction. Mild hypertrophy noted. E/e' > 15, suggesting elevated LV filling pressures.    Left Atrium: Left atrial volume is moderately increased.    Mitral Valve: There is moderate mitral annular calcification. Moderate mitral regurgitation.    Mild aortic stenosis. Mild aortic regurgitation    Normal right ventricular size and systolic function.    No pulmonary hypertension present.    Estimated central venous pressure equal to 13 mmHg.    XR SWALLOW STUDY W SPEECH 3/12/2018 1:30 PM  INDICATION: Dysphagia.  TECHNIQUE: Routine.  COMPARISON: None.  FINDINGS:   FLUOROSCOPIC TIME: 1.3 minutes  NUMBER OF IMAGES: 6  Swallow study with Speech Pathology using multiple barium thicknesses.   Puree and honey consistency both demonstrated significant piriform sinus stasis.  [place] consistency demonstrated stasis and kickback aspiration which resulted in only a minimal throat clear.  Thin liquid demonstrated delay in swallow reflex with direct, silent aspiration.    CHOLECYSTOGRAM  3/16/2018 8:54 AM  INDICATION: evaluation of cholecystostomy tube, patency and rule out cholelithaisis, in preparation for surgery  MODERATE SEDATION: None.  ANTIBIOTICS: None.  ADDITIONAL MEDICATIONS: None.  FLUOROSCOPIC TIME: 2.4 mins.  DOSE: Air Kerma: 176 mGy.  COMPLICATIONS: No immediate complications.  FINDINGS:   CHOLECYSTOGRAM: The cholecystostomy tube is in satisfactory position. There is no extravasation or leakage of contrast. Large filling defect within the gallbladder, appearance consistent with clot. Late filling of the cystic duct.  IMPRESSION:    Large filling defect within the gallbladder, appearance consistent with clot.     By:  Sarthak Pritchard, 3/16/2018, 11:00 AM    Primary Care Physician:  Riley Cuevas MD

## 2021-06-16 NOTE — CONSULTS
GENERAL SURGERY CONSULTATION    Nixon Velasquez Jr.  [place]  Medical Record #:  [id number]  YOB: 1929  Age:  88 y.o.     Date of Consultation: 3/15/2018    Reason for Consultation: Cholecystitis positive E. coli blood cultures    Nixon Velasquez Jr. is a 88 y.o. male who presents with a long-standing history of CLL and recent admission and this is reviewed as patient is unable to give significant history.  1 of his sons is in attendance but does not have clear understanding of the full clinical circumstance his father is been in the last 2 weeks.  The patient is admitted to hospital and had cholecystostomy tube placed.  He was admitted with sepsis and E. coli of positive blood cultures.  It appeared on CT scan eventually that he had a cholecystitis and cholecystostomy tube was placed and has been replaced once.  My understanding is these is that he is to have it reviewed again today.  At the current time patient is seen in the intensive care unit.  He is awake and responds but does not verbally respond.  The patient's son indicates that this is because he is in the hospital with healthcare health caregivers.  2 weeks ago he was at home walking stairs normally ambulatory functioning and speaking apparently.  He has a prostate surgical history no other major surgery on the abdomen.  He has a cholecystostomy tube in the right side.  It is draining a bloody dark bile currently    PHH:    Past Medical History:   Diagnosis Date     Cataract     bilateral     Foot fracture, right      History of transfusion      Hypertension      Inguinal hernia      Leukemia      PE (pulmonary embolism)      Pneumonia      Prostate cancer      Skin cancer      Thyroid cancer         Past Surgical History:   Procedure Laterality Date     CATARACT EXTRACTION, BILATERAL       RI REANOMAL CORON ART PA ORIGIN BY GRAFT      Description: Anomalous Coronary Artery Graft;  Proc Date: 01/01/1996;     RI REMV  PROSTATE,RETROPUB,RAD,LTD NODES      Description: Prostatectomy Retropubic Radical With Lymph Node Biopsy(S);  Recorded: 09/13/2007;       ALLERGIES:  Dairy aid [lactase]; Benzonatate; Blood-group specific substance; Ciprofloxacin; Ephedrine sulfate; Erythromycin base; Hyoscyamine sulfate; Lactose; Methocarbamol; Terazosin; and Warfarin    MEDS:    Current Facility-Administered Medications:      acetaminophen suppository 650 mg (TYLENOL), 650 mg, Rectal, Q4H PRN, Denia Chapman CNP, 650 mg at 03/14/18 0928     acetaminophen tablet 650 mg (TYLENOL), 650 mg, Oral, Q4H PRN, Ling Varela MD, 650 mg at 03/09/18 2215     aluminum-magnesium hydroxide-simethicone 200-200-20 mg/5 mL suspension 30 mL (MAALOX ADVANCED), 30 mL, Oral, Q4H PRN, Arlin Galindo MD     bacitracin ointment packet 1 packet, 1 packet, Topical, Once PRN, Kenny Ramirez MD     benzocaine-menthol lozenge 1 lozenge (CEPACOL), 1 lozenge, Oral, Q1H PRN, Ling Varela MD     bisacodyl suppository 10 mg (DULCOLAX), 10 mg, Rectal, Daily PRN, Ling Varela MD, 10 mg at 03/12/18 0135     dextrose 5%, 100 mL/hr, Intravenous, Continuous, Sarthak Pritchard MD, Last Rate: 100 mL/hr at 03/14/18 0958, 100 mL/hr at 03/14/18 0958     dextrose 50 % (D50W) syringe 20-50 mL, 20-50 mL, Intravenous, PRN, Ling Varela MD, 12.5 mL at 03/11/18 0839     fentaNYL pf injection 12.5-25 mcg (SUBLIMAZE), 12.5-25 mcg, Intravenous, Q3H PRN, Ruby Josue MD, 25 mcg at 03/13/18 1847     folic acid 1 mg in sodium chloride 0.9% 50 mL (FOLVITE), 1 mg, Intravenous, DAILY, Audrey Vergara MD, Last Rate: 100 mL/hr at 03/15/18 0830, 1 mg at 03/15/18 0830     furosemide injection 20 mg (LASIX), 20 mg, Intravenous, DAILY, Sarthak Pritchard MD, 20 mg at 03/15/18 0814     glucagon (human recombinant) injection 1 mg, 1 mg, Subcutaneous, PRN, Ling Varela MD     hydrocortisone sod succ (PF) 100 mg/2 mL injection 50 mg, 50 mg, Intravenous, Q12H, Sarthak  Misael Pritchard MD, 50 mg at 03/15/18 0815     HYDROmorphone injection 0.2-0.4 mg (DILAUDID), 0.2-0.4 mg, Intravenous, Q3H PRN, Ruby Josue MD, 0.3 mg at 03/15/18 0813     insulin aspart U-100 injection (NovoLOG), , Subcutaneous, TID with meals, Sarthak Pritchard MD, 2 Units at 03/15/18 1314     insulin aspart U-100 injection (NovoLOG), , Subcutaneous, QHS, Sarthak Pritchard MD, 2 Units at 03/14/18 2216     labetalol injection 5-10 mg (NORMODYNE,TRANDATE), 5-10 mg, Intravenous, Q4H PRN, Shawna Cassidy DO, 5 mg at 03/14/18 0927     magnesium hydroxide suspension 30 mL (MILK OF MAG), 30 mL, Oral, Daily PRN, Ling Varela MD     melatonin tablet 3 mg, 3 mg, Oral, Bedtime PRN, Denia Gordon, FLEX     metoprolol succinate 24 hr tablet 12.5 mg (TOPROL-XL), 12.5 mg, Oral, DAILY, Sarthak Pritchard MD, 12.5 mg at 03/15/18 0828     naloxone injection 0.2-0.4 mg (NARCAN), 0.2-0.4 mg, Intravenous, PRN **OR** naloxone injection 0.2-0.4 mg (NARCAN), 0.2-0.4 mg, Intramuscular, PRN, Ruby Josue MD     norepinephrine 16 mg/250 ml in NS (0.064 mg/ml), 2-30 mcg/min, Intravenous, Continuous, Ruby Josue MD, Stopped at 03/12/18 1000     OLANZapine tablet 7.5 mg (zyPREXA), 7.5 mg, Oral, QHS, Arlin Galindo MD, 7.5 mg at 03/14/18 1841     pantoprazole 40 mg in NaCl 0.9 % 100 mL (MINI-BAG Plus) (PROTONIX), 40 mg, Intravenous, QAM AC, Last Rate: 400 mL/hr at 03/14/18 0809, 40 mg at 03/14/18 0809 **OR** omeprazole capsule 20 mg (PriLOSEC), 20 mg, Oral, QAM AC, 20 mg at 03/15/18 0829 **OR** [DISCONTINUED] omeprazole suspension 20 mg (PriLOSEC), 20 mg, Enteral Tube, [facility], Ling Varela MD     ondansetron injection 4 mg (ZOFRAN), 4 mg, Intravenous, Q4H PRN, 4 mg at 03/10/18 1722 **OR** ondansetron tablet 8 mg (ZOFRAN), 8 mg, Oral, Q8H PRN, Ling Varela MD     oxyCODONE 5 mg/0.25 mL concentrated solution 5 mg (ROXICODONE), 5 mg, Sublingual, Q3H PRN, Denia Mcclelland  FLEX Gordon     piperacillin-tazobactam (ZOSYN) 4.5 grams, 4.5 g, Intravenous, Q6H, Ling Varela MD, 4.5 g at 03/15/18 1144     polyvinyl alcohol 1.4 % ophthalmic solution 1-2 drop (LIQUIFILM TEARS), 1-2 drop, Both Eyes, Q1H PRN, Ling Varela MD     potassium, sodium phosphates 280-160-250 mg packet 1 packet (PHOS-NAK), 1 packet, Oral, TID, José Miguel June MD, 1 packet at 03/15/18 0838     senna-docusate 8.6-50 mg tablet 1 tablet (PERICOLACE), 1 tablet, Oral, BID, 1 tablet at 03/15/18 0828 **OR** [DISCONTINUED] sennosides syrup 8.8 mg (for SENOKOT), 8.8 mg, Enteral Tube, BID, Ling Varela MD, 8.8 mg at 03/13/18 0817     sodium chloride flush 10-20 mL (NS), 10-20 mL, Intravenous, PRN, Kenny Ramirez MD     sodium chloride flush 10-30 mL (NS), 10-30 mL, Intravenous, PRN, Kenny Ramirez MD     sodium chloride flush 10-30 mL (NS), 10-30 mL, Intravenous, Q8H FIXED TIMES, Kenny Ramirez MD, 20 mL at 03/15/18 0601     sodium chloride flush 20 mL (NS), 20 mL, Intravenous, PRN, Kenny Ramirez MD     Insert peripheral IV, , , Once **AND** Saline lock IV, , , Once **AND** sodium chloride flush 3 mL (NS), 3 mL, Intravenous, Line Care, Kenny Ramirez MD, 10 mL at 03/15/18 0829     sodium phosphates 133 mL rectal enema 1 enema (for FLEET), 1 enema, Rectal, Daily PRN, Sarthak Pritchard MD, 1 enema at 03/13/18 1203     tiZANidine tablet 2 mg (ZANAFLEX), 2 mg, Oral, BID, Denia Gordon CNP     vasopressin standard infusion 20 units in D5W 100 mL, 2.4 Units/hr, Intravenous, Continuous, Ling Varela MD, Stopped at 03/11/18 3685    SOCIAL HABITS:    History   Smoking Status     Never Smoker   Smokeless Tobacco     Never Used     History   Alcohol Use No     History   Drug Use No       FAMILY HISTORY:    Family History   Problem Relation Age of Onset     Prostate cancer Father      Cancer Father      Alzheimer's disease Father      Parkinsonism Mother      Diabetes Brother      Alzheimer's disease  Sister      No Medical Problems Daughter      No Medical Problems Daughter      Parkinsonism Son      No Medical Problems Son      No Medical Problems Son      No Medical Problems Son      Parkinsonism Son      No Medical Problems Son      No Medical Problems Son      No Medical Problems Son        REVIEW OF SYSTEMS: Noted his cardiac history including bypass in the distant past no recent events regarding that.  No peripheral vascular surgical intervention.    PE:    Vitals:    03/15/18 1100   BP: 111/79   Pulse: (!) 146   Resp: (!) 31   Temp:    SpO2: 96%       HEENT: Flat affect and does respond and looks at the clock but does not respond verbally to me.  The son indicates this would be not unusual for his dad to respond this way in the hospital.  Chest: Benign  Lungs: Scattered rhonchi diffusely  Heart: Heart rate 90/min  Abd: Cholecystostomy tube in place.  Patient with fairly benign abdomen currently.  Well-healed low midline incision.  Ext: Benign  Vascular: Normal    LABS:    Results from last 7 days  Lab Units 03/15/18  0404   LN-SODIUM mmol/L 153*   LN-POTASSIUM mmol/L 3.1*   LN-CHLORIDE mmol/L 116*   LN-CO2 mmol/L 30   LN-BLOOD UREA NITROGEN mg/dL 33*   LN-CREATININE mg/dL 0.85   LN-CALCIUM mg/dL 7.6*      Results from last 7 days  Lab Units 03/15/18  0404   LN-WHITE BLOOD CELL COUNT thou/uL 11.7*   LN-HEMOGLOBIN g/dL 7.9*   LN-HEMATOCRIT % 25.9*   LN-PLATELET COUNT thou/uL 37*   LN-NEUTROPHILS RELATIVE PERCENT % 49*   LN-LYMPHOCYTES RELATIVE PERCENT % 48*   LN-MONOCYTES RELATIVE PERCENT % 4   LN-EOSINOPHILS RELATIVE PERCENT % 0      Results from last 7 days  Lab Units 03/15/18  0404 03/12/18  0402   LN-ALKALINE PHOSPHATASE U/L 40* 39*   LN-BILIRUBIN TOTAL mg/dL 1.0 1.3*   LN-BILIRUBIN DIRECT mg/dL  --  0.6*   LN-PROTEIN TOTAL g/dL 4.5* 4.6*   LN-ALT (SGPT) U/L <9 18   LN-AST (SGOT) U/L 7 9           Results from last 7 days  Lab Units 03/10/18  2031   LN-INR  2.03*       XRAYS: Radiographic studies  reviewed.    ASSESSMENT: Patient admitted with sepsis appears to be cholecystitis in origin and is status post cholecystostomy tube.  He had some problem with the tube and it was revised once and there is now a CT scan indicating some blood around the gallbladder and liver.  He apparently is going down for reassessment of this today.  The potential of intervention with cholecystectomy under taken but this decision will result on his findings today in interventional radiology.    PLAN: As above will follow    Ramy arias md  [facility]

## 2021-06-16 NOTE — PROGRESS NOTES
"Speech Language/Pathology  Speech Therapy Daily Progress Note    Patient presents as alert and confused during this session.  An  was not applicable.    Objective  Family (2 sons, 1 daughter and daughter-in-law) in room with patient.  Patient seemed more confused compared to conversations during VFSS. Nurse endorsed cognition waxing and waning.  Education provided to patient and family re: results of VFSS. Apparently TF is not an option per patient wishes prior to new confusion. Now patient is stating \"I don't care.\" when asked if he wants TF, but then he is asking for water and ice cream. Patient seems as if he is deciding to eat by mouth. I stated that since he is a silent aspirator I would like to teach family to cue patient to cough after he takes a sip of liquid or a bite of food. Patient was given a cup of water with hand over hand assistance. He was able to take small sips, had independent multiple swallows, and was able to cough 100% of time when verbally cued to cough. He did this 5/5 times.    Assessment  Family education provided re: cueing pt to cough following sips of liquids. They verbalized understanding. Verbal education provided to nurse. Instructions on strategies written on white board in room.    Plan/Recommendations  Continue [facility] soft diet with thin liquids using strategies of small bites/sips and cough after each bite/sip.    The ST Care Plan has been reviewed and current plan remains appropriate.    30 dysphagia minutes     Nhung Carlson MS, CCC-SLP      "

## 2021-06-16 NOTE — PROGRESS NOTES
Speech Language/Pathology  Speech Therapy Daily Progress Note    Patient presents as lethargic and somnolent during this session.  An  was not applicable.    Objective  patient was noted to open his eyes x2 during this session with max stim, but when his eyes were open he did not track or respond to questions. Two sons were present in the room and his partcpcation did not increase.     Son's in the room verbalized need for strategy of cough after each bite and sip.     Assessment  Patient's mental status continues to wax and wane and is not appropriate to participate in therapeutic feedings when I saw him today.   Plan/Recommendations    The ST Care Plan has been reviewed and current plan remains appropriate.  Direct handoff provided to RN (Genoveva THOMAS) that patient must be awake and alert and follow direction to cough after every bite or sip or he should not be given any food.  Patient is at high risk for aspiration with all PO, but patient and family chose to continue PO diet after extensive education after VFSS per SLP Karrie RAY.    Order was modified to include these instructions.       8 conference minutes     Camilla Machuca MS, CCC-SLP

## 2021-06-16 NOTE — PROGRESS NOTES
Clinical Nutrition Therapy      Pt is receiving mechanical soft diet with thin liquids. Pt took 85% this morning, Averaging greater than 75% of meals. Pt is also receiving supplement of gelatein 20. Pt unable to recall if he is receiving or if he likes the product. ( has been receiving TID with meals)    Weight 206 2/3#, admit 195#  BM incontinent 3/22  Labs reviewed  Lucas Sneed    Malnutrition: not noted    Nutrition risk; remains at moderate risk    Nutrition diagnosis: updated as is now eating 50 to 85%    Intervention: Continue supplement, current diet of mechanical soft.    Monitor: follow intake, weights/labs.

## 2021-06-16 NOTE — PROGRESS NOTES
[facility]  Progress Note  Jerry W Oumar    Primary Care: Dr. Riley Cuevas MD    Assessment:         Atrial fibrillation with RVR.  The patient is maintained sinus rhythm for the past 36 hours.  He has been switched over to oral amiodarone.    Volume overload: The patient had some heart failure earlier in his clinical course however he has normal left ventricular systolic performance.  The patient has evidence of mild fluid overload at this point and I agree with the plan diuresis.  This does not however put him at increased risk for undergoing cholecystectomy.    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    Mixed hyperlipidemia    Hypothyroidism    Coronary Artery Disease    Other autoimmune hemolytic anemias    Thrombocytopenia    Septic shock    Neutropenia associated with infection    Gram-negative bacteremia    Metabolic acidosis with respiratory acidosis    Hemorrhage    Atrial fibrillation with rapid ventricular response    Lactic acidosis    Hypocalcemia    Acute respiratory failure with hypoxia and hypercapnia    Bacteremia due to Gram-negative bacteria    Acute pulmonary edema    Abdominal pain, unspecified abdominal location    Fecal impaction    Cholecystitis     LOS: 9 days       Recommendations:    Patient has cardiac clearance to move ahead with cholecystectomy.    Agree with gentle diuresis today and cutting back on IV fluids.    Continue oral amiodarone    Subjective:  Patient notes that he feels relatively well.  He notes minimal shortness of breath.  He is not having any chest pain or palpitations.  His appetite has improved.    Current Facility-Administered Medications   Medication Dose Route Frequency Provider Last Rate Last Dose     acetaminophen suppository 650 mg (TYLENOL)  650 mg Rectal Q4H PRN Denia Chapman CNP   650 mg at 03/14/18 0928     acetaminophen tablet 650 mg (TYLENOL)  650 mg Oral Q4H PRN Ling Varela MD    650 mg at 03/09/18 2215     aluminum-magnesium hydroxide-simethicone 200-200-20 mg/5 mL suspension 30 mL (MAALOX ADVANCED)  30 mL Oral Q4H PRN Arlin Galindo MD         amiodarone tablet 200 mg (PACERONE)  200 mg Oral BID Jerry Oseguera MD   200 mg at 03/18/18 0935     bacitracin ointment packet 1 packet  1 packet Topical Once PRN Kenny Ramirez MD         benzocaine-menthol lozenge 1 lozenge (CEPACOL)  1 lozenge Oral Q1H PRN Ling Varela MD         bisacodyl suppository 10 mg (DULCOLAX)  10 mg Rectal Daily PRN Ling Varela MD   10 mg at 03/12/18 0135     dextrose 50 % (D50W) syringe 20-50 mL  20-50 mL Intravenous PRN Ling Varela MD   12.5 mL at 03/11/18 0839     folic acid tablet 1 mg (FOLVITE)  1 mg Oral QHS Sarthak Pritchard MD   1 mg at 03/17/18 2100     furosemide injection 20 mg (LASIX)  20 mg Intravenous BID - diuretic Valeria Swan MD         glucagon (human recombinant) injection 1 mg  1 mg Subcutaneous PRN Ling Varela MD         HYDROmorphone injection 0.2-0.4 mg (DILAUDID)  0.2-0.4 mg Intravenous Q3H PRN Ruby Josue MD   0.3 mg at 03/15/18 0813     insulin aspart U-100 injection (NovoLOG)   Subcutaneous TID with meals Sarthak Pritchard MD   4 Units at 03/18/18 1150     insulin aspart U-100 injection (NovoLOG)   Subcutaneous QHS Sarthak Pritchard MD   2 Units at 03/16/18 2207     labetalol injection 5-10 mg (NORMODYNE,TRANDATE)  5-10 mg Intravenous Q4H PRN Shawna Cassidy DO   5 mg at 03/14/18 0927     levothyroxine tablet 150 mcg (SYNTHROID, LEVOTHROID)  150 mcg Oral Daily 0600 Bo Franks MD   150 mcg at 03/18/18 0532     magnesium hydroxide suspension 30 mL (MILK OF MAG)  30 mL Oral Daily PRN Ling Varela MD         magnesium oxide tablet 400 mg (MAG-OX)  400 mg Oral TID Bo Franks MD   400 mg at 03/18/18 1355     melatonin tablet 3 mg  3 mg Oral Bedtime PRN Denia Gordon CNP   3 mg at 03/15/18 1959     metoprolol  succinate 24 hr tablet 25 mg (TOPROL-XL)  25 mg Oral DAILY Valeria Swan MD   25 mg at 03/16/18 0919     naloxone injection 0.2-0.4 mg (NARCAN)  0.2-0.4 mg Intravenous PRN Ruby Josue MD        Or     naloxone injection 0.2-0.4 mg (NARCAN)  0.2-0.4 mg Intramuscular PRN Ruby Josue MD         OLANZapine tablet 7.5 mg (zyPREXA)  7.5 mg Oral QHS Arlinyue Galindo MD   7.5 mg at 03/17/18 2100     omeprazole capsule 20 mg (PriLOSEC)  20 mg Oral QAM AC Ling Varela MD   20 mg at 03/18/18 0934     ondansetron injection 4 mg (ZOFRAN)  4 mg Intravenous Q4H PRN Ling Varela MD   4 mg at 03/10/18 1722    Or     ondansetron tablet 8 mg (ZOFRAN)  8 mg Oral Q8H PRN Ling Varela MD         oxyCODONE 5 mg/0.25 mL concentrated solution 5 mg (ROXICODONE)  5 mg Sublingual Q3H PRN Deniadalton Gordon, CNP   5 mg at 03/15/18 1336     oxyCODONE 5 mg/0.25 mL concentrated solution 5 mg (ROXICODONE)  5 mg Sublingual QID Deniadalton Gordon, CNP   5 mg at 03/18/18 1355     piperacillin-tazobactam (ZOSYN) 4.5 grams  4.5 g Intravenous Q6H Ling Varela MD   4.5 g at 03/18/18 1151     polyvinyl alcohol 1.4 % ophthalmic solution 1-2 drop (LIQUIFILM TEARS)  1-2 drop Both Eyes Q1H PRN Ling Varela MD         predniSONE tablet 20 mg (DELTASONE)  20 mg Oral BID with meals Valeria Swan MD         senna-docusate 8.6-50 mg tablet 1 tablet (PERICOLACE)  1 tablet Oral BID Ling Varela MD   1 tablet at 03/18/18 0935     sodium chloride flush 10-20 mL (NS)  10-20 mL Intravenous PRN Kenny Ramirez MD         sodium chloride flush 10-30 mL (NS)  10-30 mL Intravenous PRN Kenny Ramirez MD         sodium chloride flush 10-30 mL (NS)  10-30 mL Intravenous Q8H FIXED TIMES Kenny Ramirez MD   10 mL at 03/18/18 1357     sodium chloride flush 20 mL (NS)  20 mL Intravenous PRN Kenny Ramirez MD         sodium chloride flush 3 mL (NS)  3 mL Intravenous Line Care Kenny Ramirez MD   10 mL at 03/18/18 0953     sodium phosphates 133  mL rectal enema 1 enema (for FLEET)  1 enema Rectal Daily PRN Sarthak Pritchard MD   1 enema at 03/13/18 1203     tiZANidine tablet 2 mg (ZANAFLEX)  2 mg Oral TID Denia Gordon, FLEX   2 mg at 03/18/18 1355       Objective:   Vital signs in last 24 hours:  Temp:  [97.4  F (36.3  C)-98.1  F (36.7  C)] 97.6  F (36.4  C)  Heart Rate:  [46-64] 64  Resp:  [16-20] 20  BP: ()/(48-72) 140/72  Weight:   213 lb (96.6 kg)   Weight change:       Physical Exam:  General: The patient is alert oriented to person place and situation.  The patient is in no acute distress at the time of my evaluation.  Eyes: Pupils are equal, round, and reactive to light.  Conjunctiva and sclera are clear.  ENT: Oral mucosa is moist and without redness. No evident nasal discharge.  Pulmonary: Lungs are notable for some scattered rhonchi but no rales.      Cardiovascular exam: Rhythm is regular. S1 and S2 are normal. No significant murmur is present. JVP is normal. Lower extremities demonstrate no significant edema. Distal pulses are intact bilaterally.  Abdomen is obese, soft, and nontender.  Musculoskeletal: Spine is straight. Extremities without deformity.  Neuro: Gait is not observed as the patient is a bedrest normal.   Skin is warm, dry, and otherwise intact.        Cardiographics:   Telemetry, personally reviewed demonstrates normal sinus rhythm.  No recurrent atrial fibrillation.    Radiology:      Lab Results:   Lab Results   Component Value Date     03/18/2018    K 3.8 03/18/2018     03/18/2018    CO2 30 03/18/2018    BUN 20 03/18/2018    CREATININE 0.77 03/18/2018    CALCIUM 7.0 (L) 03/18/2018     Lab Results   Component Value Date    WBC 12.5 (H) 03/18/2018    HGB 8.7 (L) 03/18/2018    HCT 27.9 (L) 03/18/2018     (H) 03/18/2018    PLT 52 (L) 03/18/2018     Lab Results   Component Value Date    INR 1.21 (H) 03/16/2018       Outside record review:

## 2021-06-16 NOTE — PROGRESS NOTES
Nutrition Therapy Brief Note    Asked by RN re: diet.  Yesterday SLP sticky note recommended nectar thickened liquids.  He hadn't yet written his note. I called to clarify this and then entered this in the diet order.  Today, sticky note and SLP note recommend thin liquids with the reasoning that it may be easier to cough up then thickened liquids.  He is at risk for aspiration with all intake per SLP.  I changed diet back to thin liquids.  Will leave in the Gelatein 20 for now.

## 2021-06-16 NOTE — PROGRESS NOTES
[facility] Pulmonary/Critical Care Consult Team Note    Nixon Velasquez Jr.,  1929, MRN [id number]  Admitting Dx: Lactic acidosis [E87.2]  Thrombocytopenia [D69.6]  Chronic anemia [D64.9]  FRANC (acute kidney injury) [N17.9]  Sepsis [A41.9]  Date / Time of Admission:  3/9/2018  4:34 AM    Overnight Events:  Intake/Output last 3 shifts:  I/O last 3 completed shifts:  In: 2594 [I.V.:1404; IV Piggyback:1190]  Out: 720 [Urine:720]   Levophed at 6, vaso off  On bipap overnight for increased work of breathing    Assessment/Plan: Nixon Velasquez Jr. is a 88 y.o. 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  - hematology following  - G-CSF daily until ANC greater than 1000 for 2 days or so    CV: septic shock- improving  - Monitor on tele    ID: Severe Sepsis with gram negative bacteremia  - likely due to PNA  - will get CT to evaluate for other sources, rule out bowel  - on vanc and zosyn  - pressors   - stress dose steroids    GI: NPO  - GI proph    RENAL: Acute renal failure likely due to severe sepsis  - Cr 1.63 <-- 1.4  - baseline Cr of 0.88  - Follow BUN/Creatinine  - strict I/O's    NEURO: Hx of CVA with residual weakness    FEN:   - IVF  - electrolyte replacement protocol    MS:   - Pain control  - PT/OT consult    Code Status    ICU DAILY CHECKLIST           Can patient transfer out of MICU? no  FAST HUG:  Feeding:   "Feeding: No.    Mora:Yes  Analgesia/Sedation:Yes  Thromboembolic prophylaxis:Contraindication  HOB>30:  Yes  Stress Ulcer Protocol Active: yes; Mode: PPI/H2 Antagonist  Glycemic Control:   Lab Results   Component Value Date/Time     (H) 03/10/2018 05:31 AM     (H) 03/09/2018 04:57 AM     (H) 03/07/2018 08:53 AM    Any glucose > 180 no; Mode of Insulin Therapy: Not indicated  INTUBATED: NA  PHYSICAL THERAPY AND MOBILITY: Can patient have PT and mobility trial: yes Activity: ICU mobility protocol    Critical Care Time excluding procedures and family discussions greater than: 45 Minutes    Shawna Cassidy, DO  Pulmonary and Critical Care Attending  pgr [phone]    Allergies   Allergen Reactions     Dairy Aid [Lactase]      Dairy products- severe shaking in legs     Benzonatate      Blood-Group Specific Substance      Warm autoantibodies present. Expect up to 24 hour delay in blood for transfusion. Draw 2 lavender and 1 red tube for type and screen orders   Notify Blood bank as soon as possible if transfusions are needed.     Ciprofloxacin      Dairy      Severe shaking in legs.     Ephedrine Sulfate      Erythromycin Base      Hyoscyamine Sulfate      Lactose      Methocarbamol      Terazosin      Warfarin        Meds: See MAR    Physical Exam:  BP 92/50  Pulse (!) 112  Temp 97.1  F (36.2  C) (Axillary)   Resp 25  Ht 6' 3\" (1.905 m)  Wt 200 lb 12.8 oz (91.1 kg)  SpO2 100%  BMI 25.1 kg/m2  Intake/Output this shift:  I/O this shift:  In: -   Out: 675 [Urine:675]  GEN:  NAD, cachectic appearing  HEENT: bipap mask in place  CVS: regular rhythm, no murmurs  RESP: Coarse rhonchi bilateral  ABD: No abdominal pain with palpation, no guarding, no rigidity  EXT: Warm, well perfused, no rashes, no edema  Vasc: halina radial pulses intact  NEURO:  CN2-12 grossly intact, moving all extremities  PSYCH: unable to assess    Pertinent Labs: Latest lab results in EHR personally reviewed.     Cultures: " personally reviewed.   personally reviewed images:   Xr Chest 1 View Portable    Result Date: 3/10/2018  XR CHEST 1 VIEW PORTABLE 3/10/2018 5:28 AM INDICATION: dyspnea, hypoxia, pneumonia. COMPARISON: 03/09/2018 FINDINGS: Worsening central congestion and diffuse interstitial alveolar infiltrates along with increasing bilateral effusions consistent with progression of pulmonary edema or congestive heart failure. Superimposed pneumonia certainly possible. Remainder stable.       Patient Active Problem List   Diagnosis     Restless Legs Syndrome     Corrosive Esophagitis     Hiatal Hernia     CLL (chronic lymphoid leukemia) in relapse     Mixed hyperlipidemia     Cerebral atherosclerosis     Adenocarcinoma Of The Prostate Gland     Papillary Carcinoma Of The Thyroid Gland     Hypothyroidism     Type 2 Diabetes Mellitus     Coronary Artery Disease     Cervical Radiculopathy     Iron Deficiency     Hypoxia     Advanced directives, counseling/discussion     Infection of ear lobe, right     Right ear pain     Benign essential HTN     Anemia     Other autoimmune hemolytic anemias     Thrombocytopenia     Squamous cell carcinoma of skin     Sepsis     Septic shock     Neutropenia associated with infection       Shawna Cassidy DO  Pulmonary and Critical Care Attending  pgr [phone]

## 2021-06-16 NOTE — PROGRESS NOTES
Progress Note    Assessment/Plan  Patient much improved today smiling and joking.  Reviewed with family members with continued conservative care interval cholecystectomy 4-6 weeks    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    Mixed hyperlipidemia    Hypothyroidism    Coronary Artery Disease    Other autoimmune hemolytic anemias    Thrombocytopenia    Septic shock    Neutropenia associated with infection    Gram-negative bacteremia    Metabolic acidosis with respiratory acidosis    Hemorrhage    Atrial fibrillation with rapid ventricular response    Lactic acidosis    Hypocalcemia    Acute respiratory failure with hypoxia and hypercapnia    Bacteremia due to Gram-negative bacteria    Acute pulmonary edema    Abdominal pain, unspecified abdominal location    Fecal impaction    Cholecystitis    Encounter for family conference with patient present      Subjective  As above abdomen  Objective    Vital signs in last 24 hours  Temp:  [97.7  F (36.5  C)-98.1  F (36.7  C)] 98.1  F (36.7  C)  Heart Rate:  [48-56] 50  Resp:  [16-20] 18  BP: (101-137)/(50-61) 101/50  Weight:   210 lb 14.4 oz (95.7 kg)    Intake/Output last 3 shifts  I/O last 3 completed shifts:  In: 290 [P.O.:240; IV Piggyback:50]  Out: 2270 [Urine:2050; Emesis/NG output:220]  Intake/Output this shift:         Physical Exam  Abdomen benign drain working well with bile drainage    Pertinent Labs   Lab Results   Component Value Date    WBC 11.4 (H) 03/19/2018    HGB 8.8 (L) 03/19/2018    HCT 28.8 (L) 03/19/2018     (H) 03/19/2018    PLT 65 (L) 03/19/2018             Pertinent Radiology     No results found.        Ramy Mcguire

## 2021-06-16 NOTE — PROGRESS NOTES
[facility] Daily Progress Note      Date of Service: 3/14/2018     Assessment and plan    Goals of care  ; 88-year-old male with multiple comorbidities  ; Admitted with sepsis and bacteremia  ; Associated altered mental status  : Underlying CLL  ; Family wanting to discuss about treatment options and possible goals of care  ; Discussion with intensivist arranged  ; Possible palliative care consult depending on outcome    Altered mental status  ; Likely acute metabolic encephalopathy secondary to underlying illness  ; Associated delirium  ; CT head on 3/11/2018 did not show any acute changes  ; Proper sleep hygiene    E. coli bacteremia with sepsis  ; Associated with septic/hemorrhagic shock  ; Of pressors   ;Suspected source is acute acalculous cholecystitis.    : Patient currently on Zosyn  : Could consider changing to Maxipime if not improving well     Acute acalculous cholecystitis,   s/p percutaneous cholecystostomy on 3/10 with subsequent bleeding.  IR images on 3/11 demonstrated nearly entire lumen of the gallbladder filled with a blood clot.  Catheter was exchanged and some of the clot was aspirated.    Continue flushes with normal saline    Hyper natremia  ; Patient on dextrose at 100 mL spiral    Acute kidney injury  ; Monitor renal function  ; Creatinine normalized    Paroxysmal atrial fibrillation  ; Continue metoprolol     Moderate pharyngeal dysphagia and moderate aspiration risk with all intake.    Since patient does not want feeding tubes started on NDD 3 and thin liquids which is thought to be the least risk for aspiration.      This note was dictated using voice recognition software. Any grammatical or context distortions are unintentional and inherent to the software.  Subjective:   Patient seen at bedside with his son  Patient is awake, does not respond to questions    As per son patient has not spoken a word yet, but does seem to respond little bit to him, earlier he was able to wink at  "him.    Updates from nursing staff    Spoke with patient's family: Patient son updated about clinical condition  Asking about patient's prognosis and long-term outcome  Son states patient wife will be coming later in the afternoon, wondering if they could be a family discussion  Spoke to intensivist: Intensivist will meet with the family  Nursing staff updated    Brief History: 88 y.o. old male with PMH of CAD, status post CABG and postoperative CVA, HTN, CLL recently started on Imbruvica, h/o thyroid cancer, s/p thyroidectomy and radioactive iodine treatment, now on levothyroxine, prostate cancer in 1992, s/p prostatectomy, multiple skin cancers who presented with E. coli bacteremia, sepsis and septic shock secondary to acalculous cholecystitis.  He had percutaneous cholecystostomy tube placed on 3/10 with subsequent bleeding and clotting.    Catheter was exchanged on 3/11, some of the clot has been aspirated. Confusion has been noted since 3/11, suspect ICU delirium.    Chart reviewed, events noted. Pt seen and examined.     Objective     Vital signs in last 24 hours:  Temp:  [98.1  F (36.7  C)-99  F (37.2  C)] 98.6  F (37  C)  Heart Rate:  [] 111  Resp:  [13-32] 19  BP: ()/(54-82) 115/66  Weight:   205 lb 7 oz (93.2 kg)  Weight change:   Body mass index is 25.68 kg/(m^2).    Intake/Output last 3 shifts:  I/O last 3 completed shifts:  In: 1111 [P.O.:30; I.V.:811; IV Piggyback:270]  Out: 1665 [Urine:1550; Emesis/NG output:115]  Intake/Output this shift:         Physical Exam:  /66  Pulse (!) 111  Temp 98.6  F (37  C) (Axillary)   Resp 19  Ht 6' 3\" (1.905 m)  Wt 205 lb 7 oz (93.2 kg)  SpO2 95%  BMI 25.68 kg/m2    FiO2 (%): 40 % O2 Device: Nasal cannula O2 Flow Rate (L/min): 2 L/min    General Appearance:    Alert, no apparent distress.    HEENT:    Normocephalic. Pupils equal and reactive. No scleral   Icterus. Moist oral mucosa    Lungs:     Clear to auscultation bilaterally, respirations " unlabored   Heart::    Regular rate and rhythm, S1 and S2 normal, no murmur, rub   or gallop. No peripheral edema.   Abdomen:   Soft, surgical dressing present  ?  Tenderness: No grimace on palpation   Extremity :   No deformity   Pulses:   2+ and symmetric all extremities   CNS:  Awake, appears confused, not responding to question         Imaging:  personally reviewed.      Scheduled Meds:    folic acid (FOLVITE) IVPB  1 mg Intravenous DAILY     furosemide  20 mg Intravenous DAILY     hydrocortisone sod succ  50 mg Intravenous Q12H     insulin aspart (NovoLOG) injection   Subcutaneous TID with meals     insulin aspart (NovoLOG) injection   Subcutaneous QHS     metoprolol succinate  12.5 mg Oral DAILY     OLANZapine  7.5 mg Oral QHS     pantoprazole (PROTONIX) IVPB 40 mg  40 mg Intravenous QAM AC    Or     omeprazole  20 mg Oral QAM AC     piperacillin-tazobactam  4.5 g Intravenous Q6H     senna-docusate  1 tablet Oral BID     sodium chloride  10-30 mL Intravenous Q8H FIXED TIMES     sodium chloride  3 mL Intravenous Line Care     Continuous Infusions:    dextrose 5% 100 mL/hr (03/14/18 0958)     norepinephrine IV infusion in NS Stopped (03/12/18 1000)     vasopressin Stopped (03/11/18 0345)     PRN Meds:.acetaminophen, acetaminophen, aluminum-magnesium hydroxide-simethicone, bacitracin, benzocaine-menthol, bisacodyl, dextrose 50 % (D50W), fentaNYL pf, glucagon (human recombinant), HYDROmorphone, labetalol, magnesium hydroxide, naloxone **OR** naloxone, ondansetron **OR** ondansetron, polyvinyl alcohol, sodium chloride, sodium chloride, sodium chloride, sodium phosphates 133 mL    Lab Results:  personally reviewed.   not applicable  Recent Results (from the past 24 hour(s))   POCT Glucose    Collection Time: 03/13/18  4:38 PM   Result Value Ref Range    Glucose,  mg/dL   POCT Glucose    Collection Time: 03/13/18  8:05 PM   Result Value Ref Range    Glucose,  mg/dL   Crossmatch    Collection Time:  03/14/18 12:05 AM   Result Value Ref Range    Crossmatch LEAST INCOMPATIBLE     Blood Expiration Date [id number]     Unit Type A Neg     Unit Number [id number]     Status Transfused     Component Red Blood Cells     PRODUCT CODE P1476D86     Issue Date and Time [id number]     Blood Type 0600     CODING SYSTEM PRSL051    Potassium    Collection Time: 03/14/18 12:10 AM   Result Value Ref Range    Potassium 3.1 (L) 3.5 - 5.0 mmol/L   Renal Function Profile    Collection Time: 03/14/18  6:03 AM   Result Value Ref Range    Albumin 2.5 (L) 3.5 - 5.0 g/dL    Calcium 7.6 (L) 8.5 - 10.5 mg/dL    Phosphorus 3.3 2.5 - 4.5 mg/dL    Glucose 166 (H) 70 - 125 mg/dL    BUN 38 (H) 8 - 28 mg/dL    Creatinine 0.94 0.70 - 1.30 mg/dL    Sodium 155 (HH) 136 - 145 mmol/L    Potassium 3.8 3.5 - 5.0 mmol/L    Chloride 120 (H) 98 - 107 mmol/L    CO2 27 22 - 31 mmol/L    Anion Gap, Calculation 8 5 - 18 mmol/L    GFR MDRD Af Amer >60 >60 mL/min/1.73m2    GFR MDRD Non Af Amer >60 >60 mL/min/1.73m2   Magnesium    Collection Time: 03/14/18  6:03 AM   Result Value Ref Range    Magnesium 2.2 1.8 - 2.6 mg/dL   HM1 (CBC with Diff)    Collection Time: 03/14/18  6:03 AM   Result Value Ref Range    WBC 14.3 (H) 4.0 - 11.0 thou/uL    RBC 2.52 (L) 4.40 - 6.20 mill/uL    Hemoglobin 8.0 (L) 14.0 - 18.0 g/dL    Hematocrit 26.3 (L) 40.0 - 54.0 %     (H) 80 - 100 fL    MCH 31.7 27.0 - 34.0 pg    MCHC 30.4 (L) 32.0 - 36.0 g/dL    RDW 22.8 (H) 11.0 - 14.5 %    Platelets 30 (LL) 140 - 440 thou/uL    MPV 13.2 (H) 8.5 - 12.5 fL    Neutrophils % 49 (L) 50 - 70 %    Lymphocytes % 48 (H) 20 - 40 %    Monocytes % 4 2 - 10 %    Eosinophils % 0 0 - 6 %    Basophils % 0 0 - 2 %    Neutrophils Absolute 6.8 2.0 - 7.7 thou/uL    Lymphocytes Absolute 6.8 (H) 0.8 - 4.4 thou/uL    Monocytes Absolute 0.5 0.0 - 0.9 thou/uL    Eosinophils Absolute 0.0 0.0 - 0.4 thou/uL    Basophils Absolute 0.0 0.0 - 0.2 thou/uL   POCT Glucose    Collection Time: 03/14/18  7:53  AM   Result Value Ref Range    Glucose,  mg/dL   POCT Glucose    Collection Time: 03/14/18 11:47 AM   Result Value Ref Range    Glucose,  mg/dL   Hemoglobin    Collection Time: 03/14/18  1:31 PM   Result Value Ref Range    Hemoglobin 8.0 (L) 14.0 - 18.0 g/dL       Results from last 7 days  Lab Units 03/14/18  1147 03/14/18  0753 03/13/18  2005 03/13/18  1638 03/13/18  1325 03/13/18  1214 03/13/18  0802 03/13/18  0542 03/13/18  0356 03/12/18  2330   LN-POC GLUCOSE FINGERSTICK- HE mg/dL 182 170 152 160 172 173 148 143 146 144           Advance Care Planning:   Barriers to discharge: Multiple issue  Anticipated discharge day: Unclear  Disposition: Unclear      Laurence Dhillon MD  [facility]  Hospitalist

## 2021-06-16 NOTE — PROGRESS NOTES
PULMONARY / CRITICAL CARE PROGRESS NOTE    Date / Time of Admission:  3/9/2018  4:34 AM    Assessment:   1. Acute respiratory failure   2. Pulmonary edema  3. Sepsis due to acute cholecystitis with E coli bacteremia   4. Acute cholecystitis s/p percutaneous cholecystostomy tube placement 3/10/2018  5. Abdominal pain  F/u abdomen/pelvis CT scan showed interval cholecystectomy tube placement. There is a small amount of perihepatic hemorrhage and slight amount of hyperdense ascites consistent with mild intraperitoneal blood. No localized hematoma. Fecal impaction.   Bowel regimen.   6. Resolving acute kidney injury  7. Hypernatremia  8. Anemia s/p RBC transfusion  9. Chronic thrombocytopenia  10. Paroxysmal atrial fibrillation   11. Hx Ischemic cardiomyopathy s/p CABG  12. Moderate MR, mild AS  13. Dysphagia, diet was modify per speech therapy recommendations  14. 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 DM  16. Hx CVA  17. Hx Prostate cancer s/p prostatectomy  18. Hx thyroid cancer s/p thyroidectomy    Advance Directives:  DNR    Plan:   1. Titrate FiO2  2. Pulmonary toiletting  3. Change IV fluids to D5W  4. Gently diuresis   5. Off amiodarone drip   6. Start low dose metoprolol  7. Titrate down stress dose steroids (pt is on chronic prednisone 10 mg daily)   8. Continue zosyn  9. Monitor bile drain output.   10. Diet per speech therapy evaluation   11. PPI for GI prophylaxis  12. Glucose level monitoring  13. DVT prophylaxis SCDs  14. PT, OT evaluation     Please contact me if you have any questions.  Total critical care time, not including separately billable procedure time: 45 minutes    Sarthak Pritchard  Pulmonary / Critical Care  3/13/2018   10:34 AM    ICU DAILY CHECKLIST                           Can patient transfer out of MICU? no    FAST HUG:    Feeding:  Feeding: No.  Patient is receiving NPO    Mora:Yes  Analgesia/Sedation: no.   Thromboembolic prophylaxis:  yes; Mode:  SCDs  HOB>30:  Yes  Stress Ulcer Protocol Active: yes; Mode: PPI  Glycemic Control: Any glucose > 180 no; Mode of Insulin Therapy: Sliding Scale Insulin    INTUBATED:  Can patient have daily waking:  not applicable  Can patient have spontaneous breathing trial:  not applicable    Restraints? no    PHYSICAL THERAPY AND MOBILITY:  Can patient have PT and mobility trial: yes  Activity: PT    Subjective:   HPI:  Nixon Velasquez Jr. is a 88 y.o. 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   Patient was admitted with diagnosis of septic shock. Chest / Abdomen / pelvis CT scan with suspicion for cholecystitis. RUQ US showed acalculous cholecystitis. Positive blood Cx with Gram Negative rods.     Events overnight  - Decrease O2 requirements, off BiPAP.   - Hemodynamically stable  - S/p 1 unit of RBC  - Intermittent colic abdominal pain  - Abdomen CT scan showed nterval cholecystectomy tube placement. There is a small amount of perihepatic hemorrhage and slight amount of hyperdense ascites consistent with mild intraperitoneal blood. No localized hematoma. Fecal impaction.     Allergies: Dairy aid [lactase]; Benzonatate; Blood-group specific substance; Ciprofloxacin; Dairy; Ephedrine sulfate; Erythromycin base; Hyoscyamine sulfate; Lactose; Methocarbamol; Terazosin; and Warfarin     MEDS:  Scheduled Meds:    folic acid (FOLVITE) IVPB  1 mg Intravenous DAILY     furosemide  20 mg Intravenous DAILY     hydrocortisone sod succ  50 mg Intravenous Q8H     insulin aspart (NovoLOG) injection   Subcutaneous Q4H FIXED TIMES     pantoprazole (PROTONIX) IVPB 40 mg  40 mg Intravenous QAM AC    Or     omeprazole  20 mg Oral QAM AC    Or     omeprazole  20 mg Enteral Tube QAM AC      "piperacillin-tazobactam  4.5 g Intravenous Q6H     potassium chloride  10 mEq Intravenous Once     potassium phosphate ivpb  21 mmol Intravenous Once     senna-docusate  1 tablet Oral BID    Or     senna (SENOKOT) syrup  8.8 mg Enteral Tube BID     sodium chloride  10-30 mL Intravenous Q8H FIXED TIMES     sodium chloride  3 mL Intravenous Line Care     Continuous Infusions:    norepinephrine IV infusion in NS Stopped (03/12/18 1000)     nacl 0.9% 25 mL/hr (03/12/18 5478)     vasopressin Stopped (03/11/18 0345)     PRN Meds:.acetaminophen, acetaminophen, bacitracin, benzocaine-menthol, bisacodyl, dextrose 50 % (D50W), fentaNYL pf, glucagon (human recombinant), HYDROmorphone, labetalol, magnesium hydroxide, naloxone **OR** naloxone, ondansetron **OR** ondansetron, polyvinyl alcohol, sodium chloride, sodium chloride, sodium chloride, sodium phosphates 133 mL    Objective:   VITALS:  /65 (Patient Position: Lying)  Pulse (!) 110  Temp 98.1  F (36.7  C) (Oral)   Resp 25  Ht 6' 3\" (1.905 m)  Wt 205 lb 7 oz (93.2 kg)  SpO2 97%  BMI 25.68 kg/m2  EXAM:   Gen: awake, alert, mild distress secondary to abdominal pain  HEENT: pale conjunctiva, moist mucosa, Mallampati III/IV  Neck: no thyromegaly, masses or JVD  Lungs: ronchi both HT  CV: irregular, systolic murmur 2/6, no gallops appreciated  Abdomen: soft, RUQ drain, mild tenderness on deep palpation, BS decrease in frequency  Ext: no edema  Neuro: awake , alert, weakness right side     I&O:      Intake/Output Summary (Last 24 hours) at 03/13/18 1034  Last data filed at 03/13/18 0907   Gross per 24 hour   Intake          1393.22 ml   Output             2455 ml   Net         -1061.78 ml       Data Review:    Results from last 7 days  Lab Units 03/13/18  0543   LN-WHITE BLOOD CELL COUNT thou/uL 14.8*   LN-HEMOGLOBIN g/dL 7.4*   LN-HEMATOCRIT % 23.4*   LN-PLATELET COUNT thou/uL 23*       Results from last 7 days  Lab Units 03/13/18  0543   LN-SODIUM mmol/L 153* "   LN-POTASSIUM mmol/L 3.0*   LN-CHLORIDE mmol/L 118*   LN-CO2 mmol/L 27   LN-BLOOD UREA NITROGEN mg/dL 46*   LN-CREATININE mg/dL 0.96   LN-CALCIUM mg/dL 7.5*     XR CHEST 1 VIEW PORTABLE 3/12/2018 5:19 AM  INDICATION: septic shock, respiratory failure, pneumonia, volume overload.  COMPARISON: 03/11/2018   FINDINGS: Considerable increase in central congestion and diffuse interstitial alveolar infiltrate throughout both hemithoraces. Similar small effusions. Remainder stable.    CT ABDOMEN WO ORAL WO IV CONTRAST 3/12/2018 8:19 PM  INDICATION: Cholecystitis / cholangitis worsening abdominal pain , post cholecystostomy tube placement, dropping H/H  COMPARISON: 03/10/2018  FINDINGS:  LUNG BASES: Minimal change in the significant dependent infiltrate/atelectasis as well as small bilateral pleural effusions. Large hiatal hernia present, unchanged.  ABDOMEN: Interval placement of cholecystostomy tube with dense residual contrast within the gallbladder. There appears to be a small layer of high density hemorrhage about the liver capsule.] Minimal hyperdense ascites present suggesting slight   hemoperitoneum, there is no localized hematoma. Liver otherwise appears normal. Pancreas, spleen, adrenals and kidneys are negative. Mild ascites within both right and left gutters. Right inguinal hernia contains fluid with a slight increase in the hematocrit level. Mora catheter in place, previous prostatectomy. Sigmoid diverticulosis.  MUSCULOSKELETAL: Negative.  CONCLUSION:  1.  Interval cholecystectomy tube placement. There is a small amount of perihepatic hemorrhage and slight amount of hyperdense ascites consistent with mild intraperitoneal blood. No localized hematoma.    Echocardiogram 3/9/2018    Left Ventricle: Normal left ventricular size.The estimated left ventricular ejection fraction is 60%. This represents a normal ejection fraction. Mild hypertrophy noted. E/e' > 15, suggesting elevated LV filling pressures.    Left  Atrium: Left atrial volume is moderately increased.    Mitral Valve: There is moderate mitral annular calcification. Moderate mitral regurgitation.    Mild aortic stenosis. Mild aortic regurgitation    Normal right ventricular size and systolic function.    No pulmonary hypertension present.    Estimated central venous pressure equal to 13 mmHg.    XR SWALLOW STUDY W SPEECH 3/12/2018 1:30 PM  INDICATION: Dysphagia.  TECHNIQUE: Routine.  COMPARISON: None.  FINDINGS:   FLUOROSCOPIC TIME: 1.3 minutes  NUMBER OF IMAGES: 6  Swallow study with Speech Pathology using multiple barium thicknesses.   Puree and honey consistency both demonstrated significant piriform sinus stasis.  [place] consistency demonstrated stasis and kickback aspiration which resulted in only a minimal throat clear.  Thin liquid demonstrated delay in swallow reflex with direct, silent aspiration.    By:  Sarthak Pritchard, 3/13/2018, 1:34 PM    Primary Care Physician:  Riley Cuevas MD

## 2021-06-16 NOTE — PROGRESS NOTES
Hospitalist Progress Note    Brief history  88 y.o. old male with PMH of CAD, status post CABG and postoperative CVA, HTN, CLL recently started on Imbruvica, h/o thyroid cancer, s/p thyroidectomy and radioactive iodine treatment, now on levothyroxine, prostate cancer in 1992, s/p prostatectomy, multiple skin cancers who presented with E. coli bacteremia, sepsis and septic shock secondary to acalculous cholecystitis.  He had percutaneous cholecystostomy tube placed on 3/10 with subsequent bleeding and clotting.  Catheter was exchanged on 3/11, some of the clot has been aspirated. Confusion has been noted since 3/11, suspect ICU delirium.     1. E.coli bacteremia, sepsis.  E.coli is identified in 2 out of 3 sets of blood cultures from 3/9.  Suspected source is acute acalculous cholecystitis.  Antibiotics de-escalated to Zosyn today.   2.  Acute acalculous cholecystitis, s/p percutaneous cholecystostomy on 3/10 with subsequent bleeding.  IR images on 3/11 demonstrated nearly entire lumen of the gallbladder filled with a blood clot.  Catheter was exchanged and some of the clot was aspirated.  Continue home flushes with normal saline twice daily per prior orders.    3.  Septic/hemorrhagic shock.    Requires pressors intermittently.  Titrate off to keep MAP over 65  4.  SHAR. Probably combination of prerenal and ATN in the setting of sepsis.  Creatinine slightly better today. Volume is up, diuresis started today.    5. AFib with RVR. Ventricular rates are reasonably controlled on IV amiodarone.   6. Acute blood loss anemia. S/p 2 Units PRBCs yesterday, hemoglobin is stable since yesterday.    7. Acute respiratory failure with hypoxia. Secondary to fluid overload. NIPPV prn.   8.  Confusion.  Possibly related to medications (hydromorphone), ICU delirium.  Try to avoid narcotics, supportive care.  9. Metabolic acidosis. Secondary to lactic acidosis, Shar, hyperchloremia. Improved with bicarbonate infusion.   10.   Hypernatremia.  Secondary to free water deficit given poor oral intake.  Hopefully will improve after patient started oral intake today.  If not improving start D5 water  11.  Moderate pharyngeal dysphagia and moderate aspiration risk with all intake.  Since patient does not want feeding tubes started on NDD 3 and thin liquids which is thought to be the least risk for aspiration.    Barriers to Discharge : multiple as above   Anticipated Discharge :multiple day stay   Disposition :TBD      Subjective  Patient has been confused since yesterday.  Stroke code was called last night, CT of the head was negative for acute CVA.  Patient remains confused.  He complains of severe abdominal cramping and he says that he needs to have a bowel movement.  Cholecystostomy tube has been replaced and is being flushed with normal saline per IR orders.  Patient denies shortness of breath and he is currently on 3 L nasal cannula.  VFSS demonstrated no oral and moderate pharyngeal dysphasia and moderate aspiration risk with all intake.  Speech therapist recommended NDD 3 and thin liquids since tube feeding is not an option.    Objective    Vital signs in last 24 hours  Temp:  [97.6  F (36.4  C)-98.7  F (37.1  C)] 98.7  F (37.1  C)  Heart Rate:  [107-129] 121  Resp:  [8-39] 28  BP: ()/(44-71) 107/65 98% O2 Device: OxyMask O2 Flow Rate (L/min): 3 L/min  Weight:   203 lb 11.2 oz (92.4 kg) Weight change: 2 lb 14.4 oz (1.315 kg)    Intake/Output last 3 shifts  I/O last 3 completed shifts:  In: 1379.5 [I.V.:1359.5; IV Piggyback:20]  Out: 1370 [Urine:1325; Emesis/NG output:45]  Intake/Output this shift:       Physical Exam:  GENERAL: No acute distress, laying in bed, BiPAP is on   CARDIAC: Irregular, mild tachycardia,  S1 & S2 normal.  No gallops or murmurs. Trace UE edema  LUNGS: Clear anteriorly   ABDOMEN: Soft, non-tender, cholecystostomy tube is noted, bowel sounds present all quadrants  : Mora catheter with leilani color urine    SKIN: Multiple bruises over arms   NEURO: Awake, able to tell the year and the place but appears confused.  No obvious focal deficits noted      Pertinent Labs   Lab Results: personally reviewed.     Results from last 7 days  Lab Units 03/12/18  0402 03/11/18  0535 03/10/18  1955 03/10/18  0531  03/09/18  0457   LN-SODIUM mmol/L 149* 146* 142 141  --  142   LN-POTASSIUM mmol/L 4.0 4.0 5.0 4.7  < > 3.8   LN-CHLORIDE mmol/L 115* 115* 115* 114*  --  110*   LN-CO2 mmol/L 27 23 17* 18*  --  20*   LN-BLOOD UREA NITROGEN mg/dL 53* 48* 48* 45*  --  28   LN-CREATININE mg/dL 1.25 1.31* 1.55* 1.63*  --  1.40*   LN-CALCIUM mg/dL 7.7* 7.5* 6.9* 6.8*  --  7.7*   LN-ALBUMIN g/dL 2.5* 2.4*  --  2.8*  --  3.2*   LN-PROTEIN TOTAL g/dL 4.6* 4.7*  --   --   --  5.2*   LN-BILIRUBIN TOTAL mg/dL 1.3* 1.2*  --   --   --  1.6*   LN-ALKALINE PHOSPHATASE U/L 39* 39*  --   --   --  39*   LN-ALT (SGPT) U/L 18 24  --   --   --  11   LN-AST (SGOT) U/L 9 15  --   --   --  15   LN-MAGNESIUM mg/dL 2.4 2.4 2.2 2.5  < >  --    < > = values in this interval not displayed.    Results from last 7 days  Lab Units 03/12/18 0402 03/11/18  1231 03/11/18  0535  03/10/18  1756 03/10/18  0531 03/09/18  0457   LN-WHITE BLOOD CELL COUNT thou/uL 21.6* 15.3* 29.9*  < > 40.6* 36.9* 32.4*   LN-HEMOGLOBIN g/dL 7.0* 6.8* 7.0*  < > 7.2* 8.6* 8.7*   LN-HEMATOCRIT % 21.6* 20.7* 22.1*  < > 24.1* 28.2* 29.0*   LN-PLATELET COUNT thou/uL 34* 43* 51*  < > 59* 36* 48*   LN-MONOCYTES - REL (DIFF) %  --   --   --   --  2 2 0*   < > = values in this interval not displayed.    Results from last 7 days  Lab Units 03/09/18  0457   LN-TROPONIN I ng/mL 0.04       Medications  Scheduled Meds:    furosemide  20 mg Intravenous DAILY     hydrocortisone sod succ  50 mg Intravenous Q8H     insulin aspart (NovoLOG) injection   Subcutaneous Q4H FIXED TIMES     pantoprazole (PROTONIX) IVPB 40 mg  40 mg Intravenous QAM AC    Or     omeprazole  20 mg Oral QAM AC    Or     omeprazole  20 mg  Enteral Tube QAM AC     piperacillin-tazobactam  4.5 g Intravenous Q6H     senna-docusate  1 tablet Oral BID    Or     senna (SENOKOT) syrup  8.8 mg Enteral Tube BID     sodium chloride  10-30 mL Intravenous Q8H FIXED TIMES     sodium chloride  3 mL Intravenous Line Care     Continuous Infusions:    amiodarone 900 mg/500 ml standard 24 hour infusion 0.5 mg/min (03/11/18 2345)     norepinephrine IV infusion in NS 2 mcg/min (03/12/18 0900)     nacl 0.9% 25 mL/hr (03/12/18 1400)     vasopressin Stopped (03/11/18 0345)     PRN Meds:.acetaminophen, acetaminophen, bacitracin, benzocaine-menthol, bisacodyl, dextrose 50 % (D50W), fentaNYL pf, glucagon (human recombinant), HYDROmorphone, labetalol, magnesium hydroxide, naloxone **OR** naloxone, ondansetron **OR** ondansetron, polyvinyl alcohol, sodium chloride, sodium chloride, sodium chloride    Pertinent Radiology   Radiology Results: Personally reviewed image/s  CT CHEST, ABDOMEN, AND PELVIS  3/10/2018 1:01 PM  1.  Exam is mildly compromised by some respiratory motion. Even given this, gallbladder appears abnormal with edematous wall and mild stranding in the adjacent fat. Further evaluation with ultrasound is recommended as the findings are suspicious for   cholecystitis.  2.  Small bilateral pleural effusions with atelectasis in both lungs.  3.  Mediastinal, and retroperitoneal lymph nodes are in the upper range of normal in size and decreased compared to the prior exam. The spleen is also decreased in size compared to the prior exam.  4.  Moderate to severe calcified atherosclerotic plaque in the visualized arteries with small right common iliac artery aneurysm.  5.  Colonic diverticulosis without evidence of diverticulitis.  6.  Right inguinal hernia containing fat and fluid.  7.  Changes of prostatectomy and pelvic lymph node dissection.  8.  Groundglass nodule in the right upper lobe is grossly stable though not well evaluated due to motion.    TTE    Left  Ventricle: Normal left ventricular size.The estimated left ventricular ejection fraction is 60%. This represents a normal ejection fraction. Mild hypertrophy noted. E/e' > 15, suggesting elevated LV filling pressures.    Left Atrium: Left atrial volume is moderately increased.    Mitral Valve: There is moderate mitral annular calcification. Moderate mitral regurgitation.    Mild aortic stenosis. Mild aortic regurgitation    Normal right ventricular size and systolic function.    No pulmonary hypertension present.    Estimated central venous pressure equal to 13 mmHg.    Advanced Care Planning:  Treatment/Discharge Planning discussed with the patient, son and ICU RN    Total time with this patient is 35 minutes with greater than 50% of time spent in coordination of care.      Darcy Rubalcava MD  Internal Medicine Hospitalist  3/12/2018

## 2021-06-16 NOTE — PROGRESS NOTES
[facility] Pulmonary/Critical Care Consult Team Note    Nixon Velasquez Jr.,  1929, MRN [id number]  Admitting Dx: Lactic acidosis [E87.2]  Thrombocytopenia [D69.6]  Chronic anemia [D64.9]  FRANC (acute kidney injury) [N17.9]  Sepsis [A41.9]  Date / Time of Admission:  3/9/2018  4:34 AM    Overnight Events:  Intake/Output last 3 shifts:  I/O last 3 completed shifts:  In: 3385.2 [I.V.:1759.2; Blood:497; Other:309; IV Piggyback:820]  Out: 3140 [Urine:2540; Emesis/NG output:600]   Levophed titrated off  Given PRBC, PLT, and FFP overnight  Appears comfortable    Assessment/Plan: Nixon Velasquez Jr. is a 88 y.o. 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    CV: septic shock- improving, off vasopressors  - afib with RVR and now controlled on amiodarone gtt  - Monitor on tele    ID: Severe Sepsis with gram negative bacteremia due to acute cholecystitis s/p drain placement 3/10  - IR to evaluate drain function today   - on vanc and zosyn  - pressors titrated off  - stress dose steroids, will titrate down 100q8 to 50q8    GI: NPO  - GI proph    RENAL: Acute renal failure likely due to severe sepsis  - Cr 1.31 <--1.63 <-- 1.4  - baseline Cr of 0.88  - Follow BUN/Creatinine  - strict I/O's    PULM: placed on bipap for increased work of breathing, will make it PRN  - bipap with breaks  - FiO2 (%):  [40 %] 40 %    HEME: 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 no  FAST HUG:  Feeding:  Feeding: No.    Mora:Yes  Analgesia/Sedation:Yes  Thromboembolic prophylaxis:Contraindication  HOB>30:  Yes  Stress Ulcer Protocol Active: yes; Mode: PPI/H2 Antagonist  Glycemic Control:   Lab Results   Component Value Date/Time     03/11/2018 05:35 AM     (H) 03/10/2018 07:55 PM     (H) 03/10/2018 05:31 AM    Any glucose > 180 no; Mode of Insulin Therapy: Not indicated  INTUBATED: NA  PHYSICAL THERAPY AND MOBILITY: Can patient have PT and mobility trial: yes Activity: ICU mobility protocol    Critical Care Time excluding procedures and family discussions greater than: 41 Minutes    Shawna Cassidy DO  Pulmonary and Critical Care Attending  pgr [phone]    Allergies   Allergen Reactions     Dairy Aid [Lactase]      Dairy products- severe shaking in legs     Benzonatate      Blood-Group Specific Substance      Warm autoantibodies present. Expect up to 24 hour delay in blood for transfusion. Draw 2 lavender and 1 red tube for type and screen orders   Notify Blood bank as soon as possible if transfusions are needed.     Ciprofloxacin      Dairy      Severe shaking in legs.     Ephedrine Sulfate      Erythromycin Base      Hyoscyamine Sulfate      Lactose      Methocarbamol      Terazosin      Warfarin        Meds: See MAR    Physical Exam:  /57  Pulse (!) 117  Temp 96.7  F (35.9  C) (Axillary)   Resp 13  Ht 6' 3\" (1.905 m)  Wt 202 lb (91.6 kg)  SpO2 100%  BMI 25.25 kg/m2  Intake/Output this shift:  I/O this shift:  In: 541 [I.V.:200; Blood:341]  Out: 60 [Urine:60]  GEN:  NAD, cachectic appearing  HEENT: bipap mask in place  CVS: regular rhythm, no murmurs  RESP: Coarse rhonchi bilateral  ABD: Soft, right sided drain in place, No abdominal pain with palpation, no guarding, no rigidity  EXT: Warm, well perfused, " no rashes, no edema  Vasc: halina radial pulses intact  NEURO:  CN2-12 grossly intact, moving all extremities  PSYCH: unable to assess    Pertinent Labs: Latest lab results in EHR personally reviewed.     Cultures: personally reviewed.   personally reviewed images:   Ct Chest Abdomen Pelvis Without Oral Without Iv Contrast    Result Date: 3/10/2018  CT CHEST, ABDOMEN, AND PELVIS 3/10/2018 1:01 PM      INDICATION: Sepsis septic shock with no source TECHNIQUE: CT chest, abdomen, and pelvis. Dose reduction techniques were used. IV CONTRAST: None COMPARISON: 02/13/2018 FINDINGS: CHEST: There are small bilateral pleural effusions with atelectasis in both lungs. Groundglass nodule in the right upper lobe as not well visualized due to some motion artifact but measures approximately 18 mm in greatest dimension on series 3, image 28,  likely unchanged from prior exam. Mediastinal lymph nodes are in the upper range of normal in size and not significantly changed, likely being reactive. Calcified mediastinal and right hilar lymph nodes are consistent with old granulomatous disease. A calcified nodule is seen in the right lower lobe in a region of atelectasis. Calcified atherosclerotic plaque is seen in the thoracic aorta, great vessels, and coronary arteries. 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 Mildly prominent left axillary lymph nodes are unchanged. There is a right PICC line with tip in the low superior vena cava.  ABDOMEN: There appears to be gallbladder wall thickening and adjacent edema suggesting cholecystitis. The liver, spleen, adrenal glands, and kidneys appear normal on these noncontrast images. There is some fatty replacement of the pancreas. The abdominal  aorta is normal in caliber with very densely calcified atherosclerotic plaque. 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 Retroperitoneal lymph nodes appear slightly decreased in size compared to the  prior exam. The largest, to the left of the aorta on series 2, image 120 measures 7 x 13 mm and previously measured 10 x 17 mm. Other lymph nodes are similarly decreased in size. The spleen is normal in size, decreased compared to the prior exam. PELVIS: Sigmoid diverticula without evidence of diverticulitis. There appears to be fecal impaction in the rectum. Surgical clips are seen in the pelvis bilaterally with changes of prostatectomy. Mora catheter is seen in the bladder. There is a right inguinal hernia containing fat and fluid. MUSCULOSKELETAL: Negative.     CONCLUSION: 1.  Exam is mildly compromised by some respiratory motion. Even given this, gallbladder appears abnormal with edematous wall and mild stranding in the adjacent fat. Further evaluation with ultrasound is recommended as the findings are suspicious for cholecystitis. 2.  Small bilateral pleural effusions with atelectasis in both lungs. 3.  Mediastinal, and retroperitoneal lymph nodes are in the upper range of normal in size and decreased compared to the prior exam. The spleen is also decreased in size compared to the prior exam. 4.  Moderate to severe calcified atherosclerotic plaque in the visualized arteries with small right common iliac artery aneurysm. 5.  Colonic diverticulosis without evidence of diverticulitis. 6.  Right inguinal hernia containing fat and fluid. 7.  Changes of prostatectomy and pelvic lymph node dissection. 8.  Groundglass nodule in the right upper lobe is grossly stable though not well evaluated due to motion. NOTE: ABNORMAL REPORT THE DICTATION ABOVE DESCRIBES AN ABNORMALITY FOR WHICH FOLLOW-UP IS NEEDED.     Xr Chest 1 View Portable    Result Date: 3/11/2018  XR CHEST 1 VIEW PORTABLE 3/11/2018 6:09 AM INDICATION: Respiratory failure follow-up infiltrates COMPARISON: 03/10/2018 FINDINGS: Significant improvement in congestion and pulmonary edema pattern on prior. Prominent infiltrates persist in the lower lobes and  retrocardiac area. Similar effusions. Remainder stable.    Ir Cholecystostomy    Result Date: 3/10/2018  [facility] PROCEDURE: PERCUTANEOUS CHOLECYSTOSTOMY PLACEMENT. ATTENDING: Errol Britt MD. INDICATION: 88-year-old male with cholecystitis and impending biliary sepsis. Urgent cholecystostomy placement is requested. The patient has low platelets secondary to chemotherapy and has platelets infusing. CONSENT: The risks, benefits and alternatives of cholecystostomy placement were discussed with the patient, his family and his power of  Srinivasa in detail. All questions were answered. Informed consent was given to proceed with the procedure. MODERATE SEDATION: None. ANTIBIOTICS: None. ADDITIONAL MEDICATIONS: Fentanyl 50 mcg IV. FLUOROSCOPIC TIME: 0.8 minutes. AIR KERMA:  38 mGy. CONTRAST: 5 mL Omnipaque into the gallbladder lumen. COMPLICATIONS: No immediate complications. PROCEDURE:  Using real-time sonographic guidance an AccuStick set was utilized to gain percutaneous access into the gallbladder lumen. Endoluminal positioning was confirmed with an injection of contrast. Over wire exchange was then made for a 10 Upper sorbian locking loop cholecystostomy. The loop was formed within the gallbladder lumen and it was attached to gravity drainage. FINDINGS: Ultrasound demonstrates a mild to moderately distended gallbladder with an abnormally thickened wall. The cholecystogram shows access into the gallbladder lumen. Filling defects are seen near the gallbladder fundus. The proximal cystic duct appears to be patent. The completion image shows the cholecystostomy in the gallbladder lumen.     Successful percutaneous cholecystostomy placement, as detailed above. A specimen of tar-like bile was sent for diagnostic analysis. ____________________________________________________________________ CPT codes for physician reference only: 24109     Us Abdomen Limited    Result Date: 3/10/2018  US ABDOMEN LIMITED 3/10/2018  3:03 PM INDICATION: acute choly COMPARISON: CTA chest abdomen pelvis dated 03/10/2018. FINDINGS: GALLBLADDER: There is thickening of the gallbladder wall with fluid within the gallbladder wall suggesting edematous gallbladder. No intraluminal stones are identified the wall is thickened to 1.5 cm BILE DUCTS: No bile duct dilation. The common bile duct measures 7 mm. LIVER: Normal where seen. RIGHT KIDNEY: 10.4 x 4.9 x 5.1 cm without hydronephrosis or mass. PANCREAS: Obscured by bowel gas and not well visualized. No ascites in the right upper quadrant.     CONCLUSION: 1.  Abnormal appearance of the gallbladder with marked thickening of the gallbladder wall and fluid within the gallbladder are consistent with an edematous gallbladder no gallstones or biliary dilatation are seen. Findings are suspicious for possible acalculous cholecystitis. NOTE: ABNORMAL REPORT THE DICTATION ABOVE DESCRIBES AN ABNORMALITY FOR WHICH FOLLOW-UP IS NEEDED.        Patient Active Problem List   Diagnosis     Restless Legs Syndrome     Corrosive Esophagitis     Hiatal Hernia     CLL (chronic lymphoid leukemia) in relapse     Mixed hyperlipidemia     Cerebral atherosclerosis     Adenocarcinoma Of The Prostate Gland     Papillary Carcinoma Of The Thyroid Gland     Hypothyroidism     Type 2 Diabetes Mellitus     Coronary Artery Disease     Cervical Radiculopathy     Iron Deficiency     Hypoxia     Advanced directives, counseling/discussion     Infection of ear lobe, right     Right ear pain     Benign essential HTN     Anemia     Other autoimmune hemolytic anemias     Thrombocytopenia     Squamous cell carcinoma of skin     Sepsis     Septic shock     Neutropenia associated with infection     Gram-negative bacteremia     Metabolic acidosis with respiratory acidosis     Hemorrhage     Atrial fibrillation with rapid ventricular response     Lactic acidosis     Hypocalcemia     Acute respiratory failure with hypoxia and hypercapnia       Shawna  DO [facility]  Pulmonary and Critical Care Attending  pgr [phone]

## 2021-06-16 NOTE — PROGRESS NOTES
PULMONARY / CRITICAL CARE PROGRESS NOTE    Date / Time of Admission:  3/9/2018  4:34 AM    Assessment:   1. Respiratory failure   2. Sepsis due to acute cholecystitis with E coli bacteremia   3. Acute cholecystitis s/p percutaneous cholecystostomy tube placement 3/10/2018  Surgery consult to assess patient for cholecystectomy   4. S/p replacement of cholecystostomy tube placement on 3/11/2018 due to obstruction secondary to blood clot  Continue flushes per IR recommendation  5. Abdominal pain  F/u abdomen/pelvis CT scan showed interval cholecystostomy tube placement. There is a small amount of perihepatic hemorrhage and slight amount of hyperdense ascites consistent with mild intraperitoneal blood. No localized hematoma. Fecal impaction. Stable H/H.    6. Resolving acute kidney injury  7. Hypernatremia  8. Anemia s/p RBC transfusion  9. Chronic thrombocytopenia  10. Atrial fibrillation with RVR  11. Hx Ischemic cardiomyopathy s/p CABG  12. Moderate MR, mild AS  13. Dysphagia, diet was modify per speech therapy recommendations  14. 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 DM  16. Hx CVA  17. Hx Prostate cancer s/p prostatectomy  18. Hx thyroid cancer s/p thyroidectomy    Advance Directives:  DNR    Plan:   1. Titrate FiO2  2. Pulmonary toiletting  3. D5W  4. Continue diuresis   5. Re-start amiodarone drip   6. Increase dose of metoprolol oral  7. Continue metoprolol IV as needed, keep HR < 100  8. Continue to titrate down stress dose steroids (pt is on chronic prednisone 10 mg daily)   9. Continue zosyn  10. Follow up Blood cultures  11. Monitor bile drain output. Cholecystostomy tube flushes per IR recommendation  12. Surgery service was consulted, ? cholecystectomy  13. Diet per speech therapy recommendation  14. PPI for GI prophylaxis  15. Glucose level monitoring  16. DVT prophylaxis SCDs  17. PT, OT evaluation   18. Palliative care was consulted  19. Transfer patient to  telemetry floor     Case was discussed with hospitalist, surgery and palliative care service  Please contact me if you have any questions.    Total critical care time, not including separately billable procedure time: 45 minutes    Sarthak Pritchard  Pulmonary / Critical Care  3/15/2018   12:44 PM    ICU DAILY CHECKLIST                           Can patient transfer out of MICU? yes    FAST HUG:    Feeding:  Feeding: yes  Patient is receiving oral  Mora:Yes  Analgesia/Sedation: no.   Thromboembolic prophylaxis: yes; Mode:  SCDs  HOB>30:  Yes  Stress Ulcer Protocol Active: yes; Mode: PPI  Glycemic Control: Any glucose > 180 no; Mode of Insulin Therapy: Sliding Scale Insulin    INTUBATED:  Can patient have daily waking:  not applicable  Can patient have spontaneous breathing trial:  not applicable  Restraints? no    PHYSICAL THERAPY AND MOBILITY:  Can patient have PT and mobility trial: yes  Activity: PT    Subjective:   HPI:  Nixon Velasquez Jr. is a 88 y.o. 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   Patient was admitted with diagnosis of septic shock. Chest / Abdomen / pelvis CT scan with suspicion for cholecystitis. RUQ US showed acalculous cholecystitis. Positive blood Cx with Gram Negative rods. Later identified as E coli. S/p cholecystostomy tube placement.     Events overnight  - Normal BP, tachycardic  - FiO2 is down  - Intermittent colic abdominal pain less frequent than two days ago , adequate bowel movements.   - Improvement of mental status  - Poor appetite  - Working with PT    Allergies: Dairy aid [lactase]; Benzonatate; Blood-group specific substance; Ciprofloxacin; Ephedrine sulfate; Erythromycin base; Hyoscyamine sulfate; Lactose; Methocarbamol; Terazosin; and  "Warfarin     MEDS:  Scheduled Meds:    folic acid (FOLVITE) IVPB  1 mg Intravenous DAILY     furosemide  20 mg Intravenous DAILY     hydrocortisone sod succ  50 mg Intravenous Q12H     insulin aspart (NovoLOG) injection   Subcutaneous TID with meals     insulin aspart (NovoLOG) injection   Subcutaneous QHS     metoprolol succinate  12.5 mg Oral DAILY     OLANZapine  7.5 mg Oral QHS     pantoprazole (PROTONIX) IVPB 40 mg  40 mg Intravenous QAM AC    Or     omeprazole  20 mg Oral QAM AC     piperacillin-tazobactam  4.5 g Intravenous Q6H     potassium, sodium phosphates  1 packet Oral TID     senna-docusate  1 tablet Oral BID     sodium chloride  10-30 mL Intravenous Q8H FIXED TIMES     sodium chloride  3 mL Intravenous Line Care     tiZANidine  2 mg Oral BID     Continuous Infusions:    dextrose 5% 100 mL/hr (03/14/18 0958)     norepinephrine IV infusion in NS Stopped (03/12/18 1000)     vasopressin Stopped (03/11/18 0345)     PRN Meds:.acetaminophen, acetaminophen, aluminum-magnesium hydroxide-simethicone, bacitracin, benzocaine-menthol, bisacodyl, dextrose 50 % (D50W), fentaNYL pf, glucagon (human recombinant), HYDROmorphone, labetalol, magnesium hydroxide, melatonin, naloxone **OR** naloxone, ondansetron **OR** ondansetron, oxyCODONE intensol (ROXICODONE) conc solution 20 mg/mL, polyvinyl alcohol, sodium chloride, sodium chloride, sodium chloride, sodium phosphates 133 mL    Objective:   VITALS:  /79  Pulse (!) 146  Temp 98  F (36.7  C) (Axillary)   Resp (!) 31  Ht 6' 3\" (1.905 m)  Wt 204 lb (92.5 kg)  SpO2 96%  BMI 25.5 kg/m2  EXAM:   Gen: awake, alert, mild distress secondary to generalized weakness  HEENT: pale conjunctiva, moist mucosa  Neck: no thyromegaly, masses or JVD  Lungs: ronchi both HT  CV: irregular, tachycardic, systolic murmur 2/6, no gallops appreciated  Abdomen: soft, RUQ drain, no further bleeding around drain, ecchymosis right flank, BS decrease in frequency  Ext: no " edema  Neuro:awake, alert, decrease strength upper and lower extremities     I&O:      Intake/Output Summary (Last 24 hours) at 03/15/18 1244  Last data filed at 03/15/18 1000   Gross per 24 hour   Intake             1908 ml   Output             1765 ml   Net              143 ml       Data Review:    Results from last 7 days  Lab Units 03/15/18  0404   LN-WHITE BLOOD CELL COUNT thou/uL 11.7*   LN-HEMOGLOBIN g/dL 7.9*   LN-HEMATOCRIT % 25.9*   LN-PLATELET COUNT thou/uL 37*       Results from last 7 days  Lab Units 03/15/18  0404   LN-SODIUM mmol/L 153*   LN-POTASSIUM mmol/L 3.1*   LN-CHLORIDE mmol/L 116*   LN-CO2 mmol/L 30   LN-BLOOD UREA NITROGEN mg/dL 33*   LN-CREATININE mg/dL 0.85   LN-CALCIUM mg/dL 7.6*     XR CHEST 1 VIEW PORTABLE 3/12/2018 5:19 AM  INDICATION: septic shock, respiratory failure, pneumonia, volume overload.  COMPARISON: 03/11/2018   FINDINGS: Considerable increase in central congestion and diffuse interstitial alveolar infiltrate throughout both hemithoraces. Similar small effusions. Remainder stable.    CT ABDOMEN WO ORAL WO IV CONTRAST 3/12/2018 8:19 PM  INDICATION: Cholecystitis / cholangitis worsening abdominal pain , post cholecystostomy tube placement, dropping H/H  COMPARISON: 03/10/2018  FINDINGS:  LUNG BASES: Minimal change in the significant dependent infiltrate/atelectasis as well as small bilateral pleural effusions. Large hiatal hernia present, unchanged.  ABDOMEN: Interval placement of cholecystostomy tube with dense residual contrast within the gallbladder. There appears to be a small layer of high density hemorrhage about the liver capsule.] Minimal hyperdense ascites present suggesting slight   hemoperitoneum, there is no localized hematoma. Liver otherwise appears normal. Pancreas, spleen, adrenals and kidneys are negative. Mild ascites within both right and left gutters. Right inguinal hernia contains fluid with a slight increase in the hematocrit level. Mora catheter in  place, previous prostatectomy. Sigmoid diverticulosis.  MUSCULOSKELETAL: Negative.  CONCLUSION:  1.  Interval cholecystectomy tube placement. There is a small amount of perihepatic hemorrhage and slight amount of hyperdense ascites consistent with mild intraperitoneal blood. No localized hematoma.    Echocardiogram 3/9/2018    Left Ventricle: Normal left ventricular size.The estimated left ventricular ejection fraction is 60%. This represents a normal ejection fraction. Mild hypertrophy noted. E/e' > 15, suggesting elevated LV filling pressures.    Left Atrium: Left atrial volume is moderately increased.    Mitral Valve: There is moderate mitral annular calcification. Moderate mitral regurgitation.    Mild aortic stenosis. Mild aortic regurgitation    Normal right ventricular size and systolic function.    No pulmonary hypertension present.    Estimated central venous pressure equal to 13 mmHg.    XR SWALLOW STUDY W SPEECH 3/12/2018 1:30 PM  INDICATION: Dysphagia.  TECHNIQUE: Routine.  COMPARISON: None.  FINDINGS:   FLUOROSCOPIC TIME: 1.3 minutes  NUMBER OF IMAGES: 6  Swallow study with Speech Pathology using multiple barium thicknesses.   Puree and honey consistency both demonstrated significant piriform sinus stasis.  [place] consistency demonstrated stasis and kickback aspiration which resulted in only a minimal throat clear.  Thin liquid demonstrated delay in swallow reflex with direct, silent aspiration.    By:  Sarthak Pritchard, 3/15/2018, 12:44 PM    Primary Care Physician:  Riley Cuevas MD

## 2021-06-16 NOTE — PROGRESS NOTES
"Dr. Sky on call cardiology paged regarding pt's HR in the low 40's.  Pt /58.  Pt on amio gtt for over 24 hours.  Asked if po Amio could be started and gtt turned off.  Response \"we will see him in the morning.\"  No new orders received.  Will continue to monitor and assess.     "

## 2021-06-16 NOTE — PROGRESS NOTES
"Pharmacy Consult: Vancomycin Dosing    Pharmacist consulted to dose vancomycin for Nixon Velasquez Jr., a 88 y.o. male.    Ordering provider: Sina Warner MD Pulmonology, Intensive Care    Indication for vancomycin therapy: Sepsis, Septric Shock, poss PNA, neutropenic    Goal Trough Range:  15-20 mcg/mL based on indication    Other current antimicrobials             piperacillin-tazobactam (ZOSYN) 3.375 grams  Every 6 hours    Vancomycin 1.5 gram IV x1 0609 3/9 given in ED           Subjective/Objective:    Patient was admitted for Sepsis on 3/9/2018  Height: 6' 3\" (1.905 m)  Actual Body Weight (ABW): 88.9 kg (195 lb 14.4 oz)  Ideal body weight: 84.5 kg (186 lb 4.6 oz)  Adjusted ideal body weight: 86.2 kg (190 lb 2.1 oz)  BMI: Body mass index is 24.49 kg/(m^2).    Allergies   Allergen Reactions     Dairy Aid [Lactase]      Dairy products- severe shaking in legs     Benzonatate      Blood-Group Specific Substance      Warm autoantibodies present. Expect up to 24 hour delay in blood for transfusion. Draw 2 lavender and 1 red tube for type and screen orders   Notify Blood bank as soon as possible if transfusions are needed.     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     Mixed hyperlipidemia     Cerebral atherosclerosis     Adenocarcinoma Of The Prostate Gland     Papillary Carcinoma Of The Thyroid Gland     Hypothyroidism     Type 2 Diabetes Mellitus     Coronary Artery Disease     Cervical Radiculopathy     Iron Deficiency     Hypoxia     Advanced directives, counseling/discussion     Infection of ear lobe, right     Right ear pain     Benign essential HTN     Anemia     Other autoimmune hemolytic anemias     Thrombocytopenia     Squamous cell " carcinoma of skin     Sepsis     Septic shock    Past Medical History:   Diagnosis Date     Cataract     bilateral     Foot fracture, right      History of transfusion      Hypertension      Inguinal hernia      Leukemia      PE (pulmonary embolism)      Pneumonia      Prostate cancer      Skin cancer      Thyroid cancer         Temp Readings from Current Encounter:     03/09/18 0439 03/09/18 0805   Temp: (!) 105.1  F (40.6  C) 100.1  F (37.8  C)     Net Intake/Output (last 24 hours):  I/O last 3 completed shifts:  In: 350 [I.V.:350]  Out: -     Recent Labs      03/07/18   0853  03/09/18   0457   WBC  70.3*  32.4*   LACTICACID   --   7.0*   BUN  20  28   CREATININE  0.88  1.40*     Estimated Creatinine Clearance: 45.9 mL/min (by C-G formula based on Cr of 1.4).    No results for input(s): CULTURE in the last 72 hours.  3/9 BC x1  3/9 UC x1  3/9 negative for Influenza A or B  No results found for any visits on 03/09/18.    No results for input(s): VANCOMYCIN in the last 168 hours.    Vancomycin administrations: (last 120 hours)     Date/Time Action Medication Dose Rate    03/09/18 0609 New Bag    vancomycin 1.5 g in sodium chloride 0.9% 500 mL (VANCOCIN) x1 in ED 1.5 g 176.7 mL/hr          Assessment/Plan:    Pharmacist consulted to dose vancomycin for Sepsis, Septic Shock, poss PNA, neutropenic, goal trough range 15-20 mcg/mL.  1. Initiate vancomycin 1.5 gm x1 IV in ED and then every 24 hours (17 mg/kg actual body weight).  2. No vancomycin level available for assessment.  3. Pharmacist will plan to check a vancomycin trough level just before dose #4 on 3/12.  4. Pharmacist will continue to follow.    Thank you for the consult.  Linda Coronado RPh 3/9/2018 10:27 AM

## 2021-06-16 NOTE — PROGRESS NOTES
Pharmacy Note - Admission Medication History    Pertinent Provider Information:  - Family member states prednisone taper is 20mg daily until 3/13 followed by 10mg daily thru 3/20, then stop   ______________________________________________________________________    Prior To Admission (PTA) med list completed and updated in EMR.       PTA Med List   Medication Sig Last Dose     allopurinol (ZYLOPRIM) 300 MG tablet Take 1 tablet (300 mg total) by mouth daily with breakfast. 3/8/2018 at am     clopidogrel (PLAVIX) 75 mg tablet Take 75 mg by mouth daily. 3/8/2018 at am     escitalopram oxalate (LEXAPRO) 5 MG tablet Take 1 tablet (5 mg total) by mouth daily. 3/8/2018 at am     folic acid (FOLVITE) 1 MG tablet Take 1 tablet (1 mg total) by mouth daily. 3/8/2018 at am     ibrutinib (IMBRUVICA) 140 mg cap capsule Take 3 capsules (420 mg total) by mouth daily. 3/7/2018     levothyroxine (SYNTHROID, LEVOTHROID) 150 MCG tablet Take 150 mcg by mouth Daily at 6:00 am. 3/8/2018 at am     mirtazapine (REMERON) 45 MG tablet Take 45 mg by mouth at bedtime. 3/8/2018 at pm     omeprazole (PRILOSEC) 40 MG capsule Take 1 capsule (40 mg total) by mouth 2 (two) times a day. 3/8/2018 at pm     ondansetron (ZOFRAN-ODT) 8 MG disintegrating tablet Take 8 mg by mouth every 8 (eight) hours as needed for nausea. 3/8/2018 at Unknown time     [START ON 3/13/2018] predniSONE (DELTASONE) 10 mg tablet Take 10 mg by mouth daily. Stop taking on 3/21/18      predniSONE (DELTASONE) 20 MG tablet Take 20 mg by mouth daily. 3/8/2018 at am     simvastatin (ZOCOR) 40 MG tablet Take 40 mg by mouth at bedtime. 3/8/2018 at pm     triamcinolone (KENALOG) 0.1 % cream APPLY TO RASH TWO TIMES A DAY FOR 2-3 WEEKS. DO NOT USE LONGER THAN 3 WEEKS AT A TIME Unknown at Unknown time       Information source(s): Family member, Clinic records and Hospital records    Summary of Changes to PTA Med List  New: ondansetron SL tablets  Discontinued: None  Changed: prednisone  directions    Patient was asked about OTC/herbal products specifically.  PTA med list reflects this.    Based on the pharmacist s assessment, the PTA med list information appears reliable    Patient appears adherent: Yes    Allergies were reviewed, assessed, and updated with the patient.      Medications currently not available for use during hospital stay. Family/Patient representative states they will bring Imbruvica to [facility].    Thank you for the opportunity to participate in the care of this patient.    Jose G Max, PharmD  3/9/2018 8:35 AM

## 2021-06-16 NOTE — PROGRESS NOTES
Progress Note    Assessment/Plan  Patient continues to do well.  To go to TCU soon.  Biliary drain working well.    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    Mixed hyperlipidemia    Hypothyroidism    Coronary Artery Disease    Other autoimmune hemolytic anemias    Thrombocytopenia    Septic shock    Neutropenia associated with infection    Gram-negative bacteremia    Metabolic acidosis with respiratory acidosis    Hemorrhage    Atrial fibrillation with rapid ventricular response    Lactic acidosis    Hypocalcemia    Acute respiratory failure with hypoxia and hypercapnia    Bacteremia due to Gram-negative bacteria    Acute pulmonary edema    Abdominal pain, unspecified abdominal location    Fecal impaction    Cholecystitis    Encounter for family conference with patient present      Subjective  As above  Objective    Vital signs in last 24 hours  Temp:  [97.4  F (36.3  C)-98.7  F (37.1  C)] 97.9  F (36.6  C)  Heart Rate:  [55-59] 59  Resp:  [16-19] 18  BP: (118-143)/(58-66) 131/66  Weight:   (!) 229 lb 1.6 oz (103.9 kg)    Intake/Output last 3 shifts  I/O last 3 completed shifts:  In: 605 [P.O.:565; I.V.:40]  Out: 425 [Urine:300; Emesis/NG output:125]  Intake/Output this shift:         Physical Exam  Abdomen benign good bile drainage per cholecystostomy tube    Pertinent Labs   Lab Results   Component Value Date    WBC 9.1 03/21/2018    HGB 9.2 (L) 03/21/2018    HCT 29.6 (L) 03/21/2018     (H) 03/21/2018    PLT 88 (L) 03/21/2018             Pertinent Radiology     No results found.        Ramy Mcguire

## 2021-06-16 NOTE — PROGRESS NOTES
"Arrived for transfusion of 1 unit PRBC's. Pt much brighter and feeling much better than when he was here a couple weeks ago.   Given transfusion without problem.  VSS.  No evidence of reaction while here.  Did note some crackling of left base at completion of transfusion and this was called to triage.  Pt in no acute distress and states he \"always has some shortness of breath\" but is no different post tranfusion.  Per triage, pt to report any problems with dyspnea should this increase after going home.  Note that all lung fields with good aeration and all but left base are clear.  Pt is agreeable and wanting to go home.  Left ambulatory with myself escorting him to front entrance to meet son.  No distress with this activity.  Pt declined any printed info on transfusions, feels he has received adequate info in past.  Reminded to call MD if he develops any increased dyspnea, itching, rash, chest pain and/or fever over 100.5. He verbalizes understanding of info given.    "

## 2021-06-16 NOTE — PROGRESS NOTES
Clinical Nutrition Therapy Reassessment Note    Current Nutrition Prescription:   Diet: NPO for procedure today  Supplements and Modulars: gelatein 20 TID w/ meals  Flush Orders:   Propofol Orders:  IV dextrose or Fluids:  nacl 0.9%       Current Nutrition Intake:  The patient's current meal intake is good > 75%  X 3 meals noted per nursing prior to NPO status. SLP re evaluation was deferred today d/t NPO status and the patients current supplement/snack intake is not noted.    Anthropometrics:  Most recent weight: :  Wt Readings from Last 3 Encounters:   03/19/18 210 lb 14.4 oz (95.7 kg)   03/07/18 191 lb 7 oz (86.8 kg)   02/14/18 189 lb 3.2 oz (85.8 kg)       GI Status/Output:   The patient's GI symptoms include: none noted. Last BM per nursing 3/17/18    Bowel Sounds present    Skin/Wound:  Lucas score        Medications:  Medications reviewed.    Labs:  Labs reviewed :    Results from last 7 days  Lab Units 03/19/18  0527   LN-WHITE BLOOD CELL COUNT thou/uL 11.4*   LN-HEMOGLOBIN g/dL 8.8*   LN-HEMATOCRIT % 28.8*   LN-PLATELET COUNT thou/uL 65*       Results from last 7 days  Lab Units 03/19/18  0527   LN-SODIUM mmol/L 142   LN-POTASSIUM mmol/L 3.6   LN-CHLORIDE mmol/L 105   LN-CO2 mmol/L 31   LN-BLOOD UREA NITROGEN mg/dL 23   LN-CREATININE mg/dL 0.76   LN-CALCIUM mg/dL 7.1*       Results from last 7 days  Lab Units 03/18/18  0508   LN-ALBUMIN g/dL 2.5*   LN-PROTEIN TOTAL g/dL 4.4*   LN-BILIRUBIN TOTAL mg/dL 1.2*   LN-ALKALINE PHOSPHATASE U/L 37*   LN-ALT (SGPT) U/L <9   LN-AST (SGOT) U/L 9       Results from last 7 days  Lab Units 03/19/18  0527   LN-MAGNESIUM mg/dL 1.8       Results from last 7 days  Lab Units 03/19/18  0527   LN-PHOSPHORUS mg/dL 2.4*       Results from last 7 days  Lab Units 03/19/18  0527 03/18/18  0508 03/17/18  0401 03/16/18  0501 03/15/18  0404 03/14/18  0603 03/13/18  0543   LN-GLUCOSE mg/dL 139* 172* 189* 215* 218* 166* 136*       Malnutrition: Not noted; <50% estimated energy intake for  >5 days in context of acute illness with edema which could be masking weight loss-lack 1 other risk factor.     Nutrition Risk Level: moderate risk     Nutrition DX: inadequate intake r/t critical illness evidenced by refusing meals or eating very little.     Goals:  Tolerate least restrictive diet-continue     Intervention:    Resume oral diet and supplements as soon as able s/p NPO status. Follow for SLP re evaluation results.     Monitor:   Weight, po intake, supplement acceptance      See Care Plan for Problems, Goals, and Interventions.

## 2021-06-16 NOTE — ED PROVIDER NOTES
eMERGENCY dEPARTMENT eNCOUnter      CHIEF COMPLAINT    Chief Complaint   Patient presents with     Fever       HPI    Nixon Velasquez Jr. is a 88 y.o. 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  Pt states he started feeling bad yesterday afternoon around 3 pm.  Reports nausea, but no emesis.  Admits to some constipation, minor cough and some SOB.  Denies HA, neck pain, sore throat, new rash, cp, abdominal pain, known sick contacts.  Pt last took Tylenol yesterday evening around 7pm.  EMS gave 8mg Zofran.  Per EMS, family got a temp of 99 using their thermometer this morning, but EMS states it was 104 by them.     PAST MEDICAL HISTORY    Past Surgical History:   Procedure Laterality Date     CATARACT EXTRACTION, BILATERAL       DE REANOMAL CORON ART PA ORIGIN BY GRAFT      Description: Anomalous Coronary Artery Graft;  Proc Date: 01/01/1996;     DE REMV PROSTATE,RETROPUB,RAD,LTD NODES      Description: Prostatectomy Retropubic Radical With Lymph Node Biopsy(S);  Recorded: 09/13/2007;       Past Medical History:   Diagnosis Date     Cataract      Foot fracture, right      History of transfusion      Hypertension      Inguinal hernia      Leukemia      PE (pulmonary embolism)      Pneumonia      Prostate cancer      Skin cancer      Thyroid cancer        CURRENT MEDICATIONS    Patient's Medications   New Prescriptions    No medications on file   Previous Medications    ALLOPURINOL (ZYLOPRIM) 300 MG TABLET    Take 1 tablet (300 mg total) by mouth daily with breakfast.    CLOPIDOGREL (PLAVIX) 75 MG TABLET    Take 75 mg by mouth daily.    ESCITALOPRAM OXALATE (LEXAPRO) 5 MG TABLET    Take 1 tablet (5 mg total) by mouth daily.    FOLIC ACID (FOLVITE) 1 MG TABLET    Take 1 tablet (1 mg total) by mouth daily.    IBRUTINIB (IMBRUVICA) 140 MG CAP CAPSULE    Take 3 capsules (420 mg total) by mouth daily.    LEVOTHYROXINE (SYNTHROID, LEVOTHROID) 150 MCG  TABLET    Take 150 mcg by mouth Daily at 6:00 am.    MIRTAZAPINE (REMERON) 45 MG TABLET    Take 45 mg by mouth at bedtime.    OMEPRAZOLE (PRILOSEC) 40 MG CAPSULE    Take 1 capsule (40 mg total) by mouth 2 (two) times a day.    PREDNISONE (DELTASONE) 20 MG TABLET    Take 2.5 tablets (50 mg total) by mouth daily with breakfast.    SIMVASTATIN (ZOCOR) 40 MG TABLET    Take 40 mg by mouth at bedtime.    TRIAMCINOLONE (KENALOG) 0.1 % CREAM    APPLY TO RASH TWO TIMES A DAY FOR 2-3 WEEKS. DO NOT USE LONGER THAN 3 WEEKS AT A TIME   Modified Medications    No medications on file   Discontinued Medications    No medications on file       ALLERGIES    Allergies   Allergen Reactions     Dairy Aid [Lactase]      Dairy products- severe shaking in legs     Benzonatate      Blood-Group Specific Substance      Warm autoantibodies present. Expect up to 24 hour delay in blood for transfusion. Draw 2 lavender and 1 red tube for type and screen orders   Notify Blood bank as soon as possible if transfusions are needed.     Ciprofloxacin      Ephedrine Sulfate      Erythromycin Base      Hyoscyamine Sulfate      Lactose      Methocarbamol      Terazosin      Warfarin        FAMILY HISTORY    Family History   Problem Relation Age of Onset     Prostate cancer Father      Cancer Father      Alzheimer's disease Father      Parkinsonism Mother      Diabetes Brother      Alzheimer's disease Sister      No Medical Problems Daughter      No Medical Problems Daughter      Parkinsonism Son      No Medical Problems Son      No Medical Problems Son      No Medical Problems Son      Parkinsonism Son      No Medical Problems Son      No Medical Problems Son      No Medical Problems Son        SOCIAL HISTORY    Social History     Social History     Marital status:      Spouse name: N/A     Number of children: N/A     Years of education: N/A     Social History Main Topics     Smoking status: Never Smoker     Smokeless tobacco: Never Used      "Alcohol use No     Drug use: No     Sexual activity: No     Other Topics Concern     Not on file     Social History Narrative       REVIEW OF SYSTEMS    Review of Systems   Constitutional: Positive for chills and fever.        +rigors   HENT: Negative for congestion and sore throat.    Eyes: Negative for visual disturbance.   Respiratory: Negative for cough and shortness of breath.    Cardiovascular: Negative for chest pain.   Gastrointestinal: Positive for nausea. Negative for abdominal pain, diarrhea and vomiting.   Genitourinary: Negative for dysuria, flank pain and frequency.   Musculoskeletal: Positive for myalgias. Negative for back pain.   Skin: Negative for rash.   Neurological: Positive for weakness. Negative for syncope, light-headedness and headaches.   Psychiatric/Behavioral: Negative for confusion.         PHYSICAL EXAM    VITAL SIGNS: /49  Pulse 82  Temp (!) 105.1  F (40.6  C) (Rectal)   Resp (!) 29  Ht 6' 4\" (1.93 m)  Wt 202 lb (91.6 kg)  SpO2 92%  BMI 24.59 kg/m2  Physical Exam   Constitutional: He is oriented to person, place, and time. He appears well-developed and well-nourished.   Elderly, frail, actively shivering     HENT:   Head: Normocephalic and atraumatic.   Mouth/Throat: Mucous membranes are dry.   Eyes: EOM are normal. Pupils are equal, round, and reactive to light. No scleral icterus.   Neck: Neck supple.   Cardiovascular: Regular rhythm, normal heart sounds and intact distal pulses.  Tachycardia present.    Pulses:       Radial pulses are 2+ on the right side, and 2+ on the left side.        Dorsalis pedis pulses are 2+ on the right side, and 2+ on the left side.   Pulmonary/Chest: Effort normal. No stridor. Tachypnea noted. He has decreased breath sounds (b/l).   Abdominal: Soft. He exhibits no distension. There is no tenderness. There is no guarding.   Musculoskeletal: He exhibits no edema or tenderness.   No calf tenderness/swelling b/l   Neurological: He is alert and " oriented to person, place, and time.   No new focal neuro deficits   Skin: Skin is warm and dry. No rash noted.   Warm to touch  Poor skin turgor  Scattered bruising and healing skin tears to b/l UE   Psychiatric: He has a normal mood and affect. His behavior is normal.   Nursing note and vitals reviewed.      LABS  Pertinent lab results reviewed in chart.  Results for orders placed or performed during the hospital encounter of 03/09/18   Influenza A/B Rapid Test   Result Value Ref Range    Influenza  A, Rapid Antigen No Influenza A antigen detected No Influenza A antigen detected    Influenza B, Rapid Antigen No Influenza B antigen detected No Influenza B antigen detected   Comprehensive Metabolic Panel   Result Value Ref Range    Sodium 142 136 - 145 mmol/L    Potassium 3.8 3.5 - 5.0 mmol/L    Chloride 110 (H) 98 - 107 mmol/L    CO2 20 (L) 22 - 31 mmol/L    Anion Gap, Calculation 12 5 - 18 mmol/L    Glucose 179 (H) 70 - 125 mg/dL    BUN 28 8 - 28 mg/dL    Creatinine 1.40 (H) 0.70 - 1.30 mg/dL    GFR MDRD Af Amer 58 (L) >60 mL/min/1.73m2    GFR MDRD Non Af Amer 48 (L) >60 mL/min/1.73m2    Bilirubin, Total 1.6 (H) 0.0 - 1.0 mg/dL    Calcium 7.7 (L) 8.5 - 10.5 mg/dL    Protein, Total 5.2 (L) 6.0 - 8.0 g/dL    Albumin 3.2 (L) 3.5 - 5.0 g/dL    Alkaline Phosphatase 39 (L) 45 - 120 U/L    AST 15 0 - 40 U/L    ALT 11 0 - 45 U/L   APTT   Result Value Ref Range    PTT 28 24 - 37 seconds   INR   Result Value Ref Range    INR 1.26 (H) 0.90 - 1.10   Lactic Acid   Result Value Ref Range    Lactic Acid 7.0 (HH) 0.5 - 2.2 mmol/L   Type and Screen   Result Value Ref Range    ABORh A POS     Antibody Screen Positive (!) Negative   Blood Gases, Venous   Result Value Ref Range    pH, Venous 7.27 (L) 7.35 - 7.45    pCO2, Venous 43 35 - 50 mm Hg    pO2, [place] 45 25 - 47 mm Hg    Base Excess, Venous -6.8 mmol/L    HCO3, Venous 19.3 (L) 24.0 - 30.0 mmol/L    Oxyhemoglobin 75.5 (H) 70.0 - 75.0 %    O2 Sat, Venous 80.1 (H) 70.0 - 75.0 %    Troponin I   Result Value Ref Range    Troponin I 0.04 0.00 - 0.29 ng/mL   HM1 (CBC with Diff)   Result Value Ref Range    WBC 32.4 (HH) 4.0 - 11.0 thou/uL    RBC 2.53 (L) 4.40 - 6.20 mill/uL    Hemoglobin 8.7 (L) 14.0 - 18.0 g/dL    Hematocrit 29.0 (L) 40.0 - 54.0 %     (H) 80 - 100 fL    MCH 34.4 (H) 27.0 - 34.0 pg    MCHC 30.0 (L) 32.0 - 36.0 g/dL    RDW 23.9 (H) 11.0 - 14.5 %    Platelets 48 (LL) 140 - 440 thou/uL    MPV 12.5 8.5 - 12.5 fL   Manual Differential   Result Value Ref Range    Total Neutrophils % 1 (L) 50 - 70 %    Lymphocytes % 99 (H) 20 - 40 %    Monocytes % 0 (L) 2 - 10 %    Eosinophils %  0 0 - 6 %    Basophils % 0 0 - 2 %    Total Neutrophils Absolute 0.3 (L) 2.0 - 7.7 thou/ul    Lymphocytes Absolute 32.1 (H) 0.8 - 4.4 thou/uL    Monocytes Absolute 0.0 0.0 - 0.9 thou/uL    Eosinophils Absolute 0.0 0.0 - 0.4 thou/uL    Basophils Absolute 0.0 0.0 - 0.2 thou/uL    Smudge Cells Present (!) None Seen    Platelet Estimate Decreased (!) Normal    Ovalocytes 1+ (!) Negative    Polychromasia 1+ (!) Negative   ECG 12 lead nursing unit performed   Result Value Ref Range    SYSTOLIC BLOOD PRESSURE  mmHg    DIASTOLIC BLOOD PRESSURE  mmHg    VENTRICULAR RATE 106 BPM    ATRIAL RATE 106 BPM    P-R INTERVAL 130 ms    QRS DURATION 72 ms    Q-T INTERVAL 344 ms    QTC CALCULATION (BEZET) 456 ms    P Axis 80 degrees    R AXIS 36 degrees    T AXIS 132 degrees    MUSE DIAGNOSIS       Sinus tachycardia with Premature atrial complexes  ST & T wave abnormality, consider inferior ischemia  ST & T wave abnormality, consider anterolateral ischemia  Abnormal ECG         EKG  EKG reviewed and interpreted by me.  Sinus tach, rate 106, normal pr/qrs/qtc, poor baseline due to pt shivering limiting interpretation, nonspecific T wave abnormalities, no ST elevation    RADIOLOGY  Images independently reviewed.  Xr Chest 1 View Portable    Result Date: 3/9/2018  XR CHEST 1 VIEW PORTABLE 3/9/2018 5:19 AM INDICATION: Fever  COMPARISON: CT 02/13/2018 FINDINGS: Poor ventilatory effort with increasing compressive and congestive changes. Probable pulmonary edema or congestive heart failure. Similar to perhaps slight increase in left pleural effusion. Stable peripheral consolidation right lower lobe. Groundglass opacity right upper lobe better seen on CT. Remainder stable. Follow-up recommended.        PROCEDURES   Critical Care  Performed by: SUSI BURNS  Authorized by: SUSI BURNS   Total critical care time: 40 minutes  Critical care time was exclusive of separately billable procedures and treating other patients and teaching time.  Critical care was necessary to treat or prevent imminent or life-threatening deterioration of the following conditions: circulatory failure (septic shock).  Critical care was time spent personally by me on the following activities: discussions with consultants, evaluation of patient's response to treatment, examination of patient, obtaining history from patient or surrogate, ordering and performing treatments and interventions, ordering and review of laboratory studies, ordering and review of radiographic studies, pulse oximetry and re-evaluation of patient's condition.  Subsequent provider of critical care: I assumed direction of critical care for this patient from another provider of my specialty.            ED MEDS  New Prescriptions    No medications on file         ED COURSE & MEDICAL DECISION MAKING    DDX Includes, but not limited to: Sepsis, bacteremia, UTI, pneumonia, influenza, viral illness, medication side effect, dehydration, FRANC    4:49 AM The patient was interviewed and examined.  History in the chart was reviewed.  Pt with general malaise and not feeling well past day, now found to have a rectal temp of 105 here. Pt with rigors, but hemodynamically stable and normal mental status.  Will need aggressive sepsis workup and initial 30ml/kg IVF bolus, symptom control with tylenol, reassess.  Pt is on medication for CLL (recently started on ibrutinib), which may be cause of fever vs causing immunocompromised states and opportunistic infections.  5:12 AM Repeat BPs trending lower with SBP in the 80s.  Pt already started on IVF bolus, will track closely.  Pt's VBG pH of 7.27.  Likely secondary to lactic acidosis (lactate pending).  Family in room and confirms that he started with extreme rigors this evening, their thermometer at home was reading 99.    CXR (independent interp): right greater than left opacities, likely pneumonia  5:26 AM Lactate 7.0.  I've ordered zosyn and vancomycin for the patient.  CXR concerning for pneumonia.  Pt recently hospitalized in January for 4 days.    6:10 AM I spoke with Dr. Josue, intensivist, who accepts to ICU.  She requested PICC consent and placement, which I have done.  Pt and family aware of current results, plan.  Pt states he's feeling better overall, respiratory rate improved to 20s and states he's less sob on supplemental O2.  6:13 AM Influenza negative.  Cr elevated from baseline at 1.4.  Pt with leukocytosis (hx of CLL), anemia and thrombocytopenia similar to previous.  6:42 AM I spoke with hospitalist Dr. Orourke who accepts for further management of sepsis with septic shock.  Pt getting PICC line, will likely require vasopressors as BP trending down after IVF.  7:12 AM BP trending down.  Levophed ordered.      Any labs, xray, EKG, CT ordered and personally reviewed by me.    At the conclusion of the encounter I discussed  the results of all of the tests and the disposition with the patient and any family present.   All questions were answered.  The patient and any family present acknowledged understanding and was involved in the decision making regarding the overall care plan.      I discussed with patient the utility, limitations and findings of the exam/interventions/studies done during this visit as well as the list of differential diagnosis and symptoms to  monitor/return to ER for.  Additional verbal discharge instructions were provided.     FINAL DIAGNOSIS:   1. Sepsis    2. Lactic acidosis    3. Chronic anemia    4. Thrombocytopenia           Kenny Ramirez MD  03/09/18 0702

## 2021-06-16 NOTE — PROGRESS NOTES
[facility] Hematology/Oncology Inpatient progress note  Patient: Nixon Velasquez Jr.  MRN: [id number]  Date of Service: 3/20/2018       Chief complaint      E. coli sepsis.  CLL.    Assessment     1.  A very pleasant 88 years old gentleman with CLL now admitted with E. coli sepsis due to cholecystitis.  Seems to be improving clinically.  2.  Status post cholecystostomy.  3.  Significant abdominal discomfort.    4.  Borderline performance status.    Plan     1.  Continue with IV antibiotics and other supportive care.  2.  He can start taking his ibrutinib 420 mg p.o. daily to treat his CLL.  This drug will need to be stopped 3 days before his cholecystectomy whenever it is planned.  The patient is going to take his own supply.  He can continue that at the TCU as well.  3.  Incentive spirometry.  4.  DVT prophylaxis  6.  Bedsore prophylaxis.    HPI    Nixon Velasquez Jr. is a 88 y.o. old male who is known to have CLL but now admitted with E. coli sepsis secondary to acute acalculous cholecystitis.  He underwent percutaneous cholecystostomy on 10 March.  The tube got clogged and had to be changed on 12 March.  He haD been in a lot of pain.  But he has since then significantly improved.  His pain is significantly better.  He is on a stepdown unit.    He continues to be improving.  No plans other to send him to a TCU.    Review of system      Detailed pertinent 8 system review of systems was done.  Findings noted.      Past Medical, family and social history     Medical History  Active Ambulatory (Non-Hospital) Problems    Diagnosis     Squamous cell carcinoma of skin     Anemia     Benign essential HTN     Right ear pain     Infection of ear lobe, right     Advanced directives, counseling/discussion     Hypoxia     Restless Legs Syndrome     Corrosive Esophagitis     Hiatal Hernia     Cerebral atherosclerosis     Adenocarcinoma Of The Prostate Gland     Papillary Carcinoma Of The Thyroid Gland     Type 2 Diabetes  Mellitus     Cervical Radiculopathy     Iron Deficiency     Past Medical History:   Diagnosis Date     Cataract      Foot fracture, right      History of transfusion      Hypertension      Inguinal hernia      Leukemia      PE (pulmonary embolism)      Pneumonia      Prostate cancer      Skin cancer      Thyroid cancer        Physical exam      Vitals:    03/20/18 2002   BP: 140/61   Pulse: (!) 57   Resp: 17   Temp: 98.1  F (36.7  C)   SpO2: 94%       GENERAL: No acute distress. Cooperative in conversation.  Laying in bed.  HEENT: Pupils are equal, round and reactive. Oral mucosa is clean and intact. No ulcerations or mucositis noted. No bleeding noted.  RESP:Chest symmetric lungs are clear bilaterally per auscultation. Regular respiratory rate. No wheezes or rhonchi.  CV: Normal S1 S2 Regular, rate and rhythm. No murmurs.  ABD: Soft but tender.  Biliary drain in place.  Positive bowel sounds. No organomegaly.   EXTREMITIES: Trace lower extremity edema.  Some ecchymosis on his right triceps area.  NEURO: He is quite weak.  A lot better compared to last week.  PSYCH: I think he is a bit improved from last week.  SKIN: Warm dry intact.  Some ecchymosis at the site of phlebotomy.    Labs and radiology      Recent Results (from the past 24 hour(s))   Crossmatch   Result Value Ref Range    Crossmatch LEAST INCOMPATIBLE     Blood Expiration Date [id number]     Unit Type A Neg     Unit Number [id number]     Status Released     Component Red Blood Cells     PRODUCT CODE Y0921C82     Blood Type 0600     CODING SYSTEM [id number]    HM2(CBC W/O DIFF)   Result Value Ref Range    WBC 11.5 (H) 4.0 - 11.0 thou/uL    RBC 3.07 (L) 4.40 - 6.20 mill/uL    Hemoglobin 9.8 (L) 14.0 - 18.0 g/dL    Hematocrit 32.0 (L) 40.0 - 54.0 %     (H) 80 - 100 fL    MCH 31.9 27.0 - 34.0 pg    MCHC 30.6 (L) 32.0 - 36.0 g/dL    RDW 22.5 (H) 11.0 - 14.5 %    Platelets 80 (L) 140 - 440 thou/uL    MPV 12.9 (H) 8.5 - 12.5 fL   Basic Metabolic  Panel   Result Value Ref Range    Sodium 143 136 - 145 mmol/L    Potassium 3.8 3.5 - 5.0 mmol/L    Chloride 106 98 - 107 mmol/L    CO2 30 22 - 31 mmol/L    Anion Gap, Calculation 7 5 - 18 mmol/L    Glucose 125 70 - 125 mg/dL    Calcium 7.4 (L) 8.5 - 10.5 mg/dL    BUN 21 8 - 28 mg/dL    Creatinine 0.77 0.70 - 1.30 mg/dL    GFR MDRD Af Amer >60 >60 mL/min/1.73m2    GFR MDRD Non Af Amer >60 >60 mL/min/1.73m2   Phosphorus   Result Value Ref Range    Phosphorus 2.4 (L) 2.5 - 4.5 mg/dL   POCT Glucose   Result Value Ref Range    Glucose,  mg/dL   POCT Glucose   Result Value Ref Range    Glucose,  mg/dL   POCT Glucose   Result Value Ref Range    Glucose,  mg/dL   POCT Glucose   Result Value Ref Range    Glucose,  mg/dL         No results found.      Eugene Brown MD

## 2021-06-16 NOTE — PROGRESS NOTES
"  RESPIRATORY CARE NOTE     Patient Name: Nixon Velasquez Jr.  Today's Date: 3/11/2018      /58  Pulse (!) 108  Temp 97  F (36.1  C) (Axillary)   Resp 20  Ht 6' 3\" (1.905 m)  Wt 202 lb (91.6 kg)  SpO2 98%  BMI 25.25 kg/m2    Pt. used BiPAP (14/5, 14, 40%) intermittently during this shift, for increased WOB,  BS diminished, RR 16-20, sats 99% on 3L oxymask, RT following      TIFF MillerT    "

## 2021-06-16 NOTE — PROGRESS NOTES
Speech Language/Pathology    Order received for Bedside Swallow Study. Attempted to complete study this PM, but this was deferred by nursing as patient had just been made NPO for a procedure. Speech Therapy will re-attempt to complete swallow evaluation tomorrow AM as schedule permits. Of note, patient has a prior history of dysphagia (2017) with recommendation for nectar-thick liquids. He reports that he does not follow this diet at home.     Alicia Dennison MA, CCC-SLP

## 2021-06-16 NOTE — PROGRESS NOTES
Cardiology Progress Note    Assessment:  1.  Atrial fibrillation with increased ventricular response.  Patient now on amiodarone and has maintained sinus rhythm.  Anticoagulation decisions pending as it relates to potential surgical intervention and current cholecystostomy tube.  We will continue to monitor.  He does have a trend towards sinus bradycardia.  Metoprolol discontinued and will need to observe.    2.  Dyspnea, volume overload.  IV Lasix administered until yesterday.  Good urine output overnight.  We will need to monitor closely.  Weight today does not appear to be accurate compared to prior weights.  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    Mixed hyperlipidemia    Hypothyroidism    Coronary Artery Disease    Other autoimmune hemolytic anemias    Thrombocytopenia    Septic shock    Neutropenia associated with infection    Gram-negative bacteremia    Metabolic acidosis with respiratory acidosis    Hemorrhage    Atrial fibrillation with rapid ventricular response    Lactic acidosis    Hypocalcemia    Acute respiratory failure with hypoxia and hypercapnia    Bacteremia due to Gram-negative bacteria    Acute pulmonary edema    Abdominal pain, unspecified abdominal location    Fecal impaction    Cholecystitis    Encounter for family conference with patient present      Plan:  1.  Continue with current combination medications with close monitoring.  2.  Discontinue metoprolol given bradycardic tendency on amiodarone.    Subjective:   Nixon Velasquez Jr. seen in follow-up today.  Patient with atrial fibrillation with increased ventricular response responsive to amiodarone.  He is status post cholecystostomy tube.  Surgical notes reviewed regarding plans for conservative care and possible eventual cholecystectomy.    Patient reports no significant shortness of breath or dizziness.  He is weak and needed 2 to open sit forward to listen to his breath  sounds.    Objective:   Vital signs in last 24 hours:  Temp:  [97.8  F (36.6  C)-98.5  F (36.9  C)] 98.3  F (36.8  C)  Heart Rate:  [50-64] 64  Resp:  [18-20] 20  BP: (101-137)/(50-64) 128/64  Weight:   (!) 227 lb (103 kg)     Physical Exam:   Neck: No JVD   Lungs: clear to auscultation   COR: RRR, systolic murmur   Abd: nondistended, BS present, cholecystectomy tube   Extrem: Mild edema, feet are cool.    Current Facility-Administered Medications   Medication Dose Route Frequency Provider Last Rate Last Dose     acetaminophen suppository 650 mg (TYLENOL)  650 mg Rectal Q4H PRN Denia Chapman CNP   650 mg at 03/14/18 0928     acetaminophen tablet 650 mg (TYLENOL)  650 mg Oral Q4H PRN Ling Varela MD   650 mg at 03/09/18 2215     aluminum-magnesium hydroxide-simethicone 200-200-20 mg/5 mL suspension 30 mL (MAALOX ADVANCED)  30 mL Oral Q4H PRN Arlin Galindo MD         amiodarone tablet 200 mg (PACERONE)  200 mg Oral BID Jerry JENNIFER Oseguera MD   200 mg at 03/20/18 0827     bacitracin ointment packet 1 packet  1 packet Topical Once PRN Kenny Ramirez MD         benzocaine-menthol lozenge 1 lozenge (CEPACOL)  1 lozenge Oral Q1H PRN Ling Varela MD         bisacodyl suppository 10 mg (DULCOLAX)  10 mg Rectal Daily PRN Ling Varela MD   10 mg at 03/12/18 0135     dextrose 50 % (D50W) syringe 20-50 mL  20-50 mL Intravenous PRN Ling Varela MD   12.5 mL at 03/11/18 0839     folic acid tablet 1 mg (FOLVITE)  1 mg Oral QHS Sarthak Pritchard MD   1 mg at 03/19/18 2045     glucagon (human recombinant) injection 1 mg  1 mg Subcutaneous PRN Ling Varela MD         HYDROmorphone injection 0.2-0.4 mg (DILAUDID)  0.2-0.4 mg Intravenous Q3H PRN Ruby Josue MD   0.3 mg at 03/15/18 0813     insulin aspart U-100 injection (NovoLOG)   Subcutaneous TID with meals Sarthak Pritchard MD   2 Units at 03/20/18 0827     insulin aspart U-100 injection (NovoLOG)   Subcutaneous QHS Sarthak Douglas  Khadijah Pritchard MD   2 Units at 03/18/18 2128     labetalol injection 5-10 mg (NORMODYNE,TRANDATE)  5-10 mg Intravenous Q4H PRN Shawna Cassidy DO   5 mg at 03/14/18 0927     levothyroxine tablet 150 mcg (SYNTHROID, LEVOTHROID)  150 mcg Oral Daily 0600 Bo Franks MD   150 mcg at 03/20/18 0455     magnesium hydroxide suspension 30 mL (MILK OF MAG)  30 mL Oral Daily PRN Ling Varela MD         melatonin tablet 3 mg  3 mg Oral Bedtime PRN Denia Gordon CNP   3 mg at 03/20/18 0157     metoprolol succinate 24 hr tablet 25 mg (TOPROL-XL)  25 mg Oral DAILY Valeria Swan MD   25 mg at 03/20/18 0828     naloxone injection 0.2-0.4 mg (NARCAN)  0.2-0.4 mg Intravenous PRN Ruby Josue MD        Or     naloxone injection 0.2-0.4 mg (NARCAN)  0.2-0.4 mg Intramuscular PRN Ruby Josue MD         OLANZapine tablet 7.5 mg (zyPREXA)  7.5 mg Oral QHS Arlin Galindo MD   7.5 mg at 03/19/18 2045     omeprazole capsule 20 mg (PriLOSEC)  20 mg Oral QAM AC Ling Varela MD   20 mg at 03/20/18 0827     ondansetron injection 4 mg (ZOFRAN)  4 mg Intravenous Q4H PRN Ling Varela MD   4 mg at 03/10/18 1722    Or     ondansetron tablet 8 mg (ZOFRAN)  8 mg Oral Q8H PRN Ling Varela MD         oxyCODONE 5 mg/0.25 mL concentrated solution 5 mg (ROXICODONE)  5 mg Sublingual Q3H PRN Denia Gordon CNP   5 mg at 03/20/18 0247     oxyCODONE 5 mg/0.25 mL concentrated solution 5 mg (ROXICODONE)  5 mg Sublingual QID Denia Gordon CNP   5 mg at 03/20/18 0827     piperacillin-tazobactam (ZOSYN) 4.5 grams  4.5 g Intravenous Q6H Ling Varela MD   4.5 g at 03/20/18 1013     polyvinyl alcohol 1.4 % ophthalmic solution 1-2 drop (LIQUIFILM TEARS)  1-2 drop Both Eyes Q1H PRN Ling Varela MD         potassium, sodium phosphates 280-160-250 mg packet 1 packet (PHOS-NAK)  1 packet Oral TID Segundo Mcdaniels MD   1 packet at 03/20/18 0828     predniSONE tablet 20 mg (DELTASONE)  20 mg Oral Daily with alvaro Shaw  MD Sosa   20 mg at 18 0827     senna-docusate 8.6-50 mg tablet 1 tablet (PERICOLACE)  1 tablet Oral BID Ling Varela MD   1 tablet at 18 2045     sodium chloride flush 10-20 mL (NS)  10-20 mL Intravenous PRN Kenny Ramirez MD         sodium chloride flush 10-30 mL (NS)  10-30 mL Intravenous PRN Kenny Ramirez MD         sodium chloride flush 10-30 mL (NS)  10-30 mL Intravenous Q8H FIXED TIMES Kenny Ramirez MD   30 mL at 18 0600     sodium chloride flush 20 mL (NS)  20 mL Intravenous PRN Kenny Ramirez MD         sodium chloride flush 3 mL (NS)  3 mL Intravenous Line Care Kenny Ramirez MD   10 mL at 18 0835     sodium phosphates 133 mL rectal enema 1 enema (for FLEET)  1 enema Rectal Daily PRN Sarthak Pritchard MD   1 enema at 18 1203     tiZANidine tablet 2 mg (ZANAFLEX)  2 mg Oral TID Denia Gordon, FLEX   2 mg at 18 0827       Cardiographics:    EC-MAR-2018 05:02:42 HE JOES/JOHNS/SALENA/NOAM  Sinus tachycardia with Premature atrial complexes  ST & T wave abnormality, consider inferior ischemia  ST & T wave abnormality, consider anterolateral ischemia  Abnormal ECG  When compared with ECG of 2017  Premature atrial complexes , new  St-t changes new  Confirmed by ALEXANDR JONES, JORGE LOC:JN ([id number]) on 3/9/2018 2:21:50 PM  Echocardiogram:   ient Information      Patient Name MRN Sex              Age     Nixon Velasquez Jr. [id number] Male 1929 (88 y.o.)       Indications      Congestive heart failure       Summary        Left Ventricle: Normal left ventricular size.The estimated left ventricular ejection fraction is 60%. This represents a normal ejection fraction. Mild hypertrophy noted. E/e' > 15, suggesting elevated LV filling pressures.    Left Atrium: Left atrial volume is moderately increased.    Mitral Valve: There is moderate mitral annular calcification. Moderate mitral regurgitation.    Mild aortic stenosis. Mild aortic  regurgitation    Normal right ventricular size and systolic function.    No pulmonary hypertension present.    Estimated central venous pressure equal to 13 mmHg.             Telemetry: Sinus rhythm, sinus bradycardia    Imaging:   Chest X-Ray:   Study Result      XR CHEST 1 VIEW PORTABLE  3/18/2018 10:36 AM     INDICATION: dyspnea, basilar crackles  COMPARISON: 03/12/2018     FINDINGS: Right-sided PICC line. Sternotomy. There are opacities in both lung bases that likely represent a combination of pleural fluid and pulmonary infiltrates. Mild pulmonary vascular congestion. Hiatal hernia         Lab Results:   Sodium   Date Value Ref Range Status   03/20/2018 143 136 - 145 mmol/L Final   03/19/2018 142 136 - 145 mmol/L Final   03/18/2018 142 136 - 145 mmol/L Final     Potassium   Date Value Ref Range Status   03/20/2018 3.8 3.5 - 5.0 mmol/L Final   03/19/2018 3.6 3.5 - 5.0 mmol/L Final   03/18/2018 3.8 3.5 - 5.0 mmol/L Final     Chloride   Date Value Ref Range Status   03/20/2018 106 98 - 107 mmol/L Final   03/19/2018 105 98 - 107 mmol/L Final   03/18/2018 107 98 - 107 mmol/L Final     CO2   Date Value Ref Range Status   03/20/2018 30 22 - 31 mmol/L Final   03/19/2018 31 22 - 31 mmol/L Final   03/18/2018 30 22 - 31 mmol/L Final     BUN   Date Value Ref Range Status   03/20/2018 21 8 - 28 mg/dL Final   03/19/2018 23 8 - 28 mg/dL Final   03/18/2018 20 8 - 28 mg/dL Final     Creatinine   Date Value Ref Range Status   03/20/2018 0.77 0.70 - 1.30 mg/dL Final   03/19/2018 0.76 0.70 - 1.30 mg/dL Final   03/18/2018 0.77 0.70 - 1.30 mg/dL Final     Calcium   Date Value Ref Range Status   03/20/2018 7.4 (L) 8.5 - 10.5 mg/dL Final   03/19/2018 7.1 (L) 8.5 - 10.5 mg/dL Final   03/18/2018 7.0 (L) 8.5 - 10.5 mg/dL Final     WBC   Date Value Ref Range Status   03/20/2018 11.5 (H) 4.0 - 11.0 thou/uL Final   03/19/2018 11.4 (H) 4.0 - 11.0 thou/uL Final   03/18/2018 12.5 (H) 4.0 - 11.0 thou/uL Final     Hemoglobin   Date Value Ref  Range Status   03/20/2018 9.8 (L) 14.0 - 18.0 g/dL Final   03/19/2018 8.8 (L) 14.0 - 18.0 g/dL Final   03/18/2018 8.7 (L) 14.0 - 18.0 g/dL Final     Hematocrit   Date Value Ref Range Status   03/20/2018 32.0 (L) 40.0 - 54.0 % Final   03/19/2018 28.8 (L) 40.0 - 54.0 % Final   03/18/2018 27.9 (L) 40.0 - 54.0 % Final     MCV   Date Value Ref Range Status   03/20/2018 104 (H) 80 - 100 fL Final   03/19/2018 103 (H) 80 - 100 fL Final   03/18/2018 103 (H) 80 - 100 fL Final     Platelets   Date Value Ref Range Status   03/20/2018 80 (L) 140 - 440 thou/uL Final   03/19/2018 65 (L) 140 - 440 thou/uL Final   03/18/2018 52 (L) 140 - 440 thou/uL Final     CK-MB   Date Value Ref Range Status   03/26/2013 <1 <8 ng/mL Final     Troponin I   Date Value Ref Range Status   03/09/2018 0.04 0.00 - 0.29 ng/mL Final   06/18/2013 <0.01 <0.30 ng/mL Final   03/26/2013 <0.01 <0.30 ng/mL Final     BNP   Date Value Ref Range Status   05/30/2016 96 (H) 0 - 93 pg/mL Final     INR   Date Value Ref Range Status   03/16/2018 1.21 (H) 0.90 - 1.10 Final   03/10/2018 2.03 (H) 0.90 - 1.10 Final   03/09/2018 1.26 (H) 0.90 - 1.10 Final

## 2021-06-16 NOTE — PROGRESS NOTES
[facility] Daily Progress Note      Date of Service: 3/15/2018     Assessment and plan    Goals of care  ; 88-year-old male with multiple comorbidities  ; Admitted with sepsis and bacteremia  ;; Family wanting to discuss about treatment options and possible goals of care  ; Discussion with intensivist was held yesterday  ; Palliative care consulted  :  patient doing better today    Altered mental status: Improved  ;; Likely acute metabolic encephalopathy secondary to underlying illness  ; Associated delirium  ; CT head on 3/11/2018  show any acute changes  ; Proper sleep hygiene  ; More interactive as per family members  ;correct hypernatremia    E. coli bacteremia with sepsis  ; Associated with septic/hemorrhagic shock  ; Of pressors   ;Suspected source is acute acalculous cholecystitis.    : Patient currently on Zosyn  : Could consider changing to Maxipime if not improving well  ; No episodes of fever  ; Leukocytosis improved     Acute acalculous cholecystitis,   s/p percutaneous cholecystostomy on 3/10 with subsequent bleeding.  IR images on 3/11 demonstrated nearly entire lumen of the gallbladder filled with a blood clot.  Catheter was exchanged and some of the clot was aspirated.    Continue flushes with normal saline  ; Surgery requested for evaluation    Hyper natremia  ; Patient on dextrose at 100 mL per hour  ; Sodium improved from 155-153  ; Monitor    Acute kidney injury  ; Monitor renal function  ; Creatinine normalized    Paroxysmal atrial fibrillation  ; Continue metoprolol     Moderate pharyngeal dysphagia and moderate aspiration risk with all intake.    Since patient does not want feeding tubes started on NDD 3 and thin liquids which is thought to be the least risk for aspiration.      This note was dictated using voice recognition software. Any grammatical or context distortions are unintentional and inherent to the software.  Subjective:   Patient seen at bedside with his sons  Patient sitting up  "in chair in no acute distress  Not answering the question, when asked how he is feeling, he give a thumbs up    As per patient son at bedside: He was more interactive this morning, he was alert, able to recognize son, and have a decent conversation    Surgery requested for evaluation for cholecystectomy    Brief History: 88 y.o. old male with PMH of CAD, status post CABG and postoperative CVA, HTN, CLL recently started on Imbruvica, h/o thyroid cancer, s/p thyroidectomy and radioactive iodine treatment, now on levothyroxine, prostate cancer in 1992, s/p prostatectomy, multiple skin cancers who presented with E. coli bacteremia, sepsis and septic shock secondary to acalculous cholecystitis.  He had percutaneous cholecystostomy tube placed on 3/10 with subsequent bleeding and clotting.    Catheter was exchanged on 3/11, some of the clot has been aspirated. Confusion has been noted since 3/11, suspect ICU delirium.    Chart reviewed, events noted. Pt seen and examined.     Objective     Vital signs in last 24 hours:  Temp:  [98  F (36.7  C)-98.6  F (37  C)] 98  F (36.7  C)  Heart Rate:  [] 146  Resp:  [14-37] 31  BP: ()/(52-81) 111/79  Weight:   204 lb (92.5 kg)  Weight change:   Body mass index is 25.5 kg/(m^2).    Intake/Output last 3 shifts:  I/O last 3 completed shifts:  In: 2188 [P.O.:30; I.V.:1888; IV Piggyback:270]  Out: 1540 [Urine:1500; Emesis/NG output:40]  Intake/Output this shift:  I/O this shift:  In: -   Out: 525 [Urine:525]      Physical Exam:  /79  Pulse (!) 146  Temp 98  F (36.7  C) (Axillary)   Resp (!) 31  Ht 6' 3\" (1.905 m)  Wt 204 lb (92.5 kg)  SpO2 96%  BMI 25.5 kg/m2    FiO2 (%): 40 % O2 Device: Nasal cannula O2 Flow Rate (L/min): 2 L/min    General Appearance:    Alert, no apparent distress.    HEENT:    Normocephalic. Pupils equal and reactive. No scleral   Icterus. Moist oral mucosa    Lungs:     Clear to auscultation bilaterally, respirations unlabored   Heart::    " Regular rate and rhythm, S1 and S2 normal, no murmur, rub   or gallop. No peripheral edema.   Abdomen:   Soft, surgical dressing present  ?  Tenderness: No grimace on palpation  Tube in place,   Extremity :   No deformity   Pulses:   2+ and symmetric all extremities   CNS:  Awake, seems to follow simple commands  No focal weakness         Imaging:  personally reviewed.      Scheduled Meds:    folic acid (FOLVITE) IVPB  1 mg Intravenous DAILY     furosemide  20 mg Intravenous DAILY     hydrocortisone sod succ  50 mg Intravenous Q12H     insulin aspart (NovoLOG) injection   Subcutaneous TID with meals     insulin aspart (NovoLOG) injection   Subcutaneous QHS     metoprolol succinate  12.5 mg Oral DAILY     OLANZapine  7.5 mg Oral QHS     pantoprazole (PROTONIX) IVPB 40 mg  40 mg Intravenous QAM AC    Or     omeprazole  20 mg Oral QAM AC     piperacillin-tazobactam  4.5 g Intravenous Q6H     potassium, sodium phosphates  1 packet Oral TID     senna-docusate  1 tablet Oral BID     sodium chloride  10-30 mL Intravenous Q8H FIXED TIMES     sodium chloride  3 mL Intravenous Line Care     tiZANidine  2 mg Oral BID     Continuous Infusions:    dextrose 5% 100 mL/hr (03/14/18 0958)     norepinephrine IV infusion in NS Stopped (03/12/18 1000)     vasopressin Stopped (03/11/18 0345)     PRN Meds:.acetaminophen, acetaminophen, aluminum-magnesium hydroxide-simethicone, bacitracin, benzocaine-menthol, bisacodyl, dextrose 50 % (D50W), fentaNYL pf, glucagon (human recombinant), HYDROmorphone, labetalol, magnesium hydroxide, melatonin, naloxone **OR** naloxone, ondansetron **OR** ondansetron, oxyCODONE intensol (ROXICODONE) conc solution 20 mg/mL, polyvinyl alcohol, sodium chloride, sodium chloride, sodium chloride, sodium phosphates 133 mL    Lab Results:  personally reviewed.   not applicable  Recent Results (from the past 24 hour(s))   Hemoglobin    Collection Time: 03/14/18  1:31 PM   Result Value Ref Range    Hemoglobin 8.0 (L)  14.0 - 18.0 g/dL   POCT Glucose    Collection Time: 03/14/18  6:33 PM   Result Value Ref Range    Glucose,  mg/dL   POCT Glucose    Collection Time: 03/14/18 10:06 PM   Result Value Ref Range    Glucose,  mg/dL   Crossmatch    Collection Time: 03/15/18 12:01 AM   Result Value Ref Range    Crossmatch LEAST INCOMPATIBLE     Blood Expiration Date [id number]     Unit Type A Neg     Unit Number [id number]     Status Released     Component Red Blood Cells     PRODUCT CODE L0536W08     Blood Type 0600     CODING SYSTEM [id number]    Magnesium    Collection Time: 03/15/18  4:04 AM   Result Value Ref Range    Magnesium 2.2 1.8 - 2.6 mg/dL   Phosphorus Level > 2.4 no replacement required    Collection Time: 03/15/18  4:04 AM   Result Value Ref Range    Phosphorus 2.0 (L) 2.5 - 4.5 mg/dL   Comprehensive Metabolic Panel    Collection Time: 03/15/18  4:04 AM   Result Value Ref Range    Sodium 153 (HH) 136 - 145 mmol/L    Potassium 3.1 (L) 3.5 - 5.0 mmol/L    Chloride 116 (H) 98 - 107 mmol/L    CO2 30 22 - 31 mmol/L    Anion Gap, Calculation 7 5 - 18 mmol/L    Glucose 218 (H) 70 - 125 mg/dL    BUN 33 (H) 8 - 28 mg/dL    Creatinine 0.85 0.70 - 1.30 mg/dL    GFR MDRD Af Amer >60 >60 mL/min/1.73m2    GFR MDRD Non Af Amer >60 >60 mL/min/1.73m2    Bilirubin, Total 1.0 0.0 - 1.0 mg/dL    Calcium 7.6 (L) 8.5 - 10.5 mg/dL    Protein, Total 4.5 (L) 6.0 - 8.0 g/dL    Albumin 2.5 (L) 3.5 - 5.0 g/dL    Alkaline Phosphatase 40 (L) 45 - 120 U/L    AST 7 0 - 40 U/L    ALT <9 0 - 45 U/L   HM1 (CBC with Diff)    Collection Time: 03/15/18  4:04 AM   Result Value Ref Range    WBC 11.7 (H) 4.0 - 11.0 thou/uL    RBC 2.46 (L) 4.40 - 6.20 mill/uL    Hemoglobin 7.9 (L) 14.0 - 18.0 g/dL    Hematocrit 25.9 (L) 40.0 - 54.0 %     (H) 80 - 100 fL    MCH 32.1 27.0 - 34.0 pg    MCHC 30.5 (L) 32.0 - 36.0 g/dL    RDW 22.5 (H) 11.0 - 14.5 %    Platelets 37 (LL) 140 - 440 thou/uL    MPV 13.7 (H) 8.5 - 12.5 fL    Neutrophils % 49 (L) 50 -  70 %    Lymphocytes % 48 (H) 20 - 40 %    Monocytes % 4 2 - 10 %    Eosinophils % 0 0 - 6 %    Basophils % 0 0 - 2 %    Neutrophils Absolute 5.6 2.0 - 7.7 thou/uL    Lymphocytes Absolute 5.5 (H) 0.8 - 4.4 thou/uL    Monocytes Absolute 0.4 0.0 - 0.9 thou/uL    Eosinophils Absolute 0.0 0.0 - 0.4 thou/uL    Basophils Absolute 0.0 0.0 - 0.2 thou/uL   Crossmatch    Collection Time: 03/15/18  7:27 AM   Result Value Ref Range    Status Canceled     Component Red Blood Cells    POCT Glucose    Collection Time: 03/15/18  7:52 AM   Result Value Ref Range    Glucose,  mg/dL   POCT Glucose    Collection Time: 03/15/18 12:00 PM   Result Value Ref Range    Glucose,  mg/dL       Results from last 7 days  Lab Units 03/15/18  1200 03/15/18  0752 03/14/18  2206 03/14/18  1833 03/14/18  1147 03/14/18  0753 03/13/18  2005 03/13/18  1638 03/13/18  1325 03/13/18  1214   LN-POC GLUCOSE FINGERSTICK- HE mg/dL 173 252 201 166 182 170 152 160 172 173           Advance Care Planning:   Barriers to discharge: Multiple issue  Anticipated discharge day: Unclear  Disposition: Unclear      Laurence Dhillon MD  [facility]  Hospitalist

## 2021-06-16 NOTE — PROGRESS NOTES
Nutrition Therapy Brief Note    RN asked about changing Boost supplement as pt can't have dairy.  I explained that Boost was lactose free.  I reviewed last RD note that mentioned pt has issues with dairy including butter, cheese.  Unclear if she meant that pt did NOT have issues with butter and cheese.  Boost does have some milk protein.     Upon chart review noted that SLP recommended pt have nectar thickened liquids and was at risk for aspiration with all textures.  He clarified via vocera that pt could continue with the mechanical soft texture.      As Boost is not nectar thick, I will d/c this and substitute Gelatein 20 TID with meals.  Pt could not have Magic Cup with meals as it is not lactose free.  Unit RD will follow in the next couple of days.

## 2021-06-16 NOTE — PROGRESS NOTES
IR was contacted regarding drain clotted off. Dr. Josue lavaged x 2 and drain bag was replaced with orders to contact IR MD if drain not draining. IR MD was recontacted regarding the drain not draining after the new bag was connected. Dr. Carson returned phone call stating that the gallbladder may be contracted after 600 cc of blood drained out in 3 hours ( 5pm to 8 pm) when patient returned to the floor. Patient has been afebrile, plan is to continue to monitor and AM doctor will decide if further intervention is warranted. MD Josue updated.

## 2021-06-16 NOTE — PROGRESS NOTES
[facility] Hematology and Oncology Progress Note    Patient: Nixon Velasquez Jr.  MRN: [id number]  Date of Service:         Reason for Visit    Chief Complaint   Patient presents with     HE Cancer       Assessment and Plan      1. CLL: Received single agent Rituxan for this with his last dose being August 7, 2017.  Recent relapse so has started imbruvica one week ago. Tolerating well. He will return in 2 weeks to see how he is doing with labs. His labs are all slightly improved from the last time he was here.     2.  Hemolytic anemia: unclear taper at last appt so pt has continued to take 30mg daily. We will decrease to 20mg daily for one week, then 10mg daily for one week and then we will recheck labs. If stable, we will stop.       ECOG Performance   ECOG Performance Status: 2     Distress Assessment  Distress Assessment Score: 3    Pain  Currently in Pain: No/denies      Problem List    1. CLL (chronic lymphoid leukemia) in relapse  HM1(CBC and Differential)    Comprehensive Metabolic Panel   2. Other autoimmune hemolytic anemias     3. Bruising        ______________________________________________________________________________    History of Present Illness      Diagnosis:     1. Chronic lymphocytic leukemia initially diagnosed in 2/2008. 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   2. Thyroid cancer, status post thyroidectomy and radioactive iodine ablation.   3. Prostate cancer diagnosed in 1992.  4. Skin cancers, multiple.     Treatment:  Started single agent Rituxan for his CLL on July 18, 2017.  This was initiated for worsening symptomatic anemia.  He received 4 weekly doses.  Completed on August 7, 2017.  Recent relapse of CLL and has started Imbruvica on 2/27/18.      He is on Synthroid for his thyroidectomy.     Currently on his prednisone taper for a hemolytic anemia    Had recent excision for skin cancer in right ear. Was incompletely excised, but got severely infected, which resulted in  hospitalization     Interim History:      Nixon is here today for a toxicity check after starting imbruvica. He states it is going well and his only complaint is mild abdominal discomfort. zofran helps. Has only taken it a couple of times. Denies any bleeding, but having significant bruising. Has fallen once at home. Has been working with son berna at his home and tripped over some cords.     Pain Status  Currently in Pain: No/denies    Review of Systems    Constitutional  Constitutional (WDL): Exceptions to WDL  Fatigue: Fatigue relieved by rest  Neurosensory  Neurosensory (WDL): Exceptions to WDL  Ataxia: Asymptomatic, clinical or diagnostic observations only, intervention not indicated  Eye   Eye Disorder (WDL): All eye disorder elements are within defined limits  Ear  Ear Disorder (WDL): Assessment not pertinent to visit  Cardiovascular  Cardiovascular (WDL): All cardiovascular elements are within defined limits  Pulmonary  Respiratory (WDL): Exceptions to WDL  Cough: None  Gastrointestinal  Gastrointestinal (WDL): All gastrointestinal elements are within defined limits  Genitourinary  Genitourinary (WDL): Exceptions to WDL  Urinary Frequency: Present (up once at night)  Lymphatic  Lymph (WDL): Assessment not pertinent to visit  Musculoskeletal and Connective Tissue  Musculoskeletal and Connetive Tissue Disorders (WDL): Exceptions to WDL  Muscle Weakness : Symptomatic, evident on physical exam, limiting instrumental ADL  Integumentary  Integumentary (WDL): Exceptions to WDL  Rash Maculo-Papular: Macules/papules covering <10% BSA with or without symptoms (e.g., pruritus, burning, tightness) (chest  and face rash. bruises on arm s from fall)  Patient Coping  Patient Coping: Accepting  Distress Assessment  Distress Assessment Score: 3  Accompanied by  Accompanied by: Family Member  Oral Chemo Adherence  Oral Chemo Adherence  What Oral Chemotherapy is the patient taking?: Ibrutinib  Did the patient fill and pick  up the prescription as written?: Yes  Did patient start taking oral chemo on time?: Yes, patient started taking oral chemo on time  Oral chemo start date: 02/28/18  Does the patient report missing any doses?: Yes  Any side effects to report that hasn't already been discussed?: abd pain. zofran seems to help the cramps    Past History  Past Medical History:   Diagnosis Date     Cataract     bilateral     Foot fracture, right      History of transfusion      Hypertension      Inguinal hernia      Leukemia      PE (pulmonary embolism)      Pneumonia      Prostate cancer      Skin cancer      Thyroid cancer        PHYSICAL EXAM:  /74  Pulse 71  Temp 97.8  F (36.6  C)  Wt 191 lb 7 oz (86.8 kg)  SpO2 96%  BMI 23.93 kg/m2  GENERAL: no acute distress. Cooperative in conversation. Here with son.   HEENT: pupils are equal, round and reactive. Oromucosa is clean and intact. No ulcerations or mucositis noted. No bleeding noted.  RESP: lungs are clear bilaterally per auscultation. Regular respiratory rate. No wheezes or rhonchi.  CV: Regular, rate and rhythm. Slight systolic murmur.  ABD: soft, nontender. Positive bowel sounds. No organomegaly.   MUSCULOSKELETAL: No lower extremity swelling.   NEURO: non focal. Alert and oriented x3.   PSYCH: within normal limits. No depression or anxiety.  SKIN: warm dry intact, significant bruises on arms. Has 2 open areas on arms covered in bandaid.  LYMPH: mild bilateral cervical nodes, no supraclavicular or axillary lymphadenopathy        Lab Results  Recent Results (from the past 24 hour(s))   Comprehensive Metabolic Panel   Result Value Ref Range    Sodium 141 136 - 145 mmol/L    Potassium 3.4 (L) 3.5 - 5.0 mmol/L    Chloride 106 98 - 107 mmol/L    CO2 28 22 - 31 mmol/L    Anion Gap, Calculation 7 5 - 18 mmol/L    Glucose 158 (H) 70 - 125 mg/dL    BUN 20 8 - 28 mg/dL    Creatinine 0.88 0.70 - 1.30 mg/dL    GFR MDRD Af Amer >60 >60 mL/min/1.73m2    GFR MDRD Non Af Amer >60 >60  mL/min/1.73m2    Bilirubin, Total 0.9 0.0 - 1.0 mg/dL    Calcium 8.5 8.5 - 10.5 mg/dL    Protein, Total 5.5 (L) 6.0 - 8.0 g/dL    Albumin 3.7 3.5 - 5.0 g/dL    Alkaline Phosphatase 35 (L) 45 - 120 U/L    AST 8 0 - 40 U/L    ALT <9 0 - 45 U/L   HM1 (CBC with Diff)   Result Value Ref Range    WBC 70.3 (HH) 4.0 - 11.0 thou/uL    RBC 2.88 (L) 4.40 - 6.20 mill/uL    Hemoglobin 9.7 (L) 14.0 - 18.0 g/dL    Hematocrit 31.8 (L) 40.0 - 54.0 %     (H) 80 - 100 fL    MCH 33.7 27.0 - 34.0 pg    MCHC 30.5 (L) 32.0 - 36.0 g/dL    RDW 24.6 (H) 11.0 - 14.5 %    Platelets 82 (L) 140 - 440 thou/uL    MPV 12.0 8.5 - 12.5 fL   Manual Differential   Result Value Ref Range    Total Neutrophils % 5 (L) 50 - 70 %    Lymphocytes % 96 (H) 20 - 40 %    Monocytes % 0 (L) 2 - 10 %    Eosinophils %  0 0 - 6 %    Basophils % 0 0 - 2 %    Total Neutrophils Absolute 3.2 2.0 - 7.7 thou/ul    Lymphocytes Absolute 67.1 (H) 0.8 - 4.4 thou/uL    Monocytes Absolute 0.0 0.0 - 0.9 thou/uL    Eosinophils Absolute 0.0 0.0 - 0.4 thou/uL    Basophils Absolute 0.0 0.0 - 0.2 thou/uL    Platelet Estimate Decreased (!) Normal    Ovalocytes 1+ (!) Negative    Polychromasia 1+ (!) Negative         Imaging    Ct Chest Abdomen Pelvis Without Oral With Iv Contrast    Result Date: 2/14/2018  CT CHEST, ABDOMEN, AND PELVIS WO ORAL W IV CONTRAST 2/13/2018 2:07 PM      INDICATION: CLL, dyspnea, abdominal pain. TECHNIQUE: CT chest, abdomen, and pelvis. Dose reduction techniques were used. IV CONTRAST: Iohexol (Omni) 100 mL. COMPARISON: CT chest 5/30/2016, CT abdomen and pelvis 3/3/2017. FINDINGS: CHEST: 1.5 cm ground-glass nodule in the right upper lobe on image 33, not appreciably changed. Calcified granuloma in the right lower lobe. Small bilateral pleural effusions are new, with compressive atelectasis in the lower lobes. Some presumed scarring in the lateral right lung base is unchanged. A few calcified pleural plaques present. Prior CABG. There are several mildly  prominent bilateral axillary and mediastinal lymph nodes, all of which are under 1 cm in short axis. Large hiatal hernia.  ABDOMEN: Mild splenomegaly. The spleen measures 14 cm in craniocaudal length compared to previous 15.6 cm. A small benign cyst and hemangioma in the liver. Otherwise normal liver, pancreas, and adrenal glands. Small renal cysts. Mild nonspecific gallbladder wall edema. The gallbladder is decompressed. Mildly enlarged retroperitoneal, mesenteric, and chris hepatic lymph nodes. The lymph nodes have significantly decreased in size when compared to 3/30/2017. For example, a left periaortic lymph node on image 286 measures 13 mm in short axis, previously 22 mm. PELVIS: Prostatectomy. Sigmoid colonic diverticulosis. Right inguinal hernia containing fat and fluid. There is mild external iliac and inguinal lymphadenopathy. MUSCULOSKELETAL: Negative.     CONCLUSION: 1.  Mild lymphadenopathy in the chest, abdomen, and pelvis as well as splenomegaly have all improved when compared to 3/3/2017. 2.  New small bilateral pleural effusions. 3.  Stable 1.5 cm ground-glass nodule in the right upper lobe compared to 5/30/2016.        Signed by: Vy Huerta, CNP

## 2021-06-16 NOTE — PROGRESS NOTES
[facility]  Progress Note  Jerry Oseguera    Primary Care: Dr. Riley Cuevas MD    Assessment:         Atrial fibrillation with RVR: The patient's atrial fibrillation is resolved and he is returned back to sinus rhythm.  Okay to stop IV amiodarone and initiate oral amiodarone.  Discussion regarding long-term anticoagulation will occur after the patient surgery on Monday    Cholecystitis: The patient's echo shows normal LV function and the patient is cleared to move ahead with surgery on Monday.    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    Mixed hyperlipidemia    Hypothyroidism    Coronary Artery Disease    Other autoimmune hemolytic anemias    Thrombocytopenia    Septic shock    Neutropenia associated with infection    Gram-negative bacteremia    Metabolic acidosis with respiratory acidosis    Hemorrhage    Atrial fibrillation with rapid ventricular response    Lactic acidosis    Hypocalcemia    Acute respiratory failure with hypoxia and hypercapnia    Bacteremia due to Gram-negative bacteria    Acute pulmonary edema    Abdominal pain, unspecified abdominal location    Fecal impaction    Cholecystitis     LOS: 8 days       Recommendations:    DC IV amiodarone    Initiate oral amiodarone      Subjective:  Patient new to me, chart reviewed and the patient examined.  The patient notes no rhythm related symptoms.  He is not having any significant shortness of breath or chest pain at this time.    Current Facility-Administered Medications   Medication Dose Route Frequency Provider Last Rate Last Dose     acetaminophen suppository 650 mg (TYLENOL)  650 mg Rectal Q4H PRN Denia Chapman CNP   650 mg at 03/14/18 0928     acetaminophen tablet 650 mg (TYLENOL)  650 mg Oral Q4H PRN Ling Varela MD   650 mg at 03/09/18 2215     aluminum-magnesium hydroxide-simethicone 200-200-20 mg/5 mL suspension 30 mL (MAALOX ADVANCED)  30 mL Oral Q4H PRN Arlin  MD Josie         amiodarone 900 mg/500 ml standard 24 hour infusion  0.5 mg/min Intravenous Continuous Stephanie Sharpe MD 16.7 mL/hr at 03/16/18 1725 0.5 mg/min at 03/16/18 1725     bacitracin ointment packet 1 packet  1 packet Topical Once PRN Kenny Ramirez MD         benzocaine-menthol lozenge 1 lozenge (CEPACOL)  1 lozenge Oral Q1H PRN Ling Varela MD         bisacodyl suppository 10 mg (DULCOLAX)  10 mg Rectal Daily PRN Ling Varela MD   10 mg at 03/12/18 0135     dextrose 5%  50 mL/hr Intravenous Continuous Sarthak Pritchard MD 50 mL/hr at 03/17/18 0947 50 mL/hr at 03/17/18 0947     dextrose 50 % (D50W) syringe 20-50 mL  20-50 mL Intravenous PRN Ling Varela MD   12.5 mL at 03/11/18 0839     fentaNYL pf injection 12.5-25 mcg (SUBLIMAZE)  12.5-25 mcg Intravenous Q3H PRN Ruby Josue MD   25 mcg at 03/13/18 1847     folic acid tablet 1 mg (FOLVITE)  1 mg Oral QHS Sarthak Pritchard MD         furosemide injection 20 mg (LASIX)  20 mg Intravenous DAILY Sarthak Pritchard MD   20 mg at 03/17/18 0918     glucagon (human recombinant) injection 1 mg  1 mg Subcutaneous PRN Ling Varela MD         hydrocortisone sod succ (PF) 100 mg/2 mL injection 50 mg  50 mg Intravenous Q12H Sarthak Pritchard MD   50 mg at 03/17/18 0914     HYDROmorphone injection 0.2-0.4 mg (DILAUDID)  0.2-0.4 mg Intravenous Q3H PRN Ruby Josue MD   0.3 mg at 03/15/18 0813     insulin aspart U-100 injection (NovoLOG)   Subcutaneous TID with meals Sarthak Pritchard MD   6 Units at 03/17/18 1253     insulin aspart U-100 injection (NovoLOG)   Subcutaneous QHS Sarthak Pritchard MD   2 Units at 03/16/18 2207     labetalol injection 5-10 mg (NORMODYNE,TRANDATE)  5-10 mg Intravenous Q4H PRN Shawna Cassidy DO   5 mg at 03/14/18 0927     levothyroxine tablet 150 mcg (SYNTHROID, LEVOTHROID)  150 mcg Oral Daily 0600 Bo Franks MD   150 mcg at 03/17/18  0526     magnesium hydroxide suspension 30 mL (MILK OF MAG)  30 mL Oral Daily PRN Ling Varela MD         magnesium oxide tablet 400 mg (MAG-OX)  400 mg Oral TID Bo Franks MD   400 mg at 03/17/18 1435     melatonin tablet 3 mg  3 mg Oral Bedtime PRN Denia Gordon CNP   3 mg at 03/15/18 1959     metoprolol succinate 24 hr tablet 25 mg (TOPROL-XL)  25 mg Oral DAILY Sarthak Pritchard MD   25 mg at 03/16/18 0919     naloxone injection 0.2-0.4 mg (NARCAN)  0.2-0.4 mg Intravenous PRN Ruby Josue MD        Or     naloxone injection 0.2-0.4 mg (NARCAN)  0.2-0.4 mg Intramuscular PRN Ruby Josue MD         OLANZapine tablet 7.5 mg (zyPREXA)  7.5 mg Oral QHS Arlin Galindo MD   7.5 mg at 03/16/18 2207     omeprazole capsule 20 mg (PriLOSEC)  20 mg Oral QAM AC Ling Varela MD   20 mg at 03/16/18 0918     ondansetron injection 4 mg (ZOFRAN)  4 mg Intravenous Q4H PRN Ling Varela MD   4 mg at 03/10/18 1722    Or     ondansetron tablet 8 mg (ZOFRAN)  8 mg Oral Q8H PRN Ling Varela MD         oxyCODONE 5 mg/0.25 mL concentrated solution 5 mg (ROXICODONE)  5 mg Sublingual Q3H PRN Denia Gordon CNP   5 mg at 03/15/18 1336     oxyCODONE 5 mg/0.25 mL concentrated solution 5 mg (ROXICODONE)  5 mg Sublingual QID Denia Gordon CNP   5 mg at 03/17/18 1253     piperacillin-tazobactam (ZOSYN) 4.5 grams  4.5 g Intravenous Q6H Ling Varela MD   4.5 g at 03/17/18 1254     polyvinyl alcohol 1.4 % ophthalmic solution 1-2 drop (LIQUIFILM TEARS)  1-2 drop Both Eyes Q1H PRN Ling Varela MD         senna-docusate 8.6-50 mg tablet 1 tablet (PERICOLACE)  1 tablet Oral BID Ling Varela MD   1 tablet at 03/17/18 0913     sodium chloride flush 10-20 mL (NS)  10-20 mL Intravenous PRN Kenny Ramirez MD         sodium chloride flush 10-30 mL (NS)  10-30 mL Intravenous PRN Kenny Ramirez MD         sodium chloride flush 10-30 mL (NS)  10-30 mL Intravenous Q8H FIXED TIMES Kenny  MD James   10 mL at 03/17/18 0526     sodium chloride flush 20 mL (NS)  20 mL Intravenous PRN Kenny Ramirez MD         sodium chloride flush 3 mL (NS)  3 mL Intravenous Line Care Kenny Ramirez MD   3 mL at 03/17/18 0900     sodium phosphates 133 mL rectal enema 1 enema (for FLEET)  1 enema Rectal Daily PRN Sarthak Pritchard MD   1 enema at 03/13/18 1203     tiZANidine tablet 2 mg (ZANAFLEX)  2 mg Oral TID Denia Gordon CNP   2 mg at 03/17/18 1505       Objective:   Vital signs in last 24 hours:  Temp:  [97.5  F (36.4  C)-98.3  F (36.8  C)] 98  F (36.7  C)  Heart Rate:  [] 48  Resp:  [18-24] 18  BP: ()/(53-64) 112/57  Weight:   194 lb 4.8 oz (88.1 kg)   Weight change:       Physical Exam:  General: The patient is alert oriented to person place and situation.  The patient is in no acute distress at the time of my evaluation.  Eyes: Pupils are equal, round, and reactive to light.  Conjunctiva and sclera are clear.  ENT: Oral mucosa is moist and without redness. No evident nasal discharge.  Pulmonary: Lungs are clear bilaterally with no rales, rhonchi, or wheezes.    Cardiovascular exam: Rhythm is regular. S1 and S2 are normal.  There is a soft systolic murmur at the left lower sternal border radiating to the apex.  JVP is normal. Lower extremities demonstrate no significant edema. Distal pulses are intact bilaterally.  Abdomen is flat, soft, and nontender.  Musculoskeletal: Spine is straight. Extremities without deformity.  Neuro: Gait is not observed as the patient is at bedrest.   Skin is warm, dry, and otherwise intact.        Cardiographics:   Telemetry, personally reviewed demonstrates normal sinus rhythm.  The patient was transferred stations and I am not sure exactly when he converted from his atrial fibrillation.    Radiology:      Lab Results:   Lab Results   Component Value Date     03/17/2018    K 3.6 03/17/2018     03/17/2018    CO2 28 03/17/2018    BUN 23  03/17/2018    CREATININE 0.81 03/17/2018    CALCIUM 7.1 (L) 03/17/2018     Lab Results   Component Value Date    WBC 19.2 (H) 03/17/2018    HGB 7.8 (L) 03/17/2018    HCT 25.6 (L) 03/17/2018     (H) 03/17/2018    PLT 59 (L) 03/17/2018     Lab Results   Component Value Date    INR 1.21 (H) 03/16/2018       Outside record review:

## 2021-06-16 NOTE — PROGRESS NOTES
Speech Language/Pathology  Speech Therapy Daily Progress Note    Pt treatment deferred d/t fatigue. Spoke to one of patient's son's that I spoke to on Monday and he remembered me. He stated that patient did great eating his oatmeal today using chin tuck. I stated that it is imported for patient to cough after swallow to reduce risk of silent aspiration. He stated that his dad has been doing It inconsistently/only when he wants to. Son reports that he will cue patient to cough more often.    PLAN: Check in with patient tomorrow when patient is awake to trial strategies and provide further education to staff and family re: swallow strategies.    The ST Care Plan has been reviewed and current plan remains appropriate.    10 conference minutes     Nhung Carlson MS, CCC-SLP

## 2021-06-16 NOTE — PROGRESS NOTES
"  RESPIRATORY CARE NOTE     Patient Name: Ebenezer Velasquez Jr.  Today's Date: 3/10/2018        BP 97/53  Pulse (!) 115  Temp 98.8  F (37.1  C) (Oral)   Resp 19  Ht 6' 3\" (1.905 m)  Wt 200 lb 12.8 oz (91.1 kg)  SpO2 98%  BMI 25.1 kg/m2    Results for EBENEZER VELASQUEZ JR. (MRN [id number]) as of 3/10/2018 06:09   Ref. Range 3/10/2018 05:31   pH, Venous Latest Ref Range: 7.35 - 7.45  7.23 (LL)   pCO2, Venous Latest Ref Range: 35 - 50 mm Hg 43   pO2, [place] Latest Ref Range: 25 - 47 mm Hg 149 (H)   HCO3, Venous Latest Ref Range: 24.0 - 30.0 mmol/L 17.9 (L)   Base Excess, Venous Latest Units: mmol/L -9.0   Oxyhemoglobin Latest Ref Range: 70.0 - 75.0 % 95.5 (H)   O2 Sat, Venous Latest Ref Range: 70.0 - 75.0 % 100.0 (H)       FiO2 (%):  [40 %] 40 %    0600 Pt placed on BiPAP 12/5, f14, 40% to support work of breathing.  Pt tolerating well. Breath sounds clear/diminished.     Brian Cope, LRT      "

## 2021-06-16 NOTE — PROGRESS NOTES
Inpatient Progress Note - Internal Medicine  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    Mixed hyperlipidemia    Hypothyroidism    Coronary Artery Disease    Other autoimmune hemolytic anemias    Thrombocytopenia    Septic shock    Neutropenia associated with infection    Gram-negative bacteremia    Metabolic acidosis with respiratory acidosis    Hemorrhage    Atrial fibrillation with rapid ventricular response    Lactic acidosis    Hypocalcemia    Acute respiratory failure with hypoxia and hypercapnia    Bacteremia due to Gram-negative bacteria    Acute pulmonary edema    Abdominal pain, unspecified abdominal location    Fecal impaction    Cholecystitis      Subjective:  Is an 88-year-old gentleman admitted with acute cholecystitis and sepsis with E. coli bacteremia.  Currently on antibiotics and hemodynamically stable..  Cholecystostomy tube placed by IR and plan cholecystectomy at a later date.    History of atrial fibrillation now in sinus rhythm note by Dr. Oseguera reviewed and appreciated.    Current medications reviewed discussed and reconciled    Has a new hematoma in the right upper quadrant with dependent spread to the right flank.  This is in the area of the tube.  Reportedly was not present earlier today.  Ultrasound is ordered by IR.    Baseline hemoglobin 7.8 this morning he was transfused a single unit of red cells.  He apparently is hard to find an adequate crossmatch.  I will recheck a hemoglobin today after the apparent extravasation around the site of the tube.  Will transfuse as needed.    Generally feels well has minimal right upper quadrant pain.  In general he feels well denies chest pain or dyspnea no other specific complaints.            ROS: A comprehensive review of systems was performed and was otherwise negative except as mentioned above.     Physical Exam: /57 (Patient Position: Lying)  Pulse (!) 48  Temp 98  F (36.7  C) (Oral)   " Resp 18  Ht 6' 3\" (1.905 m)  Wt 194 lb 4.8 oz (88.1 kg)  SpO2 94%  BMI 24.29 kg/m2  There is a large hematoma in the right flank beginning at the site of the tube and expanding to deep tendon positions    This appears to be at least several hours old.  Vital signs as noted abdomen relatively soft without tenderness tube is in good position.  Heart and lungs unremarkable    Allergies: reviewed  Medications: reviewed, see discussion for details  Diagnostics: Pertinent labs and imaging reviewed and discussed in subjective as pertinent.    Assessment and Plan:  Acute cholecystitis with gram-negative bacteremia.  Cholecystostomy tube placed and doing well.  Has evidence of large hematoma around tube.  Will plan to recheck hemoglobin and follow closely no evidence of active bleeding.  Ultrasound ordered by I are will need follow-up    From a standpoint of sepsis is doing well renal function remains stable no other changes.    Paroxysmal atrial fibrillation in sinus rhythm    We will need to follow hemoglobin closely and transfuse as needed although as noted he is a hard crossmatch    Discussed at length with patient and son    Time: total time spent with the patient was 35 minutes of which >50% was spent in counseling and coordination of care.    Mushtaq Fleming MD   3/17/2018 3:35 PM  Internal Medicine  [facility]    Scheduled Meds:    amiodarone  200 mg Oral BID     folic acid  1 mg Oral QHS     furosemide  20 mg Intravenous DAILY     hydrocortisone sod succ  50 mg Intravenous Q12H     insulin aspart (NovoLOG) injection   Subcutaneous TID with meals     insulin aspart (NovoLOG) injection   Subcutaneous QHS     levothyroxine  150 mcg Oral Daily 0600     magnesium oxide  400 mg Oral TID     metoprolol succinate  25 mg Oral DAILY     OLANZapine  7.5 mg Oral QHS     omeprazole  20 mg Oral QAM AC     oxyCODONE intensol (ROXICODONE) conc solution 20 mg/mL  5 mg Sublingual QID     " piperacillin-tazobactam  4.5 g Intravenous Q6H     senna-docusate  1 tablet Oral BID     sodium chloride  10-30 mL Intravenous Q8H FIXED TIMES     sodium chloride  3 mL Intravenous Line Care     tiZANidine  2 mg Oral TID     Continuous Infusions:    dextrose 5% 50 mL/hr (03/17/18 0947)     PRN Meds:.acetaminophen, acetaminophen, aluminum-magnesium hydroxide-simethicone, bacitracin, benzocaine-menthol, bisacodyl, dextrose 50 % (D50W), fentaNYL pf, glucagon (human recombinant), HYDROmorphone, labetalol, magnesium hydroxide, melatonin, naloxone **OR** naloxone, ondansetron **OR** ondansetron, oxyCODONE intensol (ROXICODONE) conc solution 20 mg/mL, polyvinyl alcohol, sodium chloride, sodium chloride, sodium chloride, sodium phosphates 133 mL

## 2021-06-16 NOTE — PROGRESS NOTES
[facility] Hematology and Oncology Inpatient Progress Note    Patient: Nixon Velasquez Jr.  MRN: [id number]  Date of Service: 3/14/2018        Reason for Visit    1.  Chronic lymphocytic leukemia  2.  Recent history of autoimmune hemolytic anemia  3.  Sepsis  4.  Cholecystitis    Assessment and Plan    1.  CLL: He was recently started on ibrutinib.  He started this medication on February 27.  Currently on hold.    2.  Cytopenias: No longer neutropenic.  Hemoglobin and platelets are low but stable.  Would expect to trend up some given his improvement clinically.  No bleeding today.  Will transfuse to keep hemoglobin above 7.  Platelets above 20,000. He is on steroids.  Was on a prednisone taper prior to admission.  Does not appear to have significant hemolysis at this point.    3.  Disposition: This evening he is awake and alert.  Oriented to person, place, but not the year. He is talking to his sons and smiling.  Two days prior to admission he was completely independent and working in his workshop at home.  This point would recommend continuing with all therapies.    History of Present Illness    Mr. Nixon Velasquez Jr. is a pleasant 88-year-old gentleman with long-standing history of CLL.  Recently had an episode of autoimmune hemolytic anemia.  He has been on a steroid taper.  He started ibrutinib about 2 weeks ago.  He is now admitted with sepsis, gram negatives.  Likely due to cholecystitis.  He has had a drain placed.  He is feeling okay.  He is awake and alert.  He is conversant.  He is not complaining of abdominal pain or shortness of breath.    Review of Systems    As above in the history.     Review of Systems otherwise Negative for:  General: chills, fever or night sweats  Psychological: anxiety or depression  Ophthalmic: blurry vision, double vision or loss of vision, vision change  ENT: epistaxis, oral lesions, hearing changes  Hematological and Lymphatic: bleeding, bruising, jaundice, swollen lymph  nodes  Endocrine: hot flashes, unexpected weight changes  Respiratory: cough, hemoptysis, orthopnea or shortness of breath/[place]  Cardiovascular: chest pain, edema, palpitations or PND  Gastrointestinal: abdominal pain, blood in stools, change in bowel habits, constipation, diarrhea or nausea/vomiting  Genito-Urinary: change in urinary stream, incontinence, frequency/urgency  Musculoskeletal: joint pain, stiffness, swelling, muscle pain  Neurological: dizziness, headaches, numbness/tingling  Dermatological: lumps and rash    ECOG performance status is 2    Physical Exam    Recent Vitals 3/14/2018   Height -   Weight -   BSA (m2) -   /65   Pulse 94   Temp 98.3   Temp src 1   SpO2 98   Some recent data might be hidden     General: patient appears stated age of 88 y.o.. Nontoxic and in no distress.   HEENT: Head: atraumatic, normocephalic. Sclerae anicteric.  Chest:  Normal respiratory effort  Cardiac:  No edema.   Abdomen: abdomen is soft, non-distended  Extremities: normal tone and muscle bulk.  Skin: no lesions or rash. Warm and dry.   CNS: alert and oriented x2. Grossly non-focal.   Psychiatric: normal mood and affect.     Lab Results      Results from last 7 days  Lab Units 03/14/18  1331 03/14/18  0603 03/13/18  0543  03/12/18  0402  03/10/18  1756 03/10/18  0531 03/09/18  0457   LN-WHITE BLOOD CELL COUNT thou/uL  --  14.3* 14.8*  --  21.6*  < > 40.6* 36.9* 32.4*   LN-HEMOGLOBIN g/dL 8.0* 8.0* 7.4*  < > 7.0*  < > 7.2* 8.6* 8.7*   LN-HEMATOCRIT %  --  26.3* 23.4*  --  21.6*  < > 24.1* 28.2* 29.0*   LN-PLATELET COUNT thou/uL  --  30* 23*  --  34*  < > 59* 36* 48*   LN-NEUTROPHILS RELATIVE PERCENT %  --  49* 54  --   --   --   --   --   --    LN-TOTAL NEUTROPHILS-REL(DIFF) %  --   --   --   --   --   --  35* 28* 1*   LN-LYMPHOCYTES RELATIVE PERCENT %  --  48* 43*  --   --   --   --   --   --    LN-LYMPHOCYTES - REL (DIFF) %  --   --   --   --   --   --  64* 70* 99*   LN-MONOCYTES RELATIVE PERCENT %  --  4 3  --    --   --   --   --   --    LN-MONOCYTES - REL (DIFF) %  --   --   --   --   --   --  2 2 0*   < > = values in this interval not displayed.    Results from last 7 days  Lab Units 03/14/18  0603 03/14/18  0010 03/13/18  0543 03/12/18  0402 03/11/18  0535  03/09/18  0457   LN-SODIUM mmol/L 155*  --  153* 149* 146*  < > 142   LN-POTASSIUM mmol/L 3.8 3.1* 3.0* 4.0 4.0  < > 3.8   LN-CHLORIDE mmol/L 120*  --  118* 115* 115*  < > 110*   LN-CO2 mmol/L 27  --  27 27 23  < > 20*   LN-BLOOD UREA NITROGEN mg/dL 38*  --  46* 53* 48*  < > 28   LN-CREATININE mg/dL 0.94  --  0.96 1.25 1.31*  < > 1.40*   LN-CALCIUM mg/dL 7.6*  --  7.5* 7.7* 7.5*  < > 7.7*   LN-ALBUMIN g/dL 2.5*  --   --  2.5* 2.4*  < > 3.2*   LN-PROTEIN TOTAL g/dL  --   --   --  4.6* 4.7*  --  5.2*   LN-BILIRUBIN TOTAL mg/dL  --   --   --  1.3* 1.2*  --  1.6*   LN-ALKALINE PHOSPHATASE U/L  --   --   --  39* 39*  --  39*   LN-ALT (SGPT) U/L  --   --   --  18 24  --  11   LN-AST (SGOT) U/L  --   --   --  9 15  --  15   < > = values in this interval not displayed.    Imaging:    CT CHEST, ABDOMEN, AND PELVIS  3/10/2018 1:01 PM      INDICATION: Sepsis septic shock with no source  TECHNIQUE: CT chest, abdomen, and pelvis. Dose reduction techniques were used.   IV CONTRAST: None  COMPARISON: 02/13/2018     FINDINGS:   CHEST: There are small bilateral pleural effusions with atelectasis in both lungs. Groundglass nodule in the right upper lobe as not well visualized due to some motion artifact but measures approximately 18 mm in greatest dimension on series 3, image 28,   likely unchanged from prior exam. Mediastinal lymph nodes are in the upper range of normal in size and not significantly changed, likely being reactive. Calcified mediastinal and right hilar lymph nodes are consistent with old granulomatous disease. A   calcified nodule is seen in the right lower lobe in a region of atelectasis. Calcified atherosclerotic plaque is seen in the thoracic aorta, great vessels,  and coronary arteries. 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 Mildly prominent left   axillary lymph nodes are unchanged. There is a right PICC line with tip in the low superior vena cava.      ABDOMEN: There appears to be gallbladder wall thickening and adjacent edema suggesting cholecystitis. The liver, spleen, adrenal glands, and kidneys appear normal on these noncontrast images. There is some fatty replacement of the pancreas. The abdominal   aorta is normal in caliber with very densely calcified atherosclerotic plaque. 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 Retroperitoneal lymph nodes appear slightly decreased in size compared to the prior   exam. The largest, to the left of the aorta on series 2, image 120 measures 7 x 13 mm and previously measured 10 x 17 mm. Other lymph nodes are similarly decreased in size. The spleen is normal in size, decreased compared to the prior exam.     PELVIS: Sigmoid diverticula without evidence of diverticulitis. There appears to be fecal impaction in the rectum. Surgical clips are seen in the pelvis bilaterally with changes of prostatectomy. Mora catheter is seen in the bladder. There is a right   inguinal hernia containing fat and fluid.     MUSCULOSKELETAL: Negative.     CONCLUSION:  1.  Exam is mildly compromised by some respiratory motion. Even given this, gallbladder appears abnormal with edematous wall and mild stranding in the adjacent fat. Further evaluation with ultrasound is recommended as the findings are suspicious for   cholecystitis.     2.  Small bilateral pleural effusions with atelectasis in both lungs.     3.  Mediastinal, and retroperitoneal lymph nodes are in the upper range of normal in size and decreased compared to the prior exam. The spleen is also decreased in size compared to the prior exam.     4.  Moderate to severe calcified atherosclerotic plaque in the visualized arteries with small  right common iliac artery aneurysm.     5.  Colonic diverticulosis without evidence of diverticulitis.     6.  Right inguinal hernia containing fat and fluid.     7.  Changes of prostatectomy and pelvic lymph node dissection.     8.  Groundglass nodule in the right upper lobe is grossly stable though not well evaluated due to motion      Signed by: Mushtaq Pennington MD

## 2021-06-16 NOTE — PROGRESS NOTES
Cardiology Progress Note    Assessment:  1.  Atrial fibrillation with increased ventricular response earlier in the hospitalization.  Patient treated with amiodarone and now maintaining sinus rhythm.  Metoprolol discontinued secondary to bradycardic tendency.  Currently he is on 200 mg p.o. twice daily.  Likely would change to 200 mg daily at 2 weeks.  He will need close monitoring follow-up in the atrial fibrillation clinic.  Currently anticoagulation is being deferred.  He has had no recurrence of atrial fibrillation.  Awaiting further decisions regarding timing of surgical plans.    2.  Dyspnea/volume overload.  Patient has been on IV Lasix which is been discontinued.  Input and output recorded appears to be similar although urine output does not appear accurately recorded.  Weights did not appear to be accurate.  Yesterday's weight said to be 229 pounds with today's weight 206 pounds with admit weight of approximately 202 pounds.  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    Mixed hyperlipidemia    Hypothyroidism    Coronary Artery Disease    Other autoimmune hemolytic anemias    Thrombocytopenia    Septic shock    Neutropenia associated with infection    Gram-negative bacteremia    Metabolic acidosis with respiratory acidosis    Hemorrhage    Atrial fibrillation with rapid ventricular response    Lactic acidosis    Hypocalcemia    Acute respiratory failure with hypoxia and hypercapnia    Bacteremia due to Gram-negative bacteria    Acute pulmonary edema    Abdominal pain, unspecified abdominal location    Fecal impaction    Cholecystitis    Encounter for family conference with patient present      Plan:  .1  As outlined above.  2.  Consider daily low-dose aspirin if okay with surgery and primary care.  Patient was on Plavix at home but given plans for apparent surgery might be better to have him on aspirin in the interim.  Look forward to input from primary care  and surgery.    Subjective:   Nixon Velasquez Jr. is seen in follow-up today.  Chart notes are reviewed.  Cardiology following secondary to atrial fibrillation with rapid ventricular response earlier in the hospitalization treated with IV amiodarone and now on p.o. amiodarone.  He currently is on 200 mg p.o. twice daily.  He has been treated for acute cholecystitis with septic shock and E. coli bacteremia.  Patient treated with PERC cholecystostomy.    No specific cardiac complaints.  Reports that his legs are weak.    Objective:   Vital signs in last 24 hours:  Temp:  [97.5  F (36.4  C)-97.9  F (36.6  C)] 97.8  F (36.6  C)  Heart Rate:  [53-81] 57  Resp:  [16-22] 16  BP: (113-147)/(58-68) 133/68  Weight:   206 lb 12.8 oz (93.8 kg)     Physical Exam:   Neck: No JVD suggested.   Lungs: clear to auscultation   COR: RRR, 1/6 to 2/6 systolic murmur.   Abd: nondistended, BS present   Extrem: 1+ edema.  He does have some diminished ability to lift his legs against gravity.    Current Facility-Administered Medications   Medication Dose Route Frequency Provider Last Rate Last Dose     acetaminophen suppository 650 mg (TYLENOL)  650 mg Rectal Q4H PRN Denia Chapman CNP   650 mg at 03/14/18 0928     acetaminophen tablet 650 mg (TYLENOL)  650 mg Oral Q4H PRN Ling Varela MD   650 mg at 03/22/18 0220     aluminum-magnesium hydroxide-simethicone 200-200-20 mg/5 mL suspension 30 mL (MAALOX ADVANCED)  30 mL Oral Q4H PRN Arlin Galindo MD         amiodarone tablet 200 mg (PACERONE)  200 mg Oral BID Jerry Oseguera MD   200 mg at 03/22/18 0840     bacitracin ointment packet 1 packet  1 packet Topical Once PRN Kenny Ramirez MD         benzocaine-menthol lozenge 1 lozenge (CEPACOL)  1 lozenge Oral Q1H PRN Ling Varela MD         bisacodyl suppository 10 mg (DULCOLAX)  10 mg Rectal Daily PRN Ling Varela MD   10 mg at 03/12/18 0135     cefdinir capsule 300 mg (OMNICEF)  300 mg Oral BID 06-16 Valeria Swan MD   300 mg  at 03/22/18 0540     dextrose 50 % (D50W) syringe 20-50 mL  20-50 mL Intravenous PRN Ling Varela MD   12.5 mL at 03/11/18 0839     folic acid tablet 1 mg (FOLVITE)  1 mg Oral QHS Sarthak Pritchard MD   1 mg at 03/21/18 2050     glucagon (human recombinant) injection 1 mg  1 mg Subcutaneous PRN Ling Varela MD         HYDROmorphone injection 0.2-0.4 mg (DILAUDID)  0.2-0.4 mg Intravenous Q3H PRN Ruby Josue MD   0.3 mg at 03/15/18 0813     insulin aspart U-100 injection (NovoLOG)   Subcutaneous TID with meals Sarthak Pritchard MD   4 Units at 03/21/18 1656     insulin aspart U-100 injection (NovoLOG)   Subcutaneous QHS Sarthak Pritchard MD   2 Units at 03/21/18 2050     labetalol injection 5-10 mg (NORMODYNE,TRANDATE)  5-10 mg Intravenous Q4H PRN Shawna Cassidy DO   5 mg at 03/14/18 0927     levothyroxine tablet 150 mcg (SYNTHROID, LEVOTHROID)  150 mcg Oral Daily 0600 Bo Franks MD   150 mcg at 03/22/18 0540     magnesium hydroxide suspension 30 mL (MILK OF MAG)  30 mL Oral Daily PRN Ling Varela MD         melatonin tablet 3 mg  3 mg Oral Bedtime PRN Denia Gordon CNP   3 mg at 03/21/18 2049     metroNIDAZOLE tablet 500 mg (FLAGYL)  500 mg Oral TID Valeria Swan MD   500 mg at 03/22/18 0838     naloxone injection 0.2-0.4 mg (NARCAN)  0.2-0.4 mg Intravenous PRN Ruby Josue MD        Or     naloxone injection 0.2-0.4 mg (NARCAN)  0.2-0.4 mg Intramuscular PRN Ruby Josue MD         OLANZapine tablet 7.5 mg (zyPREXA)  7.5 mg Oral QHS Arlin Galindo MD   7.5 mg at 03/21/18 2050     omeprazole capsule 20 mg (PriLOSEC)  20 mg Oral QAM AC Ling Varela MD   20 mg at 03/22/18 0838     ondansetron injection 4 mg (ZOFRAN)  4 mg Intravenous Q4H PRN Ling Varela MD   4 mg at 03/10/18 1722    Or     ondansetron tablet 8 mg (ZOFRAN)  8 mg Oral Q8H PRN Ling Varela MD         oxyCODONE 5 mg/0.25 mL concentrated solution 5 mg (ROXICODONE)  5  mg Sublingual Q3H PRN Deniadalton Gordon, CNP   5 mg at 18 0006     oxyCODONE 5 mg/0.25 mL concentrated solution 5 mg (ROXICODONE)  5 mg Sublingual QID Denia Gordon, CNP   5 mg at 18 0836     polyvinyl alcohol 1.4 % ophthalmic solution 1-2 drop (LIQUIFILM TEARS)  1-2 drop Both Eyes Q1H PRN Ling Varela MD         potassium, sodium phosphates 280-160-250 mg packet 2 packet (PHOS-NAK)  2 packet Oral TID Sydnie Brantley MD   2 packet at 18 0839     senna-docusate 8.6-50 mg tablet 1 tablet (PERICOLACE)  1 tablet Oral BID Ling Varela MD   1 tablet at 18 0838     sodium chloride flush 10-20 mL (NS)  10-20 mL Intravenous PRN Kenny Ramirez MD         sodium chloride flush 10-30 mL (NS)  10-30 mL Intravenous PRN Kenny Ramirez MD         sodium chloride flush 10-30 mL (NS)  10-30 mL Intravenous Q8H FIXED TIMES Kenny Ramirez MD   30 mL at 18 0540     sodium chloride flush 20 mL (NS)  20 mL Intravenous PRN Kenny Ramirez MD         sodium chloride flush 3 mL (NS)  3 mL Intravenous Line Care Kenny Ramirez MD   30 mL at 18 1657     sodium phosphates 133 mL rectal enema 1 enema (for FLEET)  1 enema Rectal Daily PRN Sarthak Pritchard MD   1 enema at 18 1203     tiZANidine tablet 2 mg (ZANAFLEX)  2 mg Oral TID Denia Gordon, CNP   2 mg at 18 0838       Cardiographics:    EC-MAR-2018 05:02:42 HE JOES/JOHNS/SALENA/NOAM  Sinus tachycardia with Premature atrial complexes  ST & T wave abnormality, consider inferior ischemia  ST & T wave abnormality, consider anterolateral ischemia  Abnormal ECG  When compared with ECG of 2017  Premature atrial complexes , new  St-t changes new  Confirmed by ALEXANDR JONES, LES LOC:[id number]) on 3/9/2018 2:21:50 PM  Echocardiogram:       Indications       Congestive heart failure        Summary         Left Ventricle: Normal left ventricular size.The estimated left ventricular ejection fraction is 60%. This represents a  normal ejection fraction. Mild hypertrophy noted. E/e' > 15, suggesting elevated LV filling pressures.    Left Atrium: Left atrial volume is moderately increased.    Mitral Valve: There is moderate mitral annular calcification. Moderate mitral regurgitation.    Mild aortic stenosis. Mild aortic regurgitation    Normal right ventricular size and systolic function.    No pulmonary hypertension present.    Estimated central venous pressure equal to 13 mmHg.           Telemetry: Sinus rhythm,/sinus bradycardia    Imaging:   Chest X-Ray:   Study Result      XR CHEST 1 VIEW PORTABLE  3/18/2018 10:36 AM     INDICATION: dyspnea, basilar crackles  COMPARISON: 03/12/2018     FINDINGS: Right-sided PICC line. Sternotomy. There are opacities in both lung bases that likely represent a combination of pleural fluid and pulmonary infiltrates. Mild pulmonary vascular congestion. Hiatal hernia.         Lab Results:   Sodium   Date Value Ref Range Status   03/22/2018 141 136 - 145 mmol/L Final   03/21/2018 140 136 - 145 mmol/L Final   03/20/2018 143 136 - 145 mmol/L Final     Potassium   Date Value Ref Range Status   03/22/2018 4.8 3.5 - 5.0 mmol/L Final   03/21/2018 3.6 3.5 - 5.0 mmol/L Final   03/20/2018 3.8 3.5 - 5.0 mmol/L Final     Chloride   Date Value Ref Range Status   03/22/2018 106 98 - 107 mmol/L Final   03/21/2018 104 98 - 107 mmol/L Final   03/20/2018 106 98 - 107 mmol/L Final     CO2   Date Value Ref Range Status   03/22/2018 31 22 - 31 mmol/L Final   03/21/2018 29 22 - 31 mmol/L Final   03/20/2018 30 22 - 31 mmol/L Final     BUN   Date Value Ref Range Status   03/22/2018 20 8 - 28 mg/dL Final   03/21/2018 19 8 - 28 mg/dL Final   03/20/2018 21 8 - 28 mg/dL Final     Creatinine   Date Value Ref Range Status   03/22/2018 0.75 0.70 - 1.30 mg/dL Final   03/21/2018 0.71 0.70 - 1.30 mg/dL Final   03/20/2018 0.77 0.70 - 1.30 mg/dL Final     Calcium   Date Value Ref Range Status   03/22/2018 8.1 (L) 8.5 - 10.5 mg/dL Final    03/21/2018 7.5 (L) 8.5 - 10.5 mg/dL Final   03/20/2018 7.4 (L) 8.5 - 10.5 mg/dL Final     WBC   Date Value Ref Range Status   03/22/2018 9.3 4.0 - 11.0 thou/uL Final   03/21/2018 9.1 4.0 - 11.0 thou/uL Final   03/20/2018 11.5 (H) 4.0 - 11.0 thou/uL Final     Hemoglobin   Date Value Ref Range Status   03/22/2018 9.9 (L) 14.0 - 18.0 g/dL Final   03/21/2018 9.2 (L) 14.0 - 18.0 g/dL Final   03/20/2018 9.8 (L) 14.0 - 18.0 g/dL Final     Hematocrit   Date Value Ref Range Status   03/22/2018 32.9 (L) 40.0 - 54.0 % Final   03/21/2018 29.6 (L) 40.0 - 54.0 % Final   03/20/2018 32.0 (L) 40.0 - 54.0 % Final     MCV   Date Value Ref Range Status   03/22/2018 105 (H) 80 - 100 fL Final   03/21/2018 104 (H) 80 - 100 fL Final   03/20/2018 104 (H) 80 - 100 fL Final     Platelets   Date Value Ref Range Status   03/22/2018 113 (L) 140 - 440 thou/uL Final   03/21/2018 88 (L) 140 - 440 thou/uL Final   03/20/2018 80 (L) 140 - 440 thou/uL Final     CK-MB   Date Value Ref Range Status   03/26/2013 <1 <8 ng/mL Final     Troponin I   Date Value Ref Range Status   03/09/2018 0.04 0.00 - 0.29 ng/mL Final   06/18/2013 <0.01 <0.30 ng/mL Final   03/26/2013 <0.01 <0.30 ng/mL Final     BNP   Date Value Ref Range Status   05/30/2016 96 (H) 0 - 93 pg/mL Final     INR   Date Value Ref Range Status   03/16/2018 1.21 (H) 0.90 - 1.10 Final   03/10/2018 2.03 (H) 0.90 - 1.10 Final   03/09/2018 1.26 (H) 0.90 - 1.10 Final

## 2021-06-16 NOTE — PROGRESS NOTES
Hospitalist Progress Note    Brief history    Nixon Velasquez Jr. is an 88 y.o. old male with PMH of CAD, status post CABG and postoperative CVA, HTN, CLL recently started on Imbruvica, h/o thyroid cancer, s/p thyroidectomy and radioactive iodine treatment, now on levothyroxine, prostate cancer in 1992, s/p prostatectomy, multiple skin cancers who presented with E. coli bacteremia, sepsis and septic shock secondary to acalculous cholecystitis.  He had percutaneous cholecystostomy tube placed on 3/10 with subsequent bleeding and clotting.    Catheter was exchanged on 3/11, some of the clot has been aspirated. Confusion has been noted since 3/11, suspect ICU delirium.     1.  E.coli bacteremia, sepsis.  E.coli is identified in 2 out of 3 sets of blood cultures from 3/9.  Suspected source is acute acalculous cholecystitis.  Antibiotics de-escalated to Zosyn   2.  Acute acalculous cholecystitis, s/p percutaneous cholecystostomy on 3/10 with subsequent bleeding.  IR images on 3/11 demonstrated nearly entire lumen of the gallbladder filled with a blood clot.  Catheter was exchanged and some of the clot was aspirated.  Continue flushes with normal saline twice daily per prior orders.    3.  Septic/hemorrhagic shock.    Requires pressors intermittently.  Titrate off to keep MAP over 65  4.  FRANC. Probably combination of prerenal and ATN in the setting of sepsis. C/w Lasix 20 mg IV daily  5.  AFib with RVR. Off amiodarone. C/w PO BBL  6.  Acute blood loss anemia. S/p 2 Units PRBCs 03/11/18, hemoglobin is stable    7.  Acute respiratory failure with hypoxia. Secondary to fluid overload. NIPPV prn.   8.  Delirium Possibly related to medications (hydromorphone), ICU delirium. Has not slept in days  -  Try to avoid narcotics, supportive care.  -  Zyprexa HS  -  Consult psychiatry to assess with management  9.  Metabolic acidosis. Secondary to lactic acidosis, FRANC, hyperchloremia. Improved with bicarbonate infusion.   10.   Hypernatremia.  Secondary to free water deficit given poor oral intake. start D5 water  11.  Moderate pharyngeal dysphagia and moderate aspiration risk with all intake.  Since patient does not want feeding tubes started on NDD 3 and thin liquids which is thought to be the least risk for aspiration.    Barriers to Discharge : multiple as above   Anticipated Discharge :multiple day stay   Disposition :TBD      Subjective  Patient remains confused.  Has not slept for days per family  Off BIPAP  Hb is stable after transfusion  Intermittent abd pain  abd CT showed: small amount of perihepatic hemorrhage and slight amount of hyperdense ascites consistent with mild intraperitoneal blood. No localized hematoma. Fecal impaction.       Objective    Vital signs in last 24 hours  Temp:  [97.4  F (36.3  C)-98.6  F (37  C)] 98.3  F (36.8  C)  Heart Rate:  [] 103  Resp:  [15-49] 26  BP: ()/(50-72) 111/58 93% O2 Device: Nasal cannula O2 Flow Rate (L/min): 1 L/min  Weight:   205 lb 7 oz (93.2 kg) Weight change: 1 lb 11.8 oz (0.788 kg)    Intake/Output last 3 shifts  I/O last 3 completed shifts:  In: 1653.6 [P.O.:50; I.V.:493.6; Blood:700; IV Piggyback:410]  Out: 2685 [Urine:2625; Emesis/NG output:60]  Intake/Output this shift:       Physical Exam:  GENERAL: in mild  Distress ,  laying in bed,   CARDIAC: Irregular, mild tachycardia,  S1 & S2 normal.  No gallops or murmurs. Trace UE edema  LUNGS: Clear anteriorly   ABDOMEN: Soft, non-tender, cholecystostomy tube is noted, bowel sounds present all quadrants  : Mora catheter with leilani color urine   SKIN: Multiple bruises over arms   NEURO: Awake, confused  No obvious focal deficits noted      Pertinent Labs   Lab Results: personally reviewed.     Results from last 7 days  Lab Units 03/13/18  0543 03/12/18  0402 03/11/18  0535  03/10/18  0531  03/09/18  0457   LN-SODIUM mmol/L 153* 149* 146*  < > 141  --  142   LN-POTASSIUM mmol/L 3.0* 4.0 4.0  < > 4.7  < > 3.8    LN-CHLORIDE mmol/L 118* 115* 115*  < > 114*  --  110*   LN-CO2 mmol/L 27 27 23  < > 18*  --  20*   LN-BLOOD UREA NITROGEN mg/dL 46* 53* 48*  < > 45*  --  28   LN-CREATININE mg/dL 0.96 1.25 1.31*  < > 1.63*  --  1.40*   LN-CALCIUM mg/dL 7.5* 7.7* 7.5*  < > 6.8*  --  7.7*   LN-ALBUMIN g/dL  --  2.5* 2.4*  --  2.8*  --  3.2*   LN-PROTEIN TOTAL g/dL  --  4.6* 4.7*  --   --   --  5.2*   LN-BILIRUBIN TOTAL mg/dL  --  1.3* 1.2*  --   --   --  1.6*   LN-ALKALINE PHOSPHATASE U/L  --  39* 39*  --   --   --  39*   LN-ALT (SGPT) U/L  --  18 24  --   --   --  11   LN-AST (SGOT) U/L  --  9 15  --   --   --  15   LN-MAGNESIUM mg/dL 2.2 2.4 2.4  < > 2.5  < >  --    < > = values in this interval not displayed.    Results from last 7 days  Lab Units 03/13/18  0543 03/12/18  1737 03/12/18  0402 03/11/18  1231  03/10/18  1756 03/10/18  0531   LN-WHITE BLOOD CELL COUNT thou/uL 14.8*  --  21.6* 15.3*  < > 40.6* 36.9*   LN-HEMOGLOBIN g/dL 7.4* 6.7* 7.0* 6.8*  < > 7.2* 8.6*   LN-HEMATOCRIT % 23.4*  --  21.6* 20.7*  < > 24.1* 28.2*   LN-PLATELET COUNT thou/uL 23*  --  34* 43*  < > 59* 36*   LN-NEUTROPHILS RELATIVE PERCENT % 54  --   --   --   --   --   --    LN-MONOCYTES RELATIVE PERCENT % 3  --   --   --   --   --   --    LN-MONOCYTES - REL (DIFF) %  --   --   --   --   --  2 2   < > = values in this interval not displayed.    Results from last 7 days  Lab Units 03/09/18  0457   LN-TROPONIN I ng/mL 0.04       Medications  Scheduled Meds:    folic acid (FOLVITE) IVPB  1 mg Intravenous DAILY     furosemide  20 mg Intravenous DAILY     hydrocortisone sod succ  50 mg Intravenous Q12H     insulin aspart (NovoLOG) injection   Subcutaneous TID with meals     insulin aspart (NovoLOG) injection   Subcutaneous QHS     metoprolol succinate  12.5 mg Oral DAILY     pantoprazole (PROTONIX) IVPB 40 mg  40 mg Intravenous QAM AC    Or     omeprazole  20 mg Oral QAM AC     piperacillin-tazobactam  4.5 g Intravenous Q6H     potassium chloride  10 mEq  Intravenous Once     potassium phosphate ivpb  21 mmol Intravenous Once     senna-docusate  1 tablet Oral BID     sodium chloride  10-30 mL Intravenous Q8H FIXED TIMES     sodium chloride  3 mL Intravenous Line Care     Continuous Infusions:    dextrose 5% 50 mL/hr (03/13/18 1128)     norepinephrine IV infusion in NS Stopped (03/12/18 1000)     vasopressin Stopped (03/11/18 0345)     PRN Meds:.acetaminophen, acetaminophen, bacitracin, benzocaine-menthol, bisacodyl, dextrose 50 % (D50W), fentaNYL pf, glucagon (human recombinant), HYDROmorphone, labetalol, magnesium hydroxide, naloxone **OR** naloxone, ondansetron **OR** ondansetron, polyvinyl alcohol, sodium chloride, sodium chloride, sodium chloride, sodium phosphates 133 mL    Pertinent Radiology   Radiology Results: Personally reviewed image/s  CT CHEST, ABDOMEN, AND PELVIS  3/10/2018 1:01 PM  1.  Exam is mildly compromised by some respiratory motion. Even given this, gallbladder appears abnormal with edematous wall and mild stranding in the adjacent fat. Further evaluation with ultrasound is recommended as the findings are suspicious for   cholecystitis.  2.  Small bilateral pleural effusions with atelectasis in both lungs.  3.  Mediastinal, and retroperitoneal lymph nodes are in the upper range of normal in size and decreased compared to the prior exam. The spleen is also decreased in size compared to the prior exam.  4.  Moderate to severe calcified atherosclerotic plaque in the visualized arteries with small right common iliac artery aneurysm.  5.  Colonic diverticulosis without evidence of diverticulitis.  6.  Right inguinal hernia containing fat and fluid.  7.  Changes of prostatectomy and pelvic lymph node dissection.  8.  Groundglass nodule in the right upper lobe is grossly stable though not well evaluated due to motion.    TTE    Left Ventricle: Normal left ventricular size.The estimated left ventricular ejection fraction is 60%. This represents a normal  ejection fraction. Mild hypertrophy noted. E/e' > 15, suggesting elevated LV filling pressures.    Left Atrium: Left atrial volume is moderately increased.    Mitral Valve: There is moderate mitral annular calcification. Moderate mitral regurgitation.    Mild aortic stenosis. Mild aortic regurgitation    Normal right ventricular size and systolic function.    No pulmonary hypertension present.    Estimated central venous pressure equal to 13 mmHg.        Spent > 35 minutes, 50% of the time spent in coordination of care, discussions with the nursing staff and counseling the patient regarding above stated problems      Arlin Galindo MD  Internal Medicine Hospitalist  3/13/2018

## 2021-06-17 NOTE — PROGRESS NOTES
[facility] Hematology and Oncology Progress Note    Patient: Nixon Velasquez Jr.  MRN: [id number]  Date of Service:         Reason for Visit    Chief Complaint   Patient presents with     HE Cancer       Assessment and Plan      1. CLL: Received single agent Rituxan for this with his last dose being 2017.  Recent relapse so has started imbruvica.  On this in the hospital but we actually had been holding it once he got hospitalized.  We did restart once he went to TCU but patient states that he did not tolerate it so he only took it for a little bit of time.  He now has not been taking it since the end of March and would prefer not to be on it.  I think due to his counts looking fairly stable we can hold off for now.  We will have him return in 2 weeks to recheck his labs.  If either his hemoglobin starts dropping again, his platelets start dropping her his white count starts going up significantly we will have to restart.  We may be could do a lower dose only restart    2.  Anemia and thrombocytopenia: These are likely from his CLL.  They have actually stabilized if not slightly improved.  We will monitor his labs closely.  He will return in 2 weeks time.    3.  Significant weakness: He has been in and out of the hospital as well as TCU.  Also his wife of 68 years  at the end of March so he is having significant distress and grief over that.  Currently he is getting home care with physical therapy and Occupational Therapy I told him to try to get stronger over the next couple weeks and hopefully his overall performance status will improve so we can treat him if we need to.    ECOG Performance   ECOG Performance Status: 3     Distress Assessment  Distress Assessment Score: 6: See above, grieving.  Need for further intervention at this time    Pain  Currently in Pain: Yes  Pain Score (Initial OR Reassessment): 9  Location: all over: Over-the-counter medications.  I do not feel that he needs anything  stronger he will continue to use those as needed.      Problem List    1. Weakness     2. 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 Chronic lymphocytic leukemia initially diagnosed in 2/2008. 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   2. Thyroid cancer, status post thyroidectomy and radioactive iodine ablation.   3. Prostate cancer diagnosed in 1992.  4. Skin cancers, multiple.     Treatment:  Started single agent Rituxan for his CLL on July 18, 2017.  This was initiated for worsening symptomatic anemia.  He received 4 weekly doses.  Completed on August 7, 2017.  Recent relapse of CLL and has started Imbruvica on 2/27/18.  He probably only took it for a couple of weeks and then was back in the hospital and then he was holding it.  He then took it for a couple of days and then started holding it again around March 27, 2018.        Interim History:      Nixon is here today for follow-up.  He left the hospital last week.  Has been holding his improve a car roughly since March 27.  He had an extended hospitalization before this past 1 and was at a TCU but basically left almost AGAINST MEDICAL ADVICE due to being at home with his wife who is dying in hospice.  She did pass away and patient continues to struggle with both depression, grief and weakness fatigue.  Both he and his family do not feel that he was tolerating the improvement, and it was making him feel more tired week getting bruises and nausea.    Pain Status  Currently in Pain: Yes    Review of Systems    Constitutional  Constitutional (WDL): Exceptions to WDL  Fatigue: Fatigue not relieved by rest, limiting self care ADL     Ear  Ear Disorder (WDL): All ear disorder elements are within defined limits  Cardiovascular  Cardiovascular (WDL): Exceptions to WDL  Pulmonary  Respiratory (WDL): Exceptions to WDL  Cough:  Mild symptoms, nonprescription intervention indicated  Dyspnea: Shortness of breath at rest, limiting self care ADL  Gastrointestinal  Gastrointestinal (WDL): Exceptions to WDL  Anorexia: Oral intake altered without significant weight loss or malnutrition, oral nutritional supplements indicated  Nausea: Loss of appetite without alteration in eating habits  Dysgeusia: Altered taste but no change in diet  Genitourinary  Genitourinary (WDL): Exceptions to WDL  Lymphatic  Lymph (WDL): All lymph disorder elements are within defined limits  Musculoskeletal and Connective Tissue  Musculoskeletal and Connetive Tissue Disorders (WDL): Exceptions to WDL  Arthralgia: Moderate pain, limiting instrumental ADL  Bone Pain: Moderate pain, limiting instrumental ADL  Muscle Weakness : Limiting self care ADL, disabling  Integumentary   scattered bruises and scabs.   Patient Coping  Patient Coping: Depression;Sadness  Distress Assessment  Distress Assessment Score: 6  Accompanied by  Accompanied by: Family Member  Oral Chemo Adherence       Past History  Past Medical History:   Diagnosis Date     Cataract     bilateral     Foot fracture, right      History of transfusion      Hypertension      Inguinal hernia      Leukemia      PE (pulmonary embolism)      Pneumonia      Prostate cancer      Skin cancer      Thyroid cancer        PHYSICAL EXAM:  /81  Pulse 97  Temp 97.5  F (36.4  C)  GENERAL: no acute distress. Cooperative in conversation. Here with family.  Oxygen on  HEENT: pupils are equal, round and reactive. Oromucosa is clean and intact. No ulcerations or mucositis noted. No bleeding noted.  RESP: lungs are clear bilaterally per auscultation. Regular respiratory rate. No wheezes or rhonchi.  CV: Regular, rate and rhythm. Slight systolic murmur.  ABD: soft, nontender. Positive bowel sounds. No organomegaly.   MUSCULOSKELETAL: No lower extremity swelling.   NEURO: non focal. Alert and oriented x3.   PSYCH: within normal  limits. No depression or anxiety.  SKIN: warm dry intact, significant bruises on arms.  Scabs on his hands as well.  LYMPH: mild bilateral cervical nodes, no supraclavicular or axillary lymphadenopathy        Lab Results  Recent Results (from the past 24 hour(s))   Comprehensive Metabolic Panel   Result Value Ref Range    Sodium 145 136 - 145 mmol/L    Potassium 4.0 3.5 - 5.0 mmol/L    Chloride 108 (H) 98 - 107 mmol/L    CO2 27 22 - 31 mmol/L    Anion Gap, Calculation 10 5 - 18 mmol/L    Glucose 107 70 - 125 mg/dL    BUN 12 8 - 28 mg/dL    Creatinine 0.71 0.70 - 1.30 mg/dL    GFR MDRD Af Amer >60 >60 mL/min/1.73m2    GFR MDRD Non Af Amer >60 >60 mL/min/1.73m2    Bilirubin, Total 0.5 0.0 - 1.0 mg/dL    Calcium 7.7 (L) 8.5 - 10.5 mg/dL    Protein, Total 5.1 (L) 6.0 - 8.0 g/dL    Albumin 2.8 (L) 3.5 - 5.0 g/dL    Alkaline Phosphatase 61 45 - 120 U/L    AST 9 0 - 40 U/L    ALT <9 0 - 45 U/L   HM1 (CBC with Diff)   Result Value Ref Range    WBC 8.6 4.0 - 11.0 thou/uL    RBC 3.51 (L) 4.40 - 6.20 mill/uL    Hemoglobin 11.5 (L) 14.0 - 18.0 g/dL    Hematocrit 36.8 (L) 40.0 - 54.0 %     (H) 80 - 100 fL    MCH 32.8 27.0 - 34.0 pg    MCHC 31.3 (L) 32.0 - 36.0 g/dL    RDW 22.2 (H) 11.0 - 14.5 %    Platelets 123 (L) 140 - 440 thou/uL    MPV 11.7 8.5 - 12.5 fL         Imaging    Xr Chest 1 View Portable    Result Date: 3/18/2018  XR CHEST 1 VIEW PORTABLE 3/18/2018 10:36 AM INDICATION: dyspnea, basilar crackles COMPARISON: 03/12/2018 FINDINGS: Right-sided PICC line. Sternotomy. There are opacities in both lung bases that likely represent a combination of pleural fluid and pulmonary infiltrates. Mild pulmonary vascular congestion. Hiatal hernia.    Xr Chest 1 View Portable    Result Date: 3/12/2018  XR CHEST 1 VIEW PORTABLE 3/12/2018 5:19 AM INDICATION: septic shock, respiratory failure, pneumonia, volume overload. COMPARISON: 03/11/2018 FINDINGS: Considerable increase in central congestion and diffuse interstitial alveolar  infiltrate throughout both hemithoraces. Similar small effusions. Remainder stable.    Xr Chest 1 View Portable    Result Date: 3/11/2018  XR CHEST 1 VIEW PORTABLE 3/11/2018 6:09 AM INDICATION: Respiratory failure follow-up infiltrates COMPARISON: 03/10/2018 FINDINGS: Significant improvement in congestion and pulmonary edema pattern on prior. Prominent infiltrates persist in the lower lobes and retrocardiac area. Similar effusions. Remainder stable.    Xr Chest 2 Views    Result Date: 4/1/2018  XR CHEST 2 VIEWS 4/1/2018 2:34 AM INDICATION: weak COMPARISON: 03/18/2018. FINDINGS: Stable enlargement of the cardiomediastinal contours with calcified thoracic aorta. Status post sternotomy. Bilateral interstitial infiltrates suggestive of edema, although aeration has improved compared to previous study. Small pleural effusions. No visible pneumothorax.    Ct Head Without Contrast    Result Date: 3/11/2018  [facility] CT HEAD WO CONTRAST 3/11/2018 5:14 PM INDICATION: Stroke mental status change TECHNIQUE: Without IV contrast. Dose reduction techniques were used. CONTRAST: None COMPARISON:  CT head 06/18/2013 FINDINGS: Unchanged encephalomalacia in the left posterior frontoparietal lobe. Interval progression of mild diffuse parenchymal volume loss with ex vacuo ventricular dilatation. No acute intracranial hemorrhage. No CT evidence of acute infarct. No calvarial fracture. The visualized orbits, paranasal sinuses and mastoid air cells are free of significant disease.     CONCLUSION: 1. No acute intracranial abnormality. Findings discussed with Dr. Mahoney on 03/11/2018 at 1712    Ir Cholangiogram Exisiting Access    Result Date: 3/16/2018  [facility] CHOLECYSTOGRAM 3/16/2018 8:54 AM ATTENDING: David Franco MD. INDICATION: evaluation of cholecystostomy tube, patency and rule out cholelithaisis, in preparation for surgery MODERATE SEDATION: None. ANTIBIOTICS: None. ADDITIONAL MEDICATIONS: None.  FLUOROSCOPIC TIME: 2.4 mins. DOSE: Air Kerma: 176 mGy. CONTRAST: 50 mL Omnipaque intrabiliary/intraenteric. COMPLICATIONS: No immediate complications. PROCEDURE: Contrast was injected through the existing drainage catheter and images obtained. The patient tolerated the procedure well without immediate complications. FINDINGS: CHOLECYSTOGRAM: The cholecystostomy tube is in satisfactory position. There is no extravasation or leakage of contrast. Large filling defect within the gallbladder, appearance consistent with clot. Late filling of the cystic duct.     Large filling defect within the gallbladder, appearance consistent with clot. CPT: 71027 The CPT codes are for physician reference only.    Ir Biliary Catheter Change    Result Date: 3/11/2018  [facility] PROCEDURE: CHOLECYSTOSTOMY TUBE CHECK AND EXCHANGE. ATTENDING: Errol Britt MD. INDICATION: 88-year-old male with cholecystitis status post cholecystostomy placement yesterday. Initially there were bloody outputs and now there is difficulty flushing the catheter. MODERATE SEDATION: None. ANTIBIOTICS: None. ADDITIONAL MEDICATIONS: None. FLUOROSCOPIC TIME: 1.9 minutes. AIR KERMA:  109 mGy. CONTRAST: 10 mL Omnipaque into the gallbladder lumen. COMPLICATIONS: No immediate complications. PROCEDURE:  A  image was obtained. Contrast was instilled through the existing cholecystostomy and images were obtained. Over wire exchange was made for a new 10 [place] locking loop nephrostomy. The loop was formed within the gallbladder lumen and the cavity was irrigated. There was attached to gravity drainage. FINDINGS: The  image shows the existing cholecystostomy in appropriate position. An injection of contrast reveals the cholecystostomy lies within the gallbladder lumen. There is a large filling defect encompassing nearly the entire gallbladder which likely represents hemorrhagic clot. Following exchange the new drainage catheter appears in appropriate  position. Some of the clot has been successfully aspirated.     The cholecystostomy lies within the gallbladder lumen however nearly the entire lumen is filled with a blood clot. PLAN: The catheter was exchanged over a wire and the gallbladder was irrigated gently. Would recommend flushing the catheter twice daily with sterile saline hopes of improving drainage. ____________________________________________________________________ CPT codes for physician reference only: 20663     Xr Swallow Study W Speech    Result Date: 3/12/2018  XR SWALLOW STUDY W SPEECH 3/12/2018 1:30 PM INDICATION: Dysphagia. TECHNIQUE: Routine. COMPARISON: None. FINDINGS: FLUOROSCOPIC TIME: 1.3 minutes NUMBER OF IMAGES: 6 Swallow study with Speech Pathology using multiple barium thicknesses. Puree and honey consistency both demonstrated significant piriform sinus stasis. [place] consistency demonstrated stasis and kickback aspiration which resulted in only a minimal throat clear. Thin liquid demonstrated delay in swallow reflex with direct, silent aspiration.     Ct Abdomen Without Oral Without  Iv Contrast    Result Date: 3/13/2018  CT ABDOMEN WO ORAL WO IV CONTRAST 3/12/2018 8:19 PM     INDICATION: Cholecystitis / cholangitis worsening abdominal pain , post cholecystostomy tube placement, dropping H/H TECHNIQUE: CT abdomen. Multiplanar reformation images (MPR). Dose reduction techniques were used. IV CONTRAST: None COMPARISON: 03/10/2018 FINDINGS: LUNG BASES: Minimal change in the significant dependent infiltrate/atelectasis as well as small bilateral pleural effusions. Large hiatal hernia present, unchanged. ABDOMEN: Interval placement of cholecystostomy tube with dense residual contrast within the gallbladder. There appears to be a small layer of high density hemorrhage about the liver capsule.] Minimal hyperdense ascites present suggesting slight hemoperitoneum, there is no localized hematoma. Liver otherwise appears normal. Pancreas,  spleen, adrenals and kidneys are negative. Mild ascites within both right and left gutters. Right inguinal hernia contains fluid with a slight increase in the hematocrit level. Mora catheter in place, previous prostatectomy. Sigmoid diverticulosis. MUSCULOSKELETAL: Negative.     CONCLUSION: 1.  Interval cholecystectomy tube placement. There is a small amount of perihepatic hemorrhage and slight amount of hyperdense ascites consistent with mild intraperitoneal blood. No localized hematoma.    Ir Cholecystostomy    Result Date: 3/10/2018  [facility] PROCEDURE: PERCUTANEOUS CHOLECYSTOSTOMY PLACEMENT. ATTENDING: Errol Britt MD. INDICATION: 88-year-old male with cholecystitis and impending biliary sepsis. Urgent cholecystostomy placement is requested. The patient has low platelets secondary to chemotherapy and has platelets infusing. CONSENT: The risks, benefits and alternatives of cholecystostomy placement were discussed with the patient, his family and his power of  [place] in detail. All questions were answered. Informed consent was given to proceed with the procedure. MODERATE SEDATION: None. ANTIBIOTICS: None. ADDITIONAL MEDICATIONS: Fentanyl 50 mcg IV. FLUOROSCOPIC TIME: 0.8 minutes. AIR KERMA:  38 mGy. CONTRAST: 5 mL Omnipaque into the gallbladder lumen. COMPLICATIONS: No immediate complications. PROCEDURE:  Using real-time sonographic guidance an AccuStick set was utilized to gain percutaneous access into the gallbladder lumen. Endoluminal positioning was confirmed with an injection of contrast. Over wire exchange was then made for a 10 Khmer locking loop cholecystostomy. The loop was formed within the gallbladder lumen and it was attached to gravity drainage. FINDINGS: Ultrasound demonstrates a mild to moderately distended gallbladder with an abnormally thickened wall. The cholecystogram shows access into the gallbladder lumen. Filling defects are seen near the gallbladder fundus. The proximal  cystic duct appears to be patent. The completion image shows the cholecystostomy in the gallbladder lumen.     Successful percutaneous cholecystostomy placement, as detailed above. A specimen of tar-like bile was sent for diagnostic analysis. ____________________________________________________________________ CPT codes for physician reference only: 58773     Us Abdomen Limited    Result Date: 4/1/2018  US ABDOMEN LIMITED 4/1/2018 2:58 AM INDICATION: Abdominal pain, weakness, cholecystectomy tube in place COMPARISON: 03/17/2018 FINDINGS: GALLBLADDER: Cholecystostomy tube only partially imaged at the margin of the gallbladder. Gallbladder decompressed with wall thickened up to 6 mm. Gallbladder sludge. No definite stones although portions of gallbladder obscured. Very small amount of pericholecystic fluid. BILE DUCTS: No bile duct dilation. The common bile duct measures 7 mm. LIVER: Normal where seen. RIGHT KIDNEY: No hydronephrosis. PANCREAS: Not imaged in detail on this limited study. No incidental abnormalities. No ascites in the right upper quadrant.     CONCLUSION: 1.  Gallbladder decompressed from cholecystectomy tube which is only partially imaged at the edge of the gallbladder. Gallbladder wall thickened. Sludge without definite stones. Trace adjacent free fluid. Remainder not significantly changed.    Us Abdomen Limited    Result Date: 3/17/2018  US ABDOMEN LIMITED 3/17/2018 4:18 PM INDICATION: Hematoma. Assess for bleeding. Cholecystostomy tube in place. COMPARISON: CT 03/12/2018 FINDINGS: GALLBLADDER: Cholecystostomy tube in place. Gallbladder poorly visualized. BILE DUCTS: No bile duct dilation. The common bile duct measures 5 mm. LIVER: Normal hepatic echogenicity. No perihepatic fluid collections. RIGHT KIDNEY: No hydronephrosis. PANCREAS: Not imaged in detail on this limited study. No incidental abnormalities. No ascites in the right upper quadrant.     CONCLUSION: 1.  Cholecystostomy tube within the  gallbladder. Gallbladder poorly visualized. Right upper quadrant ultrasound otherwise negative. No focal fluid collections.    Us Abdomen Limited    Result Date: 3/10/2018  US ABDOMEN LIMITED 3/10/2018 3:03 PM INDICATION: acute choly COMPARISON: CTA chest abdomen pelvis dated 03/10/2018. FINDINGS: GALLBLADDER: There is thickening of the gallbladder wall with fluid within the gallbladder wall suggesting edematous gallbladder. No intraluminal stones are identified the wall is thickened to 1.5 cm BILE DUCTS: No bile duct dilation. The common bile duct measures 7 mm. LIVER: Normal where seen. RIGHT KIDNEY: 10.4 x 4.9 x 5.1 cm without hydronephrosis or mass. PANCREAS: Obscured by bowel gas and not well visualized. No ascites in the right upper quadrant.     CONCLUSION: 1.  Abnormal appearance of the gallbladder with marked thickening of the gallbladder wall and fluid within the gallbladder are consistent with an edematous gallbladder no gallstones or biliary dilatation are seen. Findings are suspicious for possible acalculous cholecystitis. NOTE: ABNORMAL REPORT THE DICTATION ABOVE DESCRIBES AN ABNORMALITY FOR WHICH FOLLOW-UP IS NEEDED.         Signed by: Vy Huerta, CNP

## 2021-06-17 NOTE — PROGRESS NOTES
"Pt is here accompanied by his daughter and son for labs and visit with Jennifer. Pt is depressed and barely answered questions. No eye contact.  Pt states that he has pain \"allover\". Takes oxycodone 2 times/day. Refused to get on the scale due to feeling too weak. Pt lost his wife on 3/30.  "

## 2021-06-17 NOTE — PROGRESS NOTES
Assessment/ Plan    Problem List Items Addressed This Visit        Unprioritized    Hypoxia - Primary     Pulse oximetry today 95% at rest, actually went up to 97-98% when he stood up and got weighed.    Bilateral pleural effusions seen on x-ray, unchanged per radiologist except that background of interstitial infiltrates is improved    Suspect hypoventilation/atelectasis has a lot to do with all of this.  Encouraged increased physical activity.    BNP ordered, reviewed recent echocardiogram which showed preserved ejection fraction.    Hope of family coming into this was to get documentation needed to order oxygen.  Slowly, unable to do this today.         Relevant Orders    BNP(B-type Natriuretic Peptide) (Completed)    Adjustment disorder     Versus simple grieving  Patient's wife  1 week ago  This is a profound problem, very flat affect, big change in personality  Patient reports sleeping on mirtazapine, also on Lexapro.  We will continue to follow as I do not think there is any reasonable treatment.  Discussed possibility of stimulant medication, for which I would want him to see a psychiatrist.         Generalized weakness     Severe, post ICU, multifactorial, continue physical therapy at home         Pleural effusion     Following ICU  Possibly due to intra-abdominal process?  Stable.  Continue to follow.  .               Subjective  CC:  Chief Complaint   Patient presents with     Shortness of Breath     x5 days     Cough     started yesterday     HPI:  Shortness of Breath  Narrative: gradual onset of sob  ------------------------  Duration/ Onset: had extreme fatigue with exertion  Context/ Triggers: Intermittent, gradually increasing  At rest of with exertion?  At rest  Time course-difficult to tell.  Patient had extreme fatigue since discharge from the ICU.  Pulse oximetry was not tested due to shortness of breath, but due to extreme fatigue.  This showed very low oximetry and then oxygen was started.   Some shortness of breath has been noted  Associated Symptoms:  Cough?  Yes, seems to develop today  Wheezing?  No  Chest pain/ palpitations?  No, patient denies  Orthopnea, PND, swellling of legs?  Yes, legs are swollen but today is better than yesterday and breathing somewhat worse    Risk of anemia?  Yes but this was checked earlier today  Lab Results   Component Value Date    WBC 8.6 2018    HGB 11.5 (L) 2018    HCT 36.8 (L) 2018     (H) 2018     (L) 2018     Wt Readings from Last 3 Encounters:   18 213 lb 0.3 oz (96.6 kg)   18 206 lb 12.8 oz (93.8 kg)   18 191 lb 7 oz (86.8 kg)   Weight today was approximately 190 pounds.      Patient's primary concern is his severe fatigue in his legs which keep him from being able to walk very much.  He is receiving home physical therapy.  He is able to walk some but then tires out very quickly.    He is obviously very depressed.  His wife  on 3/30/18, shortly after he was discharged from the ICU.  Her  was this last weekend.  PFSH:  Patient Active Problem List   Diagnosis     Restless Legs Syndrome     Corrosive Esophagitis     Hiatal Hernia     CLL (chronic lymphoid leukemia) in relapse     Mixed hyperlipidemia     Cerebral atherosclerosis     Adenocarcinoma Of The Prostate Gland     Papillary Carcinoma Of The Thyroid Gland     Hypothyroidism     Type 2 Diabetes Mellitus     Coronary Artery Disease     Cervical Radiculopathy     Iron Deficiency     Hypoxia     Advanced directives, counseling/discussion     Infection of ear lobe, right     Right ear pain     Benign essential HTN     Anemia     Other autoimmune hemolytic anemias     Thrombocytopenia     Squamous cell carcinoma of skin     Sepsis     Septic shock     Neutropenia associated with infection     Gram-negative bacteremia     Metabolic acidosis with respiratory acidosis     Hemorrhage     Atrial fibrillation with rapid ventricular response      Lactic acidosis     Hypocalcemia     Acute respiratory failure with hypoxia and hypercapnia     Bacteremia due to Gram-negative bacteria     Acute pulmonary edema     Abdominal pain, unspecified abdominal location     Fecal impaction     Cholecystitis     Encounter for family conference with patient present     Prolonged Q-T interval on ECG     Adjustment disorder     Fatigue     Near syncope     Generalized weakness     Dehydration     Diarrhea     Pleural effusion     Current medications reviewed as follows:  Current Outpatient Prescriptions on File Prior to Visit   Medication Sig     acetaminophen (TYLENOL) 500 MG tablet Take 1,000 mg by mouth every 6 (six) hours as needed for pain.     allopurinol (ZYLOPRIM) 300 MG tablet Take 1 tablet (300 mg total) by mouth daily with breakfast.     amiodarone (PACERONE) 200 MG tablet Take 1 tablet twice daily until 3/31, then transition to 1 tablet once daily until further instruction by cardiology     clopidogrel (PLAVIX) 75 mg tablet Take 75 mg by mouth daily.     enoxaparin (LOVENOX) 300 mg/3 mL Soln Inject 93 mg under the skin every 12 (twelve) hours.      escitalopram oxalate (LEXAPRO) 5 MG tablet Take 5 mg by mouth daily.      folic acid (FOLVITE) 1 MG tablet Take 1 tablet (1 mg total) by mouth daily.     hydroCHLOROthiazide (MICROZIDE) 12.5 mg capsule TAKE ONE CAPSULE BY MOUTH EVERY DAY     ibrutinib (IMBRUVICA) 140 mg cap capsule Take 3 capsules (420 mg total) by mouth daily. Start per oncology     levothyroxine (SYNTHROID, LEVOTHROID) 150 MCG tablet Take 150 mcg by mouth Daily at 6:00 am.     miconazole (MICOTIN) 2 % cream Apply 1 application topically 2 (two) times a day as needed.     mirtazapine (REMERON) 45 MG tablet Take 1 tablet (45 mg total) by mouth at bedtime.     omeprazole (PRILOSEC) 40 MG capsule Take 1 capsule (40 mg total) by mouth 2 (two) times a day.     oxyCODONE (ROXICODONE) 5 MG immediate release tablet Take 5 mg by mouth every 8 (eight) hours as  needed for pain.     rOPINIRole (REQUIP) 0.25 MG tablet Take 0.25 mg by mouth at bedtime.      simvastatin (ZOCOR) 40 MG tablet Take 40 mg by mouth at bedtime.     sodium chloride (NS) injection Flush with 10 ml three times daily.     triamcinolone (KENALOG) 0.1 % cream Apply 1 application topically 2 (two) times a day as needed.     No current facility-administered medications on file prior to visit.      History   Smoking Status     Never Smoker   Smokeless Tobacco     Never Used     Social History     Social History Narrative     Patient Care Team:  Riley Cuevas MD as PCP - General (Family Medicine)  [facility]  Full 10 system review including constitutional, respiratory, cardiac, gi, urinary, rheumatologic, neurologic, reproductive, dermatologic psychiatric is  performed (via questionnaire) and is negative except fatigue, dizziness, palpitations, nausea, change in appetite, weakness, joint pain, Radha body aches all over, sadness, helplessness, discussed at        Objective  Physical Exam  Vitals:    04/09/18 1448 04/09/18 1529   BP: 120/68    Pulse: (!) 102    SpO2: 100% 97%     There is no height or weight on file to calculate BMI.  Weight was checked today and about 190 pounds.  Recheck pulse ox which was 95%    Gen- alert, but flat affect, they will  HEENT- normal cephalic, atraumatic.   Chest- Normal inspiration and expiration.  Bibasilar crackles.  No chest wall deformity or scar.  CV- Heart regular rate and rhythm, normal tones, no murmurs gallops or rubs.  Ext- appear well perfused, no edema  Skin- warm and dry, no visualized rash    Diagnostics:   Chest x-ray personally reviewed and showed bilateral pleural effusions which look about the same compared to last x-ray.  Asked the radiologist to review this stat and he agrees but the interstitial infiltrates have cleared  Results for orders placed or performed in visit on 04/09/18   BNP(B-type Natriuretic Peptide)   Result Value Ref Range    BNP 68 0 - 93  pg/mL         Please note: Voice recognition software was used in this dictation.  It may therefore contain typographical errors.

## 2021-06-17 NOTE — PROGRESS NOTES
Patient's family stopped by the clinic to report that patient has been admitted to the hospital due to weakness. They would like for one of our providers to round on him. His spouse has passed away on Friday, 3/30.     I reassured family that I would pass long their request. This was reported to Vy MCCORD.

## 2021-06-17 NOTE — PROGRESS NOTES
[facility] PRE-OPERATIVE VISIT FOR:    Nixon Velasquez Jr.,  1929, MRN [id number]    Upcoming surgery date: 5/10/18  Surgeon: Maynor  Surgery Facility: [facility]    Chief Complaint: Preop    PCP: Riley Cuevas MD, [phone]    SUBJECTIVE:  History of Present Illness:   Nixon Velasquez Jr. is a 89 y.o. male with complex medical history    Patient was hospitalized between 3/9 and 3/22/18 at [facility], spending much of the hospitalization in the ICU, with E. coli sepsis, acute kidney injury and lactic acidosis.  During that time he was diagnosed with acute cholecystitis but not felt to be a candidate for cholecystectomy.  Cholecystostomy tube was subsequently placed, eventual cholecystectomy recommended.    Patient's hospitalization was also complicated by atrial fibrillation and provoked DVT of upper extremity due to PICC line.  He was discharged on Lovenox.    Around the time of his illness, his wife, who is struggling with Alzheimer's passed away.    She denies a history of CLL, requiring treatment.    He has a history of coronary artery disease and coronary artery bypass grafting in .  He has done well since this time.    Patient was discharged from the hospital on 3/22 home with the help of his family as well as home health nursing and physical therapy.  At the time of discharge he was profoundly weakened and depressed.  Patient's symptoms have improved somewhat since this time.  He has been on oxygen at nighttime since the time of discharge, 2 L.  For a time, his family measured his oxygen and his oxygen saturation dropped into the 80s on a number of location.    He reports that he is been doing better.  He is not having difficulty breathing and denies chest pain.  That said, exercise been limited mostly to walking around the house.  They do not have stairs.  His Lovenox was stopped after he developed extensive subepidermal bleeding in his forearms.    Past Medical  History:  Patient Active Problem List   Diagnosis     Restless Legs Syndrome     Corrosive Esophagitis     Hiatal Hernia     CLL (chronic lymphoid leukemia) in relapse     Mixed hyperlipidemia     Cerebral atherosclerosis     Adenocarcinoma Of The Prostate Gland     Papillary Carcinoma Of The Thyroid Gland     Hypothyroidism     Type 2 Diabetes Mellitus     Coronary Artery Disease     Cervical Radiculopathy     Iron Deficiency     Hypoxia     Advanced directives, counseling/discussion     Infection of ear lobe, right     Right ear pain     Benign essential HTN     Anemia     Other autoimmune hemolytic anemias     Thrombocytopenia     Squamous cell carcinoma of skin     Neutropenia associated with infection     Hemorrhage     Paroxysmal atrial fibrillation     Hypocalcemia     Acute pulmonary edema     Abdominal pain, unspecified abdominal location     Fecal impaction     Cholecystitis     Encounter for family conference with patient present     Prolonged Q-T interval on ECG     Adjustment disorder     Fatigue     Near syncope     Generalized weakness     Dehydration     Diarrhea     Pleural effusion     DVT (deep vein thrombosis) in pregnancy     Past Surgical History:   Procedure Laterality Date     CATARACT EXTRACTION, BILATERAL       NV REANOMAL CORON ART PA ORIGIN BY GRAFT      Description: Anomalous Coronary Artery Graft;  Proc Date: 01/01/1996;     NV REMV PROSTATE,RETROPUB,RAD,LTD NODES      Description: Prostatectomy Retropubic Radical With Lymph Node Biopsy(S);  Recorded: 09/13/2007;     History of bleeding: As described above only    History of tobacco use: No    History of anesthesia reactions: No    Social History:  Retired, currently living at home, family members are staying with him.  His spouse who struggled with dementia recently passed away  Family History:  Family History   Problem Relation Age of Onset     Prostate cancer Father      Cancer Father      Alzheimer's disease Father      Parkinsonism  Mother      Diabetes Brother      Alzheimer's disease Sister      No Medical Problems Daughter      No Medical Problems Daughter      Parkinsonism Son      No Medical Problems Son      No Medical Problems Son      No Medical Problems Son      Parkinsonism Son      No Medical Problems Son      No Medical Problems Son      No Medical Problems Son        Current Medications:  Current Outpatient Prescriptions   Medication Sig Dispense Refill     acetaminophen (TYLENOL) 500 MG tablet Take 1,000 mg by mouth every 6 (six) hours as needed for pain.       allopurinol (ZYLOPRIM) 300 MG tablet Take 1 tablet (300 mg total) by mouth daily with breakfast. 30 tablet 5     amiodarone (PACERONE) 200 MG tablet Take 1 tablet (200 mg total) by mouth daily. 30 tablet 3     atorvastatin (LIPITOR) 20 MG tablet Take 1 tablet (20 mg total) by mouth at bedtime. 30 tablet 3     clopidogrel (PLAVIX) 75 mg tablet Take 75 mg by mouth daily.       escitalopram oxalate (LEXAPRO) 5 MG tablet Take 5 mg by mouth daily.        folic acid (FOLVITE) 1 MG tablet Take 1 tablet (1 mg total) by mouth daily. 30 tablet 5     ibrutinib (IMBRUVICA) 140 mg cap capsule Take 3 capsules (420 mg total) by mouth daily. Start per oncology 90 capsule 0     levothyroxine (SYNTHROID, LEVOTHROID) 150 MCG tablet Take 150 mcg by mouth Daily at 6:00 am.       miconazole (MICOTIN) 2 % cream Apply 1 application topically 2 (two) times a day as needed.       mirtazapine (REMERON) 45 MG tablet Take 1 tablet (45 mg total) by mouth at bedtime. 30 tablet 6     omeprazole (PRILOSEC) 40 MG capsule Take 1 capsule (40 mg total) by mouth 2 (two) times a day. 60 capsule 5     oxyCODONE (ROXICODONE) 5 MG immediate release tablet Take 1 tablet (5 mg total) by mouth every 8 (eight) hours as needed for pain. 30 tablet 0     rOPINIRole (REQUIP) 0.25 MG tablet Take 1 tablet (0.25 mg total) by mouth at bedtime. 30 tablet 6     simvastatin (ZOCOR) 40 MG tablet TAKE ONE TABLET BY MOUTH AT BEDTIME  90 tablet 0     sodium chloride (NS) injection Flush with 10 ml three times daily. 900 mL 1     triamcinolone (KENALOG) 0.1 % cream Apply 1 application topically 2 (two) times a day as needed.       No current facility-administered medications for this visit.        Allergies:  Dairy aid [lactase]; Other allergy (see comments); Benzonatate; Blood-group specific substance; Ciprofloxacin; Ephedrine sulfate; Erythromycin base; Hyoscyamine sulfate; Lactose; Methocarbamol; Terazosin; and Warfarin    Review or Systems:  Constitution: normal  HEENT: normal  Pulmonary: normal  Cardiovascular: Dizziness  GI: normal  : normal  Musculoskeletal: normal  Neuro: normal  Endocrine: normal  Psych: normal  Lymph/Heme: normal  Skin with multiple keratotic lesions, history of skin cancer, also forearm bruising/bleeding  OBJECTIVE:  Vitals:    05/02/18 1433   BP:    Pulse: 81   Temp:    SpO2: 97%     General Appearance:    Alert, cooperative, no distress, appears stated age,   Head:    Normocephalic, without obvious abnormality, atraumatic   Eyes:    PERRL, conjunctiva/corneas clear, EOM's intact   Nose:   Mucosa moist, normal    Throat:   Lips, mucosa, and tongue normal; ora phaynx clear   Neck:   Supple, symmetrical, trachea midline, no adenopathy;     thyroid:  no enlargement/tenderness/nodules; no carotid    bruit or JVD   Lungs:     Clear to auscultation bilaterally, respirations unlabored    Heart:    Regular rate and rhythm, S1 and S2 normal, no murmur, rub   or gallop   Abdomen:     Soft, non-tender, bowel sounds active all four quadrants,     no masses, no organomegaly   Extremities:   Extremities normal, atraumatic, no cyanosis or edema   Pulses:   2+ and symmetric all extremities   Skin:   Skin color, texture, turgor normal, no rashes or lesions   Neurologic:   No focal deficits         Labs:  Results for orders placed or performed in visit on 05/02/18   Electrocardiogram Perform - Clinic   Result Value Ref Range     SYSTOLIC BLOOD PRESSURE  mmHg    DIASTOLIC BLOOD PRESSURE  mmHg    VENTRICULAR RATE 67 BPM    ATRIAL RATE 67 BPM    P-R INTERVAL 138 ms    QRS DURATION 72 ms    Q-T INTERVAL 412 ms    QTC CALCULATION (BEZET) 435 ms    P Axis 54 degrees    R AXIS 25 degrees    T AXIS 18 degrees    MUSE DIAGNOSIS       Normal sinus rhythm  Nonspecific ST and T wave abnormality  Abnormal ECG  When compared with ECG of 01-APR-2018 02:14,  Sinus rhythm has replaced Atrial fibrillation  Vent. rate has decreased BY  35 BPM  Nonspecific T wave abnormality has replaced inverted T waves in Inferior leads       Personally reviewed above and agree.  Await labs listed below.  ASSESSMENT/PLAN:  1. Preop examination  HM1(CBC and Differential)    Basic Metabolic Panel    Albumin    Electrocardiogram Perform - Clinic    HM1 (CBC with Diff)     Intermediate risk surgery,  Unknown functional capacity  No specific cardiac risk factors  Higher risk due to recent malnutrition, extended hospitalization, underlying vascular disease.  Pulmonary status seems back to baseline  Preoperative coronary revascularization would not be reasonable/feasible  Patient is already on statin medication which should lower vascular risk-    Recommend holding clopidogrel for 5 days prior to surgery.  Await labs, otherwise approved for surgery.    Hold Plavix for 5 days prior to surgery.  Amiodarone only scheduled medicine in the morning of surgery        Addendum 5/3/18    Albumin has come up nicely to 3.4.  Basic metabolic profile has normalized.  Also, recent history of provoked DVT at PICC line site.  Patient was on Lovenox until recently, stopped due extensive bruising on arms.  Riley Cuevas MD

## 2021-06-17 NOTE — ANESTHESIA CARE TRANSFER NOTE
Discussed instructions with the patient. Last vitals:   Vitals:    05/10/18 1214   BP: 179/84   Pulse: 70   Resp: 16   Temp: 36.1  C (97  F)   SpO2: 100%     Patient's level of consciousness is awake and drowsy  Spontaneous respirations: yes  Maintains airway independently: yes  Dentition unchanged: yes  Oropharynx: oropharynx clear of all foreign objects    QCDR Measures:  ASA# 20 - Surgical Safety Checklist: WHO surgical safety checklist completed prior to induction  PQRS# 430 - Adult PONV Prevention: 4558F - Pt received => 2 anti-emetic agents (different classes) preop & intraop  ASA# 8 - Peds PONV Prevention: NA - Not pediatric patient, not GA or 2 or more risk factors NOT present  PQRS# 424 - Lisseth-op Temp Management: 4559F - At least one body temp DOCUMENTED => 35.5C or 95.9F within required timeframe  PQRS# 426 - PACU Transfer Protocol: - Transfer of care checklist used  ASA# 14 - Acute Post-op Pain: ASA14B - Patient did NOT experience pain >= 7 out of 10

## 2021-06-17 NOTE — PROCEDURES
IR NOTE    Cholangiogram: indwelling cholecystostomy tube in adequate position.     Plan: gravity drainage. Discontinue flushing.     EBL: none  No immediate complications  No specimen sent    Leoncio Rios MD

## 2021-06-17 NOTE — PROGRESS NOTES
[facility] Hematology and Oncology Progress Note    Patient: Nixon Velasquez Jr.  MRN: [id number]  Date of Service:         Reason for Visit    Chief Complaint   Patient presents with     HE Cancer     Malignant Hematology       Assessment and Plan      1. CLL: Received single agent Rituxan for this with his last dose being August 7, 2017.  Recent relapse so has started imbruvica.  On this in the hospital but we actually had been holding it once he got hospitalized.  We did restart once he went to TCU but patient states that he did not tolerate it so he only took it for a little bit of time.  He now has not been taking it since the end of March and would prefer not to be on it.  I think due to his counts looking fairly stable we can hold off for now.  We will have him return in 4 weeks to recheck his labs.  If either his hemoglobin starts dropping again, his platelets start dropping or his white count starts going up significantly we will have to restart.  We may be could do a lower dose if we need to restart.     2.  Anemia and thrombocytopenia: These are likely from his CLL.  They have actually stabilized if not slightly improved.  We will monitor his labs closely.  He will return in 4 weeks.     3.  Significant weakness: He has been in and out of the hospital as well as TCU. He is starting to get a little stronger. Continue to work with PT and family to get stronger.     4. Cholecystitis: having cholecystectomy on 5/10    5. Hx of right upper arm DVT from PICC line on 3/26/18. Has been on lovenox since. PICC line is out. Swelling is gone. Having bleeding/blood blisters. After discussion with Dr. Brown, it was felt that he was treated for 4 weeks so we can stop lovenox. Family updated.     ECOG Performance   ECOG Performance Status: 3     Distress Assessment  Distress Assessment Score: No distress:   Pain  Currently in Pain: Yes  Pain Score (Initial OR Reassessment): 3  Location: generalized: I did refill  oxycodone, which he uses very intermittently. I think we can probably stop that going forward.       Problem List    1. CLL (chronic lymphocytic leukemia)  HM1(CBC and Differential)    Comprehensive Metabolic Panel      ______________________________________________________________________________    History of Present Illness      Diagnosis:     1. Chronic lymphocytic leukemia initially diagnosed in 2/2008. 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   2. Thyroid cancer, status post thyroidectomy and radioactive iodine ablation.   3. Prostate cancer diagnosed in 1992.  4. Skin cancers, multiple.     Treatment:  Started single agent Rituxan for his CLL on July 18, 2017.  This was initiated for worsening symptomatic anemia.  He received 4 weekly doses.  Completed on August 7, 2017.  Recent relapse of CLL and has started Imbruvica on 2/27/18.  He probably only took it for a couple of weeks and then was back in the hospital and then he was holding it.  He then took it for a couple of days and then started holding it again around March 27, 2018. Continues to hold.         Interim History:      Nixon is here today for follow-up.  He states he is doing ok. He feels like he may be is starting to get a little stronger. Needing less assistance when getting up. Has large bruises on arms and has bilateral large blood blisters. Keeping them covered. No fevers. Mild generalized body pains.     Pain Status  Currently in Pain: Yes    Review of Systems    Constitutional  Constitutional (WDL): Exceptions to WDL  Fatigue: Fatigue not relieved by rest, limiting self care ADL  Eye Disorder (WDL): All eye disorder elements are within defined limits (wears glasses)  Ear  Ear Disorder (WDL): All ear disorder elements are within defined limits  Cardiovascular  Cardiovascular (WDL): Exceptions to WDL  Edema: Yes  Edema Limbs: 5 - 10% inter-limb discrepancy in volume or circumference at point of greatest visible difference, swelling or  obscuration of anatomic architecture on close inspection (bilateral LE's intermittently)  Pulmonary  Respiratory (WDL): Exceptions to WDL  Dyspnea: Shortness of breath with minimal exertion, limiting instrumental ADL  Hypoxia: Decreased oxygen saturation with exercise (e.g., pulse oximeter <88%), intermittent supplemental oxygen (home O2 2 liters at night)  Gastrointestinal  Gastrointestinal (WDL): Exceptions to WDL  Anorexia: Loss of appetite without alteration in eating habits  Constipation: Occasional or intermittent symptoms, occasional use of stool softeners, laxatives, dietary modification, or enema  Dysphagia: Symptomatic, able to eat regular diet  Genitourinary  Genitourinary (WDL): Exceptions to WDL (has ileostomy)  Lymphatic  Lymph (WDL): All lymph disorder elements are within defined limits  Musculoskeletal and Connective Tissue  Musculoskeletal and Connetive Tissue Disorders (WDL): Exceptions to WDL  Muscle Weakness : Limiting self care ADL, disabling  Integumentary  Integumentary (WDL): Exceptions to WDL (bruises easily)scattered bruises and scabs.   Patient Coping  Patient Coping: Depression;Sadness  Distress Assessment  Distress Assessment Score: No distress  Accompanied by  Accompanied by: Family Member (son)  Oral Chemo Adherence       Past History  Past Medical History:   Diagnosis Date     Cataract     bilateral     Foot fracture, right      History of transfusion      Hypertension      Inguinal hernia      Leukemia      PE (pulmonary embolism)      Pneumonia      Prostate cancer      Skin cancer      Thyroid cancer        PHYSICAL EXAM:  /85  Pulse 62  Temp 97.9  F (36.6  C) (Oral)   SpO2 96%  GENERAL: no acute distress. Cooperative in conversation. Here with family.  In wheelchair.   HEENT: pupils are equal, round and reactive. Oromucosa is clean and intact. No ulcerations or mucositis noted. No bleeding noted.  RESP: lungs are clear bilaterally per auscultation. Regular respiratory  rate. No wheezes or rhonchi.  CV: Regular, rate and rhythm. Slight systolic murmur.  ABD: soft, nontender. Positive bowel sounds. No organomegaly.   MUSCULOSKELETAL: No lower extremity swelling.   NEURO: non focal. Alert and oriented x3.   PSYCH: within normal limits. No depression or anxiety.  SKIN: warm dry intact, significant bruises on arms.  Scabs on his hands as well. He also has large blood blisters on both forearms. Some old dried blood.           Lab Results  Recent Results (from the past 24 hour(s))   Comprehensive Metabolic Panel   Result Value Ref Range    Sodium 141 136 - 145 mmol/L    Potassium 4.8 3.5 - 5.0 mmol/L    Chloride 106 98 - 107 mmol/L    CO2 28 22 - 31 mmol/L    Anion Gap, Calculation 7 5 - 18 mmol/L    Glucose 128 (H) 70 - 125 mg/dL    BUN 17 8 - 28 mg/dL    Creatinine 0.74 0.70 - 1.30 mg/dL    GFR MDRD Af Amer >60 >60 mL/min/1.73m2    GFR MDRD Non Af Amer >60 >60 mL/min/1.73m2    Bilirubin, Total 0.4 0.0 - 1.0 mg/dL    Calcium 8.2 (L) 8.5 - 10.5 mg/dL    Protein, Total 5.6 (L) 6.0 - 8.0 g/dL    Albumin 2.7 (L) 3.5 - 5.0 g/dL    Alkaline Phosphatase 62 45 - 120 U/L    AST 15 0 - 40 U/L    ALT <9 0 - 45 U/L   HM1 (CBC with Diff)   Result Value Ref Range    WBC 5.8 4.0 - 11.0 thou/uL    RBC 3.21 (L) 4.40 - 6.20 mill/uL    Hemoglobin 10.6 (L) 14.0 - 18.0 g/dL    Hematocrit 33.5 (L) 40.0 - 54.0 %     (H) 80 - 100 fL    MCH 33.0 27.0 - 34.0 pg    MCHC 31.6 (L) 32.0 - 36.0 g/dL    RDW 19.7 (H) 11.0 - 14.5 %    Platelets 152 140 - 440 thou/uL    MPV 11.3 8.5 - 12.5 fL    Neutrophils % 33 (L) 50 - 70 %    Lymphocytes % 60 (H) 20 - 40 %    Monocytes % 5 2 - 10 %    Eosinophils % 1 0 - 6 %    Basophils % 1 0 - 2 %    Neutrophils Absolute 1.9 (L) 2.0 - 7.7 thou/uL    Lymphocytes Absolute 3.5 0.8 - 4.4 thou/uL    Monocytes Absolute 0.3 0.0 - 0.9 thou/uL    Eosinophils Absolute 0.1 0.0 - 0.4 thou/uL    Basophils Absolute 0.0 0.0 - 0.2 thou/uL         Imaging    Xr Chest 2 Views    Result Date:  4/1/2018  XR CHEST 2 VIEWS 4/1/2018 2:34 AM INDICATION: weak COMPARISON: 03/18/2018. FINDINGS: Stable enlargement of the cardiomediastinal contours with calcified thoracic aorta. Status post sternotomy. Bilateral interstitial infiltrates suggestive of edema, although aeration has improved compared to previous study. Small pleural effusions. No visible pneumothorax.    Xr Chest 1 View    Result Date: 4/9/2018  XR CHEST 1 VIEW 4/9/2018 3:20 PM INDICATION: chest pain COMPARISON: 4/1/2018 FINDINGS: Sternotomy. Small bilateral pleural effusions. Underlying atelectasis in each lung base. These findings are unchanged. On the prior study, there was mild interstitial edema. This has resolved.    Us Abdomen Limited    Result Date: 4/1/2018  US ABDOMEN LIMITED 4/1/2018 2:58 AM INDICATION: Abdominal pain, weakness, cholecystectomy tube in place COMPARISON: 03/17/2018 FINDINGS: GALLBLADDER: Cholecystostomy tube only partially imaged at the margin of the gallbladder. Gallbladder decompressed with wall thickened up to 6 mm. Gallbladder sludge. No definite stones although portions of gallbladder obscured. Very small amount of pericholecystic fluid. BILE DUCTS: No bile duct dilation. The common bile duct measures 7 mm. LIVER: Normal where seen. RIGHT KIDNEY: No hydronephrosis. PANCREAS: Not imaged in detail on this limited study. No incidental abnormalities. No ascites in the right upper quadrant.     CONCLUSION: 1.  Gallbladder decompressed from cholecystectomy tube which is only partially imaged at the edge of the gallbladder. Gallbladder wall thickened. Sludge without definite stones. Trace adjacent free fluid. Remainder not significantly changed.        Signed by: Vy Huerta, CNP

## 2021-06-17 NOTE — ANESTHESIA POSTPROCEDURE EVALUATION
Patient: Nixon Velasquez Jr.  #1  LAPAROSCOPIC CHOLECYSTECTOMY  Anesthesia type: general    Patient location: PACU  Last vitals:   Vitals:    05/10/18 1337   BP: 141/65   Pulse: 62   Resp: 18   Temp:    SpO2: 98%     Post vital signs: stable  Level of consciousness: awake and responds to simple questions  Post-anesthesia pain: pain controlled  Post-anesthesia nausea and vomiting: no  Pulmonary: unassisted, return to baseline  Cardiovascular: stable and blood pressure at baseline  Hydration: adequate  Anesthetic events: no    QCDR Measures:  ASA# 11 - Lisseth-op Cardiac Arrest: ASA11B - Patient did NOT experience unanticipated cardiac arrest  ASA# 12 - Lisseth-op Mortality Rate: ASA12B - Patient did NOT die  ASA# 13 - PACU Re-Intubation Rate: ASA13B - Patient did NOT require a new airway mgmt  ASA# 10 - Composite Anes Safety: ASA10A - No serious adverse event    Additional Notes: ponv treated

## 2021-06-17 NOTE — PROGRESS NOTES
[facility]   Arrhythmia Clinic    Assessment/Plan:  Diagnoses and all orders for this visit:    Paroxysmal atrial fibrillation and unclear if he is symptomatic or not but given numerous health issues at this point recommend that he stay on amiodarone 200 mg 1 tablet orally every day for at least 3 months to get him through his surgery and recovery thereafter.  [id number] score of 4+.  I believe Eliquis will be affordable given his insurance when he transitions from Lovenox to anticoagulant.  Discussed that I am will not make decision when he should stop Lovenox and go to Eliquis.  He is also on Plavix.  He is at high risk for falls.  Will message Dr. Mcguire and Dr. Cuevas regarding timeline for Lovenox and Plavix continuation as uncertain who will make that decision.  To follow-up with Dr. Garza as planned on May 24.  -     amiodarone (PACERONE) 200 MG tablet; Take 1 tablet (200 mg total) by mouth daily.  Dispense: 30 tablet; Refill: 3    Benign essential HTN and well-controlled.    Hyperlipidemia history and on simvastatin 40 mg p.o. daily.  Simvastatin interacts with amiodarone and will switch him to atorvastatin 20 mg nightly instead.  -     atorvastatin (LIPITOR) 20 MG tablet; Take 1 tablet (20 mg total) by mouth at bedtime.  Dispense: 30 tablet; Refill: 3  ______________________________________________________________________    Subjective:    I had the opportunity to see Nixon Velasquez Jr. at the [facility]. Nixon Velasquez Jr. is a 88 y.o. male and here for cardiology follow-up after hospitalization with acute cholecystitis with bacteremia and septic shock secondary to this.  He had a percutaneous drain placed to gallbladder.  He needs to get stronger before he can have his gallbladder removed per daughter and surgery would be by Dr. Mcguire and will be sometime in May as far as they know.  He is at home and has home care plus family with him 24/ 7.  He has large family and  they are  taking turns caring for him.  He does not ambulate.  He only transfers.  He came to clinic today in a wheelchair.  He talks when ask questions  during the visit but his daughter did much of telling his story.  He does not have a good appetite but is eating some.  He is sleeping.  On questioning him what his main concern is or symptoms, he complains of shortness of breath at times at rest or occ with activity.  Pulse oximetry is showing oxygen level at 84-86 at times even at rest per daughter.  They have a Oxymizer from his wife and placing him on 2 L seems to help.  I discussed that this is okay.  He does not have documentation of meeting criteria for oxygen delivery at home but okay to use what they have.  Not turning higher than 2 L as will dry his nasal passages.  To use as needed for shortness of breath.  He is on Lovenox shots every 12 hours.  He has covered with bruising on bilateral forearms.  He is wearing TEJAL stockings.  His history is significant for CLL, coronary artery disease and new diagnosis of A. fib with RVR during hospitalization.  He was started on IV amiodarone and switch to oral amiodarone.  He is taking amiodarone 200 mg p.o. daily.  Right arm swelling significantly decreased  per family.    ______________________________________________________________________    Problem List:  Patient Active Problem List   Diagnosis     Restless Legs Syndrome     Corrosive Esophagitis     Hiatal Hernia     CLL (chronic lymphoid leukemia) in relapse     Mixed hyperlipidemia     Cerebral atherosclerosis     Adenocarcinoma Of The Prostate Gland     Papillary Carcinoma Of The Thyroid Gland     Hypothyroidism     Type 2 Diabetes Mellitus     Coronary Artery Disease     Cervical Radiculopathy     Iron Deficiency     Hypoxia     Advanced directives, counseling/discussion     Infection of ear lobe, right     Right ear pain     Benign essential HTN     Anemia     Other autoimmune hemolytic anemias      Thrombocytopenia     Squamous cell carcinoma of skin     Neutropenia associated with infection     Hemorrhage     Paroxysmal atrial fibrillation     Hypocalcemia     Acute pulmonary edema     Abdominal pain, unspecified abdominal location     Fecal impaction     Cholecystitis     Encounter for family conference with patient present     Prolonged Q-T interval on ECG     Adjustment disorder     Fatigue     Near syncope     Generalized weakness     Dehydration     Diarrhea     Pleural effusion     Medical History:  Past Medical History:   Diagnosis Date     Cataract     bilateral     Foot fracture, right      History of transfusion      Hypertension      Inguinal hernia      Leukemia      PE (pulmonary embolism)      Pneumonia      Prostate cancer      Skin cancer      Thyroid cancer      Surgical History:  Past Surgical History:   Procedure Laterality Date     CATARACT EXTRACTION, BILATERAL       RI REANOMAL CORON ART PA ORIGIN BY GRAFT      Description: Anomalous Coronary Artery Graft;  Proc Date: 01/01/1996;     RI REMV PROSTATE,RETROPUB,RAD,LTD NODES      Description: Prostatectomy Retropubic Radical With Lymph Node Biopsy(S);  Recorded: 09/13/2007;     Social History:  Social History   Substance Use Topics     Smoking status: Never Smoker     Smokeless tobacco: Never Used     Alcohol use No        Review of Systems: Review of Systems:   General: Weight Loss  Eyes: WNL  Ears/Nose/Throat: Hearing Loss, Nosebleeds  Lungs: Cough, Shortness of Breath  Heart: Shortness of Breath with activity, Leg Swelling  Stomach: WNL  Bladder: WNL  Muscle/Joints: Muscle Weakness, Joint Pain, Muscle Pain  Skin: WNL  Nervous System: Daytime Sleepiness, Dizziness, Loss of Balance  Mental Health: Depression     Blood: Easy Bruising      Family History:  Family History   Problem Relation Age of Onset     Prostate cancer Father      Cancer Father      Alzheimer's disease Father      Parkinsonism Mother      Diabetes Brother       Alzheimer's disease Sister      No Medical Problems Daughter      No Medical Problems Daughter      Parkinsonism Son      No Medical Problems Son      No Medical Problems Son      No Medical Problems Son      Parkinsonism Son      No Medical Problems Son      No Medical Problems Son      No Medical Problems Son          Allergies:  Allergies   Allergen Reactions     Dairy Aid [Lactase]      Dairy products- severe shaking in legs     Other Allergy (See Comments) Unknown, Myalgia and Other (See Comments)     Restless legs     Benzonatate      Blood-Group Specific Substance      Warm autoantibodies present. Expect up to 24 hour delay in blood for transfusion. Draw 2 lavender and 1 red tube for type and screen orders   Notify Blood bank as soon as possible if transfusions are needed.     Ciprofloxacin      Ephedrine Sulfate      Erythromycin Base      Hyoscyamine Sulfate      Lactose      Methocarbamol      Terazosin      Warfarin      Medications:  Current Outpatient Prescriptions   Medication Sig Dispense Refill     acetaminophen (TYLENOL) 500 MG tablet Take 1,000 mg by mouth every 6 (six) hours as needed for pain.       allopurinol (ZYLOPRIM) 300 MG tablet Take 1 tablet (300 mg total) by mouth daily with breakfast. 30 tablet 5     amiodarone (PACERONE) 200 MG tablet Take 1 tablet (200 mg total) by mouth daily. 30 tablet 3     clopidogrel (PLAVIX) 75 mg tablet Take 75 mg by mouth daily.       enoxaparin (LOVENOX) 300 mg/3 mL Soln Inject 93 mg under the skin every 12 (twelve) hours.        escitalopram oxalate (LEXAPRO) 5 MG tablet Take 5 mg by mouth daily.        folic acid (FOLVITE) 1 MG tablet Take 1 tablet (1 mg total) by mouth daily. 30 tablet 5     hydroCHLOROthiazide (MICROZIDE) 12.5 mg capsule TAKE ONE CAPSULE BY MOUTH EVERY DAY 30 capsule 5     ibrutinib (IMBRUVICA) 140 mg cap capsule Take 3 capsules (420 mg total) by mouth daily. Start per oncology 90 capsule 0     levothyroxine (SYNTHROID, LEVOTHROID) 150  "MCG tablet Take 150 mcg by mouth Daily at 6:00 am.       miconazole (MICOTIN) 2 % cream Apply 1 application topically 2 (two) times a day as needed.       mirtazapine (REMERON) 45 MG tablet Take 1 tablet (45 mg total) by mouth at bedtime. 30 tablet 6     omeprazole (PRILOSEC) 40 MG capsule Take 1 capsule (40 mg total) by mouth 2 (two) times a day. 60 capsule 5     oxyCODONE (ROXICODONE) 5 MG immediate release tablet Take 5 mg by mouth every 8 (eight) hours as needed for pain.       rOPINIRole (REQUIP) 0.25 MG tablet Take 0.25 mg by mouth at bedtime.        sodium chloride (NS) injection Flush with 10 ml three times daily. 900 mL 1     triamcinolone (KENALOG) 0.1 % cream Apply 1 application topically 2 (two) times a day as needed.       atorvastatin (LIPITOR) 20 MG tablet Take 1 tablet (20 mg total) by mouth at bedtime. 30 tablet 3     No current facility-administered medications for this visit.        Objective:   Vital signs:  /68 (Patient Site: Left Arm, Patient Position: Sitting, Cuff Size: Adult Regular)  Pulse 66  Resp 12  Ht 6' 3\" (1.905 m)  Wt 190 lb (86.2 kg)  BMI 23.75 kg/m2      Physical Exam:    GENERAL APPEARANCE: Alert, cooperative and in no acute distress.  HEENT: No scleral icterus. No Xanthelasma. Oral mucuos membranes pink and moist.  NECK: JVP Nl.   CHEST: Occasional rhonchi heard.  CARDIOVASCULAR: S1, S2 without murmur ,clicks or rubs. Regular, regular.  Radial and posterior tibial pulses are intact and symetric.    EXTREMITIES: No cyanosis, clubbing .  1+ bilateral ankle edema.    Results personally reviewed:  Results for orders placed during the hospital encounter of 03/09/18   Echo Complete [ECH10] 03/09/2018    Narrative   Left Ventricle: Normal left ventricular size.The estimated left   ventricular ejection fraction is 60%. This represents a normal ejection   fraction. Mild hypertrophy noted. E/e' > 15, suggesting elevated LV   filling pressures.    Left Atrium: Left atrial volume " is moderately increased.    Mitral Valve: There is moderate mitral annular calcification. Moderate   mitral regurgitation.    Mild aortic stenosis. Mild aortic regurgitation    Normal right ventricular size and systolic function.    No pulmonary hypertension present.    Estimated central venous pressure equal to 13 mmHg.           Results for orders placed or performed during the hospital encounter of 03/09/18   ECG 12 lead with MUSE   Result Value Ref Range    SYSTOLIC BLOOD PRESSURE  mmHg    DIASTOLIC BLOOD PRESSURE  mmHg    VENTRICULAR RATE 60 BPM    ATRIAL RATE 55 BPM    P-R INTERVAL  ms    QRS DURATION 84 ms    Q-T INTERVAL 504 ms    QTC CALCULATION (BEZET) 504 ms    P Axis  degrees    R AXIS 31 degrees    T AXIS 110 degrees    MUSE DIAGNOSIS       Sinus rhythm  Lateral infarct , age undetermined  Prolonged QT  Abnormal ECG  Confirmed by ZENIA JONES, ALLAN LOC: ([id number]) on 3/21/2018 2:13:21 PM         TSH:   Lab Results   Component Value Date    TSH 4.21 03/18/2018     BNP:   Lab Results   Component Value Date    BNP 68 04/09/2018     BMP:  Lab Results   Component Value Date    CREATININE 0.71 04/09/2018    BUN 12 04/09/2018     04/09/2018    K 4.0 04/09/2018     (H) 04/09/2018    CO2 27 04/09/2018       This note has been dictated using voice recognition software. Any grammatical or context distortions are unintentional and inherent to the software.    ALONSO ROBERSON, RN, [facility]  [phone]

## 2021-06-17 NOTE — TELEPHONE ENCOUNTER
Telephone Encounter by Mike Spangler CMA at 7/23/2020  3:53 PM     Author: Mike Spangler CMA Service: -- Author Type: Medical Assistant    Filed: 7/23/2020  3:54 PM Encounter Date: 7/23/2020 Status: Signed    : Mike Spangler CMA (Medical Assistant)        Camila Ybarra CMA routed conversation to [facility] 9 minutes ago (3:43 PM)       Camila Ybarra CMA 10 minutes ago (3:42 PM)          Who is calling:    Daughter     Reason for Call:    Wants PCP review patient chart do to recent diagnosis of lung cancer     Caller wants to make sure her dads wishes are being respected, such physical therapy verse no PT?     Not sure is all the other stuff such as transitional care and PT is necessary for a good outcome, or can he be discharged home?     Date of last appointment with primary care: 04/15/2020     Okay to leave a detailed message: Yes     Requesting PCP visit patient in the hospital and discuss an appropriate treatment plan.

## 2021-06-17 NOTE — PROGRESS NOTES
Assessment/ Plan  Problem List Items Addressed This Visit        High    Benign essential HTN     Blood pressure good, slightly overtreated, will stop hydrochlorothiazide, especially with weight loss.            Unprioritized    Hypoxia - Primary     Excellent pulse ox readings here again today, mild decrease breath sounds at the base, I did not repeat a chest x-ray.    Doubt pneumonia, very much doubt congestive heart failure since weight is down, recent evaluation for this negative    I think pulmonary toilet is what is still needed  Recommend using pulse ox at home only for respiratory symptoms  Follow-up for worsening cough, shortness of breath, fever         Generalized weakness     Generally, perhaps somewhat better.  Discussed balance of gently pushing but not to the point of getting too tired.  Encouraged him that it is to be expected that there be good days and bad days.             Subjective  CC:  Chief Complaint   Patient presents with     daughter states he is not feeling well     no appetite, no energy     hctz medication     should pt still be taking     HPI:  Wt Readings from Last 3 Encounters:   04/16/18 183 lb (83 kg)   04/10/18 190 lb (86.2 kg)   04/02/18 213 lb 0.3 oz (96.6 kg)     Nixon is brought in today by his daughter who is currently caring for him for concern about the following issues    Fatigue.  Yesterday, patient had the best days had since he was discharged from hospital.  Worked hard with his physical therapy, walked a long way, indeed his legs are getting stronger.  Today, could hardly get out of bed, felt very tired, feels generally weak.  Daughter mentions a cough, but apparently this is been going on and off since he was discharged.  Not any worse.  Temp was 98 today, generally, 96.8 and regular temperature so they are concerned about a fever.    Second is low oxygen saturations.  Protocol has it that they check oxygen level before performing certain tasks and if it is below 91%,  "they did not proceed and check oxygen later.  It has stayed around 83% today.  Patient denies any significant shortness of breath, again mild \"moist\" cough which is been present on and off since hospital discharge.  No significant chest pain.    Has had some increase in swelling in his lower extremities.  As noted above, weight is down not up, denies PND or orthopnea.      Reviewed my note from last week.  I saw him for similar problem.  At that time chest x-ray was done which was unchanged and showed some atelectasis /pleural effusion at the base, but this was felt to be unchanged from previous x-ray.    PFSH:  Patient Active Problem List   Diagnosis     Restless Legs Syndrome     Corrosive Esophagitis     Hiatal Hernia     CLL (chronic lymphoid leukemia) in relapse     Mixed hyperlipidemia     Cerebral atherosclerosis     Adenocarcinoma Of The Prostate Gland     Papillary Carcinoma Of The Thyroid Gland     Hypothyroidism     Type 2 Diabetes Mellitus     Coronary Artery Disease     Cervical Radiculopathy     Iron Deficiency     Hypoxia     Advanced directives, counseling/discussion     Infection of ear lobe, right     Right ear pain     Benign essential HTN     Anemia     Other autoimmune hemolytic anemias     Thrombocytopenia     Squamous cell carcinoma of skin     Neutropenia associated with infection     Hemorrhage     Paroxysmal atrial fibrillation     Hypocalcemia     Acute pulmonary edema     Abdominal pain, unspecified abdominal location     Fecal impaction     Cholecystitis     Encounter for family conference with patient present     Prolonged Q-T interval on ECG     Adjustment disorder     Fatigue     Near syncope     Generalized weakness     Dehydration     Diarrhea     Pleural effusion     DVT (deep vein thrombosis) in pregnancy     Current medications reviewed as follows:  Current Outpatient Prescriptions on File Prior to Visit   Medication Sig     acetaminophen (TYLENOL) 500 MG tablet Take 1,000 mg by " mouth every 6 (six) hours as needed for pain.     allopurinol (ZYLOPRIM) 300 MG tablet Take 1 tablet (300 mg total) by mouth daily with breakfast.     amiodarone (PACERONE) 200 MG tablet Take 1 tablet (200 mg total) by mouth daily.     atorvastatin (LIPITOR) 20 MG tablet Take 1 tablet (20 mg total) by mouth at bedtime.     clopidogrel (PLAVIX) 75 mg tablet Take 75 mg by mouth daily.     enoxaparin (LOVENOX) 300 mg/3 mL Soln Inject 93 mg under the skin every 12 (twelve) hours.      escitalopram oxalate (LEXAPRO) 5 MG tablet Take 5 mg by mouth daily.      folic acid (FOLVITE) 1 MG tablet Take 1 tablet (1 mg total) by mouth daily.     ibrutinib (IMBRUVICA) 140 mg cap capsule Take 3 capsules (420 mg total) by mouth daily. Start per oncology     levothyroxine (SYNTHROID, LEVOTHROID) 150 MCG tablet Take 150 mcg by mouth Daily at 6:00 am.     miconazole (MICOTIN) 2 % cream Apply 1 application topically 2 (two) times a day as needed.     mirtazapine (REMERON) 45 MG tablet Take 1 tablet (45 mg total) by mouth at bedtime.     omeprazole (PRILOSEC) 40 MG capsule Take 1 capsule (40 mg total) by mouth 2 (two) times a day.     oxyCODONE (ROXICODONE) 5 MG immediate release tablet Take 5 mg by mouth every 8 (eight) hours as needed for pain.     rOPINIRole (REQUIP) 0.25 MG tablet Take 1 tablet (0.25 mg total) by mouth at bedtime.     sodium chloride (NS) injection Flush with 10 ml three times daily.     triamcinolone (KENALOG) 0.1 % cream Apply 1 application topically 2 (two) times a day as needed.     [DISCONTINUED] hydroCHLOROthiazide (MICROZIDE) 12.5 mg capsule TAKE ONE CAPSULE BY MOUTH EVERY DAY     No current facility-administered medications on file prior to visit.      History   Smoking Status     Never Smoker   Smokeless Tobacco     Never Used     Social History     Social History Narrative     Patient Care Team:  Riley Cuevas MD as PCP - General (Family Medicine)  ROS  Full 10 system review including  constitutional, respiratory, cardiac, gi, urinary, rheumatologic, neurologic, reproductive, dermatologic psychiatric is  performed  and is otherwise negative         Objective  Physical Exam  Vitals:    04/16/18 1428   BP: 112/56   Patient Site: Left Arm   Patient Position: Sitting   Cuff Size: Adult Regular   Pulse: 63   SpO2: 95%   Weight: 183 lb (83 kg)     Body mass index is 22.87 kg/(m^2).  Gen- alert, flat affect  HEENT- normal cephalic, atraumatic.   Chest- Normal inspiration and expiration.  Clear to ascultation.  No chest wall deformity or scar.  CV- Heart regular rate and rhythm, normal tones, no murmurs gallops or rubs.  Ext- appear well perfused, no edema  Skin- warm and dry, no visualized rash    Diagnostics:   None today      Please note: Voice recognition software was used in this dictation.  It may therefore contain typographical errors.

## 2021-06-17 NOTE — PROGRESS NOTES
Patient here per wheelchair accompanied by son for follow up on his CLL.  Patient is currently living with his son and has home care coming in for therapy.  Patient has generalized weakness and aching.  Running out of pain medicine.  Gallbladder surgery scheduled 5/10/18 per son.  Son would like blood blisters on arms looked at if possible today.  Seen by Vy Huerta NP.

## 2021-06-17 NOTE — ANESTHESIA PREPROCEDURE EVALUATION
Anesthesia Evaluation      Patient summary reviewed     Airway   Mallampati: II   Pulmonary - normal exam   (+) pneumonia,                          Cardiovascular   (+) hypertension, CAD, ,     Rhythm: regular  Rate: normal,         Neuro/Psych    (+) neuromuscular disease,      Comments: Cerebral atherosclerosis  Adjustment disorder    Endo/Other    (+) diabetes mellitus, hypothyroidism,      Comments: Thyroid CA    GI/Hepatic/Renal    (+) hiatal hernia,        Other findings:                  Restless Legs Syndrome     Corrosive Esophagitis    Hiatal Hernia    CLL (chronic lymphoid leukemia) in relapse    Mixed hyperlipidemia    Cerebral atherosclerosis    Adenocarcinoma Of The Prostate Gland    Papillary Carcinoma Of The Thyroid Gland    Hypothyroidism    Type 2 Diabetes Mellitus    Coronary Artery Disease    Cervical Radiculopathy    Iron Deficiency    Hypoxia    Advanced directives, counseling/discussion    Infection of ear lobe, right    Right ear pain    Benign essential HTN    Anemia    Other autoimmune hemolytic anemias    Thrombocytopenia    Squamous cell carcinoma of skin    Neutropenia associated with infection    Hemorrhage    Paroxysmal atrial fibrillation    Hypocalcemia    Acute pulmonary edema    Abdominal pain, unspecified abdominal location    Fecal impaction    Cholecystitis    Encounter for family conference with patient present    Prolonged Q-T interval on ECG    Adjustment disorder    Fatigue    Near syncope    Generalized weakness    Dehydration    Diarrhea    Pleural effusion    DVT (deep vein thrombosis) in pregnancy           Dental    (+) poor dentition and lower dentures                       Anesthesia Plan  Planned anesthetic: general endotracheal  DEC 4 ZOF 4  ETOMIDATE INDUCTION  ASA 3   Induction: intravenous   Anesthetic plan and risks discussed with: patient  Anesthesia plan special considerations: antiemetics,

## 2021-06-17 NOTE — PROGRESS NOTES
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 Pennington and sees him next week         Relevant Orders    HM2(CBC w/o Differential) (Completed)       Unprioritized    Sepsis     Fevers resolved, completed antibiotic         Atrial fibrillation with rapid ventricular response     Currently appears to be in sinus rhythm with him on amiodarone 200 mg a day.  She is unstable with atrial fibrillation and rapid ventricular response, reason for amiodarone.  Plan follow-up with atrial fibrillation clinic.  All complicated by prolonged QT syndrome,         Cholecystitis     Percutaneous drainage, follow-up with surgery for discussion of eventual cholecystectomy, referral made         Relevant Orders    Ambulatory referral to General Surgery    Prolonged Q-T interval on ECG     Prolonged interval, on amiodarone, also low dose Lexapro and mirtazapine which could exacerbate problem.    I elected to increase dose of Lexapro and mirtazapine today despite risk given patient's stated wishes of comfort nearing the end of his life and his severe depression.  Discussed risk of provoking arrhythmia openly with patient and daughters and they agreed         Adjustment disorder     Appears profoundly severe, this is an unfortunate event for patient I was already struggling.    Follow-up 2 weeks.  Increase mirtazapine to previous level of 45 and Lexapro to 10.         Relevant Medications    escitalopram oxalate (LEXAPRO) 5 MG tablet    mirtazapine (REMERON) 45 MG tablet      Other Visit Diagnoses     Hospital discharge follow-up        Follow-up [facility] hospital stay 3/9 through 3/22 for E. coli bacteremia sepsis syndrome due to cholecystitis.  Subsequent TCU stay    DVT (deep venous thrombosis)            Subjective  CC:  Chief Complaint   Patient presents with     Follow Up     Sepsis [facility] , and blood clot  in right arm from [place] 03/28/18     HPI:  Hospital Follow-up  Date of admission: 3/9  Date of discharge: 3/22  Hospital: [facility]  Chief Diagnosis: E. coli bacteremia/sepsis secondary to cholecystitis.  E. coli resistant to fluoroquinolones and intermediate to Augmentin.  Initially put on Zosyn.  Patient's with atrial fibrillation as well.  Deemed nonoperable candidate, percutaneous cholecystostomy placed.  Discharged on Augmentin.  Required pressors for some time.  Patient was transfused 3 units packed red blood cells and 1 unit FFP.    Procedures During hospital stay not mentioned: Transfusions as above cholecystostomy 3/10  Radiology studies: Initial chest x-ray showed poor ventilatory effort, probable pulmonary edema/CHF, on admission, these abnormalities did persist at least to a degree by the time of discharge 3/18.  Ultrasound 3/17 showed cholecystostomy tube within the gallbladder, poorly visualized gallbladder.  Multiple other studies done and not reported here.  CT head 3/11 was negative.  Swallow study was abnormal with aspiration.  Labs: Most recent BMP, 3/22 showed normal renal function, low calcium at 8.1, most recent hemoglobin 9.9, white blood cell count was only 9.3, platelets 113,000.   Nixon Velasquez Jr.   Home Medication Instructions JOSÉ:    Printed on:03/30/18 5241   Medication Information                      allopurinol (ZYLOPRIM) 300 MG tablet  Take 1 tablet (300 mg total) by mouth daily with breakfast.             amiodarone (PACERONE) 200 MG tablet  Take 1 tablet twice daily until 3/31, then transition to 1 tablet once daily until further instruction by cardiology             cefdinir (OMNICEF) 300 MG capsule  Take 1 capsule (300 mg total) by mouth 2 times a day at 6:00 am and 4:00 pm for 10 days.             clopidogrel (PLAVIX) 75 mg tablet  Take 75 mg by mouth daily.             enoxaparin (LOVENOX) 300 mg/3 mL Soln  Inject 93 mg under the skin.             escitalopram oxalate  (LEXAPRO) 5 MG tablet  Take 5 mg by mouth.             folic acid (FOLVITE) 1 MG tablet  Take 1 tablet (1 mg total) by mouth daily.             ibrutinib (IMBRUVICA) 140 mg cap capsule  Take 3 capsules (420 mg total) by mouth daily. Start per oncology             levothyroxine (SYNTHROID, LEVOTHROID) 150 MCG tablet  Take 150 mcg by mouth Daily at 6:00 am.             metroNIDAZOLE (FLAGYL) 500 MG tablet  Take 1 tablet (500 mg total) by mouth 3 (three) times a day for 8 days.             omeprazole (PRILOSEC) 40 MG capsule  Take 1 capsule (40 mg total) by mouth 2 (two) times a day.             oxyCODONE (ROXICODONE) 20 mg/mL concentrated solution  Take 0.25-0.5 mL (5-10 mg total) by mouth every 4 (four) hours as needed for pain (moderate to severe pain).             rOPINIRole (REQUIP) 0.25 MG tablet  Take 0.25 mg by mouth.             simvastatin (ZOCOR) 40 MG tablet  Take 40 mg by mouth at bedtime.             tiZANidine (ZANAFLEX) 2 MG tablet  Take 1 tablet (2 mg total) by mouth every 8 (eight) hours as needed (muscle spasms).               Follow-up recommended by discharging doctor: Patient was to follow-up with atrial fibrillation clinic since he was placed on amiodarone to control rapid atrial fibrillation then tapered to 200 mg for discharge.    Follow-up is scheduled with hematology oncology    He is to follow-up with surgery for planned for cholecystectomy 4-6 weeks later    Cipro, mirtazapine and ondansetron were all stopped due to prolonged QT      Patient was subsequently sent to a rehab facility through [facility].  [facility]   Acute Rehabilitation Unit  Discharge summary     Date of Admission: 3/22/2018  Date of Discharge: 3/28/2018  Disposition: home  Primary Care Physician: Riley Cuevas  Attending physician: Dr. Bri Heard     Developed right upper quadrant symptoms and was diagnosed with an occlusive thrombus  Ultrasound 3/26  FINDINGS:  There is  "occlusive thrombosis in the right innominate vein, subclavian  vein, and axillary vein. There is additionally occlusive thrombus in  the basilic vein just above the antecubital fossa. Edema is also noted  in the soft tissues just above the antecubital fossa. The right  internal jugular vein, cephalic vein, and brachial veins are patent    Upon discharge  mEDICAL COURSE  Acute Catheter associated DVT - Underwent US 3/26/18 due to extensive right arm swelling. Deep venous thrombosis in the right innominate, subclavian, and axillary veins. Superficial venous thrombosis in the right basilic vein. Appreciate medicine assistance with this issue.See PN by Joel Arteaga PA-C 3/27 and 3/28 for details \"Attempted contact w primary hematologist, Dr. Mina, to discuss anticoagulation in setting of CLL, thrombocytopenia, and potential bleeding risk w chemotherapy and upcoming surgery. Dr. Mina not available;. Case discussed w Hematology at [facility]. Given reported allergies to coumadin and heparin, they recommend transition to arixtra or lovenox. Arixtra was preferred in case allergy to heparin is actually HIT, although this seems unlikely (no documented history of HIT per Hematology). Lovenox would also be OK. Hem reports 20-30% of patients on Ibrutinib have platelet dysfunction and increased risk of bleeding\"  -continue lovenox 1mg/kg/BID  -follow up with oncology as scheduled 4/9/2018  ------------------------------------------------------------------------------------------------------------------    PT:Continued therapy is recommended. Rationale/Recommendations: home PT for safety with home mobility, development of HEP, stair training once strength and activity tolerance improve.      Pt has FWW, family will provide WC. Gait belt issued     Pt had quick dc home d/t pt not sleeping here, anxious about betting home to his wife who is on hospice. Family training completed, will need Ax1-2 pending fatigue levels, surface " heights.     mEDICAL COURSE    Cholecystitis- with septic shock E. Coli Bacteremia.  Underwent cholecystostomy and received 11 day course of iv Zosyn.  -follow up with general surgery with plan for cholecystectomy 4-6 weeks- may need drain removed prior  -drain cares and flushes- family training completed prior to dc.   -continue oral antibiotics, flagyl, cefdinir for 10 days  -oxycodone/tylenol prn pain      CLL- reported recent restart of oral chemotherapy. Also with history of hemolytic anemia. Thrombocytopenia.  Received 3 units PRBC, 3 units PLTS, and 1 unit FFP durinag hospitalization in setting of pancytopenia, 3/28 WBC 6.6, Hgb 9.2, PLT 83.  -continue ibrutinib 420 mg daily (to be held 3 days prior to cholecystectomy (when scheduled)  -monitor counts  -follow up with oncology as scheduled 4/9/2018  -repeat CBC with pcp within one week      A-Fib with RVR- noted during acute hospitalization likely precipitated by sepsis, seen and evaluated by cardiology, started on iv amiodarone with transition to po.   -continue amiodarone 200 mg bid through 3/31 then transition to 200 mg daily  -follow up with cardiology as scheduled 4/10.   -lovenox for acute DVT as above      CAD- history of CABG  -continue plavix  -continue zocor      CVA- with residual dysarthria, dysphagia, and right facial sensory changes.       History of prostate cancer- s/p prostatectomy- with chronic urinary incontinence with use of depends     Candida intertrigo-   Apply micantin  -keep skin folds clean and dry as able      Thyroid cancer s/p thyroidectomy- continue synthroid    QTc prolongation- Lexapro and Remeron discontinued during acute hospitalization with ongoing sleep issues and depressed mood. Repeat EKG 3/28 with QTc WNL    Depression/Anxiety and impaired sleep- restart lexapro 5 mg daily 3/28, restart Remeron 15 mg qhs- up titrate per pcp  -follow up with pcp within one week.      Restless leg syndrome- Scheduled Requip  qhs    dISCHARGE MEDICATIONS  Discharge Medication List as of 3/28/2018 3:55 PM     START taking these medications   Details   allopurinol (ZYLOPRIM) 300 MG tablet Take 1 tablet (300 mg) by mouth daily, Disp-30 tablet, Historical     amiodarone (PACERONE/CODARONE) 200 MG tablet 200 mg twice daily through 3/31. 200 mg daily starting 4/1, Disp-38 tablet, R-0, E-Prescribe     cefdinir (OMNICEF) 300 MG capsule Take 1 capsule (300 mg) by mouth every 12 hours, Disp-8 capsule, R-0, E-Prescribe     clopidogrel (PLAVIX) 75 MG tablet Take 1 tablet (75 mg) by mouth daily, Disp-30 tablet, Historical     enoxaparin (LOVENOX) 100 MG/ML injection Inject 0.93 mLs (93 mg) Subcutaneous every 12 hours, Disp-60 Syringe, R-0, E-Prescribe     escitalopram (LEXAPRO) 5 MG tablet Take 1 tablet (5 mg) by mouth daily, Historical     folic acid (FOLVITE) 1 MG tablet Take 1 tablet (1 mg) by mouth At Bedtime, Disp-30 tablet, Historical     ibrutinib (IMBRUVICA) 140 MG tablet Take 3 tablets (420 mg) by mouth daily, Historical     levothyroxine (SYNTHROID/LEVOTHROID) 150 MCG tablet Take 1 tablet (150 mcg) by mouth every morning (before breakfast), Disp-30 tablet, Historical     metroNIDAZOLE (FLAGYL) 500 MG tablet Take 1 tablet (500 mg) by mouth every 8 hours, Disp-13 tablet, R-0, E-Prescribe     miconazole (MICATIN) 2 % cream Apply topically 2 times dailyDisp-198 g, [id number]     mirtazapine (REMERON) 15 MG tablet Take 1 tablet (15 mg) by mouth every evening, Disp-30 tablet, R-0, E-Prescribe     omeprazole (PRILOSEC) 40 MG capsule Take 1 capsule (40 mg) by mouth 2 times daily (before meals), Disp-30 capsule, Historical     rOPINIRole (REQUIP) 0.25 MG tablet Take 1 tablet (0.25 mg) by mouth every evening, Disp-30 tablet, R-0, E-Prescribe     simvastatin (ZOCOR) 40 MG tablet Take 1 tablet (40 mg) by mouth At Bedtime, Disp-30 tablet, Historical     tiZANidine (ZANAFLEX) 2 MG tablet Take 1 tablet (2 mg) by mouth every 8 hours as needed for  muscle spasms, Disp-90 tablet, Historical     oxyCODONE (OXY-IR) 5 MG capsule Take 1 capsule (5 mg) by mouth every 8 hours as needed for moderate to severe pain, Disp-15 capsule, R-0, Local Print     Sharps Container MISC 1 Box continuous, Disp-1 each, R-0, E-Prescribe       CONTINUE these medications which have NOT CHANGED   Details   Acetaminophen (TYLENOL PO) Historical      ---------------------------------------    Patient is brought in by 2 daughters today.-I believe they are both nurses.  They voiced good understanding of follow-up needs and understanding of what happened in the hospital.  Cholecystostomy tube and bag are flushed by 1 of the nurses.    We will continues to struggle, largely with depression.  Despite his prolonged QT was placed back on low-dose Lexapro and mirtazapine.  Daughter's belief is that this has some role in depression though his wife continues to do poorly with dementia and he had a really hard time in the ICU with confusion and sleeping.  Indicated his wish that he would die soon, daughters disagreed with him pointing out hope for ongoing recovery.  .          PFSH:  Patient Active Problem List   Diagnosis     Restless Legs Syndrome     Corrosive Esophagitis     Hiatal Hernia     CLL (chronic lymphoid leukemia) in relapse     Mixed hyperlipidemia     Cerebral atherosclerosis     Adenocarcinoma Of The Prostate Gland     Papillary Carcinoma Of The Thyroid Gland     Hypothyroidism     Type 2 Diabetes Mellitus     Coronary Artery Disease     Cervical Radiculopathy     Iron Deficiency     Hypoxia     Advanced directives, counseling/discussion     Infection of ear lobe, right     Right ear pain     Benign essential HTN     Anemia     Other autoimmune hemolytic anemias     Thrombocytopenia     Squamous cell carcinoma of skin     Sepsis     Septic shock     Neutropenia associated with infection     Gram-negative bacteremia     Metabolic acidosis with respiratory acidosis     Hemorrhage      Atrial fibrillation with rapid ventricular response     Lactic acidosis     Hypocalcemia     Acute respiratory failure with hypoxia and hypercapnia     Bacteremia due to Gram-negative bacteria     Acute pulmonary edema     Abdominal pain, unspecified abdominal location     Fecal impaction     Cholecystitis     Encounter for family conference with patient present     Prolonged Q-T interval on ECG     Adjustment disorder     Current medications reviewed as follows:  Current Outpatient Prescriptions on File Prior to Visit   Medication Sig     allopurinol (ZYLOPRIM) 300 MG tablet Take 1 tablet (300 mg total) by mouth daily with breakfast.     amiodarone (PACERONE) 200 MG tablet Take 1 tablet twice daily until 3/31, then transition to 1 tablet once daily until further instruction by cardiology     cefdinir (OMNICEF) 300 MG capsule Take 1 capsule (300 mg total) by mouth 2 times a day at 6:00 am and 4:00 pm for 10 days.     clopidogrel (PLAVIX) 75 mg tablet Take 75 mg by mouth daily.     folic acid (FOLVITE) 1 MG tablet Take 1 tablet (1 mg total) by mouth daily.     ibrutinib (IMBRUVICA) 140 mg cap capsule Take 3 capsules (420 mg total) by mouth daily. Start per oncology     levothyroxine (SYNTHROID, LEVOTHROID) 150 MCG tablet Take 150 mcg by mouth Daily at 6:00 am.     metroNIDAZOLE (FLAGYL) 500 MG tablet Take 1 tablet (500 mg total) by mouth 3 (three) times a day for 8 days.     omeprazole (PRILOSEC) 40 MG capsule Take 1 capsule (40 mg total) by mouth 2 (two) times a day.     simvastatin (ZOCOR) 40 MG tablet Take 40 mg by mouth at bedtime.     oxyCODONE (ROXICODONE) 20 mg/mL concentrated solution Take 0.25-0.5 mL (5-10 mg total) by mouth every 4 (four) hours as needed for pain (moderate to severe pain).     [] predniSONE (DELTASONE) 10 mg tablet Take 1 tablet (10 mg total) by mouth daily for 7 days. Stop taking on 3/21/18     tiZANidine (ZANAFLEX) 2 MG tablet Take 1 tablet (2 mg total) by mouth every 8 (eight)  hours as needed (muscle spasms).     No current facility-administered medications on file prior to visit.      History   Smoking Status     Never Smoker   Smokeless Tobacco     Never Used     Social History     Social History Narrative     Patient Care Team:  Riley Cuevas MD as PCP - General (Family Medicine)  ROS  Full 10 system review including constitutional, respiratory, cardiac, gi, urinary, rheumatologic, neurologic, reproductive, dermatologic psychiatric is  performed (via questionnaire) and is negative except fatigue, change in vision, difficulty swallowing, shortness of breath, change in appetite, difficulty swallowing, weakness, joint pain, swelling of hands feet and ankles, leg redness abnormal bruising and bleeding        Objective  Physical Exam  Vitals:    03/30/18 0820   BP: 125/60   Pulse: 76   Resp: 20   Temp: 97.3  F (36.3  C)   TempSrc: Oral     There is no height or weight on file to calculate BMI.  Very flat affect, sitting in wheelchair, strapped up.  Multiple ecchymoses on face.  No acute distress.  Alert, appropriately responsive.  Gen- alert, oriented/ appropriately responsive  HEENT- normal cephalic, atraumatic.   Chest- Normal inspiration and expiration.  Clear to ascultation.  No chest wall deformit rhinotomy scar  CV- Heart regular rate and rhythm, normal tones, no murmurs gallops or rubs.  1+ lower extremity edema    Diagnostics:   Results for orders placed or performed in visit on 03/30/18   HM2(CBC w/o Differential)   Result Value Ref Range    WBC 10.7 4.0 - 11.0 thou/uL    RBC 3.38 (L) 4.40 - 6.20 mill/uL    Hemoglobin 11.0 (L) 14.0 - 18.0 g/dL    Hematocrit 35.7 (L) 40.0 - 54.0 %     (H) 80 - 100 fL    MCH 32.5 27.0 - 34.0 pg    MCHC 30.8 (L) 32.0 - 36.0 g/dL    RDW 21.2 (H) 11.0 - 14.5 %    Platelets 104 (L) 140 - 440 thou/uL    MPV 13.1 (H) 8.5 - 12.5 fL           Please note: Voice recognition software was used in this dictation.  It may therefore contain  typographical errors.

## 2021-06-18 ENCOUNTER — RECORDS - HEALTHEAST (OUTPATIENT)
Dept: ADMINISTRATIVE | Facility: CLINIC | Age: 86
End: 2021-06-18

## 2021-06-18 NOTE — PROGRESS NOTES
Patient here ambulatory with cane accompanied by daughter for follow up on his CLL.  Patient states he feels he is getting stronger but it is not as fast as he would like.  Continues on home care with PT/OT.  Patient had gallbladder surgery 5/10/18.  Seen by Dr. Pennington.

## 2021-06-18 NOTE — PROGRESS NOTES
[facility] Hematology and Oncology Progress Note    Patient: Nixon Velasquez Jr.  MRN: [id number]  Date of Service:         Reason for Visit    Chief Complaint   Patient presents with     HE Cancer       Assessment and Plan      1. CLL: Received single agent Rituxan for this with his last dose being August 7, 2017. relapse in February so he started imbruvica.  The only tolerated it for a couple of weeks.  He was hospitalized twice.  We have really been holding it since march.  His labs continue to look overall stable.  We will continue to keep him on observation.  We will have him return in 1 month to repeat labs.    2.  Anemia and thrombocytopenia: These are likely from his CLL.  His hemoglobin and platelet count are both slightly lower than last month but not significant.    3. Cholecystitis: had cholecystectomy on 5/10.  Quite well with this and has recovered.    4. Hx of right upper arm DVT from PICC line on 3/26/18.  He had Lovenox for 4 weeks.  His PICC line had been removed.  So we did stop anticoagulation.  No signs of blood clot since then.    ECOG Performance   ECOG Performance Status: 2     Distress Assessment  Distress Assessment Score: No distress:   Pain  Currently in Pain: No/denies  Pain Score (Initial OR Reassessment): No/Denies Pain: I did refill oxycodone, which he uses very intermittently. I think we can probably stop that going forward.       Problem List    1. 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)  HM1(CBC and Differential)   2. Other autoimmune hemolytic anemias (H)  HM1(CBC and Differential)   3. Thrombocytopenia (H)  HM1(CBC and Differential)   4. Deep vein thrombosis (DVT) of right upper extremity, unspecified chronicity, unspecified vein (H)        ______________________________________________________________________________    History of Present Illness      Diagnosis:     1. Chronic lymphocytic leukemia initially diagnosed in 2/2008. Cytogenetics showed a deletion of chromosome  "11.   2. Thyroid cancer, status post thyroidectomy and radioactive iodine ablation.   3. Prostate cancer diagnosed in 1992.  4. Skin cancers, multiple.     Treatment:  Started single agent Rituxan for his CLL on July 18, 2017.  This was initiated for worsening symptomatic anemia.  He received 4 weekly doses.  Completed on August 7, 2017.   relapse of CLL and has started Imbruvica on 2/27/18.  He probably only took it for a couple of weeks and then was back in the hospital and then he was holding it.  He then took it for a couple of days and then started holding it again around March 27, 2018. Continues to hold.         Interim History:      Nixon is here today for follow-up.  He states he is doing much better.  His appetite is better and he is gaining some weight over the last month.  His energy levels are better.  He denies any other constitutional symptoms.  He denies any fevers or infectious complaints.  Denies any shortness of breath.  Is continue to have a little bit of weakness and fatigue.    Pain Status  Currently in Pain: No/denies    Review of Systems    Constitutional  Constitutional (WDL): Exceptions to WDL  Fatigue: Fatigue relieved by rest  Weight Gain: 5 - <10% from baseline (up 9lbs since 5/22)  Eye Disorder (WDL): All eye disorder elements are within defined limits  Ear  Ear Disorder (WDL): All ear disorder elements are within defined limits  Cardiovascular  Cardiovascular (WDL): All cardiovascular elements are within defined limits  Pulmonary  Respiratory (WDL): Within Defined Limits  Gastrointestinal  Gastrointestinal (WDL): Exceptions to WDL  Constipation: Occasional or intermittent symptoms, occasional use of stool softeners, laxatives, dietary modification, or enema  Dysphagia: Symptomatic, able to eat regular diet (\"not a big deal\")  Genitourinary  Genitourinary (WDL): All genitourinary elements are within defined limits  Lymphatic  Lymph (WDL): All lymph disorder elements are within defined " limits  Musculoskeletal and Connective Tissue  Musculoskeletal and Connetive Tissue Disorders (WDL): Exceptions to WDL  Muscle Weakness : Symptomatic, perceived by patient but not evident on physical exam  Integumentary  Integumentary (WDL): Exceptions to WDL (Skin cancer removed from chest and forehead)scattered bruises and scabs.   Patient Coping  Patient Coping: Accepting  Distress Assessment  Distress Assessment Score: No distress  Accompanied by  Accompanied by: Family Member  Oral Chemo Adherence       Past History  Past Medical History:   Diagnosis Date     Acute pulmonary edema (H)      Adenocarcinoma of prostate (H)      Adjustment disorder      Anemia      Autoimmune hemolytic anemia (H)      Cataract     bilateral     Cerebral atherosclerosis      Cervical radiculopathy      Cholecystitis      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)      Coronary artery disease      Corrosive esophagitis      Diabetes mellitus (H)      DVT (deep vein thrombosis) in pregnancy (H)      Fatigue      Fecal impaction (H)      Foot fracture, right      Generalized weakness      Hiatal hernia      History of transfusion      Hx of papillary thyroid carcinoma      Hypertension      Hypocalcemia      Hypothyroidism      Hypoxia      Infection of right ear lobe      Inguinal hernia      Iron deficiency      Leukemia (H)      Mixed hyperlipidemia      Neutropenia associated with infection (H)      Paroxysmal atrial fibrillation (H)      PE (pulmonary embolism)      Pleural effusion      Pneumonia      Prostate cancer (H)      Restless legs syndrome      Skin cancer      Squamous cell carcinoma     skin     Thrombocytopenia (H)      Thyroid cancer (H)        PHYSICAL EXAM:  /61  Pulse 60  Temp 97.6  F (36.4  C) (Oral)   Wt 189 lb 9.6 oz (86 kg)  SpO2 97%  BMI 24.34 kg/m2  GENERAL: no acute distress. Cooperative in conversation. Here with family.  Walking with cane.    HEENT: pupils are equal, round and reactive.  Oromucosa is clean and intact. No ulcerations or mucositis noted. No bleeding noted.  RESP: lungs are clear bilaterally per auscultation. Regular respiratory rate. No wheezes or rhonchi.  CV: Regular, rate and rhythm. Slight systolic murmur.  ABD: soft, nontender. Positive bowel sounds. No organomegaly.   MUSCULOSKELETAL: No lower extremity swelling.   NEURO: non focal. Alert and oriented x3.   PSYCH: within normal limits. No depression or anxiety. Mentally feels better than the last couple of months.   SKIN: warm dry intact, significant bruises on arms.  Has gauze on forehead from dermatology procedure.           Lab Results  Recent Results (from the past 24 hour(s))   HM1 (CBC with Diff)   Result Value Ref Range    WBC 4.6 4.0 - 11.0 thou/uL    RBC 3.39 (L) 4.40 - 6.20 mill/uL    Hemoglobin 11.0 (L) 14.0 - 18.0 g/dL    Hematocrit 34.6 (L) 40.0 - 54.0 %     (H) 80 - 100 fL    MCH 32.4 27.0 - 34.0 pg    MCHC 31.8 (L) 32.0 - 36.0 g/dL    RDW 16.2 (H) 11.0 - 14.5 %    Platelets  140 - 440 thou/uL    MPV 11.3 8.5 - 12.5 fL   preliminary platelets are 98,000      Imaging    No results found.      Signed by: Vy Huerta, FLEX

## 2021-06-18 NOTE — PROGRESS NOTES
[facility] Hematology and Oncology Progress Note    Patient: Nixon Velasquez Jr.  MRN: [id number]  Date of Service: May 22, 2018      Assessment and Plan:    1.  CLL: His blood counts look very good today.  He has not had any CLL treatment for about 2 months.  No indication to restart at this point.  We will see him back in 4 weeks.  We will continue to monitor his counts.  We will restart improved workup if she develops worsening cytopenias.    2.  Prophylaxis: He received a Pneumovax in mid 2016.  He sees the dermatologist regularly.  He does have a history of skin cancers.    3.  Autoimmune hemolytic anemia: Finished a prednisone taper in March.  His hemoglobin has now stabilized.  No evidence of active hemolysis at this point.    ECOG Performance   ECOG Performance Status: 2    Distress Assessment  Distress Assessment Score: No distress    Pain  Currently in Pain: No/denies    Diagnosis:    1. Chronic lymphocytic leukemia initially diagnosed in 2/2008. 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  Relapse in January 2018.  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  No p53 mutation noted.    2. Thyroid cancer, status post thyroidectomy and radioactive iodine ablation.   3.  Prostate cancer diagnosed in 1992.  4.  Skin cancers, multiple.  5.  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  PICC line associated DVT upper extremity in March 2018.  Treated with a short course of Lovenox.  7.  Cholecystitis: He had a cholecystectomy on May 10, 2018.  Treatment:    Started single agent Rituxan for his CLL on July 18, 2017.  This was initiated for worsening symptomatic anemia.  He received 4 weekly doses.  Completed on August 7, 2017.  Started on ibrutinib early March 2018 due to worsening anemia and thrombocytopenia..  He was hospitalized on March 9, about a week after starting ibrutinib, with cholecystitis and sepsis.  Ibrutinib was held at that time.  Reinitiated end of March but he preferred to  hold it.    Prednisone taper for the hemolytic anemia in early 2018    Interim History:     Nixon is here today for a follow-up visit.  He was last seen a month ago.  He continues to get physical therapy at home.  His daughter says he is getting stronger every week.  He is walking with a cane.  Appetite is okay.  Overall he states that he feels weak but is improving.  He is recovering well after his cholecystectomy    Review of Systems:    Constitutional  Constitutional (WDL): Exceptions to WDL  Fatigue: Fatigue not relieved by rest - Limiting instrumental ADL  Neurosensory  Neurosensory (WDL): Exceptions to WDL  Ataxia: Moderate symptoms, limiting instrumental ADL (getting better, walks with cane)  Cardiovascular  Cardiovascular (WDL): All cardiovascular elements are within defined limits  Edema: No  Pulmonary  Respiratory (WDL): Exceptions to WDL  Dyspnea: Shortness of breath with moderate exertion  Hypoxia: None (O2 at night if needed only)  Gastrointestinal  Gastrointestinal (WDL): Exceptions to WDL  Anorexia: Loss of appetite without alteration in eating habits  Constipation: Occasional or intermittent symptoms, occasional use of stool softeners, laxatives, dietary modification, or enema  Dysphagia: Symptomatic, able to eat regular diet  Genitourinary  Genitourinary (WDL): Exceptions to WDL (has ileostomy)  Integumentary  Integumentary (WDL): Exceptions to WDL (skin cancer removed in couple places, bruises easily)  Patient Coping  Patient Coping: Accepting  Accompanied by  Accompanied by: Family Member (daughter)    Past History:    Past Medical History:   Diagnosis Date     Acute pulmonary edema      Adenocarcinoma of prostate      Adjustment disorder      Anemia      Autoimmune hemolytic anemia      Cataract     bilateral     Cerebral atherosclerosis      Cervical radiculopathy      Cholecystitis      CLL (chronic lymphoid leukemia) in relapse      Coronary artery disease      Corrosive esophagitis       "Diabetes mellitus      DVT (deep vein thrombosis) in pregnancy      Fatigue      Fecal impaction      Foot fracture, right      Generalized weakness      Hiatal hernia      History of transfusion      Hx of papillary thyroid carcinoma      Hypertension      Hypocalcemia      Hypothyroidism      Hypoxia      Infection of right ear lobe      Inguinal hernia      Iron deficiency      Leukemia      Mixed hyperlipidemia      Neutropenia associated with infection      Paroxysmal atrial fibrillation      PE (pulmonary embolism)      Pleural effusion      Pneumonia      Prostate cancer      Restless legs syndrome      Skin cancer      Squamous cell carcinoma     skin     Thrombocytopenia      Thyroid cancer      Physical Exam:    Recent Vitals 5/22/2018   Height 6' 2\"   Weight 180 lbs 13 oz   BSA (m2) 2.07 m2   /73   Pulse 66   Temp 97.9   Temp src 1   SpO2 98   Some recent data might be hidden     General: patient appears stated age of 89 y.o.. Nontoxic and in no distress.   HEENT: Head: atraumatic, normocephalic. Sclerae anicteric.  Chest:  Normal respiratory effort.  Clear to auscultation bilaterally.  No wheezing.  Cardiac:  No edema.  Regular rate and rhythm.  No murmur.  Abdomen: abdomen is soft, non-distended.    Extremities: normal tone and muscle bulk.   Skin: Scattered ecchymoses.   CNS: alert and oriented x3. Grossly non-focal.   Psychiatric: normal mood and affect.     Lab Results:    Recent Results (from the past 168 hour(s))   Comprehensive Metabolic Panel   Result Value Ref Range    Sodium 143 136 - 145 mmol/L    Potassium 4.1 3.5 - 5.0 mmol/L    Chloride 106 98 - 107 mmol/L    CO2 27 22 - 31 mmol/L    Anion Gap, Calculation 10 5 - 18 mmol/L    Glucose 139 (H) 70 - 125 mg/dL    BUN 14 8 - 28 mg/dL    Creatinine 0.77 0.70 - 1.30 mg/dL    GFR MDRD Af Amer >60 >60 mL/min/1.73m2    GFR MDRD Non Af Amer >60 >60 mL/min/1.73m2    Bilirubin, Total 0.5 0.0 - 1.0 mg/dL    Calcium 8.7 8.5 - 10.5 mg/dL    Protein, " Total 5.7 (L) 6.0 - 8.0 g/dL    Albumin 3.3 (L) 3.5 - 5.0 g/dL    Alkaline Phosphatase 73 45 - 120 U/L    AST 15 0 - 40 U/L    ALT <9 0 - 45 U/L   HM1 (CBC with Diff)   Result Value Ref Range    WBC 4.9 4.0 - 11.0 thou/uL    RBC 3.59 (L) 4.40 - 6.20 mill/uL    Hemoglobin 11.5 (L) 14.0 - 18.0 g/dL    Hematocrit 36.9 (L) 40.0 - 54.0 %     (H) 80 - 100 fL    MCH 32.0 27.0 - 34.0 pg    MCHC 31.2 (L) 32.0 - 36.0 g/dL    RDW 17.0 (H) 11.0 - 14.5 %    Platelets 129 (L) 140 - 440 thou/uL    MPV 11.4 8.5 - 12.5 fL      Imaging:    Ir Cholangiogram Exisiting Access    Result Date: 4/27/2018  [facility] 4/27/2018 11:44 AM PROCEDURE: CHOLANGIOGRAM THROUGH EXISTING CATHETER. INTERVENTIONAL RADIOLOGIST: Leoncio Rios MD. INDICATION: Cholecystitis. Status post cholecystostomy tube placement on 3/16/2018. Tube was re-inserted on 4/25/2018 due to accidental removal. Tube malfunction. MODERATE SEDATION: None FLUOROSCOPIC TIME: 0.2 minutes. RADIATION DOSE: Air Kerma: 24 mGy CONTRAST: Omnipaque 350: 20 cc COMPLICATIONS: No immediate complications. PROCEDURE/TECHNIQUE: After informed consent was obtained, contrast was injected through the indwelling 10 Thai cholecystostomy tube. The cholecystostomy tube was reconnected to gravity drainage. FINDINGS: 1. 10 Thai cholecystostomy tube with a loop formed within the gallbladder body. Rounded filling defects within the gallbladder consistent with cholelithiasis. No opacification of the cystic duct which is likely obstructed.     1.  Adequately positioned cholecystostomy tube. PLAN: Continued gravity drainage. Discontinue flushes. CPT codes included for physician reference only: 65813     Ir Cholecystostomy    Result Date: 4/25/2018  [facility] PROCEDURE: PERCUTANEOUS CHOLECYSTOSTOMY PLACEMENT. ATTENDING: Errol Britt MD. INDICATION: 88-year-old male with cholecystitis. The existing cholecystostomy has fallen out. MODERATE SEDATION: None. ANTIBIOTICS: Ancef 2 g  IV. ADDITIONAL MEDICATIONS: 1% lidocaine for local anesthesia. FLUOROSCOPIC TIME: 2 minutes. AIR KERMA:  72 mGy. CONTRAST: 5 mL Omnipaque into the gallbladder lumen. COMPLICATIONS: No immediate complications. PROCEDURE:  Attempts were made with a catheter and Glidewire to resecure access through the previous cholecystostomy tract. These were unsuccessful. Using real-time sonographic guidance an 18-gauge trocar needle was utilized to gain percutaneous access into the gallbladder lumen. Endoluminal positioning was confirmed with an injection of contrast. Over wire exchange was then made for a 10 Kuwaiti locking loop cholecystostomy. The loop was formed within the gallbladder lumen and it was attached to gravity drainage. FINDINGS: On physical examination the existing cholecystostomy had completely fallen out. Images obtained during attempted reinsertion show inability to resecure access into the gallbladder lumen. Ultrasound demonstrates a moderately distended gallbladder. The cholecystogram shows access into the gallbladder lumen. The cystic duct is patent with contrast passing through a nondilated common bile duct into the post ampullary small bowel. The completion image shows the cholecystostomy in the gallbladder lumen.     1.  Successful percutaneous cholecystostomy placement, as detailed above. 2.  The cystic duct and common bile duct appear to be patent on today's study. ____________________________________________________________________ CPT codes for physician reference only: 64481       Signed by: Mushtaq Pennington MD

## 2021-06-19 NOTE — PROGRESS NOTES
[facility] Hematology and Oncology Progress Note    Patient: Nixon Velasquez Jr.  MRN: [id number]  Date of Service:  July 18, 2018      Assessment and Plan:    1.  CLL: Counts look good.  Stable.  We will continue to monitor.  We will not resume ibrutinib until clinical indication arises.  Return to clinic in 2 months.    2.  Prophylaxis: He received a Pneumovax in mid 2016.  He sees the dermatologist regularly.  He does have a history of skin cancers.    3.  Autoimmune hemolytic anemia: Finished a prednisone taper in March.  His hemoglobin has now stabilized.  No evidence of active hemolysis at this point.    ECOG Performance   ECOG Performance Status: 1    Distress Assessment  Distress Assessment Score: No distress    Pain  Currently in Pain: Yes  Pain Score (Initial OR Reassessment): 8  Location: hand    Diagnosis:    1. Chronic lymphocytic leukemia initially diagnosed in 2/2008. 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  Relapse in January 2018.  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  No p53 mutation noted.    2. Thyroid cancer, status post thyroidectomy and radioactive iodine ablation.   3.  Prostate cancer diagnosed in 1992.  4.  Skin cancers, multiple.  5.  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  PICC line associated DVT upper extremity in March 2018.  Treated with a short course of Lovenox.  7.  Cholecystitis: He had a cholecystectomy on May 10, 2018.    Treatment:    Started single agent Rituxan for his CLL on July 18, 2017.  This was initiated for worsening symptomatic anemia.  He received 4 weekly doses.  Completed on August 7, 2017.    Started on ibrutinib early March 2018 due to worsening anemia and thrombocytopenia..  He was hospitalized on March 9, about a week after starting ibrutinib, with cholecystitis and sepsis.  Ibrutinib was held at that time.  Reinitiated end of March but he preferred to hold it.    Prednisone taper for the hemolytic anemia in early  2018    Interim History:     Nixon is here today for a follow-up visit.  He was seen about a month ago.  In the interim things have been slowly improving.  He has recently had some skin cancers removed and has a large lesion on the dorsum of the left hand.  He is back at his cabin now in [place].  Energy and appetite are not bad.  No bleeding or bruising.  No fevers, chills, night sweats.    Review of Systems:    Constitutional  Fatigue: Fatigue relieved by rest  Neurosensory  Neurosensory (WDL): All neurosensory elements are within defined limits  Cardiovascular  Cardiovascular (WDL): All cardiovascular elements are within defined limits  Pulmonary  Respiratory (WDL): Within Defined Limits  Gastrointestinal  Gastrointestinal (WDL): All gastrointestinal elements are within defined limits  Genitourinary  Genitourinary (WDL): All genitourinary elements are within defined limits  Integumentary  Integumentary (WDL): All integumentary elements are within defined limits (3 healing incisions from skin cancer removal)  Patient Coping  Patient Coping: Accepting  Accompanied by  Accompanied by: Family Member    Past History:    Past Medical History:   Diagnosis Date     Acute pulmonary edema (H)      Adenocarcinoma of prostate (H)      Adjustment disorder      Anemia      Autoimmune hemolytic anemia (H)      Cataract     bilateral     Cerebral atherosclerosis      Cervical radiculopathy      Cholecystitis      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)      Coronary artery disease      Corrosive esophagitis      Diabetes mellitus (H)      DVT (deep vein thrombosis) in pregnancy (H)      Fatigue      Fecal impaction (H)      Foot fracture, right      Generalized weakness      Hiatal hernia      History of transfusion      Hx of papillary thyroid carcinoma      Hypertension      Hypocalcemia      Hypothyroidism      Hypoxia      Infection of right ear lobe      Inguinal hernia      Iron deficiency      Leukemia (H)       Mixed hyperlipidemia      Neutropenia associated with infection (H)      Paroxysmal atrial fibrillation (H)      PE (pulmonary embolism)      Pleural effusion      Pneumonia      Prostate cancer (H)      Restless legs syndrome      Skin cancer      Squamous cell carcinoma     skin     Stroke (H)      Thrombocytopenia (H)      Thyroid cancer (H)      Physical Exam:    Recent Vitals 7/18/2018   Height -   Weight 193 lbs 10 oz   BSA (m2) -   /58   Pulse 57   Temp 97.8   Temp src 1   SpO2 90   Some recent data might be hidden     General: patient appears stated age of 89 y.o.. Nontoxic and in no distress.   HEENT: Head: atraumatic, normocephalic. Sclerae anicteric.  Chest:  Normal respiratory effort.    Cardiac:  No edema.    Abdomen: abdomen is soft, non-distended.    Extremities: normal tone and muscle bulk.   Skin: Scattered ecchymoses.  Recent skin cancer resection.  Left hand is bandaged.  CNS: alert and oriented. Grossly non-focal.   Psychiatric: normal mood and affect.     Lab Results:    Recent Results (from the past 168 hour(s))   HM1 (CBC with Diff)   Result Value Ref Range    WBC 6.6 4.0 - 11.0 thou/uL    RBC 3.58 (L) 4.40 - 6.20 mill/uL    Hemoglobin 11.7 (L) 14.0 - 18.0 g/dL    Hematocrit 36.4 (L) 40.0 - 54.0 %     (H) 80 - 100 fL    MCH 32.7 27.0 - 34.0 pg    MCHC 32.1 32.0 - 36.0 g/dL    RDW 16.8 (H) 11.0 - 14.5 %    Platelets 99 (L) 140 - 440 thou/uL    MPV 11.5 8.5 - 12.5 fL    Neutrophils % 52 50 - 70 %    Lymphocytes % 41 (H) 20 - 40 %    Monocytes % 6 2 - 10 %    Eosinophils % 1 0 - 6 %    Basophils % 0 0 - 2 %    Neutrophils Absolute 3.4 2.0 - 7.7 thou/uL    Lymphocytes Absolute 2.7 0.8 - 4.4 thou/uL    Monocytes Absolute 0.4 0.0 - 0.9 thou/uL    Eosinophils Absolute 0.1 0.0 - 0.4 thou/uL    Basophils Absolute 0.0 0.0 - 0.2 thou/uL      Imaging:    No results found.      Signed by: Mushtaq Pennington MD

## 2021-06-20 NOTE — LETTER
Letter by Riley Cuevas MD at      Author: Riley Cuevas MD Service: -- Author Type: --    Filed:  Encounter Date: 7/7/2020 Status: (Other)         Nixon Velasquez Jr.  [address] [place] [address]             July 7, 2020        Dear Mr. Velasquez,    Below are the results from your recent visit:    No results found from the In Basket message.        Please call with questions or contact us using [employer].    Sincerely,        Electronically signed by Riley Cuevas MD

## 2021-06-20 NOTE — PROGRESS NOTES
Assessment/ Plan  Problem List Items Addressed This Visit        High    Cerebral atherosclerosis - Primary     Secondary prevention.  Clopidogrel, blood pressure controlled, atorvastatin 20, no changes         Coronary Artery Disease       Unprioritized    Squamous cell carcinoma of skin     Multiple, currently large cancer removed from left arm with open sores, skin grafting, [facility]         Paroxysmal atrial fibrillation (H)     Those with cardiology.  Asking me today permission to get off of amiodarone for paroxysmal atrial fibrillation.    Reviewed cardiology CNP's most recent note.  She felt that patient should continue on medication due to symptoms with the problem and the likelihood that he will have atrial fibrillation again based on his age.    I tend to agree with the patient that risks may outweigh benefit in this case.  I am concerned about multiple medication interactions, patient's inability to deal with potential consequences of medication side effects given his comorbidity.  I agree with his plea to stop this.  Will notify cardiology of this decision.    Today, patient is almost certainly a normal sinus rhythm on exam -continue clopidogrel for stroke prevention due to inability to tolerate warfarin due to bleeding         Adjustment disorder     Previous profound depression near the time of his illness and loss of his wife.  Taking mirtazapine 45 and Lexapro 5, much much better today.  We will plan to continue these for a minimum of 6 months and more likely a year, possibly indefinitely         Healthcare maintenance     Shot given today           Flu shot  Subjective  CC:  Chief Complaint   Patient presents with     Follow-up     Cardiology - Pt. wants off of amirodarone, wants to take requip PRN and want back on Plavix     HPI:  Wants off amiodarone  Reviewed concerns with patient.  Reviewed cardiology discussion with patient.  Spoke with his daughter who  is in support of his wishes.    Want to keep requip as prn.  Apparently Dr. Pennington had suggested we stop this medication.  Having much less discomfort than he used to but would like to keep the medication to use as an as-needed basis.  Has problems with restless legs when he drinks milk      Depression is much better.  So much so that he and his family are planning to go to [place] [employer] this winter.  Needs to be able to walk a fair distance before he can.    Discussed his multiple skin cancers, removal by Dr. Singh, large open areas on his skin which they are using some sort of fetal or amniotic tissue to replace skin grafts    PFSH:  Patient Active Problem List   Diagnosis     Restless Legs Syndrome     Corrosive Esophagitis     Hiatal Hernia     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)     Mixed hyperlipidemia     Cerebral atherosclerosis     Adenocarcinoma Of The Prostate Gland     Papillary Carcinoma Of The Thyroid Gland     Hypothyroidism     Type 2 Diabetes Mellitus     Coronary Artery Disease     Cervical Radiculopathy     Iron Deficiency     Advanced directives, counseling/discussion     Infection of ear lobe, right     Right ear pain     Benign essential HTN     Anemia     Other autoimmune hemolytic anemias (H)     Thrombocytopenia (H)     Squamous cell carcinoma of skin     Hemorrhage     Paroxysmal atrial fibrillation (H)     Hypocalcemia     Acute pulmonary edema (H)     Abdominal pain, unspecified abdominal location     Fecal impaction (H)     Cholecystitis     Encounter for family conference with patient present     Adjustment disorder     Fatigue     Near syncope     Generalized weakness     Dehydration     Diarrhea     Pleural effusion     Deep vein thrombosis (DVT) of upper extremity (H)     Contraindication to anticoagulation therapy     Mild aortic stenosis     Healthcare maintenance     Current medications reviewed as follows:  Current Outpatient Prescriptions on File Prior to Visit    Medication Sig     acetaminophen (TYLENOL) 500 MG tablet Take 1,000 mg by mouth every 6 (six) hours as needed for pain.     allopurinol (ZYLOPRIM) 300 MG tablet TAKE ONE TABLET BY MOUTH EVERY DAY WITH BREAKFAST     atorvastatin (LIPITOR) 20 MG tablet TAKE ONE TABLET BY MOUTH AT BEDTIME     clopidogrel (PLAVIX) 75 mg tablet TAKE ONE TABLET BY MOUTH EVERY DAY     escitalopram oxalate (LEXAPRO) 5 MG tablet Take 5 mg by mouth daily.      folic acid (FOLVITE) 1 MG tablet TAKE ONE TABLET BY MOUTH EVERY DAY     levothyroxine (SYNTHROID, LEVOTHROID) 150 MCG tablet Take 150 mcg by mouth Daily at 6:00 am.     miconazole (MICOTIN) 2 % cream Apply 1 application topically 2 (two) times a day as needed.     mirtazapine (REMERON) 45 MG tablet Take 1 tablet (45 mg total) by mouth at bedtime.     mupirocin (BACTROBAN) 2 % ointment APPLY TO AFFECTED AREA ONCE DAILY.     omeprazole (PRILOSEC) 40 MG capsule TAKE ONE CAPSULE BY MOUTH TWICE A DAY     rOPINIRole (REQUIP) 0.25 MG tablet Take 1 tablet (0.25 mg total) by mouth at bedtime.     triamcinolone (KENALOG) 0.1 % cream Apply 1 application topically 2 (two) times a day as needed.     [DISCONTINUED] amiodarone (PACERONE) 200 MG tablet TAKE ONE TABLET BY MOUTH EVERY DAY     [DISCONTINUED] oxyCODONE (ROXICODONE) 5 MG immediate release tablet Take 1 tablet (5 mg total) by mouth every 8 (eight) hours as needed for pain.     No current facility-administered medications on file prior to visit.      History   Smoking Status     Never Smoker   Smokeless Tobacco     Never Used     Social History     Social History Narrative     Patient Care Team:  Riley Cuevas MD as PCP - General (Family Medicine)  ROS  Full 10 system review including constitutional, respiratory, cardiac, gi, urinary, rheumatologic, neurologic, reproductive, dermatologic psychiatric is  performed  and is otherwise negative         Objective  Physical Exam  Vitals:    09/21/18 0833   BP: 102/46   Patient Site: Right  Arm   Patient Position: Sitting   Cuff Size: Adult Regular   Pulse: (!) 56   Resp: 20   Temp: 97.1  F (36.2  C)   TempSrc: Oral   Weight: 202 lb 12 oz (92 kg)     Body mass index is 26.03 kg/(m^2).  Patient is animated, pleasant, smiling.  No acute distress.  Multiple scars on his face from surgical risk a section of squamous cell tumor  Ecchymoses on forearms as before  Regular heart rate, no murmurs, gallops, rubs.  Minimal lower extremity edema  Diagnostics:   None      Please note: Voice recognition software was used in this dictation.  It may therefore contain typographical errors.

## 2021-06-20 NOTE — PROGRESS NOTES
[facility] Hematology and Oncology Progress Note    Patient: Nixon Velasquez Jr.  MRN: [id number]  Date of Service:         Reason for Visit    Chief Complaint   Patient presents with     HE Cancer     CLL       Assessment and Plan      1. CLL: Received single agent Rituxan for this with his last dose being August 7, 2017. relapse in February so he started imbruvica.  The only tolerated it for a couple of weeks.  He was hospitalized twice.  We have really been holding it since march 2018.  His labs continue to look overall stable.  We will continue to keep him on observation.  We will have him return in 3 month to repeat labs.    2.  Anemia and thrombocytopenia: These are likely from his CLL.  His hemoglobin and platelet count are stable overall    3. Mild bradycardia: asymptomatic. Pt would like to get off amiodarone. Will meet with cardiology next month.     ECOG Performance   ECOG Performance Status: 1     Distress Assessment  Distress Assessment Score: No distress:   Pain  Currently in Pain: No/denies:       Problem List    1. CLL (chronic lymphocytic leukemia) (H)  HM1(CBC and Differential)      ______________________________________________________________________________    History of Present Illness      Diagnosis:     1. Chronic lymphocytic leukemia initially diagnosed in 2/2008. 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   2. Thyroid cancer, status post thyroidectomy and radioactive iodine ablation.   3. Prostate cancer diagnosed in 1992.  4. Skin cancers, multiple.     Treatment:  Started single agent Rituxan for his CLL on July 18, 2017.  This was initiated for worsening symptomatic anemia.  He received 4 weekly doses.  Completed on August 7, 2017.   relapse of CLL and has started Imbruvica on 2/27/18.  He probably only took it for a couple of weeks and then was back in the hospital and then he was holding it.  He then took it for a couple of days and then started holding it again around March 27,  2018. Continues to hold.         Interim History:      Nixon is here today for follow-up.  He states he is doing much better.  His appetite is better.  His energy levels are better.  He denies any other constitutional symptoms.  He denies any fevers or infectious complaints.  Denies any shortness of breath.  Is continue to have a little bit of weakness and fatigue.    Pain Status  Currently in Pain: No/denies    Review of Systems    Constitutional  Constitutional (WDL): All constitutional elements are within defined limits  Weight Gain: 5 - <10% from baseline (8#)  Eye Disorder (WDL): All eye disorder elements are within defined limits  Ear  Ear Disorder (WDL): All ear disorder elements are within defined limits  Cardiovascular  Cardiovascular (WDL): All cardiovascular elements are within defined limits  Pulmonary  Respiratory (WDL): Exceptions to WDL  Cough: Mild symptoms, nonprescription intervention indicated  Gastrointestinal  Gastrointestinal (WDL): All gastrointestinal elements are within defined limits  Genitourinary  Genitourinary (WDL): All genitourinary elements are within defined limits  Lymphatic  Lymph (WDL): All lymph disorder elements are within defined limits  Musculoskeletal and Connective Tissue  Musculoskeletal and Connetive Tissue Disorders (WDL): All Musculoskeletal and Connetive Tissue Disorder elements are within defined limits  Integumentary  Rash Maculo-Papular: Macules/papules covering <10% BSA with or without symptoms (e.g., pruritus, burning, tightness) (irritaions on arms, uses creams prescribed)scattered bruises and scabs.   Patient Coping  Patient Coping: Accepting  Distress Assessment  Distress Assessment Score: No distress  Accompanied by  Accompanied by: Family Member (son)  Oral Chemo Adherence       Past History  Past Medical History:   Diagnosis Date     Acute pulmonary edema (H)      Adenocarcinoma of prostate (H)      Adjustment disorder      Anemia      Autoimmune hemolytic  anemia (H)      Cataract     bilateral     Cerebral atherosclerosis      Cervical radiculopathy      Cholecystitis      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)      Coronary artery disease      Corrosive esophagitis      Diabetes mellitus (H)      DVT (deep vein thrombosis) in pregnancy (H)      Fatigue      Fecal impaction (H)      Foot fracture, right      Generalized weakness      Hiatal hernia      History of transfusion      Hx of papillary thyroid carcinoma      Hypertension      Hypocalcemia      Hypothyroidism      Hypoxia      Infection of right ear lobe      Inguinal hernia      Iron deficiency      Leukemia (H)      Mixed hyperlipidemia      Neutropenia associated with infection (H)      Paroxysmal atrial fibrillation (H)      PE (pulmonary embolism)      Pleural effusion      Pneumonia      Prostate cancer (H)      Restless legs syndrome      Skin cancer      Squamous cell carcinoma     skin     Stroke (H)      Thrombocytopenia (H)      Thyroid cancer (H)        PHYSICAL EXAM:  /55  Pulse (!) 48  Temp 97.6  F (36.4  C) (Oral)   Wt 201 lb (91.2 kg)  SpO2 98%  BMI 25.81 kg/m2  GENERAL: no acute distress. Cooperative in conversation. Here with family.   HEENT: no ulcerations or mucositis noted. No bleeding noted.  RESP: lungs are clear bilaterally per auscultation. Regular respiratory rate. No wheezes or rhonchi.  CV: Regular, rate and rhythm. Slight systolic murmur.  ABD: soft, nontender. Positive bowel sounds. No organomegaly.   MUSCULOSKELETAL: No lower extremity swelling.   NEURO: non focal. Alert and oriented x3.   PSYCH: within normal limits. No depression or anxiety. Mentally feels better than the last couple of months.   SKIN: warm dry intact, some bruises on arms.  Has scar on forehead from dermatology procedure.           Lab Results  Recent Results (from the past 24 hour(s))   HM1 (CBC with Diff)   Result Value Ref Range    WBC 5.5 4.0 - 11.0 thou/uL    RBC 3.45 (L) 4.40 - 6.20  mill/uL    Hemoglobin 11.8 (L) 14.0 - 18.0 g/dL    Hematocrit 35.7 (L) 40.0 - 54.0 %     (H) 80 - 100 fL    MCH 34.2 (H) 27.0 - 34.0 pg    MCHC 33.1 32.0 - 36.0 g/dL    RDW 16.3 (H) 11.0 - 14.5 %    Platelets  140 - 440 thou/uL    MPV 12.2 8.5 - 12.5 fL    Neutrophils % 41 (L) 50 - 70 %    Lymphocytes % 48 (H) 20 - 40 %    Monocytes % 10 2 - 10 %    Eosinophils % 1 0 - 6 %    Basophils % 0 0 - 2 %    Neutrophils Absolute 2.2 2.0 - 7.7 thou/uL    Lymphocytes Absolute 2.6 0.8 - 4.4 thou/uL    Monocytes Absolute 0.6 0.0 - 0.9 thou/uL    Eosinophils Absolute 0.0 0.0 - 0.4 thou/uL    Basophils Absolute 0.0 0.0 - 0.2 thou/uL   preliminary platelets are 92,000      Imaging    No results found.      Signed by: Vy Huerta, CNP

## 2021-06-20 NOTE — LETTER
Letter by Brooke Wilks RN at      Author: Brooke Wilks RN Service: -- Author Type: --    Filed:  Encounter Date: 5/18/2020 Status: (Other)       5/18/2020        Nixon Velasquez JrRosie  [address] [place] [address]    COVID-19 Antibody Screen   Date Value Ref Range Status   05/15/2020 Negative  Final     Comment:     No COVID-19 antibodies detected.  Patients within 10 days of symptom onset for  COVID-19 may not produce sufficient levels of detectable antibodies.  Immunocompromised COVID-19 patients may take longer to develop antibodies.       You have tested NEGATIVE for COVID-19 antibodies. This suggests you have not had or been exposed to COVID-19. But it does not mean that for sure.    The test finds antibodies in most people 10 days after they get sick. For some people, it takes longer than 10 days for antibodies to show up. Others may never show antibodies against COVID-19, especially if they have weak immune systems.    If you have COVID-19 symptoms now, please stay home and away from others.     Your current symptoms may or may not be COVID-19.     What is antibody testing?  This is a kind of blood test. We take a small sample of your blood, and then test it for something called antibodies.   Your body makes antibodies to fight infection. If your blood has antibodies for a certain germ, it means youve been infected with that germ in the past.   Sometimes, antibodies stay in your body for years after youve had the infection. They can be there even if the germ didnt make you sick. They are a sign that your body fought off the infection.  Will this test find antibodies in everyone whos had COVID-19?  No. The test finds antibodies in most people 10 days after they get sick. For some people, it takes longer than 10 days for antibodies to show up. Others may never show antibodies against COVID-19, especially if they have weak immune systems.  What are the signs of COVID-19?  Signs of COVID-19  can appear from 2 to 14 days (up to 2 weeks) after youre infected. Some people have no symptoms or only mild symptoms. Others get very sick. The most common symptoms are:      Cough    Shortness of breath or trouble breathing    Or at least 2 of these symptoms:      Fever    Chills    Repeated shaking with chills    Muscle pain    Headache    Sore throat    Losing your sense of taste or smell    You may have other symptoms. Please contact your doctor or clinic for any symptoms that worry you.    Where can I get more information?     To learn the [facility] guidelines for staying home, please visit the [place] Department of Health website at [email or web address]    To learn more about COVID-19 and how to care for yourself at home, please visit the CDC website at https://www.cdc.gov/coronavirus/2019-ncov/about/steps-when-sick.html    For more options for care at [facility], please visit our website at [email or web address]    [facility] ([facility]) COVID-19 Hotline:  [phone]      You have tested NEGATIVE for COVID-19 antibodies. This suggests you have not had or been exposed to COVID-19. But it does not mean that for sure.   The test finds antibodies in most people 10 days after they get sick. For some people, it takes longer than 10 days for antibodies to show up. Others may never show antibodies against COVID-19, especially if they have weak immune systems.  If you have COVID-19 symptoms now, please stay home and away from others.   What is antibody testing?  This is a kind of blood test. We take a small sample of your blood, and then test it for something called antibodies.   Your body makes antibodies to fight infection. If your blood has antibodies for a certain germ, it means youve been infected with that germ in the past.   Sometimes, antibodies stay in your body for years after youve had the infection. They can be there even if  the germ didnt make you sick. They are a sign that your body fought off the infection.  Will this test find antibodies in everyone whos had COVID-19?  No. The test finds antibodies in most people 10 days after they get sick. For some people, it takes longer than 10 days for antibodies to show up. Others may never show antibodies against COVID-19, especially if they have weak immune systems.  What does it mean if the test finds COVID-19 antibodies?  If we find these antibodies, it suggests:     This person has had the virus.     Their bodys immune system fought the virus.   We dont know if this will help protect someone from getting COVID-19 again. Scientists are still learning about this.  What are the signs of COVID-19?  Signs of COVID-19 can appear from 2 to 14 days (up to 2 weeks) after youre infected. Some people have no symptoms or only mild symptoms. Others get very sick. The most common symptoms are:    Cough    Shortness of breath or trouble breathing      Or at least 2 of these symptoms:      Fever    Chills    Repeated shaking with chills    Muscle pain    Headache    Sore throat    Losing your sense of taste or smell    You may have other symptoms. Please contact your doctor or clinic for any symptoms that worry you.    Where can I get more information?     To learn the [facility] guidelines for staying home, please visit the [place] Department of Health website at [email or web address]    To learn more about COVID-19 and how to care for yourself at home, please visit the CDC website at https://www.cdc.gov/coronavirus/2019-ncov/about/steps-when-sick.html    For more options for care at [facility], please visit our website at [email or web address]    [facility] ([facility]) COVID-19 Hotline:  [phone]

## 2021-06-22 NOTE — PROGRESS NOTES
Nixon came in today as he was having dizziness, fatigue and weakness over the past 2-3 weeks.  He is due to see Dr Pennington next week so we sherice those labs today.  Per Dr Pennington, his counts have not looked this good in quite some time.  No need for any transfusions today and we do not need to draw labs next week.  If he continues to feel like this, Dr Pennington does not believe it is related to why we see him.  He should see his PCP or cardiologist if this continues. He and his daughter verbalized understanding.    Hien Gill RN

## 2021-06-22 NOTE — PROGRESS NOTES
[facility] Hematology and Oncology Progress Note    Patient: Nixon Velasquez Jr.  MRN: [id number]  Date of Service: December 11, 2018      Assessment and Plan:    1.  CLL: He has not been on active treatment for the CLL since the end of March of this year.  His counts look very good.  Lymphocyte count is normal.  We will continue to hold treatment until he has a clinical indication.    2.  Prophylaxis: He received a Pneumovax in mid 2016.  He sees the dermatologist regularly.  He does have a history of skin cancers.    3.  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    4.  History of thyroid cancer: TSH is a little high today.  Will recheck in his next visit.  No adjustments made to levothyroxine dose.    ECOG Performance   ECOG Performance Status: 1    Distress Assessment  Distress Assessment Score: 4    Pain  Currently in Pain: No/denies    Diagnosis:    1. Chronic lymphocytic leukemia initially diagnosed in 2/2008. 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  Relapse in January 2018.  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  No p53 mutation noted.    2. Thyroid cancer, status post thyroidectomy and radioactive iodine ablation.   3.  Prostate cancer diagnosed in 1992.  4.  Skin cancers, multiple.  5.  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  PICC line associated DVT upper extremity in March 2018.  Treated with a short course of Lovenox.  7.  Cholecystitis: He had a cholecystectomy on May 10, 2018.    Treatment:    Started single agent Rituxan for his CLL on July 18, 2017.  This was initiated for worsening symptomatic anemia.  He received 4 weekly doses.  Completed on August 7, 2017.    Started on ibrutinib early March 2018 due to worsening anemia and thrombocytopenia..  He was hospitalized on March 9, about a week after starting ibrutinib, with cholecystitis and sepsis.  Ibrutinib was held at that time.  Reinitiated end of March but he preferred to hold  it.    Prednisone taper for the hemolytic anemia in early 2018    Interim History:     Nixon is here today for a follow-up visit.  He was seen about 3 months ago.  In the interim is been doing okay.  Having some fatigue.  Sleeping and eating okay.  Is not walking as much as he used to.  Denies pain, shortness of breath, or palpitations.  He will be going to [place] in a few weeks.     Review of Systems:    Constitutional  Constitutional (WDL): Exceptions to WDL  Fatigue: Fatigue not relieved by rest - Limiting instrumental ADL  Neurosensory  Neurosensory (WDL): All neurosensory elements are within defined limits  Cardiovascular  Cardiovascular (WDL): All cardiovascular elements are within defined limits  Pulmonary  Respiratory (WDL): Within Defined Limits  Gastrointestinal  Gastrointestinal (WDL): All gastrointestinal elements are within defined limits  Genitourinary  Genitourinary (WDL): All genitourinary elements are within defined limits  Integumentary  Integumentary (WDL): Exceptions to WDL(skin cancer on hands and amrs)  Patient Coping  Patient Coping: Accepting  Accompanied by  Accompanied by: Family Member    Past History:    Past Medical History:   Diagnosis Date     Acute pulmonary edema (H)      Adenocarcinoma of prostate (H)      Adjustment disorder      Anemia      Autoimmune hemolytic anemia (H)      Cataract     bilateral     Cerebral atherosclerosis      Cervical radiculopathy      Cholecystitis      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)      Coronary artery disease      Corrosive esophagitis      Diabetes mellitus (H)      DVT (deep vein thrombosis) in pregnancy (H)      Fatigue      Fecal impaction (H)      Foot fracture, right      Generalized weakness      Hiatal hernia      History of transfusion      Hx of papillary thyroid carcinoma      Hypertension      Hypocalcemia      Hypothyroidism      Hypoxia      Infection of right ear lobe      Inguinal hernia      Iron deficiency      Leukemia  (H)      Mixed hyperlipidemia      Neutropenia associated with infection (H)      Paroxysmal atrial fibrillation (H)      PE (pulmonary embolism)      Pleural effusion      Pneumonia      Prostate cancer (H)      Restless legs syndrome      Skin cancer      Squamous cell carcinoma     skin     Stroke (H)      Thrombocytopenia (H)      Thyroid cancer (H)      Physical Exam:    Recent Vitals 12/11/2018   Height -   Weight 206 lbs   BSA (m2) 2.21 m2   /63   Pulse 70   Temp 97.9   Temp src 1   SpO2 97   Some recent data might be hidden     General: patient appears stated age of 89 y.o.. Nontoxic and in no distress.   HEENT: Head: atraumatic, normocephalic. Sclerae anicteric.  Chest:  Normal respiratory effort.    Cardiac:  No edema.    Abdomen: abdomen is non-distended.    Extremities: normal tone and muscle bulk.   Skin: Scattered ecchymoses.   CNS: alert and oriented. Grossly non-focal.   Psychiatric: normal mood and affect.     Lab Results:    Recent Results (from the past 168 hour(s))   HM1 (CBC with Diff)   Result Value Ref Range    WBC 7.5 4.0 - 11.0 thou/uL    RBC 3.80 (L) 4.40 - 6.20 mill/uL    Hemoglobin 13.2 (L) 14.0 - 18.0 g/dL    Hematocrit 40.4 40.0 - 54.0 %     (H) 80 - 100 fL    MCH 34.7 (H) 27.0 - 34.0 pg    MCHC 32.7 32.0 - 36.0 g/dL    RDW 15.4 (H) 11.0 - 14.5 %    Platelets 104 (L) 140 - 440 thou/uL    MPV 11.9 8.5 - 12.5 fL    Neutrophils % 41 (L) 50 - 70 %    Lymphocytes % 52 (H) 20 - 40 %    Monocytes % 7 2 - 10 %    Eosinophils % 1 0 - 6 %    Basophils % 0 0 - 2 %    Neutrophils Absolute 3.0 2.0 - 7.7 thou/uL    Lymphocytes Absolute 3.9 0.8 - 4.4 thou/uL    Monocytes Absolute 0.5 0.0 - 0.9 thou/uL    Eosinophils Absolute 0.0 0.0 - 0.4 thou/uL    Basophils Absolute 0.0 0.0 - 0.2 thou/uL      Imaging:    No results found.      Signed by: Mushtaq Pennington MD

## 2021-06-23 NOTE — TELEPHONE ENCOUNTER
RN cannot approve Refill Request    RN can NOT refill this medication historical medication requested.     Batool Mccall, [facility] Triage/Med Refill 1/17/2019    Requested Prescriptions   Pending Prescriptions Disp Refills     levothyroxine (SYNTHROID, LEVOTHROID) 150 MCG tablet [Pharmacy Med Name: LEVOTHYROXINE SODIUM 150MCG TABS] 90 tablet 3     Sig: TAKE ONE TABLET BY MOUTH EVERY MORNING    Thyroid Hormones Protocol Passed - 1/17/2019  4:36 AM       Passed - Provider visit in past 12 months or next 3 months    Last office visit with prescriber/PCP: 9/21/2018 Riley Cuevas MD OR same dept: 9/21/2018 Riley Cuevas MD OR same specialty: 9/21/2018 Riley Cuevas MD  Last physical: 5/2/2018 Last MTM visit: Visit date not found   Next visit within 3 mo: Visit date not found  Next physical within 3 mo: Visit date not found  Prescriber OR PCP: Riley Cuevas MD  Last diagnosis associated with med order: 1. Hypothyroidism  - levothyroxine (SYNTHROID, LEVOTHROID) 150 MCG tablet [Pharmacy Med Name: LEVOTHYROXINE SODIUM 150MCG TABS]; TAKE ONE TABLET BY MOUTH EVERY MORNING  Dispense: 90 tablet; Refill: 3    If protocol passes may refill for 12 months if within 3 months of last provider visit (or a total of 15 months).            Passed - TSH on file in past 12 months for patient age 12 & older    TSH   Date Value Ref Range Status   12/11/2018 9.78 (H) 0.30 - 5.00 uIU/mL Final

## 2021-06-24 NOTE — TELEPHONE ENCOUNTER
Refill Approved    Rx renewed per Medication Renewal Policy. Medication was last renewed on 8/23/2018 with 5 refills.  Last office visit: 9/21/2018 with PCP Dr JOLYNN Cuevas.     Maria T Heath, [facility] Triage/Med Refill 3/2/2019     Requested Prescriptions   Pending Prescriptions Disp Refills     folic acid (FOLVITE) 1 MG tablet [Pharmacy Med Name: FOLIC ACID 1MG TABS] 30 tablet 5     Sig: TAKE ONE TABLET BY MOUTH EVERY DAY    There is no refill protocol information for this order

## 2021-06-24 NOTE — TELEPHONE ENCOUNTER
RN cannot approve Refill Request    RN can NOT refill this medication historical medication requested. Last office visit: 4/11/2016 Maral Cadet MD Last Physical: Visit date not found Last MTM visit: Visit date not found Last visit same specialty: 9/21/2018 Riley Cuevas MD.  Next visit within 3 mo: Visit date not found  Next physical within 3 mo: Visit date not found      Bri Braxton, [facility] Connection Triage/Med Refill 2/20/2019    Requested Prescriptions   Pending Prescriptions Disp Refills     escitalopram oxalate (LEXAPRO) 5 MG tablet [Pharmacy Med Name: ESCITALOPRAM OXALATE 5MG TABS] 30 tablet 6     Sig: TAKE 1 TABLET BY MOUTH EVERY DAY    [facility] Refill Protocol  Passed - 2/18/2019  8:42 PM       Passed - PCP or prescribing provider visit in last year    Last office visit with prescriber/PCP: 4/11/2016 Maral Cadet MD OR same dept: 9/21/2018 Riley Cuevas MD OR same specialty: 9/21/2018 Riley Cuevas MD  Last physical: Visit date not found Last MTM visit: Visit date not found   Next visit within 3 mo: Visit date not found  Next physical within 3 mo: Visit date not found  Prescriber OR PCP: Maral Cadet MD  Last diagnosis associated with med order: There are no diagnoses linked to this encounter.  If protocol passes may refill for 12 months if within 3 months of last provider visit (or a total of 15 months).

## 2021-06-25 NOTE — TELEPHONE ENCOUNTER
[address]    [place] [address] [facility]    Phone:  [phone]       Thank You for choosing us for your health care visit. We are glad to serve you and happy to provide you with this summary of your visit. Please help us to ensure we have accurate records. If you find anything that needs to be changed, please let our staff know as soon as possible.           Your Demographic Information     Patient Name Sex MIGUEL ANGEL Buchanan Female 1941       Ethnic Group Patient Race    Not of  or  Origin White      Your Visit Details     Date & Time Provider Department    2017 1:00 PM Charlie Knox [facility]      Your Upcoming Appointment*(Max 10)     2017 11:00 AM CDT   Follow-up Visit with Reagan Nicolas MD   [facility] Internal Medicine-[facility])    [address] [place] [address] Rd   [phone]            2017  1:00 PM CDT   Follow Up Hyperbaric/Wound with [address] WOUND RN4, [address] WOUND NP, [address] ROOM 4   [facility] Hyperbaric Medicine and [address] [facility])    [address] [address] [phone]            Monday May 08, 2017  3:00 PM CDT   Anti-Coagulation Follow-Up with Simon [facility] ANTICOAG NURSE   [facility]tico[facility])    [address]   [phone]            Friday May 12, 2017  1:30 PM CDT   Complete Ophthalmology Exam with Junaid Singh MD   [facility] ([address])    [address] [place] [address] [phone]             11:00 AM CDT   Follow-up Visit with Reagan Nicolas MD   [facility])    [address]   [phone]            2017 11:00 AM CDT RN cannot approve Refill Request    RN can NOT refill this medication med is not covered by policy/route to provider. Last office visit: Visit date not found Last Physical: Visit date not found Last MTM visit: Visit date not found Last visit same specialty: 9/21/2018 Riley Cuevas MD.  Next visit within 3 mo: Visit date not found  Next physical within 3 mo: Visit date not found      Eli Khalil, [facility] Triage/Med Refill 3/16/2019    Requested Prescriptions   Pending Prescriptions Disp Refills     allopurinol (ZYLOPRIM) 300 MG tablet [Pharmacy Med Name: ALLOPURINOL 300MG TABS] 30 tablet 5     Sig: TAKE ONE TABLET BY MOUTH EVERY DAY WITH BREAKFAST    There is no refill protocol information for this order            "  Follow-up Visit with Demetrius Burns DPM   [facility] ([address] [facility])    [address] [facility] [address]   [phone]            Wednesday November 01, 2017 10:15 AM CDT   SKIN CANCER CARE EXAM with Lianne Rodriguez MD   [facility]ermatology[facility] ([address])    [id number] [address] [address]   [phone]            Thursday November 09, 2017 10:30 AM CST   Follow-up Visit with Price Rand MD   [facility] ([address] [facility] [address])    [address] [facility] [address] [phone]              Your Vitals Were     BP Pulse Height Weight BMI Smoking Status    168/67 66 5' 5"" (1.651 m) 148 lb 8 oz (67.4 kg) 24.71 kg/m2 Never Smoker      Medications Prescribed or Re-Ordered Today     None      Your Current Medications Are        Disp Refills Start End    warfarin (COUMADIN) 2.5 MG tablet 225 tablet 1 4/19/2017     Sig - Route: Take 2.5 tablets by mouth daily. Or as directed by the anticoagulation clinic - Oral    Class: Eprescribe    Cosign for Ordering: Accepted by Too Aceves MD on 4/19/2017 11:45 AM    levothyroxine (SYNTHROID, LEVOTHROID) 137 MCG tablet 90 tablet 3 4/18/2017     Sig: TAKE 1 TABLET BY MOUTH DAILY    Class: Eprescribe    insulin glargine (LANTUS) 100 UNIT/ML injectable solution 10 mL 2 4/17/2017     Sig - Route: Inject 33 Units into the skin nightly. - Subcutaneous    Class: Eprescribe    losartan (COZAAR) 50 MG tablet 90 tablet 3 3/30/2017     Sig: TAKE 1 TABLET BY MOUTH EVERY MORNING    Class: Eprescribe    Notes to Pharmacy: **Patient requests 90 days supply**    rosuvastatin (CRESTOR) 5 MG tablet 36 tablet 3 2/17/2017     Sig - Route: Take 1 tablet by mouth three times a week.  Generic ok Rosuvastitin - Oral    Class: Eprescribe    Lancet Devices (ONE TOUCH DELICA LANCING DEV) Misc 200 each 12 2/1/2017     Sig: Test bid DX E11.9 on insulin " "   Class: Eprescribe    ONETOUCH DELICA LANCETS 59W Misc 200 each 12 2017     Sig: Test Bid DX E11.9 on insulin    Class: Eprescribe    blood glucose (ONE TOUCH ULTRA TEST) test strip 200 strip 12 2017     Sig: Test blood sugar 2 times daily as directed. Diagnosis: E11.9 on insulin. Meter: One touch ultra    Class: Eprescribe    sotalol (BETAPACE) 80 MG tablet 270 tablet 1 2017     Sig: TAKE ONE AND ONE-HALF TABLETS BY MOUTH TWICE DAILY    Class: Eprescribe    gabapentin (NEURONTIN) 100 MG capsule 270 capsule 3 2016     Sig: TAKE 1 CAPSULE BY MOUTH THREE TIMES DAILY    Class: Eprescribe    Notes to Pharmacy: **Patient requests 90 days supply**    omega-3 acid ethyl esters (LOVAZA) 1 G capsule 385 capsule 0 2016     Sig - Route: Take 2 capsules by mouth 2 times daily. This is OTC nature made fish oil - Oral    Class: Historical Med    Insulin Syringe 31G X "" 1 ML Misc 100 each 6 2016     Sig: as directed. Class: Eprescribe    DiphenhydrAMINE HCl (BENADRYL PO)        Class: Historical Med    Route: Oral    aspirin 81 MG chewable tablet   3/18/2016     Sig - Route: Chew 1 tablet by mouth daily. - Oral    Class: OTC    Turmeric Curcumin 500 MG Cap        Sig - Route: Take by mouth daily.  - Oral    Class: Historical Med    Cholecalciferol (VITAMIN D) 2000 UNITS tablet   3/19/2015     Sig - Route: Take 4,000 Units by mouth every morning.  - Oral    Class: Historical Med    BIOTIN PO        Sig - Route: Take 1 capsule by mouth daily. - Oral    Class: Historical Med    vitamin B-12 (CYANOCOBALAMIN) 1000 MCG tablet        Sig - Route: Take 1,000 mcg by mouth daily. - Oral    Class: Historical Med    vitamin k 100 MCG tablet  PRN 2011     Sig - Route: Take 1 tablet by mouth daily. - Oral    Class: Historical Med    amoxicillin (AMOXIL) 500 MG tablet 12 tablet 1 11/15/2016     Si tablets 1 hour before dental procedure.     Class: Eprescribe      Allergies     Levofloxacin Nausea & " Vomiting    Aminoglycosides     Tape [Adhesive] RASH    Cephalexin RASH    Neosporin [Neomycin-bacitracin-polymyxin]     Polysporin [Bacitracin-polymyxin B]     Sulfa Antibiotics       Immunizations History as of 4/24/2017     Name Date    Hepatitis A - Adult 12/18/2006    Hepatitis B Adult 12/18/2006    INFLUENZA QUADRIVALENT 11/28/2016 12:21 PM, 11/18/2015, 11/10/2014    Influenza 11/18/2013 12:15 PM, 10/31/2012, 11/8/2011, 10/6/2010, 10/29/2009, 10/16/2008, 11/8/2007, 11/14/2005, 10/22/2004, 10/31/2003, 12/2/2002    Pneumococcal Polysaccharide Adult 10/29/2009      Problem List as of 4/24/2017     Sciatica    Type II or unspecified type diabetes mellitus without mention of complication, not stated as uncontrolled    Atrial fibrillation - paroxsymal    Essential hypertension, benign    Hepatitis, autoimmune    Nonallopathic lesion of lumbar region, not elsewhere classified    Degeneration of lumbar or lumbosacral intervertebral disc    Nonallopathic lesion of sacral region, not elsewhere classified    Nonallopathic lesion of cervical region, not elsewhere classified    Degeneration of cervical intervertebral disc    Allergic rhinitis, cause unspecified    Peripheral neuropathy    DDD (degenerative disc disease), lumbar    Hypothyroidism    S/P parathyroidectomy    Long term (current) use of anticoagulants - warfarin    Senile nuclear sclerosis    Greater trochanter fracture    Closed fracture of unspecified trochanteric section of femur    Cervicalgia    Nonallopathic lesion of thoracic region, not elsewhere classified    Lumbago    Knee pain, right    Contusion of knee    Generalized osteoarthritis    Bronchopneumonia    Encounter for therapeutic drug monitoring    Osteoarthrosis, unspecified whether generalized or localized, lower leg    Long-term (current) use of anticoagulants    Acute right MCA stroke    Diabetic ulcer of right foot associated with type 2 diabetes mellitus    Paroxysmal atrial fibrillation Fracture of femur, trochanteric    Mixed hyperlipidemia    High risk medication use - sotalol    Vaginal spotting    Ovarian mass    Postmenopausal bleeding    Peripheral sensory neuropathy due to type 2 diabetes mellitus    Cataracts, bilateral    Bilateral carpal tunnel syndrome    Fatigue    Allergic dermatitis    Cerebral infarction due to embolism of right middle cerebral artery            Patient Instructions     None

## 2021-06-26 NOTE — PROGRESS NOTES
Progress Notes by Re العراقي CNP at 6/25/2018  7:50 AM     Author: Re العراقي CNP Service: -- Author Type: Nurse Practitioner    Filed: 6/25/2018 12:57 PM Encounter Date: 6/25/2018 Status: Signed    : Re العراقي CNP (Nurse Practitioner)          Click to link to [facility] HEART CARE ELECTROPHYSIOLOGY NOTE      Assessment/Recommendations   Assessment/Plan:    Diagnoses and all orders for this visit:    Paroxysmal atrial fibrillation (H) episode during hospitalization with sepsis related to cholelithiasis. I discussed with Nixon and his family that I am reluctant to think that this is postop atrial fibrillation due to significant increase in incidence with advanced age.  I discussed rate versus rhythm control depends upon symptoms.  I discussed potential symptoms with atrial fibrillation.  Will continue to follow with amiodarone as no side effects on medication and not on anticoagulation so from that standpoint may benefit from continuing amiodarone to prevent A. fib episodes which would cause stroke risk.  Discussed risk with amio to liver, thyroid, skin, eyes and lungs with pulmonary fibrosis being the most significant but dose and time dependent. Recommended routine sunscreen use with exposure to sun and yearly eye exams.  Had recent ALT but did thyroid cascade and amiodarone level  today.  To delay PFTs for  4 months.  To stay on amiodarone 200 mg 1 tablet orally every day.          -     Thyroid Cascade    Benign essential HTN and well-controlled.    Contraindication to anticoagulation therapy with has bled score of 4 for advanced age, stroke history, spontaneous bruising on antiplatelet medicine and indication for chronic aspirin or NSAID use with history of coronary artery bypass.  [id number] score of 7 and high risk for stroke in quoted him 10% yearly risk with paroxysmal A. Fib.   Nixon Velasquez Jr. has been advised to stay on anticoagulation  due to  high risk for stroke with history of atrial fib.  Nixon Velasquez Jr. has  heart failure [place] class 3.    I reviewed watchman protocol with MARLYS to define anatomy, watchman procedure itself and post MARLYS.  I reviewed anticoagulation protocol with staying on anticoagulation for about 2 months unless he went into a watchman study for device which she uses only antiplatelet medicine.  Discussed that this is a safety study looking at number of strokes with this.    I discussed that there is no further testing beyond post MARLYS.   Nixon Velasquez Jr. and his family want to discuss watchman with Dr. Cuevas and notify me if interested or call me if they have questions.  They are also to try to find out why Nixon is on Plavix and whether that needs to be chronic use.  He had a DVT in his upper extremity after PICC placement.   After discussion, Nixon Velasquez Jr. wants to discuss the situation with Dr. Cuevas and will notify me if they have further questions and/or want to proceed with watchman device.  Long term use of drug  -     Amiodarone (Cordarone )  Mild aortic stenosis and to follow     [id number] score of 7 and on Plavix and been on this medication chronically.  Both Nixon and his family are unsure why he is on Plavix versus aspirin.  To discuss with Dr. Cuevas.  See above for discussion regarding watchman.  Follow up in clinic with Dr. Garza in 4 months.     History of Present Illness    Mr. Nixon Velasquez Jr. is a very pleasant 89 y.o. male who comes in today for EP consult regarding paroxysmal atrial fibrillation and review of 2 weeks symptom monitor and is accompanied by his son and daughter.  Nixon Velasquez Jr. has a known history of paroxysmal atrial fibrillation seen at time of hospitalization in March of this year when septic with cholelithiasis and gallbladder had to be removed during that visit.  He was started on amiodarone during March 2018 hospitalization.  He denies any side effects on  amiodarone.  He has spontaneous bruising on Plavix.  He has been on chronic Plavix and uncertain why.  He is unsteady on his feet and has fallen.  He does not use assistive device.  He is covered with spontaneous bruises on his arms and tells me that this is in part related to Plavix.          Cardiographics (personally reviewed):  Results for orders placed during the hospital encounter of 03/09/18   Echo Complete [ECH10] 03/09/2018    Narrative   Left Ventricle: Normal left ventricular size.The estimated left   ventricular ejection fraction is 60%. This represents a normal ejection   fraction. Mild hypertrophy noted. E/e' > 15, suggesting elevated LV   filling pressures.    Left Atrium: Left atrial volume is moderately increased.    Mitral Valve: There is moderate mitral annular calcification. Moderate   mitral regurgitation.    Mild aortic stenosis. Mild aortic regurgitation    Normal right ventricular size and systolic function.    No pulmonary hypertension present.    Estimated central venous pressure equal to 13 mmHg.        May 2018 2 weeks symptom monitor shows sinus rhythm with average heart rate 57.  No profound or abrupt bradycardia.  No atrial tachycardia or atrial fibrillation.    Results for orders placed or performed during the hospital encounter of 03/09/18   ECG 12 lead with MUSE   Result Value Ref Range    SYSTOLIC BLOOD PRESSURE  mmHg    DIASTOLIC BLOOD PRESSURE  mmHg    VENTRICULAR RATE 60 BPM    ATRIAL RATE 55 BPM    P-R INTERVAL  ms    QRS DURATION 84 ms    Q-T INTERVAL 504 ms    QTC CALCULATION (BEZET) 504 ms    P Axis  degrees    R AXIS 31 degrees    T AXIS 110 degrees    MUSE DIAGNOSIS       Sinus rhythm  Lateral infarct , age undetermined  Prolonged QT  Abnormal ECG  Confirmed by ZENIA JONES, ALLAN LOC:SJ ([id number]) on 3/21/2018 2:13:21 PM            Problem List:  Patient Active Problem List   Diagnosis   ? Restless Legs Syndrome   ? Corrosive Esophagitis   ? Hiatal Hernia   ? 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 Mixed hyperlipidemia   ? Cerebral atherosclerosis   ? Adenocarcinoma Of The Prostate Gland   ? Papillary Carcinoma Of The Thyroid Gland   ? Hypothyroidism   ? Type 2 Diabetes Mellitus   ? Coronary Artery Disease   ? Cervical Radiculopathy   ? Iron Deficiency   ? Advanced directives, counseling/discussion   ? Infection of ear lobe, right   ? Right ear pain   ? Benign essential HTN   ? Anemia   ? Other autoimmune hemolytic anemias (H)   ? Thrombocytopenia (H)   ? Squamous cell carcinoma of skin   ? Hemorrhage   ? Paroxysmal atrial fibrillation (H)   ? Hypocalcemia   ? Acute pulmonary edema (H)   ? Abdominal pain, unspecified abdominal location   ? Fecal impaction (H)   ? Cholecystitis   ? Encounter for family conference with patient present   ? Adjustment disorder   ? Fatigue   ? Near syncope   ? Generalized weakness   ? Dehydration   ? Diarrhea   ? Pleural effusion   ? Deep vein thrombosis (DVT) of upper extremity (H)   ? Contraindication to anticoagulation therapy       Physical Examination Review of Systems   Vitals:    06/25/18 0801   BP: 130/60   Pulse: 68   Resp: 16     Body mass index is 24.27 kg/(m^2).  Wt Readings from Last 3 Encounters:   06/25/18 189 lb (85.7 kg)   06/19/18 189 lb 9.6 oz (86 kg)   05/24/18 182 lb (82.6 kg)     General Appearance:   Alert, well-appearing and in no acute distress.   HEENT: Atraumatic, normocephalic.  No scleral icterus, normal conjunctivae; mucous membranes pink and moist.     Chest: Chest symmetric, spine straight.   Lungs:   Respirations unlabored: Lungs are clear to auscultation.   Cardiovascular:   Normal first and second heart sounds with mild systolic murmur and no rubs, or gallops.  Regular, regular.  Radial and posterior tibial pulses are intact.  Normal JVD, no edema.       Extremities: No cyanosis or clubbing   Musculoskeletal: Moves all extremities   Skin: Warm, dry, intact.    Neurologic: Mood and affect are appropriate, alert  and oriented to person, place, time, and situation       Eyes: WNL  Ears/Nose/Throat: WNL  Lungs: WNL  Heart: WNL  Stomach: WNL  Bladder: WNL  Muscle/Joints: WNL  Skin: WNL  Nervous System: Loss of Balance  Mental Health: WNL     Blood: WNL       Medical History  Surgical History Family History Social History   Past Medical History:   Diagnosis Date   ? Acute pulmonary edema (H)    ? Adenocarcinoma of prostate (H)    ? Adjustment disorder    ? Anemia    ? Autoimmune hemolytic anemia (H)    ? Cataract     bilateral   ? Cerebral atherosclerosis    ? Cervical radiculopathy    ? Cholecystitis    ? 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)    ? Coronary artery disease    ? Corrosive esophagitis    ? Diabetes mellitus (H)    ? DVT (deep vein thrombosis) in pregnancy (H)    ? Fatigue    ? Fecal impaction (H)    ? Foot fracture, right    ? Generalized weakness    ? Hiatal hernia    ? History of transfusion    ? Hx of papillary thyroid carcinoma    ? Hypertension    ? Hypocalcemia    ? Hypothyroidism    ? Hypoxia    ? Infection of right ear lobe    ? Inguinal hernia    ? Iron deficiency    ? Leukemia (H)    ? Mixed hyperlipidemia    ? Neutropenia associated with infection (H)    ? Paroxysmal atrial fibrillation (H)    ? PE (pulmonary embolism)    ? Pleural effusion    ? Pneumonia    ? Prostate cancer (H)    ? Restless legs syndrome    ? Skin cancer    ? Squamous cell carcinoma     skin   ? Stroke (H)    ? Thrombocytopenia (H)    ? Thyroid cancer (H)     Past Surgical History:   Procedure Laterality Date   ? CATARACT EXTRACTION, BILATERAL     ? Cholecystostomy  03/2018    Place during hospitalization with E. coli sepsis, at time not surgical candidate   ? LAPAROSCOPIC CHOLECYSTECTOMY N/A 5/10/2018    Procedure: LAPAROSCOPIC CHOLECYSTECTOMY;  Surgeon: Ramy Mcguire MD;  Location: [facility];  Service:    ? ME REANOMAL CORON ART PA ORIGIN BY GRAFT      Description: Anomalous Coronary Artery Graft;  Proc Date:  01/01/1996;   ? ND REMV PROSTATE,RETROPUB,RAD,LTD NODES      Description: Prostatectomy Retropubic Radical With Lymph Node Biopsy(S);  Recorded: 09/13/2007;    Family History   Problem Relation Age of Onset   ? Prostate cancer Father    ? Cancer Father    ? Alzheimer's disease Father    ? Parkinsonism Mother    ? Diabetes Brother    ? Alzheimer's disease Sister    ? No Medical Problems Daughter    ? No Medical Problems Daughter    ? Parkinsonism Son    ? No Medical Problems Son    ? No Medical Problems Son    ? No Medical Problems Son    ? Parkinsonism Son    ? No Medical Problems Son    ? No Medical Problems Son    ? No Medical Problems Son     Social History     Social History   ? Marital status:      Spouse name: N/A   ? Number of children: N/A   ? Years of education: N/A     Occupational History   ? Not on file.     Social History Main Topics   ? Smoking status: Never Smoker   ? Smokeless tobacco: Never Used   ? Alcohol use No   ? Drug use: No   ? Sexual activity: No     Other Topics Concern   ? Not on file     Social History Narrative          Medications  Allergies   Current Outpatient Prescriptions   Medication Sig Dispense Refill   ? acetaminophen (TYLENOL) 500 MG tablet Take 1,000 mg by mouth every 6 (six) hours as needed for pain.     ? allopurinol (ZYLOPRIM) 300 MG tablet Take 1 tablet (300 mg total) by mouth daily with breakfast. 30 tablet 5   ? amiodarone (PACERONE) 200 MG tablet Take 1 tablet (200 mg total) by mouth daily. 30 tablet 3   ? atorvastatin (LIPITOR) 20 MG tablet Take 1 tablet (20 mg total) by mouth at bedtime. 30 tablet 3   ? clopidogrel (PLAVIX) 75 mg tablet TAKE ONE TABLET BY MOUTH EVERY DAY 90 tablet 1   ? escitalopram oxalate (LEXAPRO) 5 MG tablet Take 5 mg by mouth daily.      ? folic acid (FOLVITE) 1 MG tablet Take 1 tablet (1 mg total) by mouth daily. 30 tablet 5   ? levothyroxine (SYNTHROID, LEVOTHROID) 150 MCG tablet Take 150 mcg by mouth Daily at 6:00 am.     ? miconazole  (MICOTIN) 2 % cream Apply 1 application topically 2 (two) times a day as needed.     ? mirtazapine (REMERON) 45 MG tablet Take 1 tablet (45 mg total) by mouth at bedtime. 30 tablet 5   ? mupirocin (BACTROBAN) 2 % ointment APPLY TO AFFECTED AREA ONCE DAILY.  3   ? omeprazole (PRILOSEC) 40 MG capsule Take 1 capsule (40 mg total) by mouth 2 (two) times a day. 60 capsule 5   ? oxyCODONE (ROXICODONE) 5 MG immediate release tablet Take 1 tablet (5 mg total) by mouth every 8 (eight) hours as needed for pain. 30 tablet 0   ? rOPINIRole (REQUIP) 0.25 MG tablet Take 1 tablet (0.25 mg total) by mouth at bedtime. 30 tablet 6   ? triamcinolone (KENALOG) 0.1 % cream Apply 1 application topically 2 (two) times a day as needed.     ? sodium chloride (NS) injection Flush with 10 ml three times daily. 900 mL 1     No current facility-administered medications for this visit.       Allergies   Allergen Reactions   ? Dairy Aid [Lactase]      Dairy products- severe shaking in legs   ? Other Allergy (See Comments) Unknown, Myalgia and Other (See Comments)     Restless legs   ? Benzonatate    ? Blood-Group Specific Substance      Warm autoantibodies present. Expect up to 24 hour delay in blood for transfusion. Draw 2 lavender and 1 red tube for type and screen orders   Notify Blood bank as soon as possible if transfusions are needed.   ? Ciprofloxacin    ? Ephedrine Sulfate    ? Erythromycin Base    ? Hyoscyamine Sulfate    ? Lactose    ? Methocarbamol    ? Terazosin    ? Warfarin       Medical, surgical, family, social history, and medications were all reviewed and updated as necessary.   Lab Results    Chemistry CBC/INR CHOLESTROL   Lab Results   Component Value Date    CREATININE 0.77 05/22/2018    BUN 14 05/22/2018     05/22/2018    K 4.1 05/22/2018     05/22/2018    CO2 27 05/22/2018     Creatinine (mg/dL)   Date Value   05/22/2018 0.77   05/02/2018 0.80   04/23/2018 0.74   04/09/2018 0.71     Lab Results   Component Value  Date    BNP 68 04/09/2018    Lab Results   Component Value Date    WBC 4.6 06/19/2018    HGB 11.0 (L) 06/19/2018    HCT 34.6 (L) 06/19/2018     (H) 06/19/2018    PLT 98 (L) 06/19/2018     Lab Results   Component Value Date    INR 1.21 (H) 03/16/2018      Lab Results   Component Value Date    CHOL 135 02/02/2012    HDL 38 (L) 02/02/2012    LDLCALC 73 02/02/2012    TRIG 117 02/02/2012          Greater than than 45 minutes were spent face to face in this visit discussing diagnoses as listed above, counseling, and coordination of care.    This note has been dictated using voice recognition software. Any grammatical, typographical, or context distortions are unintentional and inherent to the software.    Re العراقي RN,  [facility]   Electrophysiology  [phone]

## 2021-06-29 NOTE — PROGRESS NOTES
"Progress Notes by Re العراقي CNP at 6/25/2020  8:30 AM     Author: Re العراقي CNP Service: -- Author Type: Nurse Practitioner    Filed: 6/25/2020  9:16 AM Encounter Date: 6/25/2020 Status: Signed    : Re العراقي CNP (Nurse Practitioner)           The patient has been notified of following:     \"This video visit will be conducted via a call between you and your physician/provider. We have found that certain health care needs can be provided without the need for an in-person physical exam.  This service lets us provide the care you need with a video conversation.  If a prescription is necessary we can send it directly to your pharmacy.  If lab work is needed we can place an order for that and you can then stop by our lab to have the test done at a later time.      Patient has given verbal consent to a Video visit? Yes    HEART CARE VIDEO ENCOUNTER        The patient has chosen to have the visit conducted as a video visit, to reduce risk of exposure given the current status of Coronavirus in our community. This video visit is being conducted via a call between the patient and physician/provider. Health care needs are being provided without a physical exam.     Assessment/Recommendations   Assessment/Plan:  Paroxysmal atrial fibrillation and verified with family that he is going in and out of A. fib and not persistent.  He is not on anticoagulation so I could not use antiarrhythmic if he was persistent.  Having more frequent episodes of paroxysmal A. fib despite start of amiodarone.  Discussed it could be due to light load of amiodarone and may need more medication.  To increase amiodarone to 200 mg p.o. twice daily for 2 weeks starting today and then decrease back to 1 tablet every day starting July 9.  Discussed that it will take about 2 weeks to get control of A. fib due to long half-life with amiodarone.  I discussed its hepatically cleared and can affect thyroid making it over " underactive.  I discussed sensitivity to the sun.  On follow-up with other specialist that we will need to weigh in on whether it is safe for Nishant stay on amiodarone long-term.  He is directly symptomatic with A. fib and limiting the quality of his life so at this point I think risks versus benefit ratio weighs in favor of staying on amiodarone.   Contraindication to anticoagulation and discussed with Nixon that with each episode of A. fib he has stroke risk.  He has declined anticoagulation in the past and verified today that he does not want to go on anticoagulation.  He verified this is still his decision to stay off of anticoagulation.  Left pleural effusion and discussed that oxygen saturation at rest is best way to  if increasing respiratory issues which may need medical attention.  Nixon has some level of shortness of breath all the time.  Hypertension and home blood pressure readings generally reflect that he is at goal.   follow Up Plan: Video visit with me in 1 month and will need ALT TSH and amiodarone level around this time.  I have reviewed the note as documented.  This accurately captures the substance of my conversation with the patient.    Total time of video between patient and provider was 21 minutes   Start Time:0835   Stop Time:0856    Originating Location (pt. Location): Home    Distant Location (provider location):  [facility] HEART [facility]     Mode of Communication:  Video Conference via [email or web address]       History of Present Illness/Subjective    Nixon Velasquez Jr. is a 89 y.o. male who is being evaluated via a billable video visit and has consented to a video visit. Nixon Velasquez Jr. has a history of autoimmune hemolytic anemia, DVT of upper extremity, chronic left pleural effusion, paroxysmal atrial fib, CLL, type 2 diabetes, hypothyroidism and coronary artery disease.  Nixon was hospitalized June 8-11 with A. fib with RVR and worsening left pleural effusion.  He was seen in the ER due to some  shortness of breath since then.  This video visit is done with his son Jean-Claude and his daughter present as well as Nixon.  Nixon is complaining of more frequent episodes of A. fib.  His family checks on him frequently.  They know when he is in A. fib because he turns white, short of breath and has abdominal breathing.  Episodes of A. fib used to last for 10 minutes and now lasting about an hour.  Having episodes 3-4 times a day at times.  His blood pressure can be elevated with episode.  He usually has to lay down when he is in A. fib.  He denies any side effects on amiodarone.  His heart rate is in the 70s to 90s when he is in A. fib.  Requesting refills of metoprolol and amiodarone.  His daughter usually listens to Guillermo's lungs.  She is asking how to know when to take him to the ER  I have reviewed and updated the patient's Past Medical History, Social History, Family History and Medication List.   Cardiographics reviewed and prep for this visit:  June 20 echo shows EF 48%.  Moderate MR.  Mild left ear.  6/17/2012 lead EKG shows atrial fib with controlled ventricular response of 72.  Physical Examination performed via live video encounter Review of Systems   General Appearance:   no distress, normal body habitus, upright.   ENT/Mouth: membranes moist, no nasal discharge or bleeding gums.  Normal head shape, no evidence of injury or laceration.     EYES:  no scleral icterus, normal conjunctivae   Neck: no evidence of thyromegaly.  Supple   Chest/Lungs:   No audible wheezing equal chest wall expansion. Non labored breathing.  No cough.   Cardiovascular:   No evidence of elevated jugular venous pressure.  No evidence of pitting edema bilaterally    Abdomen:  no evidence of abdominal distention. No observe juandice.     Extremities: no cyanosis or clubbing noted.    Skin: no xanthelasma, normal skin color. No evidence of facial lacerations.      Neurologic: Normal arm motion bilateral, no tremors.  No evidence of focal  defect.       Psychiatric: alert and oriented x3, calm                                               Medical History  Surgical History Family History Social History   Past Medical History:   Diagnosis Date   ? Acute pulmonary edema (H)    ? Adenocarcinoma of prostate (H)    ? Adjustment disorder    ? Anemia    ? Autoimmune hemolytic anemia (H)    ? Cataract     bilateral   ? Cerebral atherosclerosis    ? Cervical radiculopathy    ? Cholecystitis    ? 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)    ? Coronary artery disease    ? Corrosive esophagitis    ? Dehydration    ? Diabetes mellitus (H)    ? DVT (deep vein thrombosis) in pregnancy    ? Fatigue    ? Fecal impaction (H)    ? Foot fracture, right    ? Generalized weakness    ? Hiatal hernia    ? History of transfusion    ? Hx of papillary thyroid carcinoma    ? Hypertension    ? Hypocalcemia    ? Hypothyroidism    ? Hypoxia    ? Infection of ear lobe, right 11/30/2017   ? Infection of right ear lobe    ? Inguinal hernia    ? Iron deficiency    ? Leukemia (H)    ? Mixed hyperlipidemia    ? Neutropenia associated with infection (H)    ? Paroxysmal atrial fibrillation (H)    ? PE (pulmonary embolism)    ? Pleural effusion    ? Pneumonia    ? Prostate cancer (H)    ? Restless legs syndrome    ? Skin cancer    ? Squamous cell carcinoma     skin   ? Stroke (H)    ? Thrombocytopenia (H)    ? Thyroid cancer (H)     Past Surgical History:   Procedure Laterality Date   ? CATARACT EXTRACTION, BILATERAL     ? Cholecystostomy  03/2018    Place during hospitalization with E. coli sepsis, at time not surgical candidate   ? LAPAROSCOPIC CHOLECYSTECTOMY N/A 5/10/2018    Procedure: LAPAROSCOPIC CHOLECYSTECTOMY;  Surgeon: Ramy Mcguire MD;  Location: [facility];  Service:    ? OR REANOMAL CORON ART PA ORIGIN BY GRAFT      Description: Anomalous Coronary Artery Graft;  Proc Date: 01/01/1996;   ? OR REMV PROSTATE,RETROPUB,RAD,LTD NODES      Description: Prostatectomy  Retropubic Radical With Lymph Node Biopsy(S);  Recorded: 09/13/2007;   ? US THORACENTESIS  6/9/2020    Family History   Problem Relation Age of Onset   ? Prostate cancer Father    ? Cancer Father    ? Alzheimer's disease Father    ? Parkinsonism Mother    ? Diabetes Brother    ? Alzheimer's disease Sister    ? No Medical Problems Daughter    ? No Medical Problems Daughter    ? Parkinsonism Son    ? No Medical Problems Son    ? No Medical Problems Son    ? No Medical Problems Son    ? Parkinsonism Son    ? No Medical Problems Son    ? No Medical Problems Son    ? No Medical Problems Son       Social History     Socioeconomic History   ? Marital status:      Spouse name: Not on file   ? Number of children: Not on file   ? Years of education: Not on file   ? Highest education level: Not on file   Occupational History   ? Not on file   Social Needs   ? Financial resource strain: Not on file   ? Food insecurity     Worry: Not on file     Inability: Not on file   ? Transportation needs     Medical: Not on file     Non-medical: Not on file   Tobacco Use   ? Smoking status: Never Smoker   ? Smokeless tobacco: Never Used   Substance and Sexual Activity   ? Alcohol use: No   ? Drug use: No   ? Sexual activity: Never   Lifestyle   ? Physical activity     Days per week: Not on file     Minutes per session: Not on file   ? Stress: Not on file   Relationships   ? Social connections     Talks on phone: Not on file     Gets together: Not on file     Attends Gnosticist service: Not on file     Active member of club or organization: Not on file     Attends meetings of clubs or organizations: Not on file     Relationship status: Not on file   ? Intimate partner violence     Fear of current or ex partner: Not on file     Emotionally abused: Not on file     Physically abused: Not on file     Forced sexual activity: Not on file   Other Topics Concern   ? Not on file   Social History Narrative   ? Not on file          Medications   Allergies   Current Outpatient Medications   Medication Sig Dispense Refill   ? acetaminophen (TYLENOL) 500 MG tablet Take 1,000 mg by mouth every 6 (six) hours as needed for pain.     ? allopurinol (ZYLOPRIM) 300 MG tablet TAKE 1 TABLET BY MOUTH ONCE DAILY WITH BREAKFAST. (Patient taking differently: Take 300 mg by mouth daily. ) 90 tablet 3   ? amiodarone (PACERONE) 200 MG tablet Take 1 tab orally twice a day 110 tablet 0   ? atorvastatin (LIPITOR) 20 MG tablet Take 1 tablet (20 mg total) by mouth at bedtime. 90 tablet 3   ? calcium, as carbonate, (TUMS) 200 mg calcium (500 mg) chewable tablet Chew 1 tablet 4 (four) times a day as needed for heartburn.     ? clopidogreL (PLAVIX) 75 mg tablet TAKE ONE TABLET BY MOUTH EVERY DAY (Patient taking differently: Take 75 mg by mouth every morning. ) 90 tablet 1   ? escitalopram oxalate (LEXAPRO) 5 MG tablet TAKE ONE TABLET BY MOUTH EVERY DAY (Patient taking differently: Take 5 mg by mouth every morning. ) 30 tablet 6   ? folic acid (FOLVITE) 1 MG tablet TAKE ONE TABLET BY MOUTH EVERY DAY (Patient taking differently: Take 1 mg by mouth at bedtime. ) 30 tablet 5   ? levothyroxine (SYNTHROID, LEVOTHROID) 150 MCG tablet TAKE ONE TABLET BY MOUTH EVERY MORNING (Patient taking differently: Take 150 mcg by mouth Daily at 6:00 am. ) 90 tablet 3   ? metoprolol tartrate (LOPRESSOR) 25 MG tablet Take 1 tablet (25 mg total) by mouth 2 (two) times a day. 180 tablet 3   ? omeprazole (PRILOSEC) 40 MG capsule Take 40 mg by mouth 2 (two) times a day before meals.     ? predniSONE (DELTASONE) 5 MG tablet Take 5 mg by mouth daily. (Patient taking differently: Take 2.5 mg by mouth daily .) 30 tablet 1     No current facility-administered medications for this visit.     Allergies   Allergen Reactions   ? Dairy Aid [Lactase]      Dairy products- severe shaking in legs   ? Other Allergy (See Comments) Unknown, Myalgia and Other (See Comments)     Restless legs   ? Benzonatate    ? Blood-Group  Specific Substance      Warm autoantibodies present. Expect up to 24 hour delay in blood for transfusion. Draw 2 lavender and 1 red tube for type and screen orders   Notify Blood bank as soon as possible if transfusions are needed.   ? Ciprofloxacin    ? Ephedrine Sulfate    ? Erythromycin Base    ? Hyoscyamine Sulfate    ? Lactose    ? Methocarbamol    ? Terazosin    ? Warfarin          Lab Results    Chemistry/lipid CBC Cardiac Enzymes/BNP/TSH/INR   Lab Results   Component Value Date    CHOL 135 02/02/2012    HDL 38 (L) 02/02/2012    LDLCALC 73 02/02/2012    TRIG 117 02/02/2012    CREATININE 0.92 06/17/2020    BUN 16 06/17/2020    K 4.6 06/17/2020     06/17/2020     06/17/2020    CO2 23 06/17/2020    Lab Results   Component Value Date    WBC 29.0 (H) 06/17/2020    HGB 13.0 (L) 06/17/2020    HCT 40.9 06/17/2020     (H) 06/17/2020     (L) 06/17/2020    Lab Results   Component Value Date    CKMB <1 03/26/2013    TROPONINI <0.01 06/17/2020    BNP 96 (H) 06/17/2020    TSH 4.95 06/15/2020    INR 1.16 (H) 06/09/2020        Re العراقي

## 2021-07-01 NOTE — PROCEDURES
"Procedures by Eulogio Echavarria RN at 3/9/2018  7:28 AM     Author: Eulogio Echavarria RN Service: -- Author Type: Registered Nurse    Filed: 3/9/2018  7:30 AM Date of Service: 3/9/2018  7:28 AM Status: Signed    : Eulogio Echavarria RN (Registered Nurse)       Procedures      PICC Line Insertion Procedure Note  Pt. Name: Nixon Velasquez Jr.  MRN:        [id number]    Procedure: Insertion of a  triple Lumen  5 fr  Bard SOLO (valved) Power PICC, Lot number [id number]    Indications: vasopressors    Contraindications : none noted    Procedure Details   Patient identified with 2 identifiers and \"Time Out\" conducted.  .     Central line insertion bundle followed: hand hygeine performed prior to procedure, site cleansed with cholraprep, hat, mask, sterile gloves,sterile gown worn, patient draped with maximum barrier head to toe drape, sterile field maintained.    The vein was assessed and found to be compressible and of adequate size. 3 ml 1% Lidocaine administered sq to the insertion site. A 5 Fr PICC was inserted into the basilic vein of the right arm with ultrasound guidance. One attempt(s) required to access vein.   Catheter threaded without difficulty. Good blood return noted.    Modified Seldinger Technique used for insertion.    The 8 sharps that are included in the PICC insertion kit were accounted for and disposed of in the sharps container prior to breakdown of the sterile field.    Catheter secured with Statlock, biopatch and Tegaderm dressing applied.    Findings:  Total catheter length  42 cm, with 1 cm exposed. Mid upper arm circumference is 34 cm. Catheter was flushed with 30 cc NS. Patient  tolerated procedure well.    Tip placement verified by 3cg tip location technology. Tip placement in the SVC.    CLABSI prevention brochure left at bedside.    Patient's primary RN notified PICC is ready for use.    Comments:          Eulogio Echavarria RN    [facility]  [phone]           "

## 2021-07-02 NOTE — PROGRESS NOTES
Subjective comfortable sitting up in bed, family present in room  Temp:  [97.4  F (36.3  C)-98.1  F (36.7  C)] 97.7  F (36.5  C)  Heart Rate:  [46-53] 53  Resp:  [16-20] 20  BP: ()/(48-70) 137/70  I/O last 3 completed shifts:  In: 2206 [P.O.:480; I.V.:1218; Blood:353; IV Piggyback:155]  Out: 1185 [Urine:1175; Emesis/NG output:10]     Objective cholecystostomy tube draining small amount of serous bilious fluid, no significant right upper quadrant pain  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    Mixed hyperlipidemia    Hypothyroidism    Coronary Artery Disease    Other autoimmune hemolytic anemias    Thrombocytopenia    Septic shock    Neutropenia associated with infection    Gram-negative bacteremia    Metabolic acidosis with respiratory acidosis    Hemorrhage    Atrial fibrillation with rapid ventricular response    Lactic acidosis    Hypocalcemia    Acute respiratory failure with hypoxia and hypercapnia    Bacteremia due to Gram-negative bacteria    Acute pulmonary edema    Abdominal pain, unspecified abdominal location    Fecal impaction    Cholecystitis    Assessment & Plan clinically improving status post cholecystostomy tube placement, continue same cares, cholecystectomy per Dr. Hines timing.   What Type Of Note Output Would You Prefer (Optional)?: Standard Output Hpi Title: Evaluation of Skin Lesions How Severe Are Your Spot(S)?: mild Have Your Spot(S) Been Treated In The Past?: has not been treated

## 2021-07-03 NOTE — ADDENDUM NOTE
Addendum Note by Zahra Pennington MD at 5/18/2020  1:15 PM     Author: Zahra Pennington MD Service: -- Author Type: Physician    Filed: 5/18/2020  2:20 PM Encounter Date: 5/18/2020 Status: Signed    : Zahra Pennington MD (Physician)    Addended by: ZAHRA PENNINGTON on: 5/18/2020 02:20 PM        Modules accepted: Orders         Essential hypertension

## 2021-07-03 NOTE — ADDENDUM NOTE
Addendum Note by Zahra Pennington MD at 8/7/2017  1:37 PM     Author: Zahra Pennington MD Service: -- Author Type: Physician    Filed: 8/7/2017  1:37 PM Encounter Date: 8/7/2017 Status: Signed    : Zahra Pennington MD (Physician)    Addended by: ZAHRA PENNINGTON on: 8/7/2017 01:37 PM        Modules accepted: Orders

## 2021-07-03 NOTE — ADDENDUM NOTE
Addendum Note by Lana Renteria CLS at 2/8/2018  9:29 PM     Author: Lana Renteria CLS Service: -- Author Type:     Filed: 2/8/2018  9:29 PM Encounter Date: 2/8/2018 Status: Signed    : Lana Renteria CLS ()    Addended by: LANA RENTERIA on: 2/8/2018 09:29 PM        Modules accepted: Orders

## 2021-07-03 NOTE — ADDENDUM NOTE
Addendum Note by Batool Mcleod at 1/22/2018 12:49 PM     Author: Batool Mcleod Service: -- Author Type:     Filed: 1/22/2018 12:49 PM Encounter Date: 1/18/2018 Status: Signed    : Batool Mcleod ()    Addended by: BATOOL MCLEOD on: 1/22/2018 12:49 PM        Modules accepted: Orders

## 2021-07-03 NOTE — ADDENDUM NOTE
Addendum Note by Vicenta Pat at 2/9/2018  1:19 PM     Author: Vicenta Pat Service: -- Author Type:     Filed: 2/9/2018  1:19 PM Encounter Date: 2/8/2018 Status: Signed    : Vicenta Pat ()    Addended by: VICENTA PAT on: 2/9/2018 01:19 PM        Modules accepted: Orders

## 2021-07-03 NOTE — ADDENDUM NOTE
Addendum Note by Zahra Pennington MD at 7/14/2017  4:51 PM     Author: Zahra Pennington MD Service: -- Author Type: Physician    Filed: 7/14/2017  4:51 PM Encounter Date: 7/14/2017 Status: Signed    : Zahra Pennington MD (Physician)    Addended by: ZAHRA PENNINGTON on: 7/14/2017 04:51 PM        Modules accepted: Orders

## 2021-07-03 NOTE — ADDENDUM NOTE
Addendum Note by Hien Lal RN at 4/22/2020  3:06 PM     Author: Hien Lal RN Service: -- Author Type: Registered Nurse    Filed: 4/22/2020  3:06 PM Encounter Date: 4/22/2020 Status: Signed    : Hien Lal RN (Registered Nurse)    Addended by: HIEN LAL on: 4/22/2020 03:06 PM        Modules accepted: Orders

## 2021-07-03 NOTE — ADDENDUM NOTE
Addendum Note by Lana Renteria CLS at 2/8/2018  4:42 PM     Author: Lana Renteria CLS Service: -- Author Type:     Filed: 2/8/2018  4:42 PM Encounter Date: 2/8/2018 Status: Signed    : Lana Renteria CLS ()    Addended by: LANA RENTERIA on: 2/8/2018 04:42 PM        Modules accepted: Orders

## 2021-08-03 PROBLEM — A41.9 SEPSIS (H): Status: RESOLVED | Noted: 2018-03-22 | Resolved: 2020-01-01

## 2022-01-03 NOTE — PROGRESS NOTES
A user error has taken place: encounter opened in error, closed for administrative reasons.     Secondary Intention Text (Leave Blank If You Do Not Want): The defect will heal with secondary intention.

## 2023-12-08 NOTE — PROGRESS NOTES
[facility] Progress Note    Assessment:  1.  Atrial fibrillation with increased ventricular response.  Patient treated with amiodarone during the hospitalization and maintained sinus rhythm.  Metoprolol was discontinued secondary to bradycardia.  He did not bring in his medication list and I asked him to clarify his home medications.  Discharge medications include 200 mg of amiodarone daily.  He has an elevated chads vas score of approximately 4-5.  We talked about the potential risk of stroke with recurrence of atrial fibrillation that might go unrecognized.  He is on Plavix which appears to have been something he has been on for quite some time.  His preference at this time was not to consider warfarin or 1 of the newer anticoagulant agents.  We did agree to plan a two-week event monitor to look for occult atrial fibrillation.  Pending this we can make additional decisions regarding amiodarone.    2.  History of right upper extremity DVT March 26 while at the TCU he had been on Lovenox.  He has no longer taking Lovenox.  Patient has a history of CLL.  I have reviewed the recent notes from Dr. Pennington.  Will correspond with his primary Dr. Cuevas about the Lovenox discontinuation.    3.  History of coronary artery disease this is stable.  Cardiogram recently demonstrated preserved left ventricular function with mild aortic stenosis.      Plan: Patient to utilize a two-week event monitoring and further recommendations pending the results.            We will send a note to the amiodarone clinic so that they can be aware that he is on amiodarone.  Recent EKG from May 2, 2018 reviewed.    1. Atrial fibrillation  One-Lead Patch Monitor   2. Atherosclerosis of native coronary artery of native heart without angina pectoris           An After Visit Summary was printed and given to the patient.    Subjective:    Nixon WHIPPLE Eva Grier is a 89 y.o. male who returned for a planned  follow up visit.  Patient was  hospitalized March 2018 with acute cholecystitis, bacteremia and septic shock.  The percutaneous drainage of his gallbladder and subsequently has more recently undergone cholecystectomy.  Patient has a history of remote coronary artery disease without complaints of angina.  During the hospitalization he was diagnosed with atrial fibrillation with rapid ventricular response and was seen by Dr. Oseguera.  Subsequently was started on amiodarone and remains on amiodarone.  He was discharged on Lovenox.    He has chronically been on Plavix.  He tells me he has been on Plavix for quite some time.  This appears to have been started at the time of his bypass surgery apparently related to CVA but this is not clearly defined.    He and his son tell me that he developed blood blisters on his arms with Lovenox and this was subsequently discontinued.    He has had no clinical evidence for recurrence of atrial fibrillation.    Review of Systems:   General: WNL  Eyes: WNL  Ears/Nose/Throat: WNL  Lungs: WNL  Heart: WNL  Stomach: WNL  Bladder: WNL  Muscle/Joints: WNL  Skin: WNL  Nervous System: WNL  Mental Health: WNL     Blood: WNL      Problem List:    Patient Active Problem List   Diagnosis     Restless Legs Syndrome     Corrosive Esophagitis     Hiatal Hernia     CLL (chronic lymphoid leukemia) in relapse     Mixed hyperlipidemia     Cerebral atherosclerosis     Adenocarcinoma Of The Prostate Gland     Papillary Carcinoma Of The Thyroid Gland     Hypothyroidism     Type 2 Diabetes Mellitus     Coronary Artery Disease     Cervical Radiculopathy     Iron Deficiency     Hypoxia     Advanced directives, counseling/discussion     Infection of ear lobe, right     Right ear pain     Benign essential HTN     Anemia     Other autoimmune hemolytic anemias     Thrombocytopenia     Squamous cell carcinoma of skin     Neutropenia associated with infection     Hemorrhage     Paroxysmal atrial fibrillation     Hypocalcemia     Acute pulmonary  edema     Abdominal pain, unspecified abdominal location     Fecal impaction     Cholecystitis     Encounter for family conference with patient present     Prolonged Q-T interval on ECG     Adjustment disorder     Fatigue     Near syncope     Generalized weakness     Dehydration     Diarrhea     Pleural effusion     DVT (deep vein thrombosis) in pregnancy       Social History     Social History     Marital status:      Spouse name: N/A     Number of children: N/A     Years of education: N/A     Occupational History     Not on file.     Social History Main Topics     Smoking status: Never Smoker     Smokeless tobacco: Never Used     Alcohol use No     Drug use: No     Sexual activity: No     Other Topics Concern     Not on file     Social History Narrative       Family History   Problem Relation Age of Onset     Prostate cancer Father      Cancer Father      Alzheimer's disease Father      Parkinsonism Mother      Diabetes Brother      Alzheimer's disease Sister      No Medical Problems Daughter      No Medical Problems Daughter      Parkinsonism Son      No Medical Problems Son      No Medical Problems Son      No Medical Problems Son      Parkinsonism Son      No Medical Problems Son      No Medical Problems Son      No Medical Problems Son        Current Outpatient Prescriptions   Medication Sig Dispense Refill     acetaminophen (TYLENOL) 500 MG tablet Take 1,000 mg by mouth every 6 (six) hours as needed for pain.       allopurinol (ZYLOPRIM) 300 MG tablet Take 1 tablet (300 mg total) by mouth daily with breakfast. 30 tablet 5     amiodarone (PACERONE) 200 MG tablet Take 1 tablet (200 mg total) by mouth daily. 30 tablet 3     atorvastatin (LIPITOR) 20 MG tablet Take 1 tablet (20 mg total) by mouth at bedtime. 30 tablet 3     clopidogrel (PLAVIX) 75 mg tablet Take 75 mg by mouth daily.       escitalopram oxalate (LEXAPRO) 5 MG tablet Take 5 mg by mouth daily.        folic acid (FOLVITE) 1 MG tablet Take 1  "tablet (1 mg total) by mouth daily. 30 tablet 5     ibrutinib (IMBRUVICA) 140 mg cap capsule Take 3 capsules (420 mg total) by mouth daily. Start per oncology 90 capsule 0     levothyroxine (SYNTHROID, LEVOTHROID) 150 MCG tablet Take 150 mcg by mouth Daily at 6:00 am.       miconazole (MICOTIN) 2 % cream Apply 1 application topically 2 (two) times a day as needed.       mirtazapine (REMERON) 45 MG tablet Take 1 tablet (45 mg total) by mouth at bedtime. 30 tablet 5     mupirocin (BACTROBAN) 2 % ointment APPLY TO AFFECTED AREA ONCE DAILY.  3     omeprazole (PRILOSEC) 40 MG capsule Take 1 capsule (40 mg total) by mouth 2 (two) times a day. 60 capsule 5     oxyCODONE (ROXICODONE) 5 MG immediate release tablet Take 1 tablet (5 mg total) by mouth every 8 (eight) hours as needed for pain. 30 tablet 0     rOPINIRole (REQUIP) 0.25 MG tablet Take 1 tablet (0.25 mg total) by mouth at bedtime. 30 tablet 6     sodium chloride (NS) injection Flush with 10 ml three times daily. 900 mL 1     triamcinolone (KENALOG) 0.1 % cream Apply 1 application topically 2 (two) times a day as needed.       No current facility-administered medications for this visit.        Objective:     /72 (Patient Site: Left Arm, Patient Position: Sitting, Cuff Size: Adult Regular)  Pulse 70  Resp 16  Ht 6' 2\" (1.88 m)  Wt 182 lb (82.6 kg) Comment: with shoes on  BMI 23.37 kg/m2  182 lb (82.6 kg)       Physical Exam:    GENERAL APPEARANCE: alert, no apparent distress  HEENT: no scleral icterus or xanthelasma  NECK: jugular venous pressure does not appear elevated on today's examination.  CHEST: symmetric, the lungs are clear to auscultation  CARDIOVASCULAR: regular rhythm with 2/6 systolic murmur, click, or gallop; no carotid bruits  Abdomen: No Organomegaly, masses, bruits, or tenderness. Bowels sounds are present      EXTREMITIES: no cyanosis, clubbing.  Mild edema  Skin: Multiple bruises on his upper extremities.    Cardiac Testing:    Patient " Information      Patient Name MRN Sex              Age     Nixon Velasquez Jr. [id number] Male 1929 (89 y.o.)       Indications      Congestive heart failure       Summary        Left Ventricle: Normal left ventricular size.The estimated left ventricular ejection fraction is 60%. This represents a normal ejection fraction. Mild hypertrophy noted. E/e' > 15, suggesting elevated LV filling pressures.    Left Atrium: Left atrial volume is moderately increased.    Mitral Valve: There is moderate mitral annular calcification. Moderate mitral regurgitation.    Mild aortic stenosis. Mild aortic regurgitation    Normal right ventricular size and systolic function.    No pulmonary hypertension present.    Estimated central venous pressure equal to 13 mmHg.             Lab Results:    Lab Results   Component Value Date     2018    K 4.1 2018     2018    CO2 27 2018    BUN 14 2018    CREATININE 0.77 2018    CALCIUM 8.7 2018     Lab Results   Component Value Date    CHOL 135 2012    TRIG 117 2012    HDL 38 (L) 2012    LDLDIRECT 50 2014     BNP (pg/mL)   Date Value   2018 68   2018 62   2016 96 (H)     Creatinine (mg/dL)   Date Value   2018 0.77   2018 0.80   2018 0.74   2018 0.71     LDL Calculated (mg/dL)   Date Value   2012 73   2011 52.4   2009 72     Lab Results   Component Value Date    WBC 4.9 2018    HGB 11.5 (L) 2018    HCT 36.9 (L) 2018     (H) 2018     (L) 2018               This note has been dictated using voice recognition software. Any grammatical or context distortions are unintentional and inherent to the software.      Porter Garza M.D., F.A.C.C.  [facility]  [phone]                 FAMILY HISTORY:  FH: HTN (hypertension), father    Father  Still living? No  Family history of dementia, Age at diagnosis: Age Unknown